# Patient Record
Sex: FEMALE | Race: WHITE | Employment: OTHER | URBAN - METROPOLITAN AREA
[De-identification: names, ages, dates, MRNs, and addresses within clinical notes are randomized per-mention and may not be internally consistent; named-entity substitution may affect disease eponyms.]

---

## 2017-01-31 ENCOUNTER — HOSPITAL ENCOUNTER (OUTPATIENT)
Dept: NON INVASIVE DIAGNOSTICS | Age: 82
Discharge: HOME | End: 2017-01-31
Attending: NURSE PRACTITIONER
Payer: MEDICARE

## 2017-01-31 DIAGNOSIS — I10 ESSENTIAL HYPERTENSION, BENIGN: ICD-10-CM

## 2017-01-31 PROCEDURE — 93306 TTE W/DOPPLER COMPLETE: CPT

## 2017-02-20 NOTE — PROGRESS NOTES
Per Fercho Spencer last note \"   Just FYI: Phong Bun done after recent ER visit for CHF.  Stress echo was done in 2009, EF at that time was 65%

## 2017-02-27 NOTE — PROGRESS NOTES
This echocardiogram looks fine and we can discuss it with her when she comes into the office on March 8, 2017 for her appointment.  HERBERTH

## 2017-03-16 ENCOUNTER — OFFICE VISIT (OUTPATIENT)
Dept: CARDIOLOGY CLINIC | Age: 82
End: 2017-03-16

## 2017-03-16 VITALS
HEART RATE: 74 BPM | SYSTOLIC BLOOD PRESSURE: 120 MMHG | DIASTOLIC BLOOD PRESSURE: 60 MMHG | HEIGHT: 56 IN | OXYGEN SATURATION: 96 % | WEIGHT: 109 LBS | BODY MASS INDEX: 24.52 KG/M2

## 2017-03-16 DIAGNOSIS — I10 ESSENTIAL HYPERTENSION: ICD-10-CM

## 2017-03-16 DIAGNOSIS — I48.0 PAROXYSMAL ATRIAL FIBRILLATION (HCC): Primary | ICD-10-CM

## 2017-03-16 DIAGNOSIS — E78.5 DYSLIPIDEMIA: ICD-10-CM

## 2017-03-16 RX ORDER — HYDRALAZINE HYDROCHLORIDE 25 MG/1
25 TABLET, FILM COATED ORAL 2 TIMES DAILY
COMMUNITY
Start: 2017-01-17 | End: 2018-02-15

## 2017-03-16 RX ORDER — NIFEDIPINE 30 MG/1
30 TABLET, EXTENDED RELEASE ORAL DAILY
COMMUNITY
Start: 2017-03-08 | End: 2018-03-07 | Stop reason: DRUGHIGH

## 2017-03-16 NOTE — PROGRESS NOTES
Review of Systems   Constitutional: Positive for malaise/fatigue. Respiratory: Negative. Cardiovascular: Negative. Gastrointestinal: Negative. Musculoskeletal: Negative. Neurological: Positive for dizziness.

## 2017-03-16 NOTE — PROGRESS NOTES
1. Have you been to the ER, urgent care clinic since your last visit? Hospitalized since your last visit? [facility] 12/3/16   2. Have you seen or consulted any other health care providers outside of the [address] [place] [address] since your last visit? Include any pap smears or colon screening.   no

## 2017-03-16 NOTE — MR AVS SNAPSHOT
Visit Information Date & Time Provider Department Dept. Phone Encounter #  
 3/16/2017 11:20 AM Adeel Bustamante DO [facility] [id number] Follow-up Instructions Return in about 6 months (around 9/16/2017), or if symptoms worsen or fail to improve. Upcoming Health Maintenance Date Due DTaP/Tdap/Td series (1 - Tdap) 6/10/1956 ZOSTER VACCINE AGE 60> 6/10/1995 GLAUCOMA SCREENING Q2Y 6/10/2000 OSTEOPOROSIS SCREENING (DEXA) 6/10/2000 MEDICARE YEARLY EXAM 6/10/2000 INFLUENZA AGE 9 TO ADULT 8/1/2016 Pneumococcal 65+ High/Highest Risk (2 of 2 - PPSV23) 10/1/2019 Allergies as of 3/16/2017  Review Complete On: 3/16/2017 By: Adeel Bustamante DO Severity Noted Reaction Type Reactions Hydrochlorothiazide  08/08/2012    Hives Penicillins  08/13/2012    Other (comments) Mouth sore, upset stomach. Sulfa (Sulfonamide Antibiotics)    Unknown (comments) Prednisone Low 08/03/2015   Systemic Other (comments) Body sweats, hot and cold, up all night Current Immunizations  Never Reviewed Name Date Influenza High Dose Vaccine PF 9/22/2015, 10/1/2014 Pneumococcal Vaccine (Unspecified Type) 10/1/2014 Not reviewed this visit You Were Diagnosed With   
  
 Codes Comments Paroxysmal atrial fibrillation (HCC)    -  Primary ICD-10-CM: I48.0 ICD-9-CM: 427.31 Essential hypertension     ICD-10-CM: I10 
ICD-9-CM: 401.9 Dyslipidemia     ICD-10-CM: E78.5 ICD-9-CM: 272.4 Vitals BP Pulse Height(growth percentile) Weight(growth percentile) SpO2 BMI  
 120/60 74 4' 8\" (1.422 m) 109 lb (49.4 kg) 96% 24.44 kg/m2 OB Status Smoking Status Hysterectomy Never Smoker Vitals History BMI and BSA Data Body Mass Index Body Surface Area  
 24.44 kg/m 2 1.4 m 2 Preferred Pharmacy Pharmacy Name Phone [facility] [address] [facility] [address] Your Updated Medication List  
  
   
This list is accurate as of: 3/16/17 12:25 PM.  Always use your most recent med list.  
  
  
  
  
 aspirin, buffered 81 mg Tab Take 1 Tab by mouth daily. beclomethasone 40 mcg/actuation [employer] [facility] [employer] Commonly known as:  QVAR Take 2 Puffs by inhalation two (2) times a day. Use with spacer device  
  
 biotin 1 mg Tab Take 1 Tab by mouth daily. CALCIUM 600 PO Take 1 Tab by mouth daily. carboxymethylcellulose sodium 1 % Dlgl Apply 1 Drop to eye nightly. cholecalciferol 1,000 unit Cap Commonly known as:  VITAMIN D3 Take 1,000 Units by mouth daily. CLARITIN 10 mg tablet Generic drug:  loratadine Take 10 mg by mouth daily as needed. digoxin 0.125 mg tablet Commonly known as:  LANOXIN Take 0.125 mg by mouth every other day. estradiol 1 mg tablet Commonly known as:  ESTRACE Take 1 mg by mouth daily. fish oil-dha-epa 1,200-144-216 mg Cap Take 1 Cap by mouth daily. furosemide 20 mg tablet Commonly known as:  LASIX Take  by mouth daily. hydrALAZINE 25 mg tablet Commonly known as:  APRESOLINE Take 25 mg by mouth two (2) times a day. levothyroxine 100 mcg tablet Commonly known as:  SYNTHROID Take 100 mcg by mouth Daily (before breakfast). LOPRESSOR 50 mg tablet Generic drug:  metoprolol tartrate Take 50 mg by mouth two (2) times a day. lovastatin 40 mg tablet Commonly known as:  MEVACOR Take 40 mg by mouth nightly. multivitamin tablet Commonly known as:  ONE A DAY Take 1 Tab by mouth daily. NIFEdipine ER 30 mg ER tablet Commonly known as:  PROCARDIA XL Take 30 mg by mouth daily. pantoprazole 40 mg tablet Commonly known as:  PROTONIX Take 40 mg by mouth daily. PROBIOTIC 4X 10-15 mg Tbec Generic drug:  B.infantis-B.ani-B.long-B.bifi Take 1 Tab by mouth daily. SINGULAIR 10 mg tablet Generic drug:  montelukast  
Take 10 mg by mouth daily. traMADol 50 mg tablet Commonly known as:  ULTRAM  
Take 25 mg by mouth two (2) times a day. traZODone 50 mg tablet Commonly known as:  Yepez Leobardo Take 50 mg by mouth nightly. We Performed the Following AMB POC EKG ROUTINE W/ 12 LEADS, INTER & REP [49971 CPT(R)] Follow-up Instructions Return in about 6 months (around 9/16/2017), or if symptoms worsen or fail to improve. Introducing [facility]! Dear [facility]: Thank you for requesting a Ondeego account. Our records indicate that you already have an active Ondeego account. You can access your account anytime at [email or web address] Did you know that you can access your hospital and ER discharge instructions at any time in Ondeego? You can also review all of your test results from your hospital stay or ER visit. Additional Information If you have questions, please visit the Frequently Asked Questions section of the Ondeego website at [email or web address] Remember, Ondeego is NOT to be used for urgent needs. For medical emergencies, dial 911. Now available from your iPhone and Android! Please provide this summary of care documentation to your next provider. Your primary care clinician is listed as Chapin Berrios. If you have any questions after today's visit, please call [phone].

## 2017-03-16 NOTE — PROGRESS NOTES
HPI: I saw Ryanlinh RosNancy Acosta in my office today in cardiovascular evaluation regarding her past problems with paroxysmal atrial fibrillation. Ms. Juan Acosta is a very pleasant 80year old  female with history of only mild coronary artery disease on a cardiac catheterization back in 2009, which was done due to fatigue issues. She has had problems with paroxysmal atrial fibrillation dating back to July of 2012, and we had empirically added Xarelto to her regimen because of potential risk for peripheral embolization from the heart, but unfortunately she had a significant problem with hemoptysis and that medication had to be discontinued and we have simply kept her off of anticoagulation long term and have simply kept her on aspirin. She had been remaining in sinus rhythm on Digoxin and Lopressor when I saw her at the time of her last visit in June 2016 and she was in sinus rhythm at the time of her last EKG on February 1, 2017, but she currently appears to be in atrial flutter fibrillation with a controlled ventricular response in the 70's. She comes in today and actually feels quite well and denies any cardiovascular symptomatology. She apparently was somewhat dig toxic when she had her jocks and level checked a number of months ago and is now taking digoxin 0.25 mg every other day. She has rather marked digitalis changes on her EKG but has a controlled rate in atrial fibrillation and this is new from her EKG back on February 1, 2017. She I suspect is going back and forth between sinus rhythm and atrial fibrillation but unfortunately due to her bleeding risk we may not be able to any anticoagulation. She is concerned about stroke and I am going to discuss her case further with Dr. Tomás Mohr.   She otherwise seems to be doing well except for some fatigue which he thinks may be related to some new medicine she got from Dr. Zuleta Goes due to some ENT issues, but the fatigue could also be related to her atrial flutter fibrillation. Encounter Diagnoses   Name Primary?  Paroxysmal atrial fibrillation (HCC) recurrent Yes    Essential hypertension     Dyslipidemia        Discussion: This patient appears to be doing reasonably well but she is back in atrial flutter fibrillation and she is concerned about the potential of stroke. I am going to discuss her case with Dr. Joyce Viera her general internist, but her hemoptysis issues I believe were significant enough that it was decided not to use anticoagulation in this lady. However, I will leave decision on attempted anticoagulation again to Dr. Angelo Pedersen. She apparently was dig toxic with a level done last July with a dig level 3.1 and have her digoxin held for couple days and then decreased to 0.25 mg every other day. However she is finding this somewhat difficult to follow and I am going to discuss this with Dr. Angelo Pedersen and I think we might want to change her to 0.125 mg either daily or 6 days a week and check a digoxin level after she has been on that regimen for a few weeks but we may wish to get a digoxin level at baseline now before we make the change and I will discuss this with Dr. Angelo Pedersen. Her latest lipid profile which was completed on July 13, 2016 showed total cholesterol 157 with triglycerides of 121, HDL of 49, and LDL of 84 on only Mevacor 40 mg daily which I think for this lady without diabetes or known obstructive coronary disease is reasonably good control. Since she otherwise seems to be doing well I will plan to see her again in several months or as needed if any new cardiovascular symptoms surface in the interim. PCP: Cayetano Allen MD       Past Medical History:   Diagnosis Date    Anemia NEC     Arthritis     Atrial fibrillation ([address]     Bursitis of left shoulder     Cardiac cath 01/27/2009    RCA mild. LM patent. pLAD 20%. Very dLAD w/significant tapering. CX mild.       Cardiac stress echo, abnormal 01/05/2009    Positive maximal stress echo by echo & EKG criteria w/o chest pain.  EF >65%. Sm, discrete apical, apical septal hypk.  Cough     Endocrine disease     Hyperlipidemia     Hypertension     Hypothyroidism     Osteopenia     Pulmonary hemorrhage     Pulmonary hypertension (HCC)     Rash and nonspecific skin eruption 9/2012    maculopapular on torso, arms, and upper legs    Reflux     Right hip pain     Right shoulder pain     Stenosing tenosynovitis of finger 04/2015    Bilateral index fingers    Trigger ring finger of right hand     Wears glasses        Past Surgical History:   Procedure Laterality Date    ABDOMEN SURGERY PROC UNLISTED      HX APPENDECTOMY      HX COLECTOMY      partial    HX HYSTERECTOMY      HX MOHS PROCEDURES Right 12/2010    Dr. Andrew Moore    HX OTHER SURGICAL      Two ribs removed    HX TONSILLECTOMY         Current Outpatient Rx   Name  Route  Sig  Dispense  Refill    levothyroxine (SYNTHROID) 100 mcg tablet    Oral    Take 100 mcg by mouth Daily (before breakfast).  hydrALAZINE (APRESOLINE) 50 mg tablet    Oral    Take 50 mg by mouth two (2) times a day.  NIFEdipine ER (ADALAT CC) 90 mg ER tablet    Oral    Take 90 mg by mouth daily.  pantoprazole (PROTONIX) 40 mg tablet    Oral    Take 40 mg by mouth daily.  CALCIUM CARBONATE (CALCIUM 600 PO)    Oral    Take 1 Tab by mouth daily.  ascorbic acid (VITAMIN C) 1,000 mg tablet    Oral    Take 1,000 mg by mouth daily.  cholecalciferol (VITAMIN D3) 1,000 unit cap    Oral    Take 1,000 Units by mouth daily.  multivitamin (ONE A DAY) tablet    Oral    Take 1 Tab by mouth daily.  carboxymethylcellulose sodium 1 % dlgl    Ophthalmic    Apply 1 Drop to eye nightly.  traMADol (ULTRAM) 50 mg tablet    Oral    Take 50 mg by mouth two (2) times a day.               beclomethasone (QVAR) 40 mcg/actuation inhaler    Inhalation    Take 2 Puffs by inhalation two (2) times a day. Use with spacer device    1 Inhaler    6      fish oil-dha-epa 1,200-144-216 mg cap    Oral    Take 1 Cap by mouth daily.  biotin 1 mg tab    Oral    Take 1 Tab by mouth daily.  OTHER    IntraMUSCular    by IntraMUSCular route every seven (7) days. Allergy shot at Dr. Ken Zepeda office              Aspirin, Buffered 81 mg tab    Oral    Take 1 Tab by mouth daily.  B.infantis-B.ani-B.long-B.bifi (PROBIOTIC 4X) 10-15 mg TbEC    Oral    Take 1 Tab by mouth daily.  montelukast (SINGULAIR) 10 mg tablet    Oral    Take 10 mg by mouth daily.  digoxin (LANOXIN) 0.125 mg tablet    Oral    Take 0.125 mg by mouth daily.  metoprolol (LOPRESSOR) 50 mg tablet    Oral    Take 50 mg by mouth two (2) times a day.  loratadine (CLARITIN) 10 mg tablet    Oral    Take 10 mg by mouth daily as needed.  lovastatin (MEVACOR) 40 mg tablet    Oral    Take 40 mg by mouth nightly.  traZODone (DESYREL) 50 mg tablet    Oral    Take 50 mg by mouth nightly.  estradiol (ESTRACE) 1 mg tablet    Oral    Take 1 mg by mouth daily. Allergies   Allergen Reactions    Hydrochlorothiazide Hives    Penicillins Other (comments)     Mouth sore, upset stomach.  Sulfa (Sulfonamide Antibiotics) Unknown (comments)    Prednisone Other (comments)     Body sweats, hot and cold, up all night       Social History:   Social History   Substance Use Topics    Smoking status: Never Smoker    Smokeless tobacco: Never Used    Alcohol use No         Family history: family history includes Heart Attack in her brother and mother; Heart Disease in her sister; Heart Surgery in her sister; Hypertension in her mother; Stroke in her brother and father. Review of Systems:   Constitutional: Positive for malaise/fatigue.    Respiratory: Negative. Cardiovascular: Negative. Gastrointestinal: Negative. Musculoskeletal: Negative. Neurological: Positive for dizziness. Physical Exam:   The patient is an alert, oriented, well developed, well nourished 80 y.o.  female who was in no acute distress at the time of my examination. Visit Vitals    /60    Pulse 74    Ht 4' 8\" (1.422 m)    Wt 49.4 kg (109 lb)    SpO2 96%    BMI 24.44 kg/m2      BP Readings from Last 3 Encounters:   03/16/17 120/60   12/29/16 150/66   06/15/16 136/80      Wt Readings from Last 3 Encounters:   03/16/17 49.4 kg (109 lb)   12/29/16 49.5 kg (109 lb 3.2 oz)   06/15/16 49 kg (108 lb)     HEENT: Conjuctiva white, mucosa moist, no pallor or cyanosis. NECK: Supple without masses, tenderness or thyromegaly. There was no jugular venous distention. Carotid are full bilaterally without bruits. CHEST: Symmetrical with good excursion. LUNGS: Clear to auscultation in all fields. HEART: Regular rate and rhythm. The apex is not displaced. There were no lifts, thrills or heaves. There is a normal S1 and S2 without appreciable murmurs, rubs, clicks, or gallops auscultated. ABDOMEN: Soft without masses, tenderness or organomegaly. EXTREMITIES: Full peripheral pulses without peripheral edema. Review of Data: See PMH and Cardiology and Imaging sections for cardiac testing      Results for orders placed or performed in visit on 03/16/17   AMB POC EKG ROUTINE W/ 12 LEADS, INTER & REP     Status: None    Narrative    Atrial flutter fibrillation with rate in the mid 70s. There are ST-T changes inferolaterally on the high lateral leads most consistent with digitalis effect. This EKG is similar to the EKG of December 29, 2016 although the ST-T wave changes are much more marked on the current tracing and at that time the patient was in sinus rhythm and now appears to be in atrial flutter fibrillation. Arvilla Lefort, D.O., F.A.C.C.   [facility]  [facility]  [address]    PLEASE NOTE:  This document has been produced using voice recognition software. Unrecognized errors in transcription may be present.

## 2017-03-17 ENCOUNTER — TELEPHONE (OUTPATIENT)
Dept: CARDIOLOGY CLINIC | Age: 82
End: 2017-03-17

## 2017-03-17 NOTE — TELEPHONE ENCOUNTER
Ms. Lenna Canavan called wanting to know if Dr. Lalita Cardozo spoke with Dr. Ирина Lovelace last night. She states that Dr. Lalita Cardozo told her he was going to call our office. Please call her with any information.

## 2017-03-20 ENCOUNTER — TELEPHONE (OUTPATIENT)
Dept: CARDIOLOGY CLINIC | Age: 82
End: 2017-03-20

## 2017-03-20 NOTE — TELEPHONE ENCOUNTER
Dr. Julio Canales returned my call and we discussed anticoagulation again  regarding Mrs. Heavenly Rodriguez since she was concerned about her risk of stroke when I saw her but is our understanding that she had a lot of bleeding when she was on Pradaxa with her hemoptysis and she really does not appear to be a candidate for any type of anticoagulation in the future. I also talked him about her digoxin level and he got one recently was down to 1.0 and she is now on digoxin 0.125 mg daily.  ES

## 2017-03-22 NOTE — TELEPHONE ENCOUNTER
I called and placed a message on the patient's answering machine on her home phone that I had called and talked to Dr. Tomás Mohr and that we are not planning on changing any medications at this time. I did actually try to call her on her cell phone but her cell phone was not accepting calls. In discussion with Dr. Tomás Mohr she had such significant bleeding from her lungs on Pradaxa that he did not feel that she was going to be a candidate to try an anticoagulation agent again and so we will simply have to follow her without any coagulation at this time. If she has specific questions or wants to discuss this further with either myself or with Dr. Tomás Mohr she can let us know and I will be glad to call or as I am sure he will be.  ES

## 2018-02-09 ENCOUNTER — APPOINTMENT (OUTPATIENT)
Dept: GENERAL RADIOLOGY | Age: 83
DRG: 866 | End: 2018-02-09
Attending: EMERGENCY MEDICINE
Payer: MEDICARE

## 2018-02-09 ENCOUNTER — HOSPITAL ENCOUNTER (INPATIENT)
Age: 83
LOS: 6 days | Discharge: NURSING, REHAB OR LONG-TERM CARE | DRG: 866 | End: 2018-02-15
Attending: EMERGENCY MEDICINE | Admitting: INTERNAL MEDICINE
Payer: MEDICARE

## 2018-02-09 DIAGNOSIS — M54.89 BACK PAIN WITHOUT SCIATICA: Primary | ICD-10-CM

## 2018-02-09 PROBLEM — J18.9 PNEUMONIA: Status: ACTIVE | Noted: 2018-02-09

## 2018-02-09 LAB
ALBUMIN SERPL-MCNC: 3.1 G/DL (ref 3.4–5)
ALBUMIN/GLOB SERPL: 0.5 {RATIO} (ref 0.8–1.7)
ALP SERPL-CCNC: 46 U/L (ref 45–117)
ALT SERPL-CCNC: 16 U/L (ref 13–56)
ANION GAP SERPL CALC-SCNC: 8 MMOL/L (ref 3–18)
APPEARANCE UR: CLEAR
AST SERPL-CCNC: 19 U/L (ref 15–37)
ATRIAL RATE: 95 BPM
BACTERIA URNS QL MICRO: ABNORMAL /HPF
BASOPHILS # BLD: 0 K/UL (ref 0–0.1)
BASOPHILS NFR BLD: 0 % (ref 0–2)
BILIRUB SERPL-MCNC: 0.6 MG/DL (ref 0.2–1)
BILIRUB UR QL: NEGATIVE
BUN SERPL-MCNC: 21 MG/DL (ref 7–18)
BUN/CREAT SERPL: 16 (ref 12–20)
CALCIUM SERPL-MCNC: 9 MG/DL (ref 8.5–10.1)
CALCULATED P AXIS, ECG09: 73 DEGREES
CALCULATED R AXIS, ECG10: 35 DEGREES
CALCULATED T AXIS, ECG11: 148 DEGREES
CHLORIDE SERPL-SCNC: 97 MMOL/L (ref 100–108)
CK MB CFR SERPL CALC: ABNORMAL % (ref 0–4)
CK MB CFR SERPL CALC: ABNORMAL % (ref 0–4)
CK MB SERPL-MCNC: <1 NG/ML (ref 5–25)
CK MB SERPL-MCNC: <1 NG/ML (ref 5–25)
CK SERPL-CCNC: 116 U/L (ref 26–192)
CK SERPL-CCNC: 93 U/L (ref 26–192)
CO2 SERPL-SCNC: 27 MMOL/L (ref 21–32)
COLOR UR: YELLOW
CREAT SERPL-MCNC: 1.31 MG/DL (ref 0.6–1.3)
DIAGNOSIS, 93000: NORMAL
DIFFERENTIAL METHOD BLD: ABNORMAL
EOSINOPHIL # BLD: 0 K/UL (ref 0–0.4)
EOSINOPHIL NFR BLD: 0 % (ref 0–5)
EPITH CASTS URNS QL MICRO: ABNORMAL /LPF (ref 0–5)
ERYTHROCYTE [DISTWIDTH] IN BLOOD BY AUTOMATED COUNT: 13 % (ref 11.6–14.5)
FLUAV AG NPH QL IA: NEGATIVE
FLUBV AG NOSE QL IA: NEGATIVE
GLOBULIN SER CALC-MCNC: 6.1 G/DL (ref 2–4)
GLUCOSE SERPL-MCNC: 122 MG/DL (ref 74–99)
GLUCOSE UR STRIP.AUTO-MCNC: NEGATIVE MG/DL
GRAN CASTS URNS QL MICRO: ABNORMAL /LPF
HCT VFR BLD AUTO: 39.4 % (ref 35–45)
HGB BLD-MCNC: 13.5 G/DL (ref 12–16)
HGB UR QL STRIP: NEGATIVE
HYALINE CASTS URNS QL MICRO: ABNORMAL /LPF (ref 0–2)
KETONES UR QL STRIP.AUTO: ABNORMAL MG/DL
LACTATE BLD-SCNC: 1.7 MMOL/L (ref 0.4–2)
LEUKOCYTE ESTERASE UR QL STRIP.AUTO: NEGATIVE
LYMPHOCYTES # BLD: 1.1 K/UL (ref 0.9–3.6)
LYMPHOCYTES NFR BLD: 7 % (ref 21–52)
MCH RBC QN AUTO: 32.3 PG (ref 24–34)
MCHC RBC AUTO-ENTMCNC: 34.3 G/DL (ref 31–37)
MCV RBC AUTO: 94.3 FL (ref 74–97)
MONOCYTES # BLD: 1.4 K/UL (ref 0.05–1.2)
MONOCYTES NFR BLD: 9 % (ref 3–10)
MUCOUS THREADS URNS QL MICRO: ABNORMAL /LPF
NEUTS SEG # BLD: 13.4 K/UL (ref 1.8–8)
NEUTS SEG NFR BLD: 84 % (ref 40–73)
NITRITE UR QL STRIP.AUTO: NEGATIVE
P-R INTERVAL, ECG05: 232 MS
PH UR STRIP: 6 [PH] (ref 5–8)
PLATELET # BLD AUTO: 311 K/UL (ref 135–420)
PMV BLD AUTO: 10.5 FL (ref 9.2–11.8)
POTASSIUM SERPL-SCNC: 3.5 MMOL/L (ref 3.5–5.5)
PROT SERPL-MCNC: 9.2 G/DL (ref 6.4–8.2)
PROT UR STRIP-MCNC: 300 MG/DL
Q-T INTERVAL, ECG07: 328 MS
QRS DURATION, ECG06: 86 MS
QTC CALCULATION (BEZET), ECG08: 412 MS
RBC # BLD AUTO: 4.18 M/UL (ref 4.2–5.3)
RBC #/AREA URNS HPF: ABNORMAL /HPF (ref 0–5)
SODIUM SERPL-SCNC: 132 MMOL/L (ref 136–145)
SP GR UR REFRACTOMETRY: 1.02 (ref 1–1.03)
TROPONIN I SERPL-MCNC: 0.07 NG/ML (ref 0–0.04)
TROPONIN I SERPL-MCNC: 0.08 NG/ML (ref 0–0.04)
UROBILINOGEN UR QL STRIP.AUTO: 1 EU/DL (ref 0.2–1)
VENTRICULAR RATE, ECG03: 95 BPM
WBC # BLD AUTO: 16 K/UL (ref 4.6–13.2)
WBC URNS QL MICRO: ABNORMAL /HPF (ref 0–4)

## 2018-02-09 PROCEDURE — 96375 TX/PRO/DX INJ NEW DRUG ADDON: CPT

## 2018-02-09 PROCEDURE — 99285 EMERGENCY DEPT VISIT HI MDM: CPT

## 2018-02-09 PROCEDURE — 87186 SC STD MICRODIL/AGAR DIL: CPT | Performed by: EMERGENCY MEDICINE

## 2018-02-09 PROCEDURE — 74011250637 HC RX REV CODE- 250/637: Performed by: EMERGENCY MEDICINE

## 2018-02-09 PROCEDURE — 87040 BLOOD CULTURE FOR BACTERIA: CPT | Performed by: EMERGENCY MEDICINE

## 2018-02-09 PROCEDURE — 74011250636 HC RX REV CODE- 250/636: Performed by: EMERGENCY MEDICINE

## 2018-02-09 PROCEDURE — 82550 ASSAY OF CK (CPK): CPT | Performed by: EMERGENCY MEDICINE

## 2018-02-09 PROCEDURE — 74011250636 HC RX REV CODE- 250/636: Performed by: INTERNAL MEDICINE

## 2018-02-09 PROCEDURE — 83605 ASSAY OF LACTIC ACID: CPT

## 2018-02-09 PROCEDURE — 87086 URINE CULTURE/COLONY COUNT: CPT | Performed by: EMERGENCY MEDICINE

## 2018-02-09 PROCEDURE — 94762 N-INVAS EAR/PLS OXIMTRY CONT: CPT

## 2018-02-09 PROCEDURE — 71045 X-RAY EXAM CHEST 1 VIEW: CPT

## 2018-02-09 PROCEDURE — 96365 THER/PROPH/DIAG IV INF INIT: CPT

## 2018-02-09 PROCEDURE — 74011250637 HC RX REV CODE- 250/637: Performed by: INTERNAL MEDICINE

## 2018-02-09 PROCEDURE — 87077 CULTURE AEROBIC IDENTIFY: CPT | Performed by: EMERGENCY MEDICINE

## 2018-02-09 PROCEDURE — 93005 ELECTROCARDIOGRAM TRACING: CPT

## 2018-02-09 PROCEDURE — 96361 HYDRATE IV INFUSION ADD-ON: CPT

## 2018-02-09 PROCEDURE — 74011000250 HC RX REV CODE- 250: Performed by: EMERGENCY MEDICINE

## 2018-02-09 PROCEDURE — 81001 URINALYSIS AUTO W/SCOPE: CPT | Performed by: EMERGENCY MEDICINE

## 2018-02-09 PROCEDURE — 85025 COMPLETE CBC W/AUTO DIFF WBC: CPT | Performed by: EMERGENCY MEDICINE

## 2018-02-09 PROCEDURE — 65270000029 HC RM PRIVATE

## 2018-02-09 PROCEDURE — 80053 COMPREHEN METABOLIC PANEL: CPT | Performed by: EMERGENCY MEDICINE

## 2018-02-09 PROCEDURE — 87804 INFLUENZA ASSAY W/OPTIC: CPT | Performed by: EMERGENCY MEDICINE

## 2018-02-09 RX ORDER — ACETAMINOPHEN 325 MG/1
650 TABLET ORAL
Status: DISCONTINUED | OUTPATIENT
Start: 2018-02-09 | End: 2018-02-15 | Stop reason: HOSPADM

## 2018-02-09 RX ORDER — SODIUM CHLORIDE 0.9 % (FLUSH) 0.9 %
5-10 SYRINGE (ML) INJECTION AS NEEDED
Status: DISCONTINUED | OUTPATIENT
Start: 2018-02-09 | End: 2018-02-15 | Stop reason: HOSPADM

## 2018-02-09 RX ORDER — METOPROLOL TARTRATE 50 MG/1
50 TABLET ORAL 2 TIMES DAILY
Status: DISCONTINUED | OUTPATIENT
Start: 2018-02-10 | End: 2018-02-15 | Stop reason: HOSPADM

## 2018-02-09 RX ORDER — TRAMADOL HYDROCHLORIDE 50 MG/1
25 TABLET ORAL 2 TIMES DAILY
Status: DISCONTINUED | OUTPATIENT
Start: 2018-02-09 | End: 2018-02-15 | Stop reason: HOSPADM

## 2018-02-09 RX ORDER — TRAMADOL HYDROCHLORIDE 50 MG/1
50 TABLET ORAL
Status: COMPLETED | OUTPATIENT
Start: 2018-02-09 | End: 2018-02-09

## 2018-02-09 RX ORDER — NIFEDIPINE 30 MG/1
30 TABLET, EXTENDED RELEASE ORAL DAILY
Status: DISCONTINUED | OUTPATIENT
Start: 2018-02-10 | End: 2018-02-15 | Stop reason: HOSPADM

## 2018-02-09 RX ORDER — LOVASTATIN 20 MG/1
40 TABLET ORAL
Status: DISCONTINUED | OUTPATIENT
Start: 2018-02-09 | End: 2018-02-15 | Stop reason: HOSPADM

## 2018-02-09 RX ORDER — LEVOTHYROXINE SODIUM 100 UG/1
100 TABLET ORAL
Status: DISCONTINUED | OUTPATIENT
Start: 2018-02-10 | End: 2018-02-15 | Stop reason: HOSPADM

## 2018-02-09 RX ORDER — HYDRALAZINE HYDROCHLORIDE 20 MG/ML
10 INJECTION INTRAMUSCULAR; INTRAVENOUS
Status: DISCONTINUED | OUTPATIENT
Start: 2018-02-09 | End: 2018-02-15 | Stop reason: HOSPADM

## 2018-02-09 RX ORDER — ALBUTEROL SULFATE 0.83 MG/ML
1.25 SOLUTION RESPIRATORY (INHALATION)
Status: DISCONTINUED | OUTPATIENT
Start: 2018-02-09 | End: 2018-02-15 | Stop reason: HOSPADM

## 2018-02-09 RX ORDER — SODIUM CHLORIDE 0.9 % (FLUSH) 0.9 %
5-10 SYRINGE (ML) INJECTION EVERY 8 HOURS
Status: DISCONTINUED | OUTPATIENT
Start: 2018-02-09 | End: 2018-02-15 | Stop reason: HOSPADM

## 2018-02-09 RX ORDER — HEPARIN SODIUM 5000 [USP'U]/ML
5000 INJECTION, SOLUTION INTRAVENOUS; SUBCUTANEOUS EVERY 8 HOURS
Status: DISCONTINUED | OUTPATIENT
Start: 2018-02-09 | End: 2018-02-11

## 2018-02-09 RX ORDER — HYDRALAZINE HYDROCHLORIDE 25 MG/1
25 TABLET, FILM COATED ORAL 2 TIMES DAILY
Status: DISCONTINUED | OUTPATIENT
Start: 2018-02-10 | End: 2018-02-14

## 2018-02-09 RX ADMIN — Medication 10 ML: at 21:55

## 2018-02-09 RX ADMIN — SODIUM CHLORIDE 1497 ML: 900 INJECTION, SOLUTION INTRAVENOUS at 15:00

## 2018-02-09 RX ADMIN — LOVASTATIN 40 MG: 20 TABLET ORAL at 21:53

## 2018-02-09 RX ADMIN — HYDRALAZINE HYDROCHLORIDE 10 MG: 20 INJECTION INTRAMUSCULAR; INTRAVENOUS at 21:53

## 2018-02-09 RX ADMIN — SODIUM CHLORIDE 500 MG: 900 INJECTION, SOLUTION INTRAVENOUS at 16:23

## 2018-02-09 RX ADMIN — ACETAMINOPHEN 650 MG: 325 TABLET ORAL at 22:04

## 2018-02-09 RX ADMIN — WATER 2 G: 1 INJECTION INTRAMUSCULAR; INTRAVENOUS; SUBCUTANEOUS at 15:00

## 2018-02-09 RX ADMIN — TRAMADOL HYDROCHLORIDE 50 MG: 50 TABLET ORAL at 10:56

## 2018-02-09 RX ADMIN — TRAMADOL HYDROCHLORIDE 25 MG: 50 TABLET, FILM COATED ORAL at 21:54

## 2018-02-09 NOTE — IP AVS SNAPSHOT
[address] Patient: Thom Mosley MRN: [id number] About your hospitalization You were admitted on:  February 9, 2018 You last received care in the:  [facility] [address] You were discharged on:  February 15, 2018 Why you were hospitalized Your primary diagnosis was:  Not on File Your diagnoses also included:  Pneumonia Follow-up Information Follow up With Details Comments Contact Info Moy Mosley MD On 2/22/2018 @ 10:30 [address] [address] 
[phone] Discharge Orders None A check jyoti indicates which time of day the medication should be taken. My Medications START taking these medications Instructions Each Dose to Equal  
 Morning Noon Evening Bedtime  
 acetaminophen 325 mg tablet Commonly known as:  TYLENOL Your last dose was: Your next dose is: Take 2 Tabs by mouth every four (4) hours as needed for Fever. 650 mg  
    
   
   
   
  
 albuterol 2.5 mg /3 mL (0.083 %) nebulizer solution Commonly known as:  PROVENTIL VENTOLIN Your last dose was: Your next dose is:    
   
   
 1.5 mL by Nebulization route every four (4) hours as needed for Wheezing. 1.25 mg  
    
   
   
   
  
 bisacodyl 5 mg EC tablet Commonly known as:  DULCOLAX Your last dose was: Your next dose is: Take 2 Tabs by mouth daily as needed for Constipation. 10 mg  
    
   
   
   
  
 docusate sodium 100 mg capsule Commonly known as:  Rafa Monica Your last dose was: Your next dose is: Take 1 Cap by mouth two (2) times a day for 90 days. 100 mg  
    
   
   
   
  
 melatonin 3 mg tablet Your last dose was: Your next dose is: Take 1 Tab by mouth nightly. 3 mg CHANGE how you take these medications Instructions Each Dose to Equal  
 Morning Noon Evening Bedtime  
 hydrALAZINE 50 mg tablet Commonly known as:  APRESOLINE What changed:   
- medication strength 
- how much to take - when to take this Your last dose was: Your next dose is: Take 1 Tab by mouth three (3) times daily. 50 mg CONTINUE taking these medications Instructions Each Dose to Equal  
 Morning Noon Evening Bedtime  
 aspirin, buffered 81 mg Tab Your last dose was: Your next dose is: Take 1 Tab by mouth daily. 1 Tab  
    
   
   
   
  
 beclomethasone 40 mcg/actuation Aero Commonly known as:  QVAR Your last dose was: Your next dose is: Take 2 Puffs by inhalation two (2) times a day. Use with spacer device 2 Puff  
    
   
   
   
  
 biotin 1 mg Tab Your last dose was: Your next dose is: Take 1 Tab by mouth daily. 1 Tab CALCIUM 600 PO Your last dose was: Your next dose is: Take 1 Tab by mouth daily. 1 Tab  
    
   
   
   
  
 carboxymethylcellulose sodium 1 % Dlgl Your last dose was: Your next dose is:    
   
   
 Apply 1 Drop to eye nightly. 1 Drop  
    
   
   
   
  
 cholecalciferol 1,000 unit Cap Commonly known as:  VITAMIN D3 Your last dose was: Your next dose is: Take 1,000 Units by mouth daily. 1000 Units CLARITIN 10 mg tablet Generic drug:  loratadine Your last dose was: Your next dose is: Take 10 mg by mouth daily as needed. 10 mg  
    
   
   
   
  
 digoxin 0.125 mg tablet Commonly known as:  LANOXIN Your last dose was: Your next dose is: Take 0.125 mg by mouth every other day.   
 0.125 mg  
    
   
   
   
  
 fish oil-dha-epa 1,200-144-216 mg Cap Your last dose was: Your next dose is: Take 1 Cap by mouth daily. 1 Cap  
    
   
   
   
  
 levothyroxine 100 mcg tablet Commonly known as:  SYNTHROID Your last dose was: Your next dose is: Take 100 mcg by mouth Daily (before breakfast). 100 mcg LOPRESSOR 50 mg tablet Generic drug:  metoprolol tartrate Your last dose was: Your next dose is: Take 50 mg by mouth two (2) times a day. 50 mg  
    
   
   
   
  
 lovastatin 40 mg tablet Commonly known as:  MEVACOR Your last dose was: Your next dose is: Take 40 mg by mouth nightly. 40 mg  
    
   
   
   
  
 multivitamin tablet Commonly known as:  ONE A DAY Your last dose was: Your next dose is: Take 1 Tab by mouth daily. 1 Tab NIFEdipine ER 30 mg ER tablet Commonly known as:  PROCARDIA XL Your last dose was: Your next dose is: Take 30 mg by mouth daily. 30 mg  
    
   
   
   
  
 pantoprazole 40 mg tablet Commonly known as:  PROTONIX Your last dose was: Your next dose is: Take 40 mg by mouth daily. 40 mg PROBIOTIC 4X 10-15 mg Tbec Generic drug:  B.infantis-B.ani-B.long-B.bifi Your last dose was: Your next dose is: Take 1 Tab by mouth daily. 1 Tab SINGULAIR 10 mg tablet Generic drug:  montelukast  
   
Your last dose was: Your next dose is: Take 10 mg by mouth daily. 10 mg  
    
   
   
   
  
 traMADol 50 mg tablet Commonly known as:  ULTRAM  
   
Your last dose was: Your next dose is: Take 0.5 Tabs by mouth two (2) times a day. Max Daily Amount: 50 mg.  
 25 mg  
    
   
   
   
  
 traZODone 50 mg tablet Commonly known as:  Desmond Blount Your last dose was: Your next dose is: Take 50 mg by mouth nightly. 50 mg  
    
   
   
   
  
  
STOP taking these medications   
 estradiol 1 mg tablet Commonly known as:  ESTRACE  
   
  
 furosemide 20 mg tablet Commonly known as:  LASIX Where to Get Your Medications Information on where to get these meds will be given to you by the nurse or doctor. ! Ask your nurse or doctor about these medications  
  acetaminophen 325 mg tablet  
 albuterol 2.5 mg /3 mL (0.083 %) nebulizer solution  
 bisacodyl 5 mg EC tablet  
 docusate sodium 100 mg capsule  
 hydrALAZINE 50 mg tablet  
 melatonin 3 mg tablet  
 traMADol 50 mg tablet Discharge Instructions Pneumonia: Care Instructions Your Care Instructions Pneumonia is an infection of the lungs. Most cases are caused by infections from bacteria or viruses. Pneumonia may be mild or very severe. If it is caused by bacteria, you will be treated with antibiotics. It may take a few weeks to a few months to recover fully from pneumonia, depending on how sick you were and whether your overall health is good. Follow-up care is a key part of your treatment and safety. Be sure to make and go to all appointments, and call your doctor if you are having problems. It's also a good idea to know your test results and keep a list of the medicines you take. How can you care for yourself at home? · Take your antibiotics exactly as directed. Do not stop taking the medicine just because you are feeling better. You need to take the full course of antibiotics. · Take your medicines exactly as prescribed. Call your doctor if you think you are having a problem with your medicine. · Get plenty of rest and sleep. You may feel weak and tired for a while, but your energy level will improve with time.  
· To prevent dehydration, drink plenty of fluids, enough so that your urine is light yellow or clear like water. Choose water and other caffeine-free clear liquids until you feel better. If you have kidney, heart, or liver disease and have to limit fluids, talk with your doctor before you increase the amount of fluids you drink. · Take care of your cough so you can rest. A cough that brings up mucus from your lungs is common with pneumonia. It is one way your body gets rid of the infection. But if coughing keeps you from resting or causes severe fatigue and chest-wall pain, talk to your doctor. He or she may suggest that you take a medicine to reduce the cough. · Use a vaporizer or humidifier to add moisture to your bedroom. Follow the directions for cleaning the machine. · Do not smoke or allow others to smoke around you. Smoke will make your cough last longer. If you need help quitting, talk to your doctor about stop-smoking programs and medicines. These can increase your chances of quitting for good. · Take an over-the-counter pain medicine, such as acetaminophen (Tylenol), ibuprofen (Advil, Motrin), or naproxen (Aleve). Read and follow all instructions on the label. · Do not take two or more pain medicines at the same time unless the doctor told you to. Many pain medicines have acetaminophen, which is Tylenol. Too much acetaminophen (Tylenol) can be harmful. · If you were given a spirometer to measure how well your lungs are working, use it as instructed. This can help your doctor tell how your recovery is going. · To prevent pneumonia in the future, talk to your doctor about getting a flu vaccine (once a year) and a pneumococcal vaccine (one time only for most people). When should you call for help? Call 911 anytime you think you may need emergency care. For example, call if: 
? · You have severe trouble breathing. ?Call your doctor now or seek immediate medical care if: 
? · You cough up dark brown or bloody mucus (sputum). ? · You have new or worse trouble breathing. ? · You are dizzy or lightheaded, or you feel like you may faint. ? Watch closely for changes in your health, and be sure to contact your doctor if: 
? · You have a new or higher fever. ? · You are coughing more deeply or more often. ? · You are not getting better after 2 days (48 hours). ? · You do not get better as expected. Where can you learn more? Go to http://molina-frida.info/. Enter [id number] in the search box to learn more about \"Pneumonia: Care Instructions. \" Current as of: May 12, 2017 Content Version: 11.4 © 7264-1839 [employer] Care instructions adapted under license by [employer] (which disclaims liability or warranty for this information). If you have questions about a medical condition or this instruction, always ask your healthcare professional. [place] [address] any warranty or liability for your use of this information. Back Pain: Care Instructions Your Care Instructions Back pain has many possible causes. It is often related to problems with muscles and ligaments of the back. It may also be related to problems with the nerves, discs, or bones of the back. Moving, lifting, standing, sitting, or sleeping in an awkward way can strain the back. Sometimes you don't notice the injury until later. Arthritis is another common cause of back pain. Although it may hurt a lot, back pain usually improves on its own within several weeks. Most people recover in 12 weeks or less. Using good home treatment and being careful not to stress your back can help you feel better sooner. Follow-up care is a key part of your treatment and safety. Be sure to make and go to all appointments, and call your doctor if you are having problems. It's also a good idea to know your test results and keep a list of the medicines you take. How can you care for yourself at home? · Sit or lie in positions that are most comfortable and reduce your pain. Try one of these positions when you lie down: ¨ Lie on your back with your knees bent and supported by large pillows. ¨ Lie on the floor with your legs on the seat of a sofa or chair. [place] on your side with your knees and hips bent and a pillow between your legs. ¨ Lie on your stomach if it does not make pain worse. · Do not sit up in bed, and avoid soft couches and twisted positions. Bed rest can help relieve pain at first, but it delays healing. Avoid bed rest after the first day of back pain. · Change positions every 30 minutes. If you must sit for long periods of time, take breaks from sitting. Get up and walk around, or lie in a comfortable position. · Try using a heating pad on a low or medium setting for 15 to 20 minutes every 2 or 3 hours. Try a warm shower in place of one session with the heating pad. · You can also try an ice pack for 10 to 15 minutes every 2 to 3 hours. Put a thin cloth between the ice pack and your skin. · Take pain medicines exactly as directed. ¨ If the doctor gave you a prescription medicine for pain, take it as prescribed. ¨ If you are not taking a prescription pain medicine, ask your doctor if you can take an over-the-counter medicine. · Take short walks several times a day. You can start with 5 to 10 minutes, 3 or 4 times a day, and work up to longer walks. Walk on level surfaces and avoid hills and stairs until your back is better. · Return to work and other activities as soon as you can. Continued rest without activity is usually not good for your back. · To prevent future back pain, do exercises to stretch and strengthen your back and stomach. Learn how to use good posture, safe lifting techniques, and proper body mechanics. When should you call for help? Call your doctor now or seek immediate medical care if: 
? · You have new or worsening numbness in your legs. ? · You have new or worsening weakness in your legs. (This could make it hard to stand up.) ? · You lose control of your bladder or bowels. ? Watch closely for changes in your health, and be sure to contact your doctor if: 
? · Your pain gets worse. ? · You are not getting better after 2 weeks. Where can you learn more? Go to http://molina-frida.info/. Enter C445 in the search box to learn more about \"Back Pain: Care Instructions. \" Current as of: March 21, 2017 Content Version: 11.4 © 5562-8736 [employer]. Care instructions adapted under license by [employer] (which disclaims liability or warranty for this information). If you have questions about a medical condition or this instruction, always ask your healthcare professional. [address] any warranty or liability for your use of this information. Patient armband removed and shredded DISCHARGE SUMMARY from Nurse PATIENT INSTRUCTIONS: 
 
 
F-face looks uneven A-arms unable to move or move unevenly S-speech slurred or non-existent T-time-call 911 as soon as signs and symptoms begin-DO NOT go Back to bed or wait to see if you get better-TIME IS BRAIN. Warning Signs of HEART ATTACK Call 911 if you have these symptoms: 
? Chest discomfort. Most heart attacks involve discomfort in the center of the chest that lasts more than a few minutes, or that goes away and comes back. It can feel like uncomfortable pressure, squeezing, fullness, or pain. ? Discomfort in other areas of the upper body.  Symptoms can include pain or discomfort in one or both arms, the back, neck, jaw, or stomach. ? Shortness of breath with or without chest discomfort. ? Other signs may include breaking out in a cold sweat, nausea, or lightheadedness. Don't wait more than five minutes to call [address]! Fast action can save your life. Calling 911 is almost always the fastest way to get lifesaving treatment. Emergency Medical Services staff can begin treatment when they arrive  up to an hour sooner than if someone gets to the hospital by car. The discharge information has been reviewed with the patient and caregiver. The patient and caregiver verbalized understanding. Discharge medications reviewed with the patient and caregiver and appropriate educational materials and side effects teaching were provided. ___________________________________________________________________________________________________________________________________ Introducing [facility]! Dear Tierney Maldonado: Thank you for requesting a [employer] account. Our records indicate that you already have an active [employer] account. You can access your account anytime at [email or web address] Did you know that you can access your hospital and ER discharge instructions at any time in [employer]? You can also review all of your test results from your hospital stay or ER visit. Additional Information If you have questions, please visit the Frequently Asked Questions section of the [employer] website at [email or web address] Remember, [employer] is NOT to be used for urgent needs. For medical emergencies, dial 911. Now available from your iPhone and Android! Unresulted Labs-Please follow up with your PCP about these lab tests Order Current Status CULTURE, BLOOD Preliminary result CULTURE, BLOOD Preliminary result Providers Seen During Your Hospitalization Provider Specialty Primary office phone Lili Coffman MD Emergency Medicine [phone] Joleen Friday, MD Internal Medicine [phone] Carin Jules MD [facility] [phone] Kvng Willson MD Family Practice [phone] Marquis Martinez MD Internal Medicine [phone] Your Primary Care Physician (PCP) Primary Care Physician Office Phone Office Fax [phone] [facility], [address] [place] [address] [phone] You are allergic to the following Allergen Reactions Hydrochlorothiazide Hives Penicillins Other (comments) Mouth sore, upset stomach. Sulfa (Sulfonamide Antibiotics) Unknown (comments) Prednisone Other (comments) Body sweats, hot and cold, up all night Recent Documentation Height Weight BMI OB Status Smoking Status 1.346 m 53.3 kg 29.43 kg/m2 Hysterectomy Never Smoker Emergency Contacts Name Discharge Info Relation Home Work Mobile [address] A CAREGIVER [3] Child [2] [phone] Patient Belongings The following personal items are in your possession at time of discharge: 
     Visual Aid: None                  Other Valuables: None Please provide this summary of care documentation to your next provider. Signatures-by signing, you are acknowledging that this After Visit Summary has been reviewed with you and you have received a copy. Patient Signature:  ____________________________________________________________ Date:  ____________________________________________________________  
  
Tanisha Lopez Provider Signature:  ____________________________________________________________ Date:  ____________________________________________________________

## 2018-02-09 NOTE — IP AVS SNAPSHOT
[address] Patient: Yohannes Gonzalez MRN: [id number] A check jyoti indicates which time of day the medication should be taken. My Medications START taking these medications Instructions Each Dose to Equal  
 Morning Noon Evening Bedtime  
 acetaminophen 325 mg tablet Commonly known as:  TYLENOL Your last dose was: Your next dose is: Take 2 Tabs by mouth every four (4) hours as needed for Fever. 650 mg  
    
   
   
   
  
 albuterol 2.5 mg /3 mL (0.083 %) nebulizer solution Commonly known as:  PROVENTIL VENTOLIN Your last dose was: Your next dose is:    
   
   
 1.5 mL by Nebulization route every four (4) hours as needed for Wheezing. 1.25 mg  
    
   
   
   
  
 bisacodyl 5 mg EC tablet Commonly known as:  DULCOLAX Your last dose was: Your next dose is: Take 2 Tabs by mouth daily as needed for Constipation. 10 mg  
    
   
   
   
  
 docusate sodium 100 mg capsule Commonly known as:  Michelle Sevillaner Your last dose was: Your next dose is: Take 1 Cap by mouth two (2) times a day for 90 days. 100 mg  
    
   
   
   
  
 melatonin 3 mg tablet Your last dose was: Your next dose is: Take 1 Tab by mouth nightly. 3 mg CHANGE how you take these medications Instructions Each Dose to Equal  
 Morning Noon Evening Bedtime  
 hydrALAZINE 50 mg tablet Commonly known as:  APRESOLINE What changed:   
- medication strength 
- how much to take - when to take this Your last dose was: Your next dose is: Take 1 Tab by mouth three (3) times daily. 50 mg CONTINUE taking these medications Instructions Each Dose to Equal  
 Morning Noon Evening Bedtime aspirin, buffered 81 mg Tab Your last dose was: Your next dose is: Take 1 Tab by mouth daily. 1 Tab  
    
   
   
   
  
 beclomethasone 40 mcg/actuation Aero Commonly known as:  QVAR Your last dose was: Your next dose is: Take 2 Puffs by inhalation two (2) times a day. Use with spacer device 2 Puff  
    
   
   
   
  
 biotin 1 mg Tab Your last dose was: Your next dose is: Take 1 Tab by mouth daily. 1 Tab CALCIUM 600 PO Your last dose was: Your next dose is: Take 1 Tab by mouth daily. 1 Tab  
    
   
   
   
  
 carboxymethylcellulose sodium 1 % Dlgl Your last dose was: Your next dose is:    
   
   
 Apply 1 Drop to eye nightly. 1 Drop  
    
   
   
   
  
 cholecalciferol 1,000 unit Cap Commonly known as:  VITAMIN D3 Your last dose was: Your next dose is: Take 1,000 Units by mouth daily. 1000 Units CLARITIN 10 mg tablet Generic drug:  loratadine Your last dose was: Your next dose is: Take 10 mg by mouth daily as needed. 10 mg  
    
   
   
   
  
 digoxin 0.125 mg tablet Commonly known as:  LANOXIN Your last dose was: Your next dose is: Take 0.125 mg by mouth every other day. 0.125 mg  
    
   
   
   
  
 fish oil-dha-epa 1,200-144-216 mg Cap Your last dose was: Your next dose is: Take 1 Cap by mouth daily. 1 Cap  
    
   
   
   
  
 levothyroxine 100 mcg tablet Commonly known as:  SYNTHROID Your last dose was: Your next dose is: Take 100 mcg by mouth Daily (before breakfast). 100 mcg LOPRESSOR 50 mg tablet Generic drug:  metoprolol tartrate Your last dose was: Your next dose is: Take 50 mg by mouth two (2) times a day. 50 mg  
    
   
   
   
  
 lovastatin 40 mg tablet Commonly known as:  MEVACOR Your last dose was: Your next dose is: Take 40 mg by mouth nightly. 40 mg  
    
   
   
   
  
 multivitamin tablet Commonly known as:  ONE A DAY Your last dose was: Your next dose is: Take 1 Tab by mouth daily. 1 Tab NIFEdipine ER 30 mg ER tablet Commonly known as:  PROCARDIA XL Your last dose was: Your next dose is: Take 30 mg by mouth daily. 30 mg  
    
   
   
   
  
 pantoprazole 40 mg tablet Commonly known as:  PROTONIX Your last dose was: Your next dose is: Take 40 mg by mouth daily. 40 mg PROBIOTIC 4X 10-15 mg Tbec Generic drug:  B.infantis-B.ani-B.long-B.bifi Your last dose was: Your next dose is: Take 1 Tab by mouth daily. 1 Tab SINGULAIR 10 mg tablet Generic drug:  montelukast  
   
Your last dose was: Your next dose is: Take 10 mg by mouth daily. 10 mg  
    
   
   
   
  
 traMADol 50 mg tablet Commonly known as:  ULTRAM  
   
Your last dose was: Your next dose is: Take 0.5 Tabs by mouth two (2) times a day. Max Daily Amount: 50 mg.  
 25 mg  
    
   
   
   
  
 traZODone 50 mg tablet Commonly known as:  Cory Gordonle Your last dose was: Your next dose is: Take 50 mg by mouth nightly. 50 mg  
    
   
   
   
  
  
STOP taking these medications   
 estradiol 1 mg tablet Commonly known as:  ESTRACE  
   
  
 furosemide 20 mg tablet Commonly known as:  LASIX Where to Get Your Medications Information on where to get these meds will be given to you by the nurse or doctor. ! Ask your nurse or doctor about these medications acetaminophen 325 mg tablet  
 albuterol 2.5 mg /3 mL (0.083 %) nebulizer solution  
 bisacodyl 5 mg EC tablet  
 docusate sodium 100 mg capsule  
 hydrALAZINE 50 mg tablet  
 melatonin 3 mg tablet  
 traMADol 50 mg tablet

## 2018-02-09 NOTE — ED PROVIDER NOTES
EMERGENCY DEPARTMENT HISTORY AND PHYSICAL EXAM    10:43 AM      Date: 2/9/2018  Patient Name: Steven Ruiz    History of Presenting Illness     Chief Complaint   Patient presents with    Back Pain         History Provided By: Patient and Patient's Son    Additional History (Context): Steven Ruiz is a 80 y.o. female with a Hx of HTN, A-fib, endocrine disease, hyperlipidemia, hypothyroidism, pulmonary hemorrhage, anemia, who presents with acute back pain onset 2 days ago. Per the pt's family, the pt has been \"couch-ridden\" for the past few months after an episode of shingles, prior to that she was active, and ambulatory. She was on Tramadol daily for a severe, persistent cough for months. The pain began in her neck 2-3 days after stopping the Tramadol, and has since moved to her back. Denies any modifying factors. Denies any edema. No further complaints at this time. PCP: Alexandria Seip, MD    Chief Complaint: back pain  Duration: 2-3 Days  Timing:  Acute  Location: back, neck  Quality: n/a  Severity: Moderate  Modifying Factors: denies any modifying factors. Associated Symptoms: coughing      Current Outpatient Prescriptions   Medication Sig Dispense Refill    NIFEdipine ER (PROCARDIA XL) 30 mg ER tablet Take 30 mg by mouth daily.  hydrALAZINE (APRESOLINE) 25 mg tablet Take 25 mg by mouth two (2) times a day.  furosemide (LASIX) 20 mg tablet Take  by mouth daily.  traMADol (ULTRAM) 50 mg tablet Take 25 mg by mouth two (2) times a day.  levothyroxine (SYNTHROID) 100 mcg tablet Take 100 mcg by mouth Daily (before breakfast).  pantoprazole (PROTONIX) 40 mg tablet Take 40 mg by mouth daily.  CALCIUM CARBONATE (CALCIUM 600 PO) Take 1 Tab by mouth daily.  cholecalciferol (VITAMIN D3) 1,000 unit cap Take 1,000 Units by mouth daily.  multivitamin (ONE A DAY) tablet Take 1 Tab by mouth daily.  carboxymethylcellulose sodium 1 % dlgl Apply 1 Drop to eye nightly.  beclomethasone (QVAR) 40 mcg/actuation inhaler Take 2 Puffs by inhalation two (2) times a day. Use with spacer device 1 Inhaler 6    fish oil-dha-epa 1,200-144-216 mg cap Take 1 Cap by mouth daily.  biotin 1 mg tab Take 1 Tab by mouth daily.  Aspirin, Buffered 81 mg tab Take 1 Tab by mouth daily.  B.infantis-B.ani-B.long-B.bifi (PROBIOTIC 4X) 10-15 mg TbEC Take 1 Tab by mouth daily.  montelukast (SINGULAIR) 10 mg tablet Take 10 mg by mouth daily.  digoxin (LANOXIN) 0.125 mg tablet Take 0.125 mg by mouth every other day.  metoprolol (LOPRESSOR) 50 mg tablet Take 50 mg by mouth two (2) times a day.  loratadine (CLARITIN) 10 mg tablet Take 10 mg by mouth daily as needed.  lovastatin (MEVACOR) 40 mg tablet Take 40 mg by mouth nightly.  traZODone (DESYREL) 50 mg tablet Take 50 mg by mouth nightly.  estradiol (ESTRACE) 1 mg tablet Take 1 mg by mouth daily. Past History     Past Medical History:  Past Medical History:   Diagnosis Date    Anemia NEC     Arthritis     Atrial fibrillation ([facility] [address]     Bursitis of left shoulder     Cardiac cath 01/27/2009    RCA mild. LM patent. pLAD 20%. Very dLAD w/significant tapering. CX mild.  Cardiac stress echo, abnormal 01/05/2009    Positive maximal stress echo by echo & EKG criteria w/o chest pain.  EF >65%. Sm, discrete apical, apical septal hypk.    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    Bilateral index fingers    Trigger ring finger of right hand     Wears glasses        Past Surgical History:  Past Surgical History:   Procedure Laterality Date    ABDOMEN SURGERY PROC UNLISTED      HX APPENDECTOMY      HX COLECTOMY      partial    HX HYSTERECTOMY      HX MOHS PROCEDURES Right 12/2010    Dr. Anton Henley      Two ribs removed    HX TONSILLECTOMY         Family History:  Family History   Problem Relation Age of Onset    Hypertension Mother     Heart Attack Mother     Stroke Father     Heart Disease Sister     Heart Surgery Sister      CABG    Stroke Brother     Heart Attack Brother        Social History:  Social History   Substance Use Topics    Smoking status: Never Smoker    Smokeless tobacco: Never Used    Alcohol use No       Allergies: Allergies   Allergen Reactions    Hydrochlorothiazide Hives    Penicillins Other (comments)     Mouth sore, upset stomach.  Sulfa (Sulfonamide Antibiotics) Unknown (comments)    Prednisone Other (comments)     Body sweats, hot and cold, up all night         Review of Systems     Review of Systems   Respiratory: Positive for cough. Cardiovascular: Negative for leg swelling. Musculoskeletal: Positive for back pain and neck pain. All other systems reviewed and are negative. Physical Exam     Visit Vitals    /74    Pulse 94    Temp 98.7 °F (37.1 °C)    Resp 30    Ht 4' 8\" (1.422 m)    Wt 49.9 kg (110 lb)    SpO2 94%    BMI 24.66 kg/m2       Physical Exam   Constitutional: She is oriented to person, place, and time. She appears well-developed. HENT:   Head: Normocephalic and atraumatic. Eyes: EOM are normal. Pupils are equal, round, and reactive to light. Neck: Normal range of motion. Neck supple. Cardiovascular: Normal rate, regular rhythm and normal heart sounds. Exam reveals no friction rub. No murmur heard. Pulmonary/Chest: Effort normal and breath sounds normal. No respiratory distress. She has no wheezes. Abdominal: Soft. She exhibits no distension. There is no tenderness. There is no rebound and no guarding. Musculoskeletal: Normal range of motion.    Neurological: She is alert and oriented to person, place, and time. Skin: Skin is warm and dry. Psychiatric: She has a normal mood and affect. Her behavior is normal. Thought content normal.         Diagnostic Study Results   CXR:  IMPRESSION:     New streaky densities in the left lower lung zone with left costophrenic angle  focal opacity. Suggestive of developing infiltrate, i.e. infectious process,  vs. atelectatic change. If the patient symptoms are not attributable to  infectious process or atelectasis, vascular event may be considered as well. EKG shows a sinus rhythm with a rate of 95 with a first-degree AV block at 232 otherwise normal intervals there is no ST elevation or depression or hypertrophy. There were T-wave inversions laterally in V5 and V6 is likely secondary to LVH that is not obvious on the EKG. Compared to EKG done   3/ 6/17 and the rate was A. Fib and now it is sinusitis however the ST abnormalities are unchanged    Medical Decision Making     cxr noted; will tx; no fever or cough but is tachypnic. 2:25 PM initially doubted sepsis; but now considering since tachycardic and tachpic with poss infiltrate. Will admit; kofi/ dr Genie Jimenes. Diagnosis     No diagnosis found. _______________________________    Attestations:  Scribe Attestation     Adeola Rousseau acting as a scribe for and in the presence of Yossi Phoenix MD      February 09, 2018 at 10:43 AM       Provider Attestation:      I personally performed the services described in the documentation, reviewed the documentation, as recorded by the scribe in my presence, and it accurately and completely records my words and actions.  February 09, 2018 at 10:43 AM - Yossi Phoenix MD    _______________________________

## 2018-02-09 NOTE — PROGRESS NOTES
Received pt from ED alert and oriented with daughter in law at bed side. Resting at present time, afebrile but c/o back pain. Red rash under both breats due to shingles according to daughter in law, we'll continue to monitor.

## 2018-02-09 NOTE — ED TRIAGE NOTES
Pt lives at home arrived via EMS c/o backpain, pt has generalized TTP along lower back, denies trauma or any injuries, no loss of sensation or movement, pt states she was coughing all night, pain increases with breathing and movement, pt put on 2 L NC due to not taking deep breaths, O2 sat on RA 88%. Family at bedside.

## 2018-02-09 NOTE — H&P
History and Physical      NAME:  Janine Marshall   :   1935   MRN:   [id number]     Date/Time:  2018     CHIEF COMPLAINT: back pain     HISTORY OF PRESENT ILLNESS:     Ms. Joy Bazzi is a 80 y.o.   female with a PMH of chronic back pain, atrial fibrillation, HTN, hyperlipidemia, hypothyroidism, pulmonary HTN, pulmonary hemorrhage who presents with c/c of back pain. She was on tramadol for several years for pain control and few days ago her PCP discontinued that and since then she is having more pain. She is also complaining of cough, no chest pain or fever. She has some shortness of breathing. Here in ED she was found to have left lower lobe infiltrates. She was started on IV antibiotics with ceftriaxone and zithromax. According to her family, they are concerned that she may be withdrawing from tramadol. Past Medical History:   Diagnosis Date    Anemia NEC     Arthritis     Atrial fibrillation ([facility])     Bursitis of left shoulder     Cardiac cath 2009    RCA mild. LM patent. pLAD 20%. Very dLAD w/significant tapering. CX mild.  Cardiac stress echo, abnormal 2009    Positive maximal stress echo by echo & EKG criteria w/o chest pain.  EF >65%. Sm, discrete apical, apical septal hypk.     Cough     Endocrine disease     Hyperlipidemia     Hypertension     Hypothyroidism     Osteopenia     Pulmonary hemorrhage     Pulmonary hypertension     Rash and nonspecific skin eruption 2012    maculopapular on torso, arms, and upper legs    Reflux     Right hip pain     Right shoulder pain     Stenosing tenosynovitis of finger 2015    Bilateral index fingers    Trigger ring finger of right hand     Wears glasses         Past Surgical History:   Procedure Laterality Date    ABDOMEN SURGERY PROC UNLISTED      HX APPENDECTOMY      HX COLECTOMY      partial    HX HYSTERECTOMY      HX MOHS PROCEDURES Right 2010    Dr. Pao Du Robert    HX OTHER SURGICAL      Two ribs removed    HX TONSILLECTOMY         Social History   Substance Use Topics    Smoking status: Never Smoker    Smokeless tobacco: Never Used    Alcohol use No        Family History   Problem Relation Age of Onset    Hypertension Mother     Heart Attack Mother     Stroke Father     Heart Disease Sister     Heart Surgery Sister      CABG    Stroke Brother     Heart Attack Brother         Allergies   Allergen Reactions    Hydrochlorothiazide Hives    Penicillins Other (comments)     Mouth sore, upset stomach.  Sulfa (Sulfonamide Antibiotics) Unknown (comments)    Prednisone Other (comments)     Body sweats, hot and cold, up all night        Prior to Admission medications    Medication Sig Start Date End Date Taking? Authorizing Provider   NIFEdipine ER (PROCARDIA XL) 30 mg ER tablet Take 30 mg by mouth daily. 3/8/17   Historical Provider   hydrALAZINE (APRESOLINE) 25 mg tablet Take 25 mg by mouth two (2) times a day. 1/17/17   Historical Provider   furosemide (LASIX) 20 mg tablet Take  by mouth daily. Historical Provider   traMADol (ULTRAM) 50 mg tablet Take 25 mg by mouth two (2) times a day. Historical Provider   levothyroxine (SYNTHROID) 100 mcg tablet Take 100 mcg by mouth Daily (before breakfast). 5/16/16   Historical Provider   pantoprazole (PROTONIX) 40 mg tablet Take 40 mg by mouth daily. 3/9/16   Historical Provider   CALCIUM CARBONATE (CALCIUM 600 PO) Take 1 Tab by mouth daily. Historical Provider   cholecalciferol (VITAMIN D3) 1,000 unit cap Take 1,000 Units by mouth daily. Historical Provider   multivitamin (ONE A DAY) tablet Take 1 Tab by mouth daily. Historical Provider   carboxymethylcellulose sodium 1 % dlgl Apply 1 Drop to eye nightly. Historical Provider   beclomethasone (QVAR) 40 mcg/actuation inhaler Take 2 Puffs by inhalation two (2) times a day.  Use with spacer device 4/9/15   Kerrie Morillo MD   fish oil-dha-epa 1,200-144-216 mg cap Take 1 Cap by mouth daily. Historical Provider   biotin 1 mg tab Take 1 Tab by mouth daily. Historical Provider   Aspirin, Buffered 81 mg tab Take 1 Tab by mouth daily. Historical Provider   B.infantis-B.ani-B.long-B.bifi (PROBIOTIC 4X) 10-15 mg TbEC Take 1 Tab by mouth daily. Historical Provider   montelukast (SINGULAIR) 10 mg tablet Take 10 mg by mouth daily. Historical Provider   digoxin (LANOXIN) 0.125 mg tablet Take 0.125 mg by mouth every other day. Historical Provider   metoprolol (LOPRESSOR) 50 mg tablet Take 50 mg by mouth two (2) times a day. Historical Provider   loratadine (CLARITIN) 10 mg tablet Take 10 mg by mouth daily as needed. Historical Provider   lovastatin (MEVACOR) 40 mg tablet Take 40 mg by mouth nightly. Historical Provider   traZODone (DESYREL) 50 mg tablet Take 50 mg by mouth nightly. Historical Provider   estradiol (ESTRACE) 1 mg tablet Take 1 mg by mouth daily.       Historical Provider       REVIEW OF SYSTEMS:     CONSTITUTIONAL: No Fever, No chills, No weight loss, No Night sweats  HEENT:  No epistaxis, No diff in swallowing  CVS: No chest pain, No palpitations, No syncope, No peripheral edema, No PND, No orthopnea  RS: No hemoptysis, No pleuritic chest pain  GI: No abd pain, No vomitting, No diarrhea, No hematemesis, No rectal bleeding, No acid reflux or heartburn  NEURO: No focal weakness, No headaches, No seizures  PSYCH: No anxiety, No depression  MUSCULOSKLETAL: No joint pain or swelling  : No hematuria or dysuria  SKIN: No rash      Physical Exam:    VITALS:    Vital signs reviewed; most recent are:    Visit Vitals    /76    Pulse 94    Temp 98.7 °F (37.1 °C)    Resp (!) 32    Ht 4' 8\" (1.422 m)    Wt 49.9 kg (110 lb)    SpO2 94%    BMI 24.66 kg/m2     SpO2 Readings from Last 6 Encounters:   02/09/18 94%   03/16/17 96%   12/29/16 95%   06/15/16 95%   10/19/15 96%   09/30/15 97%    O2 Flow Rate (L/min): 2 l/min Intake/Output Summary (Last 24 hours) at 02/09/18 1826  Last data filed at 02/09/18 1700   Gross per 24 hour   Intake              500 ml   Output                0 ml   Net              500 ml          GENERAL: Not in acute distress  HEENT: pink conjunctiva, un icteric sclera,   NECK: No lymphadenopthy or thyroid swelling, JVD not seen  LYMPH: No supraclavicular or cervical or axillary nodes on both sides  CVS: S1S2, No murmurs, No gallop or rub  RS: CTA, No wheezing or crackles  Abd: Soft, non tender, not distended, No guarding, No rigidity  NEURO:  No focal neurologic deficits   Extrm: no leg edema or swelling   Skin: No rash      Labs:  Recent Results (from the past 24 hour(s))   EKG, 12 LEAD, INITIAL    Collection Time: 02/09/18 10:58 AM   Result Value Ref Range    Ventricular Rate 95 BPM    Atrial Rate 95 BPM    P-R Interval 232 ms    QRS Duration 86 ms    Q-T Interval 328 ms    QTC Calculation (Bezet) 412 ms    Calculated P Axis 73 degrees    Calculated R Axis 35 degrees    Calculated T Axis 148 degrees    Diagnosis       Sinus rhythm with 1st degree AV block  Marked ST abnormality, possible inferolateral subendocardial injury  ST & T wave abnormality, consider inferolateral ischemia  Abnormal ECG  When compared with ECG of 08-AUG-2012 06:43,  Sinus rhythm has replaced Atrial fibrillation  ST no longer depressed in Anterior leads  T wave inversion less evident in Inferior leads  T wave inversion no longer evident in Anterior leads  Confirmed by Ziyad Segura MD, --- ([id number]) on 2/9/2018 3:55:44 PM     CBC WITH AUTOMATED DIFF    Collection Time: 02/09/18 11:02 AM   Result Value Ref Range    WBC 16.0 (H) 4.6 - 13.2 K/uL    RBC 4.18 (L) 4.20 - 5.30 M/uL    HGB 13.5 12.0 - 16.0 g/dL    HCT 39.4 35.0 - 45.0 %    MCV 94.3 74.0 - 97.0 FL    MCH 32.3 24.0 - 34.0 PG    MCHC 34.3 31.0 - 37.0 g/dL    RDW 13.0 11.6 - 14.5 %    PLATELET 664 178 - 556 K/uL    MPV 10.5 9.2 - 11.8 FL    NEUTROPHILS 84 (H) 40 - 73 % LYMPHOCYTES 7 (L) 21 - 52 %    MONOCYTES 9 3 - 10 %    EOSINOPHILS 0 0 - 5 %    BASOPHILS 0 0 - 2 %    ABS. NEUTROPHILS 13.4 (H) 1.8 - 8.0 K/UL    ABS. LYMPHOCYTES 1.1 0.9 - 3.6 K/UL    ABS. MONOCYTES 1.4 (H) 0.05 - 1.2 K/UL    ABS. EOSINOPHILS 0.0 0.0 - 0.4 K/UL    ABS. BASOPHILS 0.0 0.0 - 0.1 K/UL    DF AUTOMATED     METABOLIC PANEL, COMPREHENSIVE    Collection Time: 02/09/18 11:02 AM   Result Value Ref Range    Sodium 132 (L) 136 - 145 mmol/L    Potassium 3.5 3.5 - 5.5 mmol/L    Chloride 97 (L) 100 - 108 mmol/L    CO2 27 21 - 32 mmol/L    Anion gap 8 3.0 - 18 mmol/L    Glucose 122 (H) 74 - 99 mg/dL    BUN 21 (H) 7.0 - 18 MG/DL    Creatinine 1.31 (H) 0.6 - 1.3 MG/DL    BUN/Creatinine ratio 16 12 - 20      GFR est AA 47 (L) >60 ml/min/1.73m2    GFR est non-AA 39 (L) >60 ml/min/1.73m2    Calcium 9.0 8.5 - 10.1 MG/DL    Bilirubin, total 0.6 0.2 - 1.0 MG/DL    ALT (SGPT) 16 13 - 56 U/L    AST (SGOT) 19 15 - 37 U/L    Alk.  phosphatase 46 45 - 117 U/L    Protein, total 9.2 (H) 6.4 - 8.2 g/dL    Albumin 3.1 (L) 3.4 - 5.0 g/dL    Globulin 6.1 (H) 2.0 - 4.0 g/dL    A-G Ratio 0.5 (L) 0.8 - 1.7     CARDIAC PANEL,(CK, CKMB & TROPONIN)    Collection Time: 02/09/18 11:02 AM   Result Value Ref Range     26 - 192 U/L    CK - MB <1.0 <3.6 ng/ml    CK-MB Index  0.0 - 4.0 %     CALCULATION NOT PERFORMED WHEN RESULT IS BELOW LINEAR LIMIT    Troponin-I, Qt. 0.07 (H) 0.0 - 0.045 NG/ML   URINALYSIS W/ RFLX MICROSCOPIC    Collection Time: 02/09/18 11:09 AM   Result Value Ref Range    Color YELLOW      Appearance CLEAR      Specific gravity 1.022 1.005 - 1.030      pH (UA) 6.0 5.0 - 8.0      Protein 300 (A) NEG mg/dL    Glucose NEGATIVE  NEG mg/dL    Ketone TRACE (A) NEG mg/dL    Bilirubin NEGATIVE  NEG      Blood NEGATIVE  NEG      Urobilinogen 1.0 0.2 - 1.0 EU/dL    Nitrites NEGATIVE  NEG      Leukocyte Esterase NEGATIVE  NEG     URINE MICROSCOPIC ONLY    Collection Time: 02/09/18 11:09 AM   Result Value Ref Range    WBC 0 to 3 0 - 4 /hpf    RBC 0 to 3 0 - 5 /hpf    Epithelial cells FEW 0 - 5 /lpf    Bacteria FEW (A) NEG /hpf    Mucus FEW (A) NEG /lpf    Hyaline cast 2 to 4 0 - 2 /lpf    Granular cast 2 to 4 NEG /lpf   CARDIAC PANEL,(CK, CKMB & TROPONIN)    Collection Time: 02/09/18  1:30 PM   Result Value Ref Range    CK 93 26 - 192 U/L    CK - MB <1.0 <3.6 ng/ml    CK-MB Index  0.0 - 4.0 %     CALCULATION NOT PERFORMED WHEN RESULT IS BELOW LINEAR LIMIT    Troponin-I, Qt. 0.08 (H) 0.0 - 0.045 NG/ML   POC LACTIC ACID    Collection Time: 02/09/18  2:46 PM   Result Value Ref Range    Lactic Acid (POC) 1.7 0.4 - 2.0 mmol/L   INFLUENZA A & B AG (RAPID TEST)    Collection Time: 02/09/18  3:46 PM   Result Value Ref Range    Influenza A Antigen NEGATIVE  NEG      Influenza B Antigen NEGATIVE  NEG           Active Problems:    Pneumonia (2/9/2018)        Assessment:       1. LLL Pneumonia  2. Chronic back pain,   3. Paroxysmal atrial fibrillation,   4. HTN, controlled  5. Hyperlipidemia,  6. Hypothyroidism,   7. Pulmonary HTN,   8. H/o pulmonary hemorrhage     Plan:       · Admit to telemetry floor  · Continue IV antibiotics with ceftriaxone and zithromax  · Continue PRN albuterol  · Will start tramadol for pain control  · Consult PT, may need SNF on discharge  · Resume home medications  · Full code  · DVT prophylaxsis    Total time:  70 minutes             _______________________________________________________________________        Attending Physician:  Pastor Grigsby MD

## 2018-02-09 NOTE — ED NOTES
TRANSFER - OUT REPORT:    Verbal report given to [employer], RN. (name) on Dhruv Polk  being transferred to N (unit) for routine progression of care       Report consisted of patients Situation, Background, Assessment and   Recommendations(SBAR). Information from the following report(s) SBAR, ED Summary, MAR, Recent Results and Cardiac Rhythm NSR was reviewed with the receiving nurse. Lines:   Peripheral IV 02/09/18 Left Antecubital (Active)        Opportunity for questions and clarification was provided.       Patient transported with:   Transportation

## 2018-02-10 LAB
ANION GAP SERPL CALC-SCNC: 11 MMOL/L (ref 3–18)
BACTERIA SPEC CULT: NORMAL
BASOPHILS # BLD: 0 K/UL (ref 0–0.06)
BASOPHILS NFR BLD: 0 % (ref 0–3)
BUN SERPL-MCNC: 20 MG/DL (ref 7–18)
BUN/CREAT SERPL: 16 (ref 12–20)
CALCIUM SERPL-MCNC: 7.9 MG/DL (ref 8.5–10.1)
CHLORIDE SERPL-SCNC: 103 MMOL/L (ref 100–108)
CO2 SERPL-SCNC: 21 MMOL/L (ref 21–32)
CREAT SERPL-MCNC: 1.24 MG/DL (ref 0.6–1.3)
DIFFERENTIAL METHOD BLD: ABNORMAL
EOSINOPHIL # BLD: 0 K/UL (ref 0–0.4)
EOSINOPHIL NFR BLD: 0 % (ref 0–5)
ERYTHROCYTE [DISTWIDTH] IN BLOOD BY AUTOMATED COUNT: 13.4 % (ref 11.6–14.5)
GLUCOSE SERPL-MCNC: 116 MG/DL (ref 74–99)
HCT VFR BLD AUTO: 33.9 % (ref 35–45)
HGB BLD-MCNC: 11.1 G/DL (ref 12–16)
LYMPHOCYTES # BLD: 2 K/UL (ref 0.8–3.5)
LYMPHOCYTES NFR BLD: 8 % (ref 20–51)
MCH RBC QN AUTO: 31.7 PG (ref 24–34)
MCHC RBC AUTO-ENTMCNC: 32.7 G/DL (ref 31–37)
MCV RBC AUTO: 96.9 FL (ref 74–97)
MONOCYTES # BLD: 1.5 K/UL (ref 0–1)
MONOCYTES NFR BLD: 6 % (ref 2–9)
NEUTS SEG # BLD: 21 K/UL (ref 1.8–8)
NEUTS SEG NFR BLD: 86 % (ref 42–75)
PLATELET # BLD AUTO: 311 K/UL (ref 135–420)
PLATELET COMMENTS,PCOM: ABNORMAL
PMV BLD AUTO: 11 FL (ref 9.2–11.8)
POTASSIUM SERPL-SCNC: 3.4 MMOL/L (ref 3.5–5.5)
RBC # BLD AUTO: 3.5 M/UL (ref 4.2–5.3)
RBC MORPH BLD: ABNORMAL
SERVICE CMNT-IMP: NORMAL
SODIUM SERPL-SCNC: 135 MMOL/L (ref 136–145)
T4 FREE SERPL-MCNC: 1.5 NG/DL (ref 0.7–1.5)
TSH SERPL DL<=0.05 MIU/L-ACNC: 2.35 UIU/ML (ref 0.36–3.74)
WBC # BLD AUTO: 24.5 K/UL (ref 4.6–13.2)

## 2018-02-10 PROCEDURE — 74011000250 HC RX REV CODE- 250: Performed by: FAMILY MEDICINE

## 2018-02-10 PROCEDURE — 94760 N-INVAS EAR/PLS OXIMETRY 1: CPT

## 2018-02-10 PROCEDURE — 84439 ASSAY OF FREE THYROXINE: CPT | Performed by: FAMILY MEDICINE

## 2018-02-10 PROCEDURE — 77010033678 HC OXYGEN DAILY

## 2018-02-10 PROCEDURE — 84443 ASSAY THYROID STIM HORMONE: CPT | Performed by: FAMILY MEDICINE

## 2018-02-10 PROCEDURE — 74011250636 HC RX REV CODE- 250/636: Performed by: INTERNAL MEDICINE

## 2018-02-10 PROCEDURE — 74011250637 HC RX REV CODE- 250/637: Performed by: INTERNAL MEDICINE

## 2018-02-10 PROCEDURE — 80048 BASIC METABOLIC PNL TOTAL CA: CPT | Performed by: INTERNAL MEDICINE

## 2018-02-10 PROCEDURE — 74011250636 HC RX REV CODE- 250/636: Performed by: FAMILY MEDICINE

## 2018-02-10 PROCEDURE — 85025 COMPLETE CBC W/AUTO DIFF WBC: CPT | Performed by: INTERNAL MEDICINE

## 2018-02-10 PROCEDURE — 65270000029 HC RM PRIVATE

## 2018-02-10 PROCEDURE — 36415 COLL VENOUS BLD VENIPUNCTURE: CPT | Performed by: INTERNAL MEDICINE

## 2018-02-10 RX ORDER — DIGOXIN 125 MCG
0.12 TABLET ORAL EVERY OTHER DAY
Status: DISCONTINUED | OUTPATIENT
Start: 2018-02-10 | End: 2018-02-15 | Stop reason: HOSPADM

## 2018-02-10 RX ORDER — LORATADINE 10 MG/1
10 TABLET ORAL
Status: DISCONTINUED | OUTPATIENT
Start: 2018-02-10 | End: 2018-02-15 | Stop reason: HOSPADM

## 2018-02-10 RX ORDER — SODIUM CHLORIDE 9 MG/ML
100 INJECTION, SOLUTION INTRAVENOUS CONTINUOUS
Status: DISCONTINUED | OUTPATIENT
Start: 2018-02-10 | End: 2018-02-10 | Stop reason: SDUPTHER

## 2018-02-10 RX ORDER — MORPHINE SULFATE 2 MG/ML
1 INJECTION, SOLUTION INTRAMUSCULAR; INTRAVENOUS
Status: DISCONTINUED | OUTPATIENT
Start: 2018-02-10 | End: 2018-02-15 | Stop reason: HOSPADM

## 2018-02-10 RX ORDER — SODIUM CHLORIDE 9 MG/ML
50 INJECTION, SOLUTION INTRAVENOUS CONTINUOUS
Status: DISCONTINUED | OUTPATIENT
Start: 2018-02-10 | End: 2018-02-13

## 2018-02-10 RX ORDER — MONTELUKAST SODIUM 10 MG/1
10 TABLET ORAL DAILY
Status: DISCONTINUED | OUTPATIENT
Start: 2018-02-10 | End: 2018-02-15 | Stop reason: HOSPADM

## 2018-02-10 RX ORDER — PANTOPRAZOLE SODIUM 40 MG/1
40 GRANULE, DELAYED RELEASE ORAL
Status: DISCONTINUED | OUTPATIENT
Start: 2018-02-10 | End: 2018-02-15 | Stop reason: HOSPADM

## 2018-02-10 RX ADMIN — TRAMADOL HYDROCHLORIDE 25 MG: 50 TABLET, FILM COATED ORAL at 21:09

## 2018-02-10 RX ADMIN — CEFEPIME 1 G: 1 INJECTION, POWDER, FOR SOLUTION INTRAMUSCULAR; INTRAVENOUS at 11:39

## 2018-02-10 RX ADMIN — NIFEDIPINE 30 MG: 30 TABLET, FILM COATED, EXTENDED RELEASE ORAL at 11:13

## 2018-02-10 RX ADMIN — METOPROLOL TARTRATE 50 MG: 50 TABLET ORAL at 11:12

## 2018-02-10 RX ADMIN — Medication 10 ML: at 14:00

## 2018-02-10 RX ADMIN — LEVOTHYROXINE SODIUM 100 MCG: 100 TABLET ORAL at 11:09

## 2018-02-10 RX ADMIN — ACETAMINOPHEN 650 MG: 325 TABLET ORAL at 07:22

## 2018-02-10 RX ADMIN — LOVASTATIN 40 MG: 20 TABLET ORAL at 23:52

## 2018-02-10 RX ADMIN — DIGOXIN 0.12 MG: 0.12 TABLET ORAL at 11:13

## 2018-02-10 RX ADMIN — METOPROLOL TARTRATE 50 MG: 50 TABLET ORAL at 17:05

## 2018-02-10 RX ADMIN — PANTOPRAZOLE SODIUM 40 MG: 40 GRANULE, DELAYED RELEASE ORAL at 11:13

## 2018-02-10 RX ADMIN — MONTELUKAST SODIUM 10 MG: 10 TABLET, COATED ORAL at 11:12

## 2018-02-10 RX ADMIN — HYDRALAZINE HYDROCHLORIDE 25 MG: 25 TABLET, FILM COATED ORAL at 11:12

## 2018-02-10 RX ADMIN — SODIUM CHLORIDE 1000 MG: 900 INJECTION, SOLUTION INTRAVENOUS at 12:18

## 2018-02-10 RX ADMIN — HYDRALAZINE HYDROCHLORIDE 25 MG: 25 TABLET, FILM COATED ORAL at 17:05

## 2018-02-10 RX ADMIN — SODIUM CHLORIDE 100 ML/HR: 900 INJECTION, SOLUTION INTRAVENOUS at 07:19

## 2018-02-10 RX ADMIN — TRAMADOL HYDROCHLORIDE 25 MG: 50 TABLET, FILM COATED ORAL at 11:10

## 2018-02-10 RX ADMIN — Medication 10 ML: at 23:53

## 2018-02-10 NOTE — PROGRESS NOTES
NUTRITION    Nursing Referral: [facility]     RECOMMENDATIONS / PLAN:     - Start po diet. - Continue RD inpatient monitoring and evaluation. NUTRITION INTERVENTIONS & DIAGNOSIS:     [x] Meals/snacks: modify composition  [x] Collaboration and referral of nutrition care: discussed diet order with Dr. Cristina Hernandez. Nutrition Diagnosis: No nutrition diagnosis at this time. ASSESSMENT:     [facility] received in error, pt denies changes in appetite or weight PTA. No diet order noted. Pt denies being hungry at this time, but son asking about diet. Discussed food preferences. Obtained telephone order from Dr. Cristina Hernandez to start po diet. Average po intake adequate to meet patients estimated nutritional needs:   [] Yes     [x] No   [] Unable to determine at this time    Diet:        Food Allergies: NKFA  Current Appetite:   [] Good     [] Fair     [x] Poor     [] Other:  Appetite/meal intake prior to admission:   [] Good     [x] Fair: baseline     [] Poor     [] Other:  Feeding Limitations:  [] Swallowing difficulty    [] Chewing difficulty    [] Other:  Current Meal Intake: No data found. BM:  2/9  Skin Integrity: WDL, fragile  Edema: none  Pertinent Medications: Reviewed: NS at 75 mL/hr    Recent Labs      02/10/18   0400  02/09/18   1102   NA  135*  132*   K  3.4*  3.5   CL  103  97*   CO2  21  27   GLU  116*  122*   BUN  20*  21*   CREA  1.24  1.31*   CA  7.9*  9.0   ALB   --   3.1*   SGOT   --   19   ALT   --   16       Intake/Output Summary (Last 24 hours) at 02/10/18 1426  Last data filed at 02/09/18 1700   Gross per 24 hour   Intake              500 ml   Output                0 ml   Net              500 ml       Anthropometrics:  Ht Readings from Last 1 Encounters:   02/09/18 4' 8\" (1.422 m)     Last 3 Recorded Weights in this Encounter    02/09/18 0946   Weight: 49.9 kg (110 lb)     Body mass index is 24.66 kg/(m^2).           Weight History: Denies weight change PTA    Weight Metrics 2/9/2018 3/16/2017 12/29/2016 6/15/2016 2/23/2016 2/16/2016 2/9/2016   Weight 110 lb 109 lb 109 lb 3.2 oz 108 lb 112 lb 112 lb 112 lb   BMI 24.66 kg/m2 24.44 kg/m2 24.48 kg/m2 24.23 kg/m2 25.12 kg/m2 25.12 kg/m2 25.12 kg/m2        Admitting Diagnosis: Pneumonia  Pertinent PMHx: hyperlipidemia, HTN, osteopenia    Education Needs:        [x] None identified  [] Identified - Not appropriate at this time  []  Identified and addressed - refer to education log  Learning Limitations:   [x] None identified  [] Identified    Cultural, Sikh & ethnic food preferences:  [x] None identified    [] Identified and addressed     ESTIMATED NUTRITION NEEDS:     Calories: 7427-9252 kcal (MSJx1.2-1.3) based on  [x] Actual BW  77 kg    [] IBW   Protein: 62-77 gm (0.8-1 gm/kg) based on  [x] Actual BW      [] IBW   Fluid: 1 mL/kcal     MONITORING & EVALUATION:     Nutrition Goal(s):   1. Po intake of meals will meet >75% of patient estimated nutritional needs within the next 7 days.   Outcome:  [] Met/Ongoing    []  Not Met    [x] New/Initial Goal     Monitoring:   [x] Food and beverage intake   [x] Diet order   [x] Nutrition-focused physical findings   [x] Treatment/therapy   [] Weight   [] Enteral nutrition intake        Previous Recommendations (for follow-up assessments only):     []   Implemented       []   Not Implemented (RD to address)      [] No Longer Appropriate     [] No Recommendation Made     Discharge Planning: cardiac diet  [x] Participated in care planning, discharge planning, & interdisciplinary rounds as appropriate      Krunal Kee RD, [address]   Pager: [phone]

## 2018-02-10 NOTE — PROGRESS NOTES
[facility] Hospitalist Group  Progress Note    Patient: Percy Starks Age: 80 y.o. : 1935 MR#: [id number] [id number]: [id number]  Date/Time: 2/10/2018 9:14 AM    Subjective/24-hour events:     Afebrile overnight. No SOB or new respiratory issues. Family present at bedside. Assessment:   Pneumonia, community acquired  Sepsis, POA  Atrial fibrillation  HTN  HLD   Hypothryoidism  Chronic back pain  Advanced age    Plan:  Broaden antibiotic therapy given worsening leukocytosis relative to admission. Continue to follow cultures. Thyroid studies. Analgesia as necessary. Continue ultram but will add PRN morphine at low dose. PT/OT. Disposition TBD but suspect that patient may benefit from SNF placement. Functional status has been declining for the past several weeks/months per family. Case discussed with:  []Patient  [x]Family  []Nursing  []Case Management  DVT Prophylaxis:  []Lovenox  [x]Hep SQ  []SCDs  []Coumadin   []On Heparin gtt    Objective:   VS:   Visit Vitals    BP (!) 179/98 (BP 1 Location: Right arm, BP Patient Position: At rest)    Pulse (!) 105    Temp 97.4 °F (36.3 °C)    Resp (!) 35    Ht 4' 8\" (1.422 m)    Wt 49.9 kg (110 lb)    SpO2 91%    BMI 24.66 kg/m2      Tmax/24hrs: Temp (24hrs), Av.2 °F (36.8 °C), Min:97.2 °F (36.2 °C), Max:100.3 °F (37.9 °C)    Intake/Output Summary (Last 24 hours) at 02/10/18 0914  Last data filed at 18 1700   Gross per 24 hour   Intake              500 ml   Output                0 ml   Net              500 ml       General:  Appears uncomfortable but nontoxic-appearing. Cardiovascular:  S1, S2.  Mildly tachy. Pulmonary:  No wheezes. Effort nonlabored. GI:  Abdomen soft, NTTP. Extremities:  Warm, no ischemia or edema.     Labs:    Recent Results (from the past 24 hour(s))   EKG, 12 LEAD, INITIAL    Collection Time: 18 10:58 AM   Result Value Ref Range    Ventricular Rate 95 BPM    Atrial Rate 95 BPM    P-R Interval 232 ms    QRS Duration 86 ms    Q-T Interval 328 ms    QTC Calculation (Bezet) 412 ms    Calculated P Axis 73 degrees    Calculated R Axis 35 degrees    Calculated T Axis 148 degrees    Diagnosis       Sinus rhythm with 1st degree AV block  Marked ST abnormality, possible inferolateral subendocardial injury  ST & T wave abnormality, consider inferolateral ischemia  Abnormal ECG  When compared with ECG of 08-AUG-2012 06:43,  Sinus rhythm has replaced Atrial fibrillation  ST no longer depressed in Anterior leads  T wave inversion less evident in Inferior leads  T wave inversion no longer evident in Anterior leads  Confirmed by Remington Rico MD, --- ([id number]) on 2/9/2018 3:55:44 PM     CBC WITH AUTOMATED DIFF    Collection Time: 02/09/18 11:02 AM   Result Value Ref Range    WBC 16.0 (H) 4.6 - 13.2 K/uL    RBC 4.18 (L) 4.20 - 5.30 M/uL    HGB 13.5 12.0 - 16.0 g/dL    HCT 39.4 35.0 - 45.0 %    MCV 94.3 74.0 - 97.0 FL    MCH 32.3 24.0 - 34.0 PG    MCHC 34.3 31.0 - 37.0 g/dL    RDW 13.0 11.6 - 14.5 %    PLATELET 376 432 - 106 K/uL    MPV 10.5 9.2 - 11.8 FL    NEUTROPHILS 84 (H) 40 - 73 %    LYMPHOCYTES 7 (L) 21 - 52 %    MONOCYTES 9 3 - 10 %    EOSINOPHILS 0 0 - 5 %    BASOPHILS 0 0 - 2 %    ABS. NEUTROPHILS 13.4 (H) 1.8 - 8.0 K/UL    ABS. LYMPHOCYTES 1.1 0.9 - 3.6 K/UL    ABS. MONOCYTES 1.4 (H) 0.05 - 1.2 K/UL    ABS. EOSINOPHILS 0.0 0.0 - 0.4 K/UL    ABS.  BASOPHILS 0.0 0.0 - 0.1 K/UL    DF AUTOMATED     METABOLIC PANEL, COMPREHENSIVE    Collection Time: 02/09/18 11:02 AM   Result Value Ref Range    Sodium 132 (L) 136 - 145 mmol/L    Potassium 3.5 3.5 - 5.5 mmol/L    Chloride 97 (L) 100 - 108 mmol/L    CO2 27 21 - 32 mmol/L    Anion gap 8 3.0 - 18 mmol/L    Glucose 122 (H) 74 - 99 mg/dL    BUN 21 (H) 7.0 - 18 MG/DL    Creatinine 1.31 (H) 0.6 - 1.3 MG/DL    BUN/Creatinine ratio 16 12 - 20      GFR est AA 47 (L) >60 ml/min/1.73m2    GFR est non-AA 39 (L) >60 ml/min/1.73m2    Calcium 9.0 8.5 - 10.1 MG/DL    Bilirubin, total 0.6 0.2 - 1.0 MG/DL    ALT (SGPT) 16 13 - 56 U/L    AST (SGOT) 19 15 - 37 U/L    Alk.  phosphatase 46 45 - 117 U/L    Protein, total 9.2 (H) 6.4 - 8.2 g/dL    Albumin 3.1 (L) 3.4 - 5.0 g/dL    Globulin 6.1 (H) 2.0 - 4.0 g/dL    A-G Ratio 0.5 (L) 0.8 - 1.7     CARDIAC PANEL,(CK, CKMB & TROPONIN)    Collection Time: 02/09/18 11:02 AM   Result Value Ref Range     26 - 192 U/L    CK - MB <1.0 <3.6 ng/ml    CK-MB Index  0.0 - 4.0 %     CALCULATION NOT PERFORMED WHEN RESULT IS BELOW LINEAR LIMIT    Troponin-I, Qt. 0.07 (H) 0.0 - 0.045 NG/ML   URINALYSIS W/ RFLX MICROSCOPIC    Collection Time: 02/09/18 11:09 AM   Result Value Ref Range    Color YELLOW      Appearance CLEAR      Specific gravity 1.022 1.005 - 1.030      pH (UA) 6.0 5.0 - 8.0      Protein 300 (A) NEG mg/dL    Glucose NEGATIVE  NEG mg/dL    Ketone TRACE (A) NEG mg/dL    Bilirubin NEGATIVE  NEG      Blood NEGATIVE  NEG      Urobilinogen 1.0 0.2 - 1.0 EU/dL    Nitrites NEGATIVE  NEG      Leukocyte Esterase NEGATIVE  NEG     URINE MICROSCOPIC ONLY    Collection Time: 02/09/18 11:09 AM   Result Value Ref Range    WBC 0 to 3 0 - 4 /hpf    RBC 0 to 3 0 - 5 /hpf    Epithelial cells FEW 0 - 5 /lpf    Bacteria FEW (A) NEG /hpf    Mucus FEW (A) NEG /lpf    Hyaline cast 2 to 4 0 - 2 /lpf    Granular cast 2 to 4 NEG /lpf   CARDIAC PANEL,(CK, CKMB & TROPONIN)    Collection Time: 02/09/18  1:30 PM   Result Value Ref Range    CK 93 26 - 192 U/L    CK - MB <1.0 <3.6 ng/ml    CK-MB Index  0.0 - 4.0 %     CALCULATION NOT PERFORMED WHEN RESULT IS BELOW LINEAR LIMIT    Troponin-I, Qt. 0.08 (H) 0.0 - 0.045 NG/ML   POC LACTIC ACID    Collection Time: 02/09/18  2:46 PM   Result Value Ref Range    Lactic Acid (POC) 1.7 0.4 - 2.0 mmol/L   CULTURE, BLOOD    Collection Time: 02/09/18  3:00 PM   Result Value Ref Range    Special Requests: NO SPECIAL REQUESTS      Culture result: NO GROWTH AFTER 13 HOURS     CULTURE, BLOOD    Collection Time: 02/09/18  3:15 PM   Result Value Ref Range    Special Requests: NO SPECIAL REQUESTS      Culture result: NO GROWTH AFTER 13 HOURS     INFLUENZA A & B AG (RAPID TEST)    Collection Time: 02/09/18  3:46 PM   Result Value Ref Range    Influenza A Antigen NEGATIVE  NEG      Influenza B Antigen NEGATIVE  NEG     METABOLIC PANEL, BASIC    Collection Time: 02/10/18  4:00 AM   Result Value Ref Range    Sodium 135 (L) 136 - 145 mmol/L    Potassium 3.4 (L) 3.5 - 5.5 mmol/L    Chloride 103 100 - 108 mmol/L    CO2 21 21 - 32 mmol/L    Anion gap 11 3.0 - 18 mmol/L    Glucose 116 (H) 74 - 99 mg/dL    BUN 20 (H) 7.0 - 18 MG/DL    Creatinine 1.24 0.6 - 1.3 MG/DL    BUN/Creatinine ratio 16 12 - 20      GFR est AA 50 (L) >60 ml/min/1.73m2    GFR est non-AA 41 (L) >60 ml/min/1.73m2    Calcium 7.9 (L) 8.5 - 10.1 MG/DL   CBC WITH AUTOMATED DIFF    Collection Time: 02/10/18  4:00 AM   Result Value Ref Range    WBC 24.5 (H) 4.6 - 13.2 K/uL    RBC 3.50 (L) 4.20 - 5.30 M/uL    HGB 11.1 (L) 12.0 - 16.0 g/dL    HCT 33.9 (L) 35.0 - 45.0 %    MCV 96.9 74.0 - 97.0 FL    MCH 31.7 24.0 - 34.0 PG    MCHC 32.7 31.0 - 37.0 g/dL    RDW 13.4 11.6 - 14.5 %    PLATELET 775 893 - 905 K/uL    MPV 11.0 9.2 - 11.8 FL    NEUTROPHILS 86 (H) 42 - 75 %    LYMPHOCYTES 8 (L) 20 - 51 %    MONOCYTES 6 2 - 9 %    EOSINOPHILS 0 0 - 5 %    BASOPHILS 0 0 - 3 %    ABS. NEUTROPHILS 21.0 (H) 1.8 - 8.0 K/UL    ABS. LYMPHOCYTES 2.0 0.8 - 3.5 K/UL    ABS. MONOCYTES 1.5 (H) 0 - 1.0 K/UL    ABS. EOSINOPHILS 0.0 0.0 - 0.4 K/UL    ABS.  BASOPHILS 0.0 0.0 - 0.06 K/UL    DF MANUAL      PLATELET COMMENTS ADEQUATE PLATELETS      RBC COMMENTS NORMOCYTIC, NORMOCHROMIC         Signed By: Marija Gil MD     February 10, 2018 9:14 AM

## 2018-02-10 NOTE — ROUTINE PROCESS
Bedside and Verbal shift change report given to GUSTAVO King (oncoming nurse) by [employer] RN   (offgoing nurse). Report included the following information SBAR and Kardex.

## 2018-02-10 NOTE — PROGRESS NOTES
Called regarding medications    Patient takes digoxin every other day, high interaction with azithro. Will give digoxin as he takes it at home and check digoxin level in AM.     Did not reorder lasix as patient comes with PNA and has just started getting fluids , reduced fluids. Please discuss medications with patients family.

## 2018-02-11 LAB
ANION GAP SERPL CALC-SCNC: 11 MMOL/L (ref 3–18)
BASOPHILS # BLD: 0 K/UL (ref 0–0.06)
BASOPHILS NFR BLD: 0 % (ref 0–3)
BUN SERPL-MCNC: 25 MG/DL (ref 7–18)
BUN/CREAT SERPL: 23 (ref 12–20)
CALCIUM SERPL-MCNC: 8.7 MG/DL (ref 8.5–10.1)
CHLORIDE SERPL-SCNC: 100 MMOL/L (ref 100–108)
CO2 SERPL-SCNC: 22 MMOL/L (ref 21–32)
CREAT SERPL-MCNC: 1.1 MG/DL (ref 0.6–1.3)
DIFFERENTIAL METHOD BLD: ABNORMAL
DIGOXIN SERPL-MCNC: 1.1 NG/ML (ref 0.9–2)
EOSINOPHIL # BLD: 0 K/UL (ref 0–0.4)
EOSINOPHIL NFR BLD: 0 % (ref 0–5)
ERYTHROCYTE [DISTWIDTH] IN BLOOD BY AUTOMATED COUNT: 13.8 % (ref 11.6–14.5)
GLUCOSE SERPL-MCNC: 109 MG/DL (ref 74–99)
HCT VFR BLD AUTO: 35.5 % (ref 35–45)
HGB BLD-MCNC: 11.8 G/DL (ref 12–16)
LYMPHOCYTES # BLD: 2.2 K/UL (ref 0.8–3.5)
LYMPHOCYTES NFR BLD: 9 % (ref 20–51)
MCH RBC QN AUTO: 31.9 PG (ref 24–34)
MCHC RBC AUTO-ENTMCNC: 33.2 G/DL (ref 31–37)
MCV RBC AUTO: 95.9 FL (ref 74–97)
MONOCYTES # BLD: 1.7 K/UL (ref 0–1)
MONOCYTES NFR BLD: 7 % (ref 2–9)
NEUTS BAND NFR BLD MANUAL: 2 % (ref 0–5)
NEUTS SEG # BLD: 20.5 K/UL (ref 1.8–8)
NEUTS SEG NFR BLD: 82 % (ref 42–75)
PLATELET # BLD AUTO: 382 K/UL (ref 135–420)
PLATELET COMMENTS,PCOM: ABNORMAL
PMV BLD AUTO: 11.1 FL (ref 9.2–11.8)
POTASSIUM SERPL-SCNC: 3.5 MMOL/L (ref 3.5–5.5)
RBC # BLD AUTO: 3.7 M/UL (ref 4.2–5.3)
RBC MORPH BLD: ABNORMAL
SODIUM SERPL-SCNC: 133 MMOL/L (ref 136–145)
WBC # BLD AUTO: 24.4 K/UL (ref 4.6–13.2)

## 2018-02-11 PROCEDURE — 74011250637 HC RX REV CODE- 250/637: Performed by: FAMILY MEDICINE

## 2018-02-11 PROCEDURE — 97161 PT EVAL LOW COMPLEX 20 MIN: CPT

## 2018-02-11 PROCEDURE — 74011250636 HC RX REV CODE- 250/636: Performed by: FAMILY MEDICINE

## 2018-02-11 PROCEDURE — 85025 COMPLETE CBC W/AUTO DIFF WBC: CPT | Performed by: FAMILY MEDICINE

## 2018-02-11 PROCEDURE — 97530 THERAPEUTIC ACTIVITIES: CPT

## 2018-02-11 PROCEDURE — 74011000258 HC RX REV CODE- 258: Performed by: FAMILY MEDICINE

## 2018-02-11 PROCEDURE — 36415 COLL VENOUS BLD VENIPUNCTURE: CPT | Performed by: INTERNAL MEDICINE

## 2018-02-11 PROCEDURE — 74011250637 HC RX REV CODE- 250/637: Performed by: INTERNAL MEDICINE

## 2018-02-11 PROCEDURE — 65270000029 HC RM PRIVATE

## 2018-02-11 PROCEDURE — 80162 ASSAY OF DIGOXIN TOTAL: CPT | Performed by: INTERNAL MEDICINE

## 2018-02-11 PROCEDURE — 74011000250 HC RX REV CODE- 250: Performed by: FAMILY MEDICINE

## 2018-02-11 PROCEDURE — 80048 BASIC METABOLIC PNL TOTAL CA: CPT | Performed by: FAMILY MEDICINE

## 2018-02-11 RX ORDER — TRAZODONE HYDROCHLORIDE 50 MG/1
50 TABLET ORAL
Status: DISCONTINUED | OUTPATIENT
Start: 2018-02-11 | End: 2018-02-11

## 2018-02-11 RX ORDER — TRAZODONE HYDROCHLORIDE 50 MG/1
50 TABLET ORAL
Status: DISCONTINUED | OUTPATIENT
Start: 2018-02-11 | End: 2018-02-15 | Stop reason: HOSPADM

## 2018-02-11 RX ORDER — BISACODYL 5 MG
10 TABLET, DELAYED RELEASE (ENTERIC COATED) ORAL DAILY PRN
Status: DISCONTINUED | OUTPATIENT
Start: 2018-02-11 | End: 2018-02-15 | Stop reason: HOSPADM

## 2018-02-11 RX ORDER — LANOLIN ALCOHOL/MO/W.PET/CERES
3 CREAM (GRAM) TOPICAL
Status: DISCONTINUED | OUTPATIENT
Start: 2018-02-11 | End: 2018-02-15 | Stop reason: HOSPADM

## 2018-02-11 RX ORDER — DOCUSATE SODIUM 100 MG/1
100 CAPSULE, LIQUID FILLED ORAL 2 TIMES DAILY
Status: DISCONTINUED | OUTPATIENT
Start: 2018-02-11 | End: 2018-02-15 | Stop reason: HOSPADM

## 2018-02-11 RX ADMIN — METOPROLOL TARTRATE 50 MG: 50 TABLET ORAL at 18:06

## 2018-02-11 RX ADMIN — NIFEDIPINE 30 MG: 30 TABLET, FILM COATED, EXTENDED RELEASE ORAL at 09:48

## 2018-02-11 RX ADMIN — MELATONIN TAB 3 MG 3 MG: 3 TAB at 22:00

## 2018-02-11 RX ADMIN — DOCUSATE SODIUM 100 MG: 100 CAPSULE, LIQUID FILLED ORAL at 11:00

## 2018-02-11 RX ADMIN — TRAMADOL HYDROCHLORIDE 25 MG: 50 TABLET, FILM COATED ORAL at 20:35

## 2018-02-11 RX ADMIN — Medication 10 ML: at 14:00

## 2018-02-11 RX ADMIN — CEFEPIME 1 G: 1 INJECTION, POWDER, FOR SOLUTION INTRAMUSCULAR; INTRAVENOUS at 09:47

## 2018-02-11 RX ADMIN — PANTOPRAZOLE SODIUM 40 MG: 40 GRANULE, DELAYED RELEASE ORAL at 07:30

## 2018-02-11 RX ADMIN — VANCOMYCIN HYDROCHLORIDE 500 MG: 500 INJECTION, POWDER, LYOPHILIZED, FOR SOLUTION INTRAVENOUS at 18:03

## 2018-02-11 RX ADMIN — METOPROLOL TARTRATE 50 MG: 50 TABLET ORAL at 09:48

## 2018-02-11 RX ADMIN — MONTELUKAST SODIUM 10 MG: 10 TABLET, COATED ORAL at 09:48

## 2018-02-11 RX ADMIN — Medication 10 ML: at 22:01

## 2018-02-11 RX ADMIN — LOVASTATIN 40 MG: 20 TABLET ORAL at 22:00

## 2018-02-11 RX ADMIN — TRAMADOL HYDROCHLORIDE: 50 TABLET, FILM COATED ORAL at 09:48

## 2018-02-11 RX ADMIN — Medication 10 ML: at 07:37

## 2018-02-11 RX ADMIN — DOCUSATE SODIUM 100 MG: 100 CAPSULE, LIQUID FILLED ORAL at 18:00

## 2018-02-11 RX ADMIN — LEVOTHYROXINE SODIUM 100 MCG: 100 TABLET ORAL at 09:48

## 2018-02-11 RX ADMIN — HYDRALAZINE HYDROCHLORIDE 25 MG: 25 TABLET, FILM COATED ORAL at 18:06

## 2018-02-11 RX ADMIN — TRAZODONE HYDROCHLORIDE 50 MG: 50 TABLET ORAL at 20:35

## 2018-02-11 RX ADMIN — Medication 1 MG: at 01:42

## 2018-02-11 RX ADMIN — HYDRALAZINE HYDROCHLORIDE 25 MG: 25 TABLET, FILM COATED ORAL at 09:48

## 2018-02-11 NOTE — PROGRESS NOTES
Ms. Susan Kerr has pulled out her IV. She is currently refusing I place another one. She is also refusing BP's. I will try later when her son is here.

## 2018-02-11 NOTE — PROGRESS NOTES
Problem: Mobility Impaired (Adult and Pediatric)  Goal: *Acute Goals and Plan of Care (Insert Text)  Physical Therapy Goals  Initiated 2/11/2018 and to be accomplished within 7 day(s)  1. Patient will move from supine to sit and sit to supine  in bed with supervision/set-up. 2.  Patient will transfer from bed to chair and chair to bed with contact guard assist using the least restrictive device. 3.  Patient will perform sit to stand with supervision/set-up to RW. 4.  Patient will ambulate with minimal assistance/contact guard assist for 50 feet with the least restrictive device. 5.  Patient will ascend/descend 2 stairs with unilateral handrail(s) with minimal assistance/contact guard assist.  physical Therapy EVALUATION    Patient: Domingo Baldwin (60 y.o. female)  Date: 2/11/2018  Primary Diagnosis: Pneumonia        Precautions: Fall       ASSESSMENT :  Based on the objective data described below, the patient presents with decreased functional indpendence, poor balance, decreased strength. Pt cleared with RN to participate, pt received supine in bed with 2 sons present. Upon entering pt began to complain of pain and refusing PT session. This was second attempt. Discussed with patient importance of sitting up in chair for lungs and breathing. Pt with NC @4L O2, taking short quick breaths through mouth, SPO2 @88, discussed breathing through nose and SPO2 increased to 90. Pt then asking to use bedside commode. Pt needing verbal cues for proper hand placement to roll using bedrails. Pt and sons reporting aids at home will just pick her up. Discussed proper ways to transfer safely for both patient and aids. SPT with [facility] to [facility]. Unable to void much urine. SPT to bedside chair. Discussing sitting up at least 30 minutes. Pt very anxious through and refusing to participate though with max coaxing would complete tasks. Pt left with sons, all needs met and call bell in reach.      Patient will benefit from skilled intervention to address the above impairments. Patients rehabilitation potential is considered to be Good  Factors which may influence rehabilitation potential include:   []         None noted  [x]         Mental ability/status  []         Medical condition  [x]         Home/family situation and support systems  [x]         Safety awareness  []         Pain tolerance/management  []         Other:      PLAN :  Recommendations and Planned Interventions:  [x]           Bed Mobility Training             []    Neuromuscular Re-Education  [x]           Transfer Training                   []    Orthotic/Prosthetic Training  [x]           Gait Training                          []    Modalities  [x]           Therapeutic Exercises          []    Edema Management/Control  [x]           Therapeutic Activities            [x]    Patient and Family Training/Education  []           Other (comment):    Frequency/Duration: Patient will be followed by physical therapy 1-2 times per day/4-7 days per week to address goals. Discharge Recommendations: Skilled Nursing Facility  Further Equipment Recommendations for Discharge: RW}     SUBJECTIVE:   Patient stated I don't want to do it.     OBJECTIVE DATA SUMMARY:     Past Medical History:   Diagnosis Date    Anemia NEC     Arthritis     Atrial fibrillation ([address]     Bursitis of left shoulder     Cardiac cath 01/27/2009    RCA mild. LM patent. pLAD 20%. Very dLAD w/significant tapering. CX mild.  Cardiac stress echo, abnormal 01/05/2009    Positive maximal stress echo by echo & EKG criteria w/o chest pain.  EF >65%. Sm, discrete apical, apical septal hypk.    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    Bilateral index fingers    Trigger ring finger of right hand     Wears glasses      Past Surgical History:   Procedure Laterality Date    ABDOMEN SURGERY PROC UNLISTED      HX APPENDECTOMY      HX COLECTOMY      partial    HX HYSTERECTOMY      HX MOHS PROCEDURES Right 12/2010    Dr. Katie Castellanos    HX OTHER SURGICAL      Two ribs removed    HX TONSILLECTOMY       Barriers to Learning/Limitations: yes;  emotional  Compensate with: Verbal Cues  Prior Level of Function/Home Situation: Pt lives alone, has aids come in 4 hours a day 7 days a week. Uses RW recently for locomotion. 1 story house with 2 steps to enter. Home Situation  Home Environment: Private residence  One/Two Story Residence: One story  Living Alone: Yes  Support Systems: Child(priyank)  Patient Expects to be Discharged to[de-identified] Private residence  Current DME Used/Available at Home: [facility] Behavior:        Strength:    Strength: Generally decreased, functional  Tone & Sensation:   Tone: Normal  Sensation: Intact   Range Of Motion:  AROM: Within functional limits  Functional Mobility:  Bed Mobility:  Rolling: Minimum assistance (verbal cues to use bedrails)  Supine to Sit: Minimum assistance (with use of bedrails)     Scooting: Minimum assistance  Transfers:  Sit to Stand: Minimum assistance (pt grabbing for PT)  Stand to Sit: Contact guard assistance (very poor eccentric control)  Balance:   Sitting: Impaired  Sitting - Static: Fair (occasional)  Sitting - Dynamic: Fair (occasional)  Standing: Impaired  Standing - Static: Occassional  Standing - Dynamic : Fair  Ambulation/Gait Training:                          Stairs:             Pain:  Pain Scale 1: Numeric (0 - 10)  Pain Intensity 1: 2  Pain Location 1: Neck;Rib cage     Pain Description 1: Aching  Pain Intervention(s) 1: Medication (see MAR)  Activity Tolerance:   Very poor activity tolerance.  Pt unwilling to walk with PT today, needing consistent and max encouragement to even participate to sit in chair. Please refer to the flowsheet for vital signs taken during this treatment. After treatment:   [x] Patient left in no apparent distress sitting up in chair  [] Patient left sitting on EOB  [] Patient left in no apparent distress in bed  [] Patient declined to be OOB at this time due to   [x] Call bell left within reach  [x] Nursing notified  [] Caregiver present  [] Bed alarm activated      COMMUNICATION/EDUCATION:   [x]         Fall prevention education was provided and the patient/caregiver indicated understanding. [x]         Patient/family have participated as able in goal setting and plan of care. [x]         Patient/family agree to work toward stated goals and plan of care. []         Patient understands intent and goals of therapy, but is neutral about his/her participation. []         Patient is unable to participate in goal setting and plan of care. Thank you for this referral.  Bette Yo, PT   Time Calculation: 24 mins    Eval Complexity: History: MEDIUM  Complexity : 1-2 comorbidities / personal factors will impact the outcome/ POC Exam:MEDIUM Complexity : 3 Standardized tests and measures addressing body structure, function, activity limitation and / or participation in recreation  Presentation: LOW Complexity : Stable, uncomplicated   Overall Complexity:LOW      Mobility  Current  CL= 60-79%   Goal  CI= 1-19%. The severity rating is based on the Level of Assistance required for Functional Mobility and ADLs.

## 2018-02-11 NOTE — ROUTINE PROCESS
Bedside and Verbal shift change report given to Bhakti Bright RN (oncoming nurse) by Esmer Barnett RN (offgoing nurse). Report included the following information SBAR, Kardex, MAR and Recent Results. SITUATION:    Code Status: Full Code   Reason for Admission: Pneumonia    [facility] day: 2   Problem List:       Hospital Problems  Date Reviewed: 3/16/2017          Codes Class Noted POA    Pneumonia ICD-10-CM: J18.9  ICD-9-CM: 178  2/9/2018 Unknown              BACKGROUND:    Past Medical History:   Past Medical History:   Diagnosis Date    Anemia NEC     Arthritis     Atrial [address] [address]     Bursitis of left shoulder     Cardiac cath 01/27/2009    RCA mild. LM patent. pLAD 20%. Very dLAD w/significant tapering. CX mild.  Cardiac stress echo, abnormal 01/05/2009    Positive maximal stress echo by echo & EKG criteria w/o chest pain.  EF >65%. Sm, discrete apical, apical septal hypk.    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    Bilateral index fingers    Trigger ring finger of right hand     Wears glasses          Patient taking anticoagulants no, refuses heparin    ASSESSMENT:    Changes in Assessment Throughout Shift: none     Patient has Central Line: no Reasons if yes: n/a   Patient has Kendall Cath: no Reasons if yes: n/a      Last Vitals:     Vitals:    02/10/18 2023 02/10/18 2103 02/11/18 0025 02/11/18 0400   BP: 156/88 147/71 166/83 144/87   Pulse: 92 82 90 88   Resp: (!) 38 28 29 25   Temp: (!) 92.8 °F (33.8 °C) 97 °F (36.1 °C) 95.9 °F (35.5 °C) 97.3 °F (36.3 °C)   SpO2: (!) 85% 90% 91% 91%   Weight:       Height:            IV and DRAINS (will only show if present)   Peripheral IV 02/09/18 Left Antecubital-Site Assessment: Clean, dry, & intact     WOUND (if present)   Wound Type:  none   Dressing present Dressing Present : No   Wound Concerns/Notes:  none     PAIN    Pain Assessment    Pain Intensity 1: 2 (02/11/18 0200)    Pain Location 1: Neck, Rib cage    Pain Intervention(s) 1: Medication (see MAR)    Patient Stated Pain Goal: 0  o Interventions for Pain:  Tramadol, morphine  o Intervention effective: yes  o Time of last intervention: 0142  o Reassessment Completed: yes      Last 3 Weights:  Last 3 Recorded Weights in this Encounter    02/09/18 0946   Weight: 49.9 kg (110 lb)     Weight change:      INTAKE/OUPUT    Current Shift: 02/11 0701 - 02/11 1900  In: -   Out: 25 [Urine:25]    Last three shifts: 02/09 1901 - 02/11 0700  In: -   Out: 50 [Urine:50]     LAB RESULTS     Recent Labs      02/11/18   0355  02/10/18   0400  02/09/18   1102   WBC  24.4*  24.5*  16.0*   HGB  11.8*  11.1*  13.5   HCT  35.5  33.9*  39.4   PLT  382  311  311        Recent Labs      02/11/18   0355  02/10/18   0400  02/09/18   1102   NA  133*  135*  132*   K  3.5  3.4*  3.5   GLU  109*  116*  122*   BUN  25*  20*  21*   CREA  1.10  1.24  1.31*   CA  8.7  7.9*  9.0       RECOMMENDATIONS AND DISCHARGE PLANNING     1. Pending tests/procedures/ Plan of Care or Other Needs: O2 therapy, antibiotics, pain control    2. Discharge plan for patient and Needs/Barriers: SNF    3. Estimated Discharge Date: tbd Posted on Whiteboard in [facility]: yes      4. The patient's care plan was reviewed with the oncoming nurse. \"HEALS\" SAFETY CHECK      Fall Risk    Total Score: 2    Safety Measures: Safety Measures: Bed/Chair-Wheels locked, Bed in low position, Call light within reach    A safety check occurred in the patient's room between off going nurse and oncoming nurse listed above.     The safety check included the below items  Area Items   H  High Alert Medications - Verify all high alert medication drips (heparin, PCA, etc.)   E  Equipment - Suction is set up for ALL patients (with erma)  - Red plugs utilized for all equipment (IV pumps, etc.)  - WOWs wiped down at end of shift.  - Room stocked with oxygen, suction, and other unit-specific supplies   A  Alarms - Bed alarm is set for fall risk patients  - Ensure chair alarm is in place and activated if patient is up in a chair   L  Lines - Check IV for any infiltration  - Kendall bag is empty if patient has a Kendall   - Tubing and IV bags are labeled   S  Safety   - Room is clean, patient is clean, and equipment is clean. - Hallways are clear from equipment besides carts. - Fall bracelet on for fall risk patients  - Ensure room is clear and free of clutter  - Suction is set up for ALL patients (with erma)  - Hallways are clear from equipment besides carts.    - Isolation precautions followed, supplies available outside room, sign posted     Lj Max RN

## 2018-02-11 NOTE — PROGRESS NOTES
Care Management Interventions  PCP Verified by CM: Yes (DR. MOLINA)  Palliative Care Criteria Met (RRAT>21 & CHF Dx)?: No  Mode of Transport at Discharge: BLS  Transition of Care Consult (CM Consult): Discharge Planning  Physical Therapy Consult: Yes  Occupational Therapy Consult: Yes  Current Support Network: Own Home, Family Lives Nearby, Has Personal Caregivers  Confirm Follow Up Transport: Other (see comment) (CAREGIVER)  Plan discussed with Pt/Family/Caregiver: Yes (PT LIVES ALONE AND WAS INDEPENDENT PRIOR TO THIS ADMISSION, BUT IN THE LAST THREE MONTHS PT HAS EXPERIENCED EXTREME [address]. PT NOW HAS PERSONAL CARETAKERS IN THE HOME TO ASSIST HER NEEDS AND SAFETY. )  Freedom of Choice Offered: Yes  Discharge Location  Discharge Placement: Skilled nursing facility  Pt sons at bedside, Farooq Fret [phone] and Alida Leisure [phone], who are along with pt requesting rehab when ready for discharge, prior to this pt returning home. FOC provided family members present along with pt chose [address], [facility] and [facility] for placement considerations for acceptance. This pt will return home with caregivers in place to assist as needed. Pt according to family members was independent with daily tasks and driving, prior to pt's recent illness. Pt's sons would like to be contacted regarding medical concerns and acceptance. Pt agreed to this plan of notifying sons to assist as needed. Pt and family request placed in [facility] for [facility] and [facility]. [employer] placed in [facility].

## 2018-02-11 NOTE — PROGRESS NOTES
Pt had been alert and responsive,c/o shortness of breath but saturation always above 91%. Pt was medicated as per order, afebrile continued ABT.

## 2018-02-11 NOTE — PROGRESS NOTES
[facility] Hospitalist Group  Progress Note    Patient: Janine Marshall Age: 80 y.o. : 1935 MR#: [id number] SSN: [id number]  Date/Time: 2018 9:14 AM    Subjective/24-hour events:     Slept poorly last night and has been having continued pain issues. Much more awake and alert than yesterday. Afebrile overnight. Sons present at bedside. Assessment:   Pneumonia, community acquired  Sepsis, POA  GPC bacteremia  Atrial fibrillation  HTN  HLD   Hypothryoidism  Chronic back pain  Advanced age    Plan:  Blood cultures growing GPC. Await ID and sensitivities and continue broad-spectrum therapy as ordered for now. Thyroid studies normal - no further w/u. D/C SQ heparin, order SCDs. Scheduled tramadol per home dose. Additional PRN analgesic ordered. Add melatonin and trazodone per previous home dosing. PT/OT. Anticipate SNF disposition but will place order for ARU screening. Notified [facility] of potential facilities if she is not an inpatient rehab candidate. Case discussed with:  [x]Patient  [x]Family  []Nursing  [x]Case Management  DVT Prophylaxis:  []Lovenox  []Hep SQ  [x]SCDs  []Coumadin   []On Heparin gtt    Objective:   VS:   Visit Vitals    /87 (BP 1 Location: Right arm, BP Patient Position: At rest)    Pulse 88    Temp 97.3 °F (36.3 °C)    Resp 25    Ht 4' 5\" (1.346 m)    Wt 49.9 kg (110 lb)    SpO2 91%    BMI 24.66 kg/m2      Tmax/24hrs: Temp (24hrs), Av.5 °F (35.8 °C), Min:92.8 °F (33.8 °C), Max:98.9 °F (37.2 °C)      Intake/Output Summary (Last 24 hours) at 18 0851  Last data filed at 18 0701   Gross per 24 hour   Intake                0 ml   Output               75 ml   Net              -75 ml       General:  Appears uncomfortable but nontoxic-appearing. Cardiovascular:  S1, S2.  Mildly tachy. Pulmonary:  No wheezes. Effort nonlabored. GI:  Abdomen soft, NTTP. Extremities:  Warm, no ischemia or edema.     Labs:    Recent Results (from the past 24 hour(s))   TSH 3RD GENERATION    Collection Time: 02/10/18 11:44 AM   Result Value Ref Range    TSH 2.35 0.36 - 3.74 uIU/mL   T4, FREE    Collection Time: 02/10/18 11:44 AM   Result Value Ref Range    T4, Free 1.5 0.7 - 1.5 NG/DL   DIGOXIN    Collection Time: 02/11/18  3:55 AM   Result Value Ref Range    Digoxin level 1.1 0.9 - 2.0 NG/ML   CBC WITH AUTOMATED DIFF    Collection Time: 02/11/18  3:55 AM   Result Value Ref Range    WBC 24.4 (H) 4.6 - 13.2 K/uL    RBC 3.70 (L) 4.20 - 5.30 M/uL    HGB 11.8 (L) 12.0 - 16.0 g/dL    HCT 35.5 35.0 - 45.0 %    MCV 95.9 74.0 - 97.0 FL    MCH 31.9 24.0 - 34.0 PG    MCHC 33.2 31.0 - 37.0 g/dL    RDW 13.8 11.6 - 14.5 %    PLATELET 143 695 - 383 K/uL    MPV 11.1 9.2 - 11.8 FL    NEUTROPHILS 82 (H) 42 - 75 %    BAND NEUTROPHILS 2 0 - 5 %    LYMPHOCYTES 9 (L) 20 - 51 %    MONOCYTES 7 2 - 9 %    EOSINOPHILS 0 0 - 5 %    BASOPHILS 0 0 - 3 %    ABS. NEUTROPHILS 20.5 (H) 1.8 - 8.0 K/UL    ABS. LYMPHOCYTES 2.2 0.8 - 3.5 K/UL    ABS. MONOCYTES 1.7 (H) 0 - 1.0 K/UL    ABS. EOSINOPHILS 0.0 0.0 - 0.4 K/UL    ABS.  BASOPHILS 0.0 0.0 - 0.06 K/UL    DF MANUAL      PLATELET COMMENTS ADEQUATE PLATELETS      RBC COMMENTS NORMOCYTIC, NORMOCHROMIC     METABOLIC PANEL, BASIC    Collection Time: 02/11/18  3:55 AM   Result Value Ref Range    Sodium 133 (L) 136 - 145 mmol/L    Potassium 3.5 3.5 - 5.5 mmol/L    Chloride 100 100 - 108 mmol/L    CO2 22 21 - 32 mmol/L    Anion gap 11 3.0 - 18 mmol/L    Glucose 109 (H) 74 - 99 mg/dL    BUN 25 (H) 7.0 - 18 MG/DL    Creatinine 1.10 0.6 - 1.3 MG/DL    BUN/Creatinine ratio 23 (H) 12 - 20      GFR est AA 58 (L) >60 ml/min/1.73m2    GFR est non-AA 48 (L) >60 ml/min/1.73m2    Calcium 8.7 8.5 - 10.1 MG/DL       Signed By: Pat Manzo MD     February 11, 2018 9:14 AM

## 2018-02-12 LAB
ANION GAP SERPL CALC-SCNC: 9 MMOL/L (ref 3–18)
BACTERIA SPEC CULT: ABNORMAL
BASOPHILS # BLD: 0 K/UL (ref 0–0.06)
BASOPHILS NFR BLD: 0 % (ref 0–2)
BUN SERPL-MCNC: 33 MG/DL (ref 7–18)
BUN/CREAT SERPL: 33 (ref 12–20)
CALCIUM SERPL-MCNC: 8.9 MG/DL (ref 8.5–10.1)
CHLORIDE SERPL-SCNC: 103 MMOL/L (ref 100–108)
CO2 SERPL-SCNC: 23 MMOL/L (ref 21–32)
CREAT SERPL-MCNC: 1.01 MG/DL (ref 0.6–1.3)
DIFFERENTIAL METHOD BLD: ABNORMAL
EOSINOPHIL # BLD: 0 K/UL (ref 0–0.4)
EOSINOPHIL NFR BLD: 0 % (ref 0–5)
ERYTHROCYTE [DISTWIDTH] IN BLOOD BY AUTOMATED COUNT: 13.5 % (ref 11.6–14.5)
GLUCOSE SERPL-MCNC: 96 MG/DL (ref 74–99)
GRAM STN SPEC: ABNORMAL
HCT VFR BLD AUTO: 31.1 % (ref 35–45)
HGB BLD-MCNC: 10.8 G/DL (ref 12–16)
LYMPHOCYTES # BLD: 1.7 K/UL (ref 0.9–3.6)
LYMPHOCYTES NFR BLD: 11 % (ref 21–52)
MCH RBC QN AUTO: 32 PG (ref 24–34)
MCHC RBC AUTO-ENTMCNC: 34.7 G/DL (ref 31–37)
MCV RBC AUTO: 92.3 FL (ref 74–97)
MONOCYTES # BLD: 1.4 K/UL (ref 0.05–1.2)
MONOCYTES NFR BLD: 9 % (ref 3–10)
NEUTS SEG # BLD: 12.9 K/UL (ref 1.8–8)
NEUTS SEG NFR BLD: 80 % (ref 40–73)
PLATELET # BLD AUTO: 357 K/UL (ref 135–420)
PMV BLD AUTO: 10.5 FL (ref 9.2–11.8)
POTASSIUM SERPL-SCNC: 3.4 MMOL/L (ref 3.5–5.5)
RBC # BLD AUTO: 3.37 M/UL (ref 4.2–5.3)
SERVICE CMNT-IMP: ABNORMAL
SERVICE CMNT-IMP: ABNORMAL
SODIUM SERPL-SCNC: 135 MMOL/L (ref 136–145)
WBC # BLD AUTO: 16 K/UL (ref 4.6–13.2)

## 2018-02-12 PROCEDURE — 36415 COLL VENOUS BLD VENIPUNCTURE: CPT | Performed by: FAMILY MEDICINE

## 2018-02-12 PROCEDURE — 74011250636 HC RX REV CODE- 250/636: Performed by: FAMILY MEDICINE

## 2018-02-12 PROCEDURE — 85025 COMPLETE CBC W/AUTO DIFF WBC: CPT | Performed by: FAMILY MEDICINE

## 2018-02-12 PROCEDURE — 97530 THERAPEUTIC ACTIVITIES: CPT

## 2018-02-12 PROCEDURE — 65270000029 HC RM PRIVATE

## 2018-02-12 PROCEDURE — 74011250637 HC RX REV CODE- 250/637: Performed by: FAMILY MEDICINE

## 2018-02-12 PROCEDURE — 74011250637 HC RX REV CODE- 250/637: Performed by: INTERNAL MEDICINE

## 2018-02-12 PROCEDURE — 80048 BASIC METABOLIC PNL TOTAL CA: CPT | Performed by: FAMILY MEDICINE

## 2018-02-12 RX ORDER — LEVOFLOXACIN 750 MG/1
750 TABLET ORAL
Status: DISCONTINUED | OUTPATIENT
Start: 2018-02-12 | End: 2018-02-13

## 2018-02-12 RX ORDER — POTASSIUM CHLORIDE 20MEQ/15ML
40 LIQUID (ML) ORAL EVERY 4 HOURS
Status: DISCONTINUED | OUTPATIENT
Start: 2018-02-12 | End: 2018-02-12 | Stop reason: CLARIF

## 2018-02-12 RX ORDER — POTASSIUM CHLORIDE 1.5 G/1.77G
40 POWDER, FOR SOLUTION ORAL EVERY 4 HOURS
Status: COMPLETED | OUTPATIENT
Start: 2018-02-12 | End: 2018-02-12

## 2018-02-12 RX ADMIN — Medication 10 ML: at 21:07

## 2018-02-12 RX ADMIN — Medication 10 ML: at 14:00

## 2018-02-12 RX ADMIN — METOPROLOL TARTRATE 50 MG: 50 TABLET ORAL at 10:20

## 2018-02-12 RX ADMIN — PANTOPRAZOLE SODIUM 40 MG: 40 GRANULE, DELAYED RELEASE ORAL at 10:21

## 2018-02-12 RX ADMIN — DOCUSATE SODIUM 100 MG: 100 CAPSULE, LIQUID FILLED ORAL at 17:28

## 2018-02-12 RX ADMIN — MONTELUKAST SODIUM 10 MG: 10 TABLET, COATED ORAL at 10:20

## 2018-02-12 RX ADMIN — DIGOXIN 0.12 MG: 0.12 TABLET ORAL at 10:20

## 2018-02-12 RX ADMIN — LOVASTATIN 40 MG: 20 TABLET ORAL at 21:06

## 2018-02-12 RX ADMIN — Medication 10 ML: at 05:36

## 2018-02-12 RX ADMIN — MELATONIN TAB 3 MG 3 MG: 3 TAB at 21:06

## 2018-02-12 RX ADMIN — NIFEDIPINE 30 MG: 30 TABLET, FILM COATED, EXTENDED RELEASE ORAL at 10:20

## 2018-02-12 RX ADMIN — POTASSIUM CHLORIDE 40 MEQ: 1.5 POWDER, FOR SOLUTION ORAL at 14:47

## 2018-02-12 RX ADMIN — HYDRALAZINE HYDROCHLORIDE 25 MG: 25 TABLET, FILM COATED ORAL at 10:19

## 2018-02-12 RX ADMIN — POTASSIUM CHLORIDE 40 MEQ: 1.5 POWDER, FOR SOLUTION ORAL at 17:27

## 2018-02-12 RX ADMIN — TRAMADOL HYDROCHLORIDE 25 MG: 50 TABLET, FILM COATED ORAL at 21:06

## 2018-02-12 RX ADMIN — METOPROLOL TARTRATE 50 MG: 50 TABLET ORAL at 17:27

## 2018-02-12 RX ADMIN — LEVOTHYROXINE SODIUM 100 MCG: 100 TABLET ORAL at 10:19

## 2018-02-12 RX ADMIN — DOCUSATE SODIUM 100 MG: 100 CAPSULE, LIQUID FILLED ORAL at 10:20

## 2018-02-12 RX ADMIN — HYDRALAZINE HYDROCHLORIDE 25 MG: 25 TABLET, FILM COATED ORAL at 17:27

## 2018-02-12 RX ADMIN — TRAMADOL HYDROCHLORIDE: 50 TABLET, FILM COATED ORAL at 10:20

## 2018-02-12 RX ADMIN — SODIUM CHLORIDE 50 ML/HR: 900 INJECTION, SOLUTION INTRAVENOUS at 05:36

## 2018-02-12 RX ADMIN — LEVOFLOXACIN 750 MG: 750 TABLET, FILM COATED ORAL at 20:04

## 2018-02-12 RX ADMIN — TRAZODONE HYDROCHLORIDE 50 MG: 50 TABLET ORAL at 21:06

## 2018-02-12 NOTE — PROGRESS NOTES
I have assumed care of Ms. Sindi Hutton. She was assessed after bedside report. She is currently sleeping.

## 2018-02-12 NOTE — PROGRESS NOTES
OT orders received and chart reviewed. Pt not seen for skilled OT due to: RN consulted, and per her report pt did not sleep last evening and finally sleeping at this time. RN requested pt be able to cont to sleep at this time. Will f/u later as patients' schedule allows. Thank you.   Kevin Ambriz OTR/L

## 2018-02-12 NOTE — PROGRESS NOTES
ARU/IPR REFERRAL CONTACT NOTE  [address] for Physical Rehabilitation    Re: Harper Ace    IP Consult for IP Rehab Screen received today. Upon review of case, Ms Som Greer will need to be seen by Occupational Therapy (consulted 2/10). Team will follow and advise as appropriate. Thank you for the consult.     MARY JO Myers

## 2018-02-12 NOTE — PROGRESS NOTES
ARU/IPR REFERRAL CONTACT NOTE  [address] [place] [address] Physical Rehabilitation    RE: Ana Labs    Referral received to review this patient's case for admission to [address] [place] [address] Physical Rehabilitation. Current status reviewed.  When appropriate, will need OT evaluation/treatment on this patient to complete the pre-admission evaluation.  Will continue to follow. Thank you for this referral.  Should you have any questions please do not hesitate to call. Sincerely,  Laurie Olivares.  [address] [place] [address] Physical Rehabilitation  [phone]

## 2018-02-12 NOTE — PROGRESS NOTES
Problem: Falls - Risk of  Goal: *Absence of Falls  Document Ronnie Fall Risk and appropriate interventions in the flowsheet.    Outcome: Progressing Towards Goal  Fall Risk Interventions:  Mobility Interventions: Patient to call before getting OOB         Medication Interventions: Patient to call before getting OOB    Elimination Interventions: Call light in reach    History of Falls Interventions: Door open when patient unattended

## 2018-02-12 NOTE — CDMP QUERY
Please clarify if this patient is being treated/managed for:    => Hyponatremia  => Other Explanation of clinical findings  => Unable to Determine (no explanation of clinical findings)    The medical record reflects the following:  Risk:  -- admitted with sepsis, pna    Clinical Indicators:  -- 2/9/2018 11:02: Sodium: 132 (L)  -- 2/10/2018 04:00: Sodium: 135 (L)  -- 2/11/2018 03:55: Sodium: 133 (L)  -- 2/12/2018 05:23: Sodium: 135 (L)    Treatment:   -- receiving serial BMP  -- receiving Normal Saline IVF    Please clarify and document your clinical opinion in the progress notes and discharge summary including the definitive and/or presumptive diagnosis, (suspected or probable), related to the above clinical findings. Please include clinical findings supporting your diagnosis. If you DECLINE this query or would like to communicate with [facility], please utilize the \"[employer] message box\" at the TOP of the Progress Note on the right.       Thank you,  Lauralee Jeans [facility] [address]

## 2018-02-12 NOTE — PROGRESS NOTES
[facility] Hospitalist Group  Progress Note    Patient: Janine Marshall Age: 80 y.o. : 1935 MR#: [id number] SSN: [id number]  Date/Time: 2018 10:32 AM    Subjective/24-hour events:     Continues to feel better clinically. Slept better last night with home meds being resumed. Family present at bedside. Assessment:   Pneumonia, community acquired  Sepsis, POA  S. Epidermidis bacteremia  Atrial fibrillation  HTN  HLD   Hypothryoidism  Chronic back pain  Advanced age    Plan:  F/U CBC today to trend WBCs. Cultures growing S. Epi in 4/4 bottles. Anticipate narrowing antibiotic therapy today if WBCs down. Scheduled tramadol per home dose. Additional PRN analgesic ordered. Continue melatonin and trazodone per home dosing. Awaiting PT/OT evals, mobilize as tolerated. Anticipate SNF disposition but ARU aware of patient - recs will depend on therapy recs. Discussed plan with son, questions answered. Case discussed with:  [x]Patient  [x]Family  [x]Nursing  []Case Management  DVT Prophylaxis:  []Lovenox  []Hep SQ  [x]SCDs  []Coumadin   []On Heparin gtt    Objective:   VS:   Visit Vitals    /70 (BP 1 Location: Left arm, BP Patient Position: At rest)    Pulse 89    Temp 96.9 °F (36.1 °C)    Resp 18    Ht 4' 5\" (1.346 m)    Wt 49.8 kg (109 lb 12.6 oz)    SpO2 91%    BMI 27.48 kg/m2      Tmax/24hrs: Temp (24hrs), Av °F (36.1 °C), Min:96.4 °F (35.8 °C), Max:97.6 °F (36.4 °C)    No intake or output data in the 24 hours ending 18 1032    General:  In NAD. Nontoxic-appearing. Cardiovascular:  RRR. Pulmonary:  No wheezes. Effort nonlabored. GI:  Abdomen soft, NTTP. Extremities:  Warm, no ischemia or edema.     Labs:    Recent Results (from the past 24 hour(s))   METABOLIC PANEL, BASIC    Collection Time: 18  5:23 AM   Result Value Ref Range    Sodium 135 (L) 136 - 145 mmol/L    Potassium 3.4 (L) 3.5 - 5.5 mmol/L    Chloride 103 100 - 108 mmol/L CO2 23 21 - 32 mmol/L    Anion gap 9 3.0 - 18 mmol/L    Glucose 96 74 - 99 mg/dL    BUN 33 (H) 7.0 - 18 MG/DL    Creatinine 1.01 0.6 - 1.3 MG/DL    BUN/Creatinine ratio 33 (H) 12 - 20      GFR est AA >60 >60 ml/min/1.73m2    GFR est non-AA 52 (L) >60 ml/min/1.73m2    Calcium 8.9 8.5 - 10.1 MG/DL       Signed By: Kristin Barrios MD     February 12, 2018 10:32 AM

## 2018-02-12 NOTE — CDMP QUERY
Please clarify if this patient is being treated/managed for:    => Hypokalemia  => Other Explanation of clinical findings  => Unable to Determine (no explanation of clinical findings)    The medical record reflects the following:  Risk:  -- admitted with sepsis, pna    Clinical Indicators:  -- 2/9/2018 11:02: Potassium: 3.5  -- 2/10/2018 04:00: Potassium: 3.4 (L)  -- 2/11/2018 03:55: Potassium: 3.5  -- 2/12/2018 05:23: Potassium: 3.4 (L)    Treatment:   -- receiving serial BMP  -- receiving Klor Con    Please clarify and document your clinical opinion in the progress notes and discharge summary including the definitive and/or presumptive diagnosis, (suspected or probable), related to the above clinical findings. Please include clinical findings supporting your diagnosis. If you DECLINE this query or would like to communicate with [facility], please utilize the \"[employer] message box\" at the TOP of the Progress Note on the right.       Thank you,  Ana Louie [facility] [address]

## 2018-02-12 NOTE — PROGRESS NOTES
Problem: Mobility Impaired (Adult and Pediatric)  Goal: *Acute Goals and Plan of Care (Insert Text)  Physical Therapy Goals  Initiated 2/11/2018 and to be accomplished within 7 day(s)  1. Patient will move from supine to sit and sit to supine  in bed with supervision/set-up. 2.  Patient will transfer from bed to chair and chair to bed with contact guard assist using the least restrictive device. 3.  Patient will perform sit to stand with supervision/set-up to RW. 4.  Patient will ambulate with minimal assistance/contact guard assist for 50 feet with the least restrictive device. 5.  Patient will ascend/descend 2 stairs with unilateral handrail(s) with minimal assistance/contact guard assist.   Outcome: Progressing Towards Goal  physical Therapy TREATMENT    Patient: Kimmy Srivastava (22 y.o. female)  Date: 2/12/2018  Diagnosis: Pneumonia <principal problem not specified>  Precautions:     Chart, physical therapy assessment, plan of care and goals were reviewed. ASSESSMENT:  Patient presents today supine in bed with caregiver Dereck Carrillo) in room, initially refusing PT services however agreeable with max encouragement from both PT and caregiver. Patient transferred to sitting EOB with CGA, stood with CGA for safety. Patient stood ~20 seconds, then immediately returned to sitting stating she was fatigued. Following seated rest break and additional encouragement, she returned to standing with RW and CGA, performed ~5 marches at EOB, then returned to sitting again. Attempted seated therex training, however patient then began transferring back to bed despite encouragement for continued participation. PT again attempted therex training in supine, however patient becoming increasingly agitated. Patient left with caregiver in room and needs met.   Progression toward goals:  []      Improving appropriately and progressing toward goals  [x]      Improving slowly and progressing toward goals  []      Not making progress toward goals and plan of care will be adjusted     PLAN:  Patient continues to benefit from skilled intervention to address the above impairments. Continue treatment per established plan of care. Discharge Recommendations:  Aron Rendon  Further Equipment Recommendations for Discharge:  rolling walker     SUBJECTIVE:   Patient stated Shut up and go away.     OBJECTIVE DATA SUMMARY:   Functional Mobility Training:  Bed Mobility:  Supine to Sit: Contact guard assistance  Sit to Supine: Contact guard assistance  Transfers:  Sit to Stand: Contact guard assistance  Stand to Sit: Contact guard assistance  Balance:  Sitting: Impaired  Sitting - Static: Good (unsupported)  Sitting - Dynamic: Fair (occasional)  Standing: Impaired; With support (RW)  Standing - Static: Fair  Standing - Dynamic : Fair  Ambulation/Gait Training:   N/A patient refused  Therapeutic Exercises:   Seated LAQ X 5 reps  Sidelying clamshells X 5 reps AAROM  Pain:  Pain Scale 1: Visual  Pain Intensity 1: 0  Activity Tolerance:   Fair  Please refer to the flowsheet for vital signs taken during this treatment. After treatment:   [] Patient left in no apparent distress sitting up in chair  [x] Patient left in no apparent distress in bed  [x] Call bell left within reach  [x] Nursing notified Asha Dickerson)  [x] Caregiver present  [] Bed alarm activated      Adrianne Ice   Time Calculation: 9 mins    Mobility [id number] Current  CJ= 20-39%   Goal  CJ= 20-39%. The severity rating is based on the Level of Assistance required for Functional Mobility and ADLs.

## 2018-02-12 NOTE — PROGRESS NOTES
Ms. Jeanette Rizzo has pulled out 2 IV's today. 3 nurses have attempted to place a new IV with no success. MD is informed.

## 2018-02-12 NOTE — PROGRESS NOTES
ARU/IPR REFERRAL CONTACT NOTE  [address] [place] [address] for Physical Rehabilitation    RE: Deny Ny     Thank you for the opportunity to review this patient's case for admission to [address] [place] [address] for Physical Rehabilitation. Based on our pre-admission screening:     [x ] This patient does not meet criteria for admission to [facility] for Physical Rehabilitation due to:    [x ] Too low level, per documentation, patient has not demonstrated tolerance for acute rehabilitation level of intensity. [x ] We recommend the following:  [ ] Re-evaluation of this patient's status when appropriate  [ ] Home with Home Care Services  [ ] Outpatient Therapy Services  [x ] Skilled Nursing Facility/Sub Acute Services with extended stay option  [ ] Assisted Living/ Adult Home    Again, Thank you for this referral. Should you have any questions please do not hesitate to call. Sincerely,  Loreta Seek. Hayder Jimenez, [address]   Hayder Jimenez, RN  Admissions [facility] for Physical Rehabilitation  [phone]

## 2018-02-13 ENCOUNTER — APPOINTMENT (OUTPATIENT)
Dept: GENERAL RADIOLOGY | Age: 83
DRG: 866 | End: 2018-02-13
Attending: INTERNAL MEDICINE
Payer: MEDICARE

## 2018-02-13 LAB
ANION GAP SERPL CALC-SCNC: 8 MMOL/L (ref 3–18)
BUN SERPL-MCNC: 39 MG/DL (ref 7–18)
BUN/CREAT SERPL: 35 (ref 12–20)
CALCIUM SERPL-MCNC: 8.9 MG/DL (ref 8.5–10.1)
CHLORIDE SERPL-SCNC: 107 MMOL/L (ref 100–108)
CO2 SERPL-SCNC: 22 MMOL/L (ref 21–32)
CREAT SERPL-MCNC: 1.11 MG/DL (ref 0.6–1.3)
GLUCOSE SERPL-MCNC: 96 MG/DL (ref 74–99)
POTASSIUM SERPL-SCNC: 4.2 MMOL/L (ref 3.5–5.5)
SODIUM SERPL-SCNC: 137 MMOL/L (ref 136–145)

## 2018-02-13 PROCEDURE — 97530 THERAPEUTIC ACTIVITIES: CPT

## 2018-02-13 PROCEDURE — 36415 COLL VENOUS BLD VENIPUNCTURE: CPT | Performed by: FAMILY MEDICINE

## 2018-02-13 PROCEDURE — 74011250637 HC RX REV CODE- 250/637: Performed by: INTERNAL MEDICINE

## 2018-02-13 PROCEDURE — 97166 OT EVAL MOD COMPLEX 45 MIN: CPT

## 2018-02-13 PROCEDURE — 87040 BLOOD CULTURE FOR BACTERIA: CPT | Performed by: HOSPITALIST

## 2018-02-13 PROCEDURE — 97116 GAIT TRAINING THERAPY: CPT

## 2018-02-13 PROCEDURE — 80048 BASIC METABOLIC PNL TOTAL CA: CPT | Performed by: FAMILY MEDICINE

## 2018-02-13 PROCEDURE — 65270000029 HC RM PRIVATE

## 2018-02-13 PROCEDURE — 71045 X-RAY EXAM CHEST 1 VIEW: CPT

## 2018-02-13 PROCEDURE — 97535 SELF CARE MNGMENT TRAINING: CPT

## 2018-02-13 PROCEDURE — 74011250637 HC RX REV CODE- 250/637: Performed by: FAMILY MEDICINE

## 2018-02-13 PROCEDURE — 77010033678 HC OXYGEN DAILY

## 2018-02-13 RX ADMIN — LOVASTATIN 40 MG: 20 TABLET ORAL at 22:02

## 2018-02-13 RX ADMIN — METOPROLOL TARTRATE 50 MG: 50 TABLET ORAL at 17:26

## 2018-02-13 RX ADMIN — Medication 10 ML: at 14:00

## 2018-02-13 RX ADMIN — TRAMADOL HYDROCHLORIDE 25 MG: 50 TABLET, FILM COATED ORAL at 22:02

## 2018-02-13 RX ADMIN — DOCUSATE SODIUM 100 MG: 100 CAPSULE, LIQUID FILLED ORAL at 17:26

## 2018-02-13 RX ADMIN — NIFEDIPINE 30 MG: 30 TABLET, FILM COATED, EXTENDED RELEASE ORAL at 10:58

## 2018-02-13 RX ADMIN — DOCUSATE SODIUM 100 MG: 100 CAPSULE, LIQUID FILLED ORAL at 10:58

## 2018-02-13 RX ADMIN — Medication 10 ML: at 22:06

## 2018-02-13 RX ADMIN — LEVOTHYROXINE SODIUM 100 MCG: 100 TABLET ORAL at 10:57

## 2018-02-13 RX ADMIN — TRAMADOL HYDROCHLORIDE 25 MG: 50 TABLET, FILM COATED ORAL at 10:58

## 2018-02-13 RX ADMIN — Medication 10 ML: at 07:02

## 2018-02-13 RX ADMIN — HYDRALAZINE HYDROCHLORIDE 25 MG: 25 TABLET, FILM COATED ORAL at 17:26

## 2018-02-13 RX ADMIN — MELATONIN TAB 3 MG 3 MG: 3 TAB at 22:02

## 2018-02-13 RX ADMIN — TRAZODONE HYDROCHLORIDE 50 MG: 50 TABLET ORAL at 22:02

## 2018-02-13 RX ADMIN — MONTELUKAST SODIUM 10 MG: 10 TABLET, COATED ORAL at 10:58

## 2018-02-13 RX ADMIN — METOPROLOL TARTRATE 50 MG: 50 TABLET ORAL at 10:57

## 2018-02-13 RX ADMIN — PANTOPRAZOLE SODIUM 40 MG: 40 GRANULE, DELAYED RELEASE ORAL at 10:59

## 2018-02-13 RX ADMIN — HYDRALAZINE HYDROCHLORIDE 25 MG: 25 TABLET, FILM COATED ORAL at 10:58

## 2018-02-13 NOTE — PROGRESS NOTES
Problem: Mobility Impaired (Adult and Pediatric)  Goal: *Acute Goals and Plan of Care (Insert Text)  Physical Therapy Goals  Initiated 2/11/2018 and to be accomplished within 7 day(s)  1. Patient will move from supine to sit and sit to supine  in bed with supervision/set-up. 2.  Patient will transfer from bed to chair and chair to bed with contact guard assist using the least restrictive device. 3.  Patient will perform sit to stand with supervision/set-up to RW. 4.  Patient will ambulate with minimal assistance/contact guard assist for 50 feet with the least restrictive device. 5.  Patient will ascend/descend 2 stairs with unilateral handrail(s) with minimal assistance/contact guard assist.   physical Therapy TREATMENT    Patient: Robin Pires (65 y.o. female)  Date: 2/13/2018  Diagnosis: Pneumonia <principal problem not specified>       Precautions: Fall   Chart, physical therapy assessment, plan of care and goals were reviewed. ASSESSMENT:  Pt found sitting in chair, resting. Pt easily roused and initially denied mobility but agreed to amb to bathroom. Pt slow to transfer to seated EOB but pt suddenly performed sit to stand to RW and quickly amb to bathroom with no LOB. Pt transferred to commode with CGA and required additional time to perform bladder movement. Pt then amb back to bed and transfered  Progression toward goals:  [x]      Improving appropriately and progressing toward goals  []      Improving slowly and progressing toward goals  []      Not making progress toward goals and plan of care will be adjusted     PLAN:  Patient continues to benefit from skilled intervention to address the above impairments. Continue treatment per established plan of care. Discharge Recommendations:  Aron Rendon  Further Equipment Recommendations for Discharge:  rolling walker     SUBJECTIVE:   Patient stated I'm tired, but I do need to get.     OBJECTIVE DATA SUMMARY:   Critical Behavior:  Neurologic State: Drowsy  Orientation Level: Oriented to person, Oriented to place  Cognition: Follows commands  Safety/Judgement: Fall prevention  Functional Mobility Training:  Bed Mobility:  Supine to Sit:  (Pt up in chair upon arrival.)  Transfers:  Sit to Stand: Contact guard assistance  Stand to Sit: Contact guard assistance  Balance:  Sitting: Intact  Sitting - Static: Good (unsupported)  Sitting - Dynamic: Fair (occasional)  Standing: Intact; With support  Standing - Static: Fair  Ambulation/Gait Training:  Distance (ft): 30 Feet (ft)  Assistive Device: Walker, rolling  Ambulation - Level of Assistance: Contact guard assistance  Gait Description (WDL): Exceptions to WDL  Gait Abnormalities: Decreased step clearance  Base of Support: Narrowed  Stance: Left increased;Right increased  Speed/Lilliam: Accelerated  Step Length: Left shortened;Right shortened  Pain:  Pain Scale 1: Numeric (0 - 10)  Pain Intensity 1: 0   Activity Tolerance:   Fair+  Please refer to the flowsheet for vital signs taken during this treatment. After treatment:   [] Patient left in no apparent distress sitting up in chair  [x] Patient left in no apparent distress in bed  [x] Call bell left within reach  [x] Nursing notified  [] Caregiver present  [] Bed alarm activated      Shawandaliss Clanton   Time Calculation: 23 mins    Mobility  Current  CI= 1-19%   Goal  CI= 1-19%  D/C  CI= 1-19%. The severity rating is based on the Level of Assistance required for Functional Mobility and ADLs.

## 2018-02-13 NOTE — PROGRESS NOTES
[facility] Hospitalist Group  Progress Note    Patient: Rafa Chavarria Age: 80 y.o. : 1935 MR#: [id number] SSN: [id number]  Date/Time: 2018     Subjective/24-hour events:     Pt AAOx3, feels ok, care giver at bedside. Assessment:   ? Pneumonia, community acquired vs viral syndrome   Sepsis, POA  S. Epidermidis bacteremia ? Contamination   Atrial fibrillation  HTN  HLD   Hypothryoidism  Chronic back pain  Advanced age    Plan:  CXR noted, mostly atelectasis. Start IS  Will wean off O2    F/U CBC to trend WBCs. Fu repeat blood Cx  Scheduled tramadol per home dose. Additional PRN analgesic ordered. Continue melatonin and trazodone per home dosing. Awaiting PT/OT evals, mobilize as tolerated. Anticipate SNF disposition  Discussed plan with son Brad Brought [phone]. D/w ID    Case discussed with:  [x]Patient  []Family  [x]Nursing  []Case Management  DVT Prophylaxis:  []Lovenox  []Hep SQ  [x]SCDs  []Coumadin   []On Heparin gtt    Objective:   VS:   Visit Vitals    /70 (BP 1 Location: Left arm, BP Patient Position: Sitting)    Pulse 66    Temp 96.7 °F (35.9 °C)    Resp 18    Ht 4' 5\" (1.346 m)    Wt 49.8 kg (109 lb 12.6 oz)    SpO2 97%    BMI 27.48 kg/m2      Tmax/24hrs: Temp (24hrs), Av.1 °F (36.2 °C), Min:96.7 °F (35.9 °C), Max:97.7 °F (36.5 °C)      Intake/Output Summary (Last 24 hours) at 18 1635  Last data filed at 18 1023   Gross per 24 hour   Intake              220 ml   Output              230 ml   Net              -10 ml       General:  In NAD. Nontoxic-appearing. Cardiovascular:  RRR. Pulmonary:  No wheezes. Effort nonlabored. GI:  Abdomen soft, NTTP. Extremities:  Warm, no ischemia or edema.   AAOx3, moves all ext    Labs:    Recent Results (from the past 24 hour(s))   METABOLIC PANEL, BASIC    Collection Time: 18  4:25 AM   Result Value Ref Range    Sodium 137 136 - 145 mmol/L    Potassium 4.2 3.5 - 5.5 mmol/L    Chloride 107 100 - 108 mmol/L    CO2 22 21 - 32 mmol/L    Anion gap 8 3.0 - 18 mmol/L    Glucose 96 74 - 99 mg/dL    BUN 39 (H) 7.0 - 18 MG/DL    Creatinine 1.11 0.6 - 1.3 MG/DL    BUN/Creatinine ratio 35 (H) 12 - 20      GFR est AA 57 (L) >60 ml/min/1.73m2    GFR est non-AA 47 (L) >60 ml/min/1.73m2    Calcium 8.9 8.5 - 10.1 MG/DL       Signed By: Simón James MD     February 13, 2018

## 2018-02-13 NOTE — CDMP QUERY
Please clarify any significance to renal lab results.    =>   =>Other Explanation of clinical findings  =>Unable to Determine (no explanation of clinical findings)    The medical record reflects the following:  Risk:  -- admitted with sepsis, pna  -- PMH HTN, afib    Clinical Indicators:  -- 2/10/2018 04:00  BUN: 20 (H)  Creatinine: 1.24  BUN/Creatinine ratio: 16  GFR est non-AA: 41 (L)  GFR est AA: 50 (L)    -- 2/11/2018 03:55  BUN: 25 (H)  Creatinine: 1.10  BUN/Creatinine ratio: 23 (H)  GFR est non-AA: 48 (L)  GFR est AA: 58 (L)    -- 2/12/2018 05:23  BUN: 33 (H)  Creatinine: 1.01  BUN/Creatinine ratio: 33 (H)  GFR est non-AA: 52 (L)  GFR est AA: >60    -- 2/13/2018 04:25  BUN: 39 (H)  Creatinine: 1.11  BUN/Creatinine ratio: 35 (H)  GFR est non-AA: 47 (L)  GFR est AA: 57 (L)    Treatment:   -- receiving serial BMP  -- receiving Normal Saline IVF    Please clarify and document your clinical opinion in the progress notes and discharge summary including the definitive and/or presumptive diagnosis, (suspected or probable), related to the above clinical findings. Please include clinical findings supporting your diagnosis. If you DECLINE this query or would like to communicate with [facility], please utilize the \"[employer] message box\" at the TOP of the Progress Note on the right.       Thank you,  Parmjit Anderson [facility]0 [address]

## 2018-02-13 NOTE — PROGRESS NOTES
Assumed care for this patient. Pt A&Ox3, bed low & locked, call bell in reach. No needs at this time. Private caregiver at bedside, Mariela Ca.

## 2018-02-13 NOTE — ROUTINE PROCESS
Bedside and Verbal shift change report given to [employer], RN (oncoming nurse) by Cyndee Woods RN (offgoing nurse). Report included the following information SBAR, Kardex, MAR and Recent Results. SITUATION:    Code Status: Full Code   Reason for Admission: Pneumonia    [facility] day: 4   Problem List:       Hospital Problems  Date Reviewed: 3/16/2017          Codes Class Noted POA    Pneumonia ICD-10-CM: J18.9  ICD-9-CM: 951  2/9/2018 Unknown              BACKGROUND:    Past Medical History:   Past Medical History:   Diagnosis Date    Anemia NEC     Arthritis     Atrial fibrillation ([facility] [address]     Bursitis of left shoulder     Cardiac cath 01/27/2009    RCA mild. LM patent. pLAD 20%. Very dLAD w/significant tapering. CX mild.  Cardiac stress echo, abnormal 01/05/2009    Positive maximal stress echo by echo & EKG criteria w/o chest pain.  EF >65%. Sm, discrete apical, apical septal hypk.    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    Bilateral index fingers    Trigger ring finger of right hand     Wears glasses          Patient taking anticoagulants no, refuses heparin    ASSESSMENT:    Changes in Assessment Throughout Shift: none     Patient has Central Line: no Reasons if yes: n/a   Patient has Kendall Cath: no Reasons if yes: n/a      Last Vitals:     Vitals:    02/12/18 2000 02/13/18 0000 02/13/18 0400 02/13/18 0708   BP: 151/85 130/79 139/82 148/81   Pulse: 81 80 90 89   Resp: 18 18 18 18   Temp: 97 °F (36.1 °C) 97.7 °F (36.5 °C) 97 °F (36.1 °C) 96.9 °F (36.1 °C)   SpO2: 94%  94% 94%   Weight:       Height:            IV and DRAINS (will only show if present)   Peripheral IV 02/09/18 Left Antecubital-Site Assessment: Clean, dry, & intact     WOUND (if present)   Wound Type:  none   Dressing present Dressing Present : No   Wound Concerns/Notes:  none     PAIN    Pain Assessment    Pain Intensity 1: 0 (02/13/18 0600)    Pain Location 1: Rib cage    Pain Intervention(s) 1: Medication (see MAR)    Patient Stated Pain Goal: 0  o Interventions for Pain:  Tramadol  o Intervention effective: yes  o Time of last intervention: 2106  o Reassessment Completed: yes      Last 3 Weights:  Last 3 Recorded Weights in this Encounter    02/09/18 0946 02/12/18 0825   Weight: 49.9 kg (110 lb) 49.8 kg (109 lb 12.6 oz)     Weight change:      INTAKE/OUPUT    Current Shift: 02/13 0701 - 02/13 1900  In: 220 [P.O.:220]  Out: 200 [Urine:200]    Last three shifts: 02/11 1901 - 02/13 0700  In: -   Out: 30 [Urine:30]     LAB RESULTS     Recent Labs      02/12/18   1256  02/11/18   0355   WBC  16.0*  24.4*   HGB  10.8*  11.8*   HCT  31.1*  35.5   PLT  357  382        Recent Labs      02/13/18   0425  02/12/18   0523  02/11/18   0355   NA  137  135*  133*   K  4.2  3.4*  3.5   GLU  96  96  109*   BUN  39*  33*  25*   CREA  1.11  1.01  1.10   CA  8.9  8.9  8.7       RECOMMENDATIONS AND DISCHARGE PLANNING     1. Pending tests/procedures/ Plan of Care or Other Needs: O2 therapy, antibiotics, pain control    2. Discharge plan for patient and Needs/Barriers: SNF    3. Estimated Discharge Date: 2/15/18 Posted on Whiteboard in [facility]: yes      4. The patient's care plan was reviewed with the oncoming nurse. \"HEALS\" SAFETY CHECK      Fall Risk    Total Score: 2    Safety Measures: Safety Measures: Bed/Chair-Wheels locked, Bed in low position, Call light within reach    A safety check occurred in the patient's room between off going nurse and oncoming nurse listed above.     The safety check included the below items  Area Items   H  High Alert Medications - Verify all high alert medication drips (heparin, PCA, etc.)   E  Equipment - Suction is set up for ALL patients (with erma)  - Red plugs utilized for all equipment (IV pumps, etc.)  - WOWs wiped down at end of shift.  - Room stocked with oxygen, suction, and other unit-specific supplies   A  Alarms - Bed alarm is set for fall risk patients  - Ensure chair alarm is in place and activated if patient is up in a chair   L  Lines - Check IV for any infiltration  - Kendall bag is empty if patient has a Kendall   - Tubing and IV bags are labeled   S  Safety   - Room is clean, patient is clean, and equipment is clean. - Hallways are clear from equipment besides carts. - Fall bracelet on for fall risk patients  - Ensure room is clear and free of clutter  - Suction is set up for ALL patients (with erma)  - Hallways are clear from equipment besides carts.    - Isolation precautions followed, supplies available outside room, sign posted     Paula Henderson RN

## 2018-02-13 NOTE — INTERDISCIPLINARY ROUNDS
Interdisciplinary Round Note   Patient Information:   Lara Aceves   [id number]/[birth year]   Reason for Admission: Pneumonia   Attending Provider:   Brayan Gomez  Primary Care Physician:       Mary Wilson MD       [phone]   Estimated discharge date:  2/14/18   Hospital day: 4  [unfilled]  4d 21h  RRAT Score: Medium Risk            14       Total Score        3 Has Seen PCP in Last 6 Months (Yes=3, No=0)    5 Pt. Coverage (Medicare=5 , Medicaid, or Self-Pay=4)    6 Charlson Comorbidity Score (Age + Comorbid Conditions)        Criteria that do not apply:    . Living with Significant Other. Assisted Living. LTAC. SNF. or   Rehab    Patient Length of Stay (>5 days = 3)    IP Visits Last 12 Months (1-3=4, 4=9, >4=11)       AFIB     No  N/A  N/A     Not needed      Lines, Drains, & Airways  None       IV Antibiotics:    Current Antimicrobial Therapy     None        GI Prophylaxis: GI Prophylaxis: no   Type:       Recent Glucose Results:   Lab Results   Component Value Date/Time    GLU 96 02/13/2018 04:25 AM      Activity Level: Activity Level:  Up with Assistance    Needs assistance with ADLs: no       Goals for Today: placement for snf   Recommendations:   Discharge Disposition: SNF  P.T, O.T. and CM  tbd    Needs for Discharge: discharge pending IDR Team: una Ozuna Montgomery case management  Recommendations from IDR team: placement for snf    Other Notes:

## 2018-02-13 NOTE — PROGRESS NOTES
Problem: Self Care Deficits Care Plan (Adult)  Goal: *Acute Goals and Plan of Care (Insert Text)  Occupational Therapy Goals  Initiated 2/13/2018 within 7 day(s). 1.  Patient will perform upper body dressing with supervision/set-up. 2.  Patient will perform lower body dressing with minimal assistance/contact guard assist.  3.  Patient will perform functional task in standing for 3 minutes with supervision for balance. 4.  Patient will perform toilet transfers with supervision/set-up. 5.  Patient will perform all aspects of toileting with supervision/set-up. 6.  Patient will participate in upper extremity therapeutic exercise/activities with supervision/set-up for 8 minutes to increase strength/endurance for ADLs. Outcome: Progressing Towards Goal  Occupational Therapy EVALUATION    Patient: Chip Mabry (16 y.o. female)  Date: 2/13/2018  Primary Diagnosis: Pneumonia        Precautions:   Fall    ASSESSMENT :  Based on the objective data described below, the patient presents with decreased ADLs, decreased functional mobility and muscle weakness, complicated by lethargy. Patient requires encouragement for all activities and to maintain alert state. She states she is weak from pneumonia and wants to rest.  Patient educated on importance of OOB activities to build endurance/strength for ADLs. She verbalized understanding. Recommend continued therapy at SNF to increase her functional independence for safe return to home. Patient will benefit from skilled intervention to address the above impairments.   Patients rehabilitation potential is considered to be Fair  Factors which may influence rehabilitation potential include:   []             None noted  []             Mental ability/status  [x]             Medical condition  []             Home/family situation and support systems  []             Safety awareness  []             Pain tolerance/management  []             Other:      PLAN :  Recommendations and Planned Interventions:  [x]               Self Care Training                  [x]        Therapeutic Activities  [x]               Functional Mobility Training    []        Cognitive Retraining  [x]               Therapeutic Exercises           [x]        Endurance Activities  [x]               Balance Training                   []        Neuromuscular Re-Education  []               Visual/Perceptual Training     [x]   Home Safety Training  [x]               Patient Education                 [x]        Family Training/Education  []               Other (comment):    Frequency/Duration: Patient will be followed by occupational therapy 1-2 times per day/4-7 days per week to address goals. Discharge Recommendations: [facility]  Further Equipment Recommendations for Discharge: TBD at next level of care     Barriers to Learning/Limitations: yes;  altered mental status (i.e.Sedation, Confusion)  Compensate with: visual, verbal, tactile, kinesthetic cues/model     PATIENT COMPLEXITY      Eval Complexity: History: MEDIUM Complexity : Expanded review of history including physical, cognitive and psychosocial  history ; Examination: MEDIUM Complexity : 3-5 performance deficits relating to physical, cognitive , or psychosocial skils that result in activity limitations and / or participation restrictions; Decision Making:MEDIUM Complexity : Patient may present with comorbidities that affect occupational performnce. Miniml to moderate modification of tasks or assistance (eg, physical or verbal ) with assesment(s) is necessary to enable patient to complete evaluation  Assessment: Moderate Complexity     G-CODES:     Self Care  Current  CK= 40-59%   Goal  CJ= 20-39%. The severity rating is based on the Level of Assistance required for Functional Mobility and ADLs. SUBJECTIVE:   Patient stated I need ice for my peaches.     OBJECTIVE DATA SUMMARY:     Past Medical History: Diagnosis Date    Anemia NEC     Arthritis     Atrial fibrillation ([facility])     Bursitis of left shoulder     Cardiac cath 01/27/2009    RCA mild. LM patent. pLAD 20%. Very dLAD w/significant tapering. CX mild.  Cardiac stress echo, abnormal 01/05/2009    Positive maximal stress echo by echo & EKG criteria w/o chest pain.  EF >65%. Sm, discrete apical, apical septal hypk. 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    Bilateral index fingers    Trigger ring finger of right hand     Wears glasses      Past Surgical History:   Procedure Laterality Date    ABDOMEN SURGERY PROC UNLISTED      HX APPENDECTOMY      HX COLECTOMY      partial    HX HYSTERECTOMY      HX MOHS PROCEDURES Right 12/2010    Dr. Buddy Oliva    HX OTHER SURGICAL      Two ribs removed    HX TONSILLECTOMY       Prior Level of Function/Home Situation: Pt required min assist with basic self care tasks and used a RW for functional mobility PTA.   Home Situation  Home Environment: Private residence  One/Two Story Residence: One story  Living Alone: Yes  Support Systems: Child(priyank)  Patient Expects to be Discharged to[de-identified] Private residence  Current DME Used/Available at Home: [employer] or Shower Type: Tub/Shower combination  [x]  Right hand dominant   []  Left hand dominant  Cognitive/Behavioral Status:  Neurologic State: Drowsy  Orientation Level: Oriented to person;Oriented to place  Cognition: Follows commands  Safety/Judgement: Fall prevention    Skin: Intact on UEs    Edema: None noted in UEs    Vision/Perceptual:    Acuity: Able to read clock/calendar on wall without difficulty    Corrective Lenses: Glasses    Coordination:  Fine Motor Skills-Upper: Left Intact; Right Intact    Gross Motor Skills-Upper: Left Intact; Right Intact    Balance:  Sitting: Intact  Standing: Impaired    Strength:  Strength: Generally decreased, functional (UEs; 4/5 throughout)    Tone & Sensation:  Tone: Normal (UEs)  Sensation: Intact (UEs)    Range of Motion:  AROM: Within functional limits (UEs)  PROM: Within functional limits (UEs)    Functional Mobility and Transfers for ADLs:  Bed Mobility:  Supine to Sit:  (Pt up in chair upon arrival.)  Transfers:  Sit to Stand: Minimum assistance   Toilet Transfer : Minimum assistance    ADL Assessment:  Feeding: Setup;Supervision    Oral Facial Hygiene/Grooming: Setup;Supervision    Bathing: Moderate assistance    Upper Body Dressing: Minimum assistance    Lower Body Dressing: Moderate assistance    Toileting: Minimum assistance    ADL Intervention:  Patient attempted to reach her feet for LB dressing but max encouragement needed for participation and min assist given. At start of session, she stated she could not feed herself but bowl of peaches handed to her and she was able to self feed. Cognitive Retraining  Safety/Judgement: Fall prevention    Pain:  Pt reports 0/10 pain or discomfort prior to treatment.    Pt reports 0/10 pain or discomfort post treatment. Activity Tolerance:   Fair    Please refer to the flowsheet for vital signs taken during this treatment. After treatment:   [x] Patient left in no apparent distress sitting up in chair  [] Patient left in no apparent distress in bed  [x] Call bell left within reach  [x] Nursing notified  [] Caregiver present  [] Bed alarm activated    COMMUNICATION/EDUCATION:   [x] Home safety education was provided and the patient/caregiver indicated understanding. [x] Patient/family have participated as able in goal setting and plan of care. [x] Patient/family agree to work toward stated goals and plan of care.   [] Patient understands intent and goals of therapy, but is neutral about his/her participation. [] Patient is unable to participate in goal setting and plan of care.     Thank you for this referral.  Anastasia Ghosh MS OTR/L  Time Calculation: 25 mins

## 2018-02-13 NOTE — CONSULTS
Infectious Disease Consultation Note    Requested by:dr. Abdulaziz Crandall    Reason: staph epidermidis bacteremia    Current abx Prior abx   Levofloxacin since 2/12 Ceftriaxone 2/9  Vancomycin 2/11-2/12  Azithromycin 2/9  Cefepime 2/10-2/12       Lines:       Assessment :    80 y.o. female with a PMH of chronic back pain, atrial fibrillation, HTN, hyperlipidemia, hypothyroidism, pulmonary HTN, pulmonary hemorrhage who presented to ed on 2/9/18 with c/c of back pain. Now with staph epidermidis bacteremia, cough, leukocytosis. Patient presents with highly complex clinical picture. Difficult to determine significance of staph epidermidis in blood culture since no repeat blood cultures obtained within 48 hours of positivity. Patient didn't have fevers on admission. But she did have leukocytosis. Patient's cough on admission was likely due to viral URTI (had sorethroat, nasal congestion). LL abnormality noted on cxr is likely atelectasis - low clinical suspicion of pneumonia. Under these circumstances, leukocytosis on admission was probably leukemoid reaction to back pain, hemoconcentration. Will need to f/u repeat blood cultures and monitor off abx to verify. Currently there is no definitive clinical evidence of back infection but clinical dx difficult due to h/o chronic back pain. Hence, will monitor off abx. Recommendations:    1. D/c levofloxacin  2. Repeat blood cultures  3. Monitor cbc, temp off abx to determine further plan of care    Advance Care planning: full code: discussed  with patient/surrogate decision maker:Raghav Garcia: [phone]    Thank you for consultation request. Above plan was discussed in details with patient, and dr Abdulaziz Crandall. Please call me if any further questions or concerns. Will continue to participate in the care of this patient.     HPI:    80 y.o. female with a PMH of chronic back pain, atrial fibrillation, HTN, hyperlipidemia, hypothyroidism, pulmonary HTN, pulmonary hemorrhage who presented to ed on 2/9/18 with c/c of back pain. She was on tramadol for several years for pain control and few days ago her PCP discontinued that and since then she is having more pain. She is also complaining of cough, no chest pain or fever. She has some shortness of breathing. Here in ED she was found to have probable left lower lobe infiltrateShe was started on IV antibiotics with ceftriaxone and zithromax which was later switched to cefepime followed by levofloxacin. Her blood cultures on admission revealed staph epidermidis. I have been consulted for further recommendations. Patient states that she still has back pain. She recollects having runny nose, sore throat for few days prior to admission. She denies any subjective sensation of fever/chills. Patient denies headaches, visual disturbances, sore throat, runny nose, earaches, cp,  chills,  abdominal pain, diarrhea, burning micturition, pain  in extremities. she denies recent sick contacts. No h/o recent travel. No known h/o MRSA colonization or infection in the past.      Past Medical History:   Diagnosis Date    Anemia NEC     Arthritis     Atrial fibrillation (HCC)     Bursitis of left shoulder     Cardiac cath 01/27/2009    RCA mild. LM patent. pLAD 20%. Very dLAD w/significant tapering. CX mild.  Cardiac stress echo, abnormal 01/05/2009    Positive maximal stress echo by echo & EKG criteria w/o chest pain.  EF >65%. Sm, discrete apical, apical septal hypk.    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    Bilateral index fingers    Trigger ring finger of right hand     Wears glasses        Past Surgical History: Procedure Laterality Date    ABDOMEN SURGERY PROC UNLISTED      HX APPENDECTOMY      HX COLECTOMY      partial    HX HYSTERECTOMY      HX MOHS PROCEDURES Right 12/2010    Dr. Vannessa Lainez    HX OTHER SURGICAL      Two ribs removed    HX TONSILLECTOMY         home Medication List    Details   NIFEdipine ER (PROCARDIA XL) 30 mg ER tablet Take 30 mg by mouth daily. hydrALAZINE (APRESOLINE) 25 mg tablet Take 25 mg by mouth two (2) times a day. furosemide (LASIX) 20 mg tablet Take  by mouth daily. levothyroxine (SYNTHROID) 100 mcg tablet Take 100 mcg by mouth Daily (before breakfast). pantoprazole (PROTONIX) 40 mg tablet Take 40 mg by mouth daily. cholecalciferol (VITAMIN D3) 1,000 unit cap Take 1,000 Units by mouth daily. multivitamin (ONE A DAY) tablet Take 1 Tab by mouth daily. carboxymethylcellulose sodium 1 % dlgl Apply 1 Drop to eye nightly. fish oil-dha-epa 1,200-144-216 mg cap Take 1 Cap by mouth daily. biotin 1 mg tab Take 1 Tab by mouth daily. Aspirin, Buffered 81 mg tab Take 1 Tab by mouth daily. B.infantis-B.ani-B.long-B.bifi (PROBIOTIC 4X) 10-15 mg TbEC Take 1 Tab by mouth daily. digoxin (LANOXIN) 0.125 mg tablet Take 0.125 mg by mouth every other day. metoprolol (LOPRESSOR) 50 mg tablet Take 50 mg by mouth two (2) times a day. loratadine (CLARITIN) 10 mg tablet Take 10 mg by mouth daily as needed. lovastatin (MEVACOR) 40 mg tablet Take 40 mg by mouth nightly. traZODone (DESYREL) 50 mg tablet Take 50 mg by mouth nightly. estradiol (ESTRACE) 1 mg tablet Take 1 mg by mouth daily. traMADol (ULTRAM) 50 mg tablet Take 25 mg by mouth two (2) times a day. CALCIUM CARBONATE (CALCIUM 600 PO) Take 1 Tab by mouth daily. beclomethasone (QVAR) 40 mcg/actuation inhaler Take 2 Puffs by inhalation two (2) times a day.  Use with spacer device  Qty: 1 Inhaler, Refills: 6      montelukast (SINGULAIR) 10 mg tablet Take 10 mg by mouth daily.              Current Facility-Administered Medications   Medication Dose Route Frequency    levoFLOXacin (LEVAQUIN) tablet 750 mg  750 mg Oral Q48H    melatonin tablet 3 mg  3 mg Oral QHS    docusate sodium (COLACE) capsule 100 mg  100 mg Oral BID    bisacodyl (DULCOLAX) tablet 10 mg  10 mg Oral DAILY PRN    traZODone (DESYREL) tablet 50 mg  50 mg Oral QHS    0.9% sodium chloride infusion  50 mL/hr IntraVENous CONTINUOUS    digoxin (LANOXIN) tablet 0.125 mg  0.125 mg Oral EVERY OTHER DAY    loratadine (CLARITIN) tablet 10 mg  10 mg Oral DAILY PRN    montelukast (SINGULAIR) tablet 10 mg  10 mg Oral DAILY    pantoprazole (PROTONIX) granules for oral suspension 40 mg  40 mg Oral ACB    morphine injection 1 mg  1 mg IntraVENous Q4H PRN    sodium chloride (NS) flush 5-10 mL  5-10 mL IntraVENous PRN    sodium chloride (NS) flush 5-10 mL  5-10 mL IntraVENous Q8H    sodium chloride (NS) flush 5-10 mL  5-10 mL IntraVENous PRN    albuterol (PROVENTIL VENTOLIN) nebulizer solution 1.25 mg  1.25 mg Nebulization Q4H PRN    hydrALAZINE (APRESOLINE) 20 mg/mL injection 10 mg  10 mg IntraVENous Q6H PRN    hydrALAZINE (APRESOLINE) tablet 25 mg  25 mg Oral BID    NIFEdipine ER (PROCARDIA XL) tablet 30 mg  30 mg Oral DAILY    traMADol (ULTRAM) tablet 25 mg  25 mg Oral BID    levothyroxine (SYNTHROID) tablet 100 mcg  100 mcg Oral ACB    metoprolol tartrate (LOPRESSOR) tablet 50 mg  50 mg Oral BID    lovastatin (MEVACOR) tablet 40 mg  40 mg Oral QHS    acetaminophen (TYLENOL) tablet 650 mg  650 mg Oral Q4H PRN       Allergies: Hydrochlorothiazide; Penicillins; Sulfa (sulfonamide antibiotics); and Prednisone    Family History   Problem Relation Age of Onset    Hypertension Mother     Heart Attack Mother     Stroke Father     Heart Disease Sister     Heart Surgery Sister      CABG    Stroke Brother     Heart Attack Brother      Social History     Social History    Marital status:      Spouse name: N/A    Number of children: N/A    Years of education: N/A     Occupational History    Not on file. Social History Main Topics    Smoking status: Never Smoker    Smokeless tobacco: Never Used    Alcohol use No    Drug use: No    Sexual activity: Not on file     Other Topics Concern    Not on file     Social History Narrative     History   Smoking Status    Never Smoker   Smokeless Tobacco    Never Used        Temp (24hrs), Av.1 °F (36.2 °C), Min:96.9 °F (36.1 °C), Max:97.7 °F (36.5 °C)    Visit Vitals    /88 (BP 1 Location: Left arm, BP Patient Position: Sitting)    Pulse 89    Temp 97.1 °F (36.2 °C)    Resp 18    Ht 4' 5\" (1.346 m)    Wt 49.8 kg (109 lb 12.6 oz)    SpO2 96%    BMI 27.48 kg/m2       ROS: 12 point ROS obtained in details. Pertinent positives as mentioned in HPI,   otherwise negative    Physical Exam:    General: Well developed, well nourished female sitting on the  Chair, sleepy, oriented x 3    HEENT:  Normocephalic, atraumatic, PERRL, EOMI, no scleral icterus or pallor; no conjunctival hemmohage;  nasal and oral mucous are moist and without evidence of lesions. No thrush Neck supple, no bruits. Lymph Nodes:   no cervical, axillary or inguinal adenopathy   Lungs:   non-labored, bilaterally clear to auscultation- no crackles wheezes rales or rhonchi   Heart:  RRR, s1 and s2; no  rubs or gallops, no edema, + pedal pulses   Abdomen:  soft, non-distended, active bowel sounds, no hepatomegaly, no splenomegaly. Non-tender   Genitourinary:  deferred   Extremities:   no clubbing, cyanosis; no joint effusions or swelling;  muscle mass appropriate for age   Neurologic:  No gross focal sensory abnormalities; 5/5 muscle strength to upper and lower extremities. Speech appropriate.  Cranial nerves intact                        Skin:  No rash or ulcers noted   Back:  no spinal or paraspinal muscle tenderness or rigidity, no CVA tenderness Psychiatric:  No suicidal or homicidal ideations, appropriate mood and affect         Labs: Results:   Chemistry Recent Labs      02/13/18   0425  02/12/18   0523  02/11/18   0355   GLU  96  96  109*   NA  137  135*  133*   K  4.2  3.4*  3.5   CL  107  103  100   CO2  22  23  22   BUN  39*  33*  25*   CREA  1.11  1.01  1.10   CA  8.9  8.9  8.7   AGAP  8  9  11   BUCR  35*  33*  23*      CBC w/Diff Recent Labs      02/12/18   1256  02/11/18   0355   WBC  16.0*  24.4*   RBC  3.37*  3.70*   HGB  10.8*  11.8*   HCT  31.1*  35.5   PLT  357  382   GRANS  80*  82*   LYMPH  11*  9*   EOS  0  0      Microbiology No results for input(s): CULT in the last 72 hours.        RADIOLOGY:    All available imaging studies/reports in [facility] for this admission were reviewed    Dr. Daren Jones, Infectious Disease Specialist  [phone]  February 13, 2018  1:25 PM

## 2018-02-13 NOTE — ROUTINE PROCESS
Bedside and Verbal shift change report given to wilbur Gotti (oncoming nurse) by Krissy Kong (offgoing nurse). Report included the following information SBAR, Kardex, Intake/Output and MAR.

## 2018-02-14 LAB
ANION GAP SERPL CALC-SCNC: 7 MMOL/L (ref 3–18)
BASOPHILS # BLD: 0.1 K/UL (ref 0–0.1)
BASOPHILS NFR BLD: 1 % (ref 0–2)
BUN SERPL-MCNC: 35 MG/DL (ref 7–18)
BUN/CREAT SERPL: 31 (ref 12–20)
CALCIUM SERPL-MCNC: 8.2 MG/DL (ref 8.5–10.1)
CHLORIDE SERPL-SCNC: 108 MMOL/L (ref 100–108)
CO2 SERPL-SCNC: 23 MMOL/L (ref 21–32)
CREAT SERPL-MCNC: 1.12 MG/DL (ref 0.6–1.3)
DIFFERENTIAL METHOD BLD: ABNORMAL
EOSINOPHIL # BLD: 0.1 K/UL (ref 0–0.4)
EOSINOPHIL NFR BLD: 1 % (ref 0–5)
ERYTHROCYTE [DISTWIDTH] IN BLOOD BY AUTOMATED COUNT: 13.7 % (ref 11.6–14.5)
GLUCOSE SERPL-MCNC: 94 MG/DL (ref 74–99)
HCT VFR BLD AUTO: 31.3 % (ref 35–45)
HGB BLD-MCNC: 10.2 G/DL (ref 12–16)
LYMPHOCYTES # BLD: 2.2 K/UL (ref 0.9–3.6)
LYMPHOCYTES NFR BLD: 25 % (ref 21–52)
MCH RBC QN AUTO: 30.8 PG (ref 24–34)
MCHC RBC AUTO-ENTMCNC: 32.6 G/DL (ref 31–37)
MCV RBC AUTO: 94.6 FL (ref 74–97)
MONOCYTES # BLD: 1.5 K/UL (ref 0.05–1.2)
MONOCYTES NFR BLD: 17 % (ref 3–10)
NEUTS SEG # BLD: 5 K/UL (ref 1.8–8)
NEUTS SEG NFR BLD: 56 % (ref 40–73)
PLATELET # BLD AUTO: 402 K/UL (ref 135–420)
PMV BLD AUTO: 10.2 FL (ref 9.2–11.8)
POTASSIUM SERPL-SCNC: 3.8 MMOL/L (ref 3.5–5.5)
RBC # BLD AUTO: 3.31 M/UL (ref 4.2–5.3)
SODIUM SERPL-SCNC: 138 MMOL/L (ref 136–145)
WBC # BLD AUTO: 8.8 K/UL (ref 4.6–13.2)

## 2018-02-14 PROCEDURE — 74011250637 HC RX REV CODE- 250/637: Performed by: INTERNAL MEDICINE

## 2018-02-14 PROCEDURE — 74011250637 HC RX REV CODE- 250/637: Performed by: HOSPITALIST

## 2018-02-14 PROCEDURE — 80048 BASIC METABOLIC PNL TOTAL CA: CPT | Performed by: INTERNAL MEDICINE

## 2018-02-14 PROCEDURE — 77030027138 HC INCENT SPIROMETER -A

## 2018-02-14 PROCEDURE — 36415 COLL VENOUS BLD VENIPUNCTURE: CPT | Performed by: INTERNAL MEDICINE

## 2018-02-14 PROCEDURE — 97535 SELF CARE MNGMENT TRAINING: CPT

## 2018-02-14 PROCEDURE — 74011250637 HC RX REV CODE- 250/637: Performed by: FAMILY MEDICINE

## 2018-02-14 PROCEDURE — 85025 COMPLETE CBC W/AUTO DIFF WBC: CPT | Performed by: INTERNAL MEDICINE

## 2018-02-14 PROCEDURE — 65270000029 HC RM PRIVATE

## 2018-02-14 PROCEDURE — 97530 THERAPEUTIC ACTIVITIES: CPT

## 2018-02-14 RX ORDER — HYDRALAZINE HYDROCHLORIDE 50 MG/1
50 TABLET, FILM COATED ORAL 3 TIMES DAILY
Status: DISCONTINUED | OUTPATIENT
Start: 2018-02-14 | End: 2018-02-15 | Stop reason: HOSPADM

## 2018-02-14 RX ADMIN — HYDRALAZINE HYDROCHLORIDE 50 MG: 50 TABLET, FILM COATED ORAL at 22:24

## 2018-02-14 RX ADMIN — TRAMADOL HYDROCHLORIDE 25 MG: 50 TABLET, FILM COATED ORAL at 10:24

## 2018-02-14 RX ADMIN — MONTELUKAST SODIUM 10 MG: 10 TABLET, COATED ORAL at 10:24

## 2018-02-14 RX ADMIN — TRAZODONE HYDROCHLORIDE 50 MG: 50 TABLET ORAL at 22:24

## 2018-02-14 RX ADMIN — LOVASTATIN 40 MG: 20 TABLET ORAL at 22:25

## 2018-02-14 RX ADMIN — LEVOTHYROXINE SODIUM 100 MCG: 100 TABLET ORAL at 10:24

## 2018-02-14 RX ADMIN — TRAMADOL HYDROCHLORIDE 25 MG: 50 TABLET, FILM COATED ORAL at 22:24

## 2018-02-14 RX ADMIN — NIFEDIPINE 30 MG: 30 TABLET, FILM COATED, EXTENDED RELEASE ORAL at 10:24

## 2018-02-14 RX ADMIN — METOPROLOL TARTRATE 50 MG: 50 TABLET ORAL at 10:24

## 2018-02-14 RX ADMIN — DOCUSATE SODIUM 100 MG: 100 CAPSULE, LIQUID FILLED ORAL at 18:13

## 2018-02-14 RX ADMIN — Medication 10 ML: at 06:15

## 2018-02-14 RX ADMIN — DIGOXIN 0.12 MG: 0.12 TABLET ORAL at 10:24

## 2018-02-14 RX ADMIN — PANTOPRAZOLE SODIUM 40 MG: 40 GRANULE, DELAYED RELEASE ORAL at 10:24

## 2018-02-14 RX ADMIN — HYDRALAZINE HYDROCHLORIDE 25 MG: 25 TABLET, FILM COATED ORAL at 10:24

## 2018-02-14 RX ADMIN — Medication 10 ML: at 22:30

## 2018-02-14 RX ADMIN — DOCUSATE SODIUM 100 MG: 100 CAPSULE, LIQUID FILLED ORAL at 10:24

## 2018-02-14 RX ADMIN — METOPROLOL TARTRATE 50 MG: 50 TABLET ORAL at 18:13

## 2018-02-14 RX ADMIN — MELATONIN TAB 3 MG 3 MG: 3 TAB at 22:24

## 2018-02-14 RX ADMIN — Medication 10 ML: at 14:00

## 2018-02-14 NOTE — PROGRESS NOTES
Bedside shift change report given to Do RN (oncoming nurse) by Davy Rodriguez RN (offgoing nurse). Report included the following information SBAR.

## 2018-02-14 NOTE — PROGRESS NOTES
Problem: Falls - Risk of  Goal: *Absence of Falls  Document Ronnie Fall Risk and appropriate interventions in the flowsheet.    Outcome: Progressing Towards Goal  Fall Risk Interventions:  Mobility Interventions: Bed/chair exit alarm         Medication Interventions: Teach patient to arise slowly    Elimination Interventions: Patient to call for help with toileting needs    History of Falls Interventions: Consult care management for discharge planning

## 2018-02-14 NOTE — ROUTINE PROCESS
Bedside and Verbal shift change report given to Xander (oncoming nurse) by Blayne Darden RN (offgoing nurse). Report included the following information SBAR, Kardex, MAR and Recent Results. SITUATION:    Code Status: Full Code   Reason for Admission: Pneumonia    [facility] day: 5   Problem List:       Hospital Problems  Date Reviewed: 3/16/2017          Codes Class Noted POA    Pneumonia ICD-10-CM: J18.9  ICD-9-CM: 603  2/9/2018 Unknown              BACKGROUND:    Past Medical History:   Past Medical History:   Diagnosis Date    Anemia NEC     Arthritis     Atrial fibrillation ([address]     Bursitis of left shoulder     Cardiac cath 01/27/2009    RCA mild. LM patent. pLAD 20%. Very dLAD w/significant tapering. CX mild.  Cardiac stress echo, abnormal 01/05/2009    Positive maximal stress echo by echo & EKG criteria w/o chest pain.  EF >65%. Sm, discrete apical, apical septal hypk.    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    Bilateral index fingers    Trigger ring finger of right hand     Wears glasses          Patient taking anticoagulants no, refuses heparin    ASSESSMENT:    Changes in Assessment Throughout Shift: none     Patient has Central Line: no Reasons if yes: n/a   Patient has Kendall Cath: no Reasons if yes: n/a      Last Vitals:     Vitals:    02/13/18 1549 02/13/18 2000 02/14/18 0000 02/14/18 0400   BP:  151/79 146/82 (!) 154/97   Pulse:  80 79 91   Resp:  20 18 20   Temp:  97 °F (36.1 °C) 98.5 °F (36.9 °C) 97.9 °F (36.6 °C)   SpO2:  94% 93% 95%   Weight: 49.8 kg (109 lb 12.6 oz)      Height:            IV and DRAINS (will only show if present)   [REMOVED] Peripheral IV 02/09/18 Left Antecubital-Site Assessment: Clean, dry, & intact  Peripheral IV 02/14/18 Left Forearm-Site Assessment: Clean, dry, & intact     WOUND (if present)   Wound Type:  none   Dressing present Dressing Present : No   Wound Concerns/Notes:  none     PAIN    Pain Assessment    Pain Intensity 1: 0 (02/14/18 0810)    Pain Location 1: Generalized    Pain Intervention(s) 1: Medication (see MAR)    Patient Stated Pain Goal: 0  o Interventions for Pain:  Tramadol  o Intervention effective: yes  o Time of last intervention: 2106  o Reassessment Completed: yes      Last 3 Weights:  Last 3 Recorded Weights in this Encounter    02/09/18 0946 02/12/18 0825 02/13/18 1549   Weight: 49.9 kg (110 lb) 49.8 kg (109 lb 12.6 oz) 49.8 kg (109 lb 12.6 oz)     Weight change: 0 kg (0 lb)     INTAKE/OUPUT    Current Shift:      Last three shifts: 02/12 1901 - 02/14 0700  In: 220 [P.O.:220]  Out: 230 [Urine:230]     LAB RESULTS     Recent Labs      02/14/18   0513  02/12/18   1256   WBC  8.8  16.0*   HGB  10.2*  10.8*   HCT  31.3*  31.1*   PLT  402  357        Recent Labs      02/14/18   0513  02/13/18   0425  02/12/18   0523   NA  138  137  135*   K  3.8  4.2  3.4*   GLU  94  96  96   BUN  35*  39*  33*   CREA  1.12  1.11  1.01   CA  8.2*  8.9  8.9       RECOMMENDATIONS AND DISCHARGE PLANNING     1. Pending tests/procedures/ Plan of Care or Other Needs: O2 therapy, antibiotics, pain control    2. Discharge plan for patient and Needs/Barriers: SNF    3. Estimated Discharge Date: 2/15/18 Posted on Whiteboard in [facility]: yes      4. The patient's care plan was reviewed with the oncoming nurse. \"HEALS\" SAFETY CHECK      Fall Risk    Total Score: 3    Safety Measures: Safety Measures: Bed/Chair alarm on, Bed/Chair-Wheels locked, Bed in low position, Call light within reach    A safety check occurred in the patient's room between off going nurse and oncoming nurse listed above.     The safety check included the below items  Area Items   H  High Alert Medications - Verify all high alert medication drips (heparin, PCA, etc.)   E  Equipment - Suction is set up for ALL patients (with erma)  - Red plugs utilized for all equipment (IV pumps, etc.)  - WOWs wiped down at end of shift.  - Room stocked with oxygen, suction, and other unit-specific supplies   A  Alarms - Bed alarm is set for fall risk patients  - Ensure chair alarm is in place and activated if patient is up in a chair   L  Lines - Check IV for any infiltration  - Kendall bag is empty if patient has a Kendall   - Tubing and IV bags are labeled   S  Safety   - Room is clean, patient is clean, and equipment is clean. - Hallways are clear from equipment besides carts. - Fall bracelet on for fall risk patients  - Ensure room is clear and free of clutter  - Suction is set up for ALL patients (with erma)  - Hallways are clear from equipment besides carts.    - Isolation precautions followed, supplies available outside room, sign posted     Vibha Holm RN

## 2018-02-14 NOTE — PROGRESS NOTES
conducted an initial consultation and Spiritual Assessment for Maryelizabeth Carrel, who is a 80 y.o.,female. Patients Primary Language is: [place]. According to the patients EMR Latter day Affiliation is: Druze. The reason the Patient came to the hospital is:   Patient Active Problem List    Diagnosis Date Noted    Pneumonia 02/09/2018    Cough 09/30/2015    Malaise and fatigue 03/23/2014    Essential hypertension 03/23/2014    Chronic kidney disease, stage III (moderate) 03/23/2014    Pulmonary hypertension     Rash and nonspecific skin eruption 09/01/2012    Atrial fibrillation [address] [address] 08/13/2012    Essential hypertension, benign 08/13/2012    Dyslipidemia 08/13/2012    Chronic anemia 08/13/2012    Hypothyroidism 08/13/2012        The  provided the following Interventions:  Initiated a relationship of care and support. Explored issues of ruperto, belief, spirituality and Catholic/ritual needs while hospitalized. Listened empathically. Patient did not feel like talking much. Her personal aid share some information about the patient with the . Patient did desire  to pray. Provided information about Spiritual Care Services. Offered prayer and assurance of continued prayers on patient's behalf. The following outcomes where achieved:  Patient shared limited information about both their medical narrative and spiritual journey/beliefs.  confirmed Patient's Latter day Affiliation. Patient processed feeling about current hospitalization. Patient expressed gratitude for 's visit. Assessment:  Patient does not have any Catholic/cultural needs that will affect patients preferences in health care. There are no spiritual or Catholic issues which require intervention at this time. Plan:  Chaplains will continue to follow and will provide pastoral care on an as needed/requested basis.    recommends bedside caregivers page  on duty if patient shows signs of acute spiritual or emotional distress.       Riddhi Flores, [address] [place] [address]  Spiritual Care  [phone]

## 2018-02-14 NOTE — PROGRESS NOTES
Problem: Self Care Deficits Care Plan (Adult)  Goal: *Acute Goals and Plan of Care (Insert Text)  Occupational Therapy Goals  Initiated 2/13/2018 within 7 day(s). 1.  Patient will perform upper body dressing with supervision/set-up. 2.  Patient will perform lower body dressing with minimal assistance/contact guard assist.  3.  Patient will perform functional task in standing for 3 minutes with supervision for balance. 4.  Patient will perform toilet transfers with supervision/set-up. 5.  Patient will perform all aspects of toileting with supervision/set-up. 6.  Patient will participate in upper extremity therapeutic exercise/activities with supervision/set-up for 8 minutes to increase strength/endurance for ADLs. Outcome: Progressing Towards Goal  Occupational Therapy TREATMENT    Patient: Wen Shipman (2 y.o. female)  Date: 2/14/2018  Diagnosis: Pneumonia <principal problem not specified>       Precautions: Fall  Chart, occupational therapy assessment, plan of care, and goals were reviewed. ASSESSMENT:  Pt is asleep upon entry, however, easily arouses to voice, and requesting to get up to the [facility]. Pt required Mod A to don socks, and Min A to simulate donning of underwear. Pt required CGA w/HHA to maneuver to the [facility] and SBA for toileting hygiene. Pt reports general fatigue and constipation, however, is not able to have BM. Pt was educated on BUE TherEx in mult planes including scap stabilization TherEx to perform to increase strength and overall activity tolerance for carryover w/ADLs. Progression toward goals:  []          Improving appropriately and progressing toward goals  [x]          Improving slowly and progressing toward goals  []          Not making progress toward goals and plan of care will be adjusted     PLAN:  Patient continues to benefit from skilled intervention to address the above impairments. Continue treatment per established plan of care.   Discharge Recommendations: Skilled Nursing Facility  Further Equipment Recommendations for Discharge:  bedside commode      G-CODES:     Self Care  Current  CK= 40-59%   Goal  CI= 1-19%. The severity rating is based on the Level of Assistance required for Functional Mobility and ADLs. SUBJECTIVE:   Patient stated I am just tired all the time.     OBJECTIVE DATA SUMMARY:   Cognitive/Behavioral Status:  Neurologic State: Alert  Orientation Level: Oriented to person, Oriented to place  Cognition: Follows commands  Safety/Judgement: Fall prevention    Functional Mobility and Transfers for ADLs:   Bed Mobility:     Supine to Sit: Supervision  Sit to Supine: Supervision      Transfers:  Sit to Stand: Contact guard assistance    Toilet Transfer : Contact guard assistance (A to [facility])    Balance:  Sitting: Intact  Standing: Impaired; With support  Standing - Static: Fair  Standing - Dynamic : Fair    ADL Intervention:   Upper Body [address]: Supervision/ set-up    Lower Body Dressing Assistance  Socks: Moderate assistance  Pain:  Pt reports 0/10 pain or discomfort prior to treatment.    Pt reports 0/10 pain or discomfort post treatment. Activity Tolerance:    Fair    Please refer to the flowsheet for vital signs taken during this treatment.   After treatment:   []  Patient left in no apparent distress sitting up in chair  [x]  Patient left in no apparent distress in bed  [x]  Call bell left within reach  [x]  Nursing notified  [x]  Nurse present at the end of the session  []  Bed alarm activated  CHELE Bowen  Time Calculation: 25 mins

## 2018-02-14 NOTE — PROGRESS NOTES
[facility] Hospitalist Group  Progress Note    Patient: Ronak Nix Age: 80 y.o. : 1935 MR#: [id number] SSN: [id number]  Date/Time: 2018     Subjective/24-hour events:     Pt AAOx3, feels ok, niece at bedside. Assessment:   ? Pneumonia, community acquired vs viral syndrome   Sepsis, POA due to # 1  S. Epidermidis bacteremia ? Contamination   Atrial fibrillation  HTN  HLD   Hypothryoidism  Chronic back pain  Advanced age    Plan:  Contt IS  Will wean off O2    Repeat blood Cx neg   Scheduled tramadol per home dose. Additional PRN analgesic ordered. Continue melatonin and trazodone per home dosing. Cont PT/OT evals, mobilize as tolerated. Anticipate SNF disposition  Discussed plan with niece at bedside in detail  maximino Quinonez [phone]. D/w ID  Plan for SNF in am     Case discussed with:  [x]Patient  [x]Family  [x]Nursing  []Case Management  DVT Prophylaxis:  []Lovenox  []Hep SQ  [x]SCDs  []Coumadin   []On Heparin gtt    Objective:   VS:   Visit Vitals    /85 (BP 1 Location: Left arm, BP Patient Position: Head of bed elevated (Comment degrees))    Pulse 72    Temp 96.9 °F (36.1 °C)    Resp 17    Ht 4' 5\" (1.346 m)    Wt 49.8 kg (109 lb 12.6 oz)    SpO2 96%    BMI 27.48 kg/m2      Tmax/24hrs: Temp (24hrs), Av.5 °F (36.4 °C), Min:96.9 °F (36.1 °C), Max:98.5 °F (36.9 °C)      Intake/Output Summary (Last 24 hours) at 18 1534  Last data filed at 18 0315   Gross per 24 hour   Intake              200 ml   Output              300 ml   Net             -100 ml       General:  In NAD. Nontoxic-appearing. Cardiovascular:  RRR. Pulmonary:  No wheezes. Effort nonlabored. GI:  Abdomen soft, NTTP. Extremities:  Warm, no ischemia or edema.   AAOx3, moves all ext    Labs:    Recent Results (from the past 24 hour(s))   CBC WITH AUTOMATED DIFF    Collection Time: 18  5:13 AM   Result Value Ref Range    WBC 8.8 4.6 - 13.2 K/uL    RBC 3.31 (L) 4.20 - 5.30 M/uL    HGB 10.2 (L) 12.0 - 16.0 g/dL    HCT 31.3 (L) 35.0 - 45.0 %    MCV 94.6 74.0 - 97.0 FL    MCH 30.8 24.0 - 34.0 PG    MCHC 32.6 31.0 - 37.0 g/dL    RDW 13.7 11.6 - 14.5 %    PLATELET 101 447 - 389 K/uL    MPV 10.2 9.2 - 11.8 FL    NEUTROPHILS 56 40 - 73 %    LYMPHOCYTES 25 21 - 52 %    MONOCYTES 17 (H) 3 - 10 %    EOSINOPHILS 1 0 - 5 %    BASOPHILS 1 0 - 2 %    ABS. NEUTROPHILS 5.0 1.8 - 8.0 K/UL    ABS. LYMPHOCYTES 2.2 0.9 - 3.6 K/UL    ABS. MONOCYTES 1.5 (H) 0.05 - 1.2 K/UL    ABS. EOSINOPHILS 0.1 0.0 - 0.4 K/UL    ABS.  BASOPHILS 0.1 0.0 - 0.1 K/UL    DF AUTOMATED     METABOLIC PANEL, BASIC    Collection Time: 02/14/18  5:13 AM   Result Value Ref Range    Sodium 138 136 - 145 mmol/L    Potassium 3.8 3.5 - 5.5 mmol/L    Chloride 108 100 - 108 mmol/L    CO2 23 21 - 32 mmol/L    Anion gap 7 3.0 - 18 mmol/L    Glucose 94 74 - 99 mg/dL    BUN 35 (H) 7.0 - 18 MG/DL    Creatinine 1.12 0.6 - 1.3 MG/DL    BUN/Creatinine ratio 31 (H) 12 - 20      GFR est AA 56 (L) >60 ml/min/1.73m2    GFR est non-AA 47 (L) >60 ml/min/1.73m2    Calcium 8.2 (L) 8.5 - 10.1 MG/DL       Signed By: Pancho Cummings MD     February 14, 2018

## 2018-02-14 NOTE — PROGRESS NOTES
Infectious Disease Progress Note    Requested by:dr. Tom Kuo    Reason: staph epidermidis bacteremia    Current abx Prior abx    Ceftriaxone 2/9  Vancomycin 2/11-2/12  Azithromycin 2/9  Cefepime 2/10-2/12  Levofloxacin 2/12-2/13       Lines:       Assessment :    80 y.o. female with a PMH of chronic back pain, atrial fibrillation, HTN, hyperlipidemia, hypothyroidism, pulmonary HTN, pulmonary hemorrhage who presented to ed on 2/9/18 with c/c of back pain. Now with staph epidermidis bacteremia, cough, leukocytosis. Patient presents with highly complex clinical picture. Difficult to determine significance of staph epidermidis in blood culture since no repeat blood cultures obtained within 48 hours of positivity. Patient didn't have fevers on admission. But she did have leukocytosis. Patient's cough on admission was likely due to viral URTI (had sorethroat, nasal congestion). LL abnormality noted on cxr is likely atelectasis - low clinical suspicion of pneumonia. Under these circumstances, leukocytosis on admission was probably leukemoid reaction to back pain, hemoconcentration. Will need to f/u repeat blood cultures and monitor off abx to verify. Currently there is no definitive clinical evidence of back infection but clinical dx difficult due to h/o chronic back pain. Hence, will monitor off abx. Recommendations:    1. Hold abx  2. F/u Repeat blood cultures  3. Monitor cbc, temp off abx to determine further plan of care  4.hopefully d/c in am if repeat blood cultures remain negative and patient is clinically stable. Advance Care planning: full code: discussed  with patient/surrogate decision maker:JoseRaghav: [phone]     Above plan was discussed in details with patient, and dr Tom Kuo. Please call me if any further questions or concerns. Will continue to participate in the care of this patient. subjective:    Patient states that she still has back pain. Feels better.  She denies any subjective sensation of fever/chills. Patient denies headaches, visual disturbances, sore throat, runny nose, earaches, cp,  chills,  abdominal pain, diarrhea, burning micturition, pain  in extremities. home Medication List    Details   NIFEdipine ER (PROCARDIA XL) 30 mg ER tablet Take 30 mg by mouth daily. hydrALAZINE (APRESOLINE) 25 mg tablet Take 25 mg by mouth two (2) times a day. furosemide (LASIX) 20 mg tablet Take  by mouth daily. levothyroxine (SYNTHROID) 100 mcg tablet Take 100 mcg by mouth Daily (before breakfast). pantoprazole (PROTONIX) 40 mg tablet Take 40 mg by mouth daily. cholecalciferol (VITAMIN D3) 1,000 unit cap Take 1,000 Units by mouth daily. multivitamin (ONE A DAY) tablet Take 1 Tab by mouth daily. carboxymethylcellulose sodium 1 % dlgl Apply 1 Drop to eye nightly. fish oil-dha-epa 1,200-144-216 mg cap Take 1 Cap by mouth daily. biotin 1 mg tab Take 1 Tab by mouth daily. Aspirin, Buffered 81 mg tab Take 1 Tab by mouth daily. B.infantis-B.ani-B.long-B.bifi (PROBIOTIC 4X) 10-15 mg TbEC Take 1 Tab by mouth daily. digoxin (LANOXIN) 0.125 mg tablet Take 0.125 mg by mouth every other day. metoprolol (LOPRESSOR) 50 mg tablet Take 50 mg by mouth two (2) times a day. loratadine (CLARITIN) 10 mg tablet Take 10 mg by mouth daily as needed. lovastatin (MEVACOR) 40 mg tablet Take 40 mg by mouth nightly. traZODone (DESYREL) 50 mg tablet Take 50 mg by mouth nightly. estradiol (ESTRACE) 1 mg tablet Take 1 mg by mouth daily. traMADol (ULTRAM) 50 mg tablet Take 25 mg by mouth two (2) times a day. CALCIUM CARBONATE (CALCIUM 600 PO) Take 1 Tab by mouth daily. beclomethasone (QVAR) 40 mcg/actuation inhaler Take 2 Puffs by inhalation two (2) times a day. Use with spacer device  Qty: 1 Inhaler, Refills: 6      montelukast (SINGULAIR) 10 mg tablet Take 10 mg by mouth daily.              Current Facility-Administered Medications   Medication Dose Route Frequency    melatonin tablet 3 mg  3 mg Oral QHS    docusate sodium (COLACE) capsule 100 mg  100 mg Oral BID    bisacodyl (DULCOLAX) tablet 10 mg  10 mg Oral DAILY PRN    traZODone (DESYREL) tablet 50 mg  50 mg Oral QHS    digoxin (LANOXIN) tablet 0.125 mg  0.125 mg Oral EVERY OTHER DAY    loratadine (CLARITIN) tablet 10 mg  10 mg Oral DAILY PRN    montelukast (SINGULAIR) tablet 10 mg  10 mg Oral DAILY    pantoprazole (PROTONIX) granules for oral suspension 40 mg  40 mg Oral ACB    morphine injection 1 mg  1 mg IntraVENous Q4H PRN    sodium chloride (NS) flush 5-10 mL  5-10 mL IntraVENous PRN    sodium chloride (NS) flush 5-10 mL  5-10 mL IntraVENous Q8H    sodium chloride (NS) flush 5-10 mL  5-10 mL IntraVENous PRN    albuterol (PROVENTIL VENTOLIN) nebulizer solution 1.25 mg  1.25 mg Nebulization Q4H PRN    hydrALAZINE (APRESOLINE) 20 mg/mL injection 10 mg  10 mg IntraVENous Q6H PRN    hydrALAZINE (APRESOLINE) tablet 25 mg  25 mg Oral BID    NIFEdipine ER (PROCARDIA XL) tablet 30 mg  30 mg Oral DAILY    traMADol (ULTRAM) tablet 25 mg  25 mg Oral BID    levothyroxine (SYNTHROID) tablet 100 mcg  100 mcg Oral ACB    metoprolol tartrate (LOPRESSOR) tablet 50 mg  50 mg Oral BID    lovastatin (MEVACOR) tablet 40 mg  40 mg Oral QHS    acetaminophen (TYLENOL) tablet 650 mg  650 mg Oral Q4H PRN       Allergies: Hydrochlorothiazide; Penicillins; Sulfa (sulfonamide antibiotics); and Prednisone    Temp (24hrs), Av.4 °F (36.3 °C), Min:96.7 °F (35.9 °C), Max:98.5 °F (36.9 °C)    Visit Vitals    /83 (BP 1 Location: Left arm, BP Patient Position: Head of bed elevated (Comment degrees))    Pulse 72    Temp 97 °F (36.1 °C)    Resp 17    Ht 4' 5\" (1.346 m)    Wt 49.8 kg (109 lb 12.6 oz)    SpO2 96%    BMI 27.48 kg/m2       ROS: 12 point ROS obtained in details.  Pertinent positives as mentioned in HPI,   otherwise negative    Physical Exam:    General: Well developed, well nourished female laying on the bed, sleepy, oriented x 3    HEENT:  Normocephalic, atraumatic, PERRL, EOMI, no scleral icterus or pallor; no conjunctival hemmohage;  nasal and oral mucous are moist and without evidence of lesions. No thrush Neck supple, no bruits. Lymph Nodes:   no cervical, axillary or inguinal adenopathy   Lungs:   non-labored, bilaterally clear to auscultation- no crackles wheezes rales or rhonchi   Heart:  RRR, s1 and s2; no  rubs or gallops, no edema, + pedal pulses   Abdomen:  soft, non-distended, active bowel sounds, no hepatomegaly, no splenomegaly. Non-tender   Genitourinary:  deferred   Extremities:   no clubbing, cyanosis; no joint effusions or swelling;  muscle mass appropriate for age   Neurologic:  No gross focal sensory abnormalities; 5/5 muscle strength to upper and lower extremities. Speech appropriate.  Cranial nerves intact                        Skin:  No rash or ulcers noted   Back:  no spinal or paraspinal muscle tenderness or rigidity, no CVA tenderness     Psychiatric:  No suicidal or homicidal ideations, appropriate mood and affect         Labs: Results:   Chemistry Recent Labs      02/14/18   0513  02/13/18   0425  02/12/18   0523   GLU  94  96  96   NA  138  137  135*   K  3.8  4.2  3.4*   CL  108  107  103   CO2  23  22  23   BUN  35*  39*  33*   CREA  1.12  1.11  1.01   CA  8.2*  8.9  8.9   AGAP  7  8  9   BUCR  31*  35*  33*      CBC w/Diff Recent Labs      02/14/18   0513  02/12/18   1256   WBC  8.8  16.0*   RBC  3.31*  3.37*   HGB  10.2*  10.8*   HCT  31.3*  31.1*   PLT  402  357   GRANS  56  80*   LYMPH  25  11*   EOS  1  0      Microbiology Recent Labs      02/13/18   0953  02/13/18   0940   CULT  NO GROWTH AFTER 20 HOURS  NO GROWTH AFTER 20 HOURS          RADIOLOGY:    All available imaging studies/reports in [facility] for this admission were reviewed    Dr. Wesley Mahoney, Infectious Disease Specialist  [phone]  February 14, 2018  1:25 PM

## 2018-02-14 NOTE — PROGRESS NOTES
This writer called pt's son regarding acceptance at the [facility] and the discharge will not be able to occur until 2/15/18. Called Riana'ubaldo Dong who would like for transport to occur by 11:00 a.m., Admissions provided nurse to nurse phone numbers to call for report.  Nursing to call report to [phone] and [phone]

## 2018-02-14 NOTE — PROGRESS NOTES
[facility]. Amy Lazcano informed that she can go to [facility] requested. Informed her we are working on a transport time.

## 2018-02-15 VITALS
BODY MASS INDEX: 27.21 KG/M2 | TEMPERATURE: 96 F | SYSTOLIC BLOOD PRESSURE: 149 MMHG | HEART RATE: 70 BPM | RESPIRATION RATE: 22 BRPM | HEIGHT: 55 IN | DIASTOLIC BLOOD PRESSURE: 78 MMHG | WEIGHT: 117.6 LBS | OXYGEN SATURATION: 95 %

## 2018-02-15 PROCEDURE — 74011250637 HC RX REV CODE- 250/637: Performed by: FAMILY MEDICINE

## 2018-02-15 PROCEDURE — 74011250637 HC RX REV CODE- 250/637: Performed by: INTERNAL MEDICINE

## 2018-02-15 PROCEDURE — 74011250637 HC RX REV CODE- 250/637: Performed by: HOSPITALIST

## 2018-02-15 RX ORDER — LANOLIN ALCOHOL/MO/W.PET/CERES
3 CREAM (GRAM) TOPICAL
Qty: 30 TAB | Refills: 0 | Status: SHIPPED
Start: 2018-02-15 | End: 2019-07-08

## 2018-02-15 RX ORDER — BISACODYL 5 MG
10 TABLET, DELAYED RELEASE (ENTERIC COATED) ORAL
Qty: 30 TAB | Refills: 0 | Status: SHIPPED
Start: 2018-02-15 | End: 2018-10-08

## 2018-02-15 RX ORDER — ALBUTEROL SULFATE 0.83 MG/ML
1.25 SOLUTION RESPIRATORY (INHALATION)
Qty: 30 EACH | Refills: 0 | Status: SHIPPED
Start: 2018-02-15 | End: 2018-10-08

## 2018-02-15 RX ORDER — DOCUSATE SODIUM 100 MG/1
100 CAPSULE, LIQUID FILLED ORAL 2 TIMES DAILY
Qty: 60 CAP | Refills: 0 | Status: SHIPPED
Start: 2018-02-15 | End: 2018-05-16

## 2018-02-15 RX ORDER — ACETAMINOPHEN 325 MG/1
650 TABLET ORAL
Qty: 30 TAB | Refills: 0 | Status: SHIPPED
Start: 2018-02-15 | End: 2019-06-20

## 2018-02-15 RX ORDER — HYDRALAZINE HYDROCHLORIDE 50 MG/1
50 TABLET, FILM COATED ORAL 3 TIMES DAILY
Qty: 90 TAB | Refills: 0 | Status: SHIPPED
Start: 2018-02-15 | End: 2018-06-15

## 2018-02-15 RX ORDER — TRAMADOL HYDROCHLORIDE 50 MG/1
25 TABLET ORAL 2 TIMES DAILY
Qty: 20 TAB | Refills: 0 | Status: ON HOLD | OUTPATIENT
Start: 2018-02-15 | End: 2018-06-15

## 2018-02-15 RX ADMIN — LEVOTHYROXINE SODIUM 100 MCG: 100 TABLET ORAL at 10:00

## 2018-02-15 RX ADMIN — MONTELUKAST SODIUM 10 MG: 10 TABLET, COATED ORAL at 10:01

## 2018-02-15 RX ADMIN — PANTOPRAZOLE SODIUM 40 MG: 40 GRANULE, DELAYED RELEASE ORAL at 09:59

## 2018-02-15 RX ADMIN — HYDRALAZINE HYDROCHLORIDE 50 MG: 50 TABLET, FILM COATED ORAL at 10:01

## 2018-02-15 RX ADMIN — Medication 10 ML: at 07:29

## 2018-02-15 RX ADMIN — TRAMADOL HYDROCHLORIDE 25 MG: 50 TABLET, FILM COATED ORAL at 10:01

## 2018-02-15 RX ADMIN — METOPROLOL TARTRATE 50 MG: 50 TABLET ORAL at 10:01

## 2018-02-15 RX ADMIN — NIFEDIPINE 30 MG: 30 TABLET, FILM COATED, EXTENDED RELEASE ORAL at 10:00

## 2018-02-15 RX ADMIN — DOCUSATE SODIUM 100 MG: 100 CAPSULE, LIQUID FILLED ORAL at 10:00

## 2018-02-15 NOTE — PROGRESS NOTES
Problem: Mobility Impaired (Adult and Pediatric)  Goal: *Acute Goals and Plan of Care (Insert Text)  Physical Therapy Goals  Initiated 2/11/2018 and to be accomplished within 7 day(s)  1. Patient will move from supine to sit and sit to supine  in bed with supervision/set-up. 2.  Patient will transfer from bed to chair and chair to bed with contact guard assist using the least restrictive device. 3.  Patient will perform sit to stand with supervision/set-up to RW. 4.  Patient will ambulate with minimal assistance/contact guard assist for 50 feet with the least restrictive device. 5.  Patient will ascend/descend 2 stairs with unilateral handrail(s) with minimal assistance/contact guard assist.     1009 - Pt found having just transferred to bedside commode. Pt request PTA return at a later time.     ANTONINO Martinez

## 2018-02-15 NOTE — PROGRESS NOTES
Infectious Disease Progress Note    Requested by:dr. Erven Klinefelter    Reason: staph epidermidis bacteremia    Current abx Prior abx    Ceftriaxone 2/9  Vancomycin 2/11-2/12  Azithromycin 2/9  Cefepime 2/10-2/12  Levofloxacin 2/12-2/13       Lines:       Assessment :    80 y.o. female with a PMH of chronic back pain, atrial fibrillation, HTN, hyperlipidemia, hypothyroidism, pulmonary HTN, pulmonary hemorrhage who presented to ed on 2/9/18 with c/c of back pain. Now with staph epidermidis bacteremia, cough, leukocytosis. Patient presents with highly complex clinical picture. Difficult to determine significance of staph epidermidis in blood culture since no repeat blood cultures obtained within 48 hours of positivity. Patient didn't have fevers on admission. But she did have leukocytosis. Patient's cough on admission was likely due to viral URTI (had sorethroat, nasal congestion). LL abnormality noted on cxr is likely atelectasis - low clinical suspicion of pneumonia. Under these circumstances, leukocytosis on admission was probably leukemoid reaction to back pain, hemoconcentration. Will need to f/u repeat blood cultures and monitor off abx to verify. Currently there is no definitive clinical evidence of back infection but clinical dx difficult due to h/o chronic back pain. Hence, will monitor off abx. Recommendations:    1. Hold abx  2. Ok to d/c patient from [address] [place] [address]: full code: discussed  with patient/surrogate decision maker:JoseRaghav: [phone]     Above plan was discussed in details with patient, and dr Erven Klinefelter. Please call me if any further questions or concerns. Will continue to participate in the care of this patient. subjective:    Doesn't want to eat. Improved cough/malaise/sore throat. She denies any subjective sensation of fever/chills.  Patient denies headaches, visual disturbances, sore throat, runny nose, earaches, cp,  chills,  abdominal pain, diarrhea, burning micturition, pain  in extremities. home Medication List    Details   NIFEdipine ER (PROCARDIA XL) 30 mg ER tablet Take 30 mg by mouth daily. hydrALAZINE (APRESOLINE) 25 mg tablet Take 25 mg by mouth two (2) times a day. furosemide (LASIX) 20 mg tablet Take  by mouth daily. levothyroxine (SYNTHROID) 100 mcg tablet Take 100 mcg by mouth Daily (before breakfast). pantoprazole (PROTONIX) 40 mg tablet Take 40 mg by mouth daily. cholecalciferol (VITAMIN D3) 1,000 unit cap Take 1,000 Units by mouth daily. multivitamin (ONE A DAY) tablet Take 1 Tab by mouth daily. carboxymethylcellulose sodium 1 % dlgl Apply 1 Drop to eye nightly. fish oil-dha-epa 1,200-144-216 mg cap Take 1 Cap by mouth daily. biotin 1 mg tab Take 1 Tab by mouth daily. Aspirin, Buffered 81 mg tab Take 1 Tab by mouth daily. B.infantis-B.ani-B.long-B.bifi (PROBIOTIC 4X) 10-15 mg TbEC Take 1 Tab by mouth daily. digoxin (LANOXIN) 0.125 mg tablet Take 0.125 mg by mouth every other day. metoprolol (LOPRESSOR) 50 mg tablet Take 50 mg by mouth two (2) times a day. loratadine (CLARITIN) 10 mg tablet Take 10 mg by mouth daily as needed. lovastatin (MEVACOR) 40 mg tablet Take 40 mg by mouth nightly. traZODone (DESYREL) 50 mg tablet Take 50 mg by mouth nightly. estradiol (ESTRACE) 1 mg tablet Take 1 mg by mouth daily. traMADol (ULTRAM) 50 mg tablet Take 25 mg by mouth two (2) times a day. CALCIUM CARBONATE (CALCIUM 600 PO) Take 1 Tab by mouth daily. beclomethasone (QVAR) 40 mcg/actuation inhaler Take 2 Puffs by inhalation two (2) times a day. Use with spacer device  Qty: 1 Inhaler, Refills: 6      montelukast (SINGULAIR) 10 mg tablet Take 10 mg by mouth daily.              Current Facility-Administered Medications   Medication Dose Route Frequency    hydrALAZINE (APRESOLINE) tablet 50 mg  50 mg Oral TID    melatonin tablet 3 mg  3 mg Oral QHS    docusate sodium (COLACE) capsule 100 mg  100 mg Oral BID    bisacodyl (DULCOLAX) tablet 10 mg  10 mg Oral DAILY PRN    traZODone (DESYREL) tablet 50 mg  50 mg Oral QHS    digoxin (LANOXIN) tablet 0.125 mg  0.125 mg Oral EVERY OTHER DAY    loratadine (CLARITIN) tablet 10 mg  10 mg Oral DAILY PRN    montelukast (SINGULAIR) tablet 10 mg  10 mg Oral DAILY    pantoprazole (PROTONIX) granules for oral suspension 40 mg  40 mg Oral ACB    morphine injection 1 mg  1 mg IntraVENous Q4H PRN    sodium chloride (NS) flush 5-10 mL  5-10 mL IntraVENous PRN    sodium chloride (NS) flush 5-10 mL  5-10 mL IntraVENous Q8H    sodium chloride (NS) flush 5-10 mL  5-10 mL IntraVENous PRN    albuterol (PROVENTIL VENTOLIN) nebulizer solution 1.25 mg  1.25 mg Nebulization Q4H PRN    hydrALAZINE (APRESOLINE) 20 mg/mL injection 10 mg  10 mg IntraVENous Q6H PRN    NIFEdipine ER (PROCARDIA XL) tablet 30 mg  30 mg Oral DAILY    traMADol (ULTRAM) tablet 25 mg  25 mg Oral BID    levothyroxine (SYNTHROID) tablet 100 mcg  100 mcg Oral ACB    metoprolol tartrate (LOPRESSOR) tablet 50 mg  50 mg Oral BID    lovastatin (MEVACOR) tablet 40 mg  40 mg Oral QHS    acetaminophen (TYLENOL) tablet 650 mg  650 mg Oral Q4H PRN       Allergies: Hydrochlorothiazide; Penicillins; Sulfa (sulfonamide antibiotics); and Prednisone    Temp (24hrs), Av.6 °F (36.4 °C), Min:96.9 °F (36.1 °C), Max:98.6 °F (37 °C)    Visit Vitals    /75 (BP 1 Location: Left arm, BP Patient Position: At rest)    Pulse 65    Temp 97.1 °F (36.2 °C)    Resp 18    Ht 4' 5\" (1.346 m)    Wt 53.3 kg (117 lb 9.6 oz)    SpO2 95%    BMI 29.43 kg/m2       ROS: 12 point ROS obtained in details.  Pertinent positives as mentioned in HPI,   otherwise negative    Physical Exam:    General: Well developed, well nourished female sitting on chair,  oriented x 3    HEENT:  Normocephalic, atraumatic, PERRL, EOMI, no scleral icterus or pallor; no conjunctival hemmohage;  nasal and oral mucous are moist and without evidence of lesions. No thrush Neck supple, no bruits. Lymph Nodes:   no cervical, axillary or inguinal adenopathy   Lungs:   non-labored, bilaterally clear to auscultation- no crackles wheezes rales or rhonchi   Heart:  RRR, s1 and s2; no  rubs or gallops, no edema, + pedal pulses   Abdomen:  soft, non-distended, active bowel sounds, no hepatomegaly, no splenomegaly. Non-tender   Genitourinary:  deferred   Extremities:   no clubbing, cyanosis; no joint effusions or swelling;  muscle mass appropriate for age   Neurologic:  No gross focal sensory abnormalities; 5/5 muscle strength to upper and lower extremities. Speech appropriate.  Cranial nerves intact                        Skin:  No rash or ulcers noted   Back:  no spinal or paraspinal muscle tenderness or rigidity, no CVA tenderness     Psychiatric:  No suicidal or homicidal ideations, appropriate mood and affect         Labs: Results:   Chemistry Recent Labs      02/14/18   0513  02/13/18   0425   GLU  94  96   NA  138  137   K  3.8  4.2   CL  108  107   CO2  23  22   BUN  35*  39*   CREA  1.12  1.11   CA  8.2*  8.9   AGAP  7  8   BUCR  31*  35*      CBC w/Diff Recent Labs      02/14/18   0513  02/12/18   1256   WBC  8.8  16.0*   RBC  3.31*  3.37*   HGB  10.2*  10.8*   HCT  31.3*  31.1*   PLT  402  357   GRANS  56  80*   LYMPH  25  11*   EOS  1  0      Microbiology Recent Labs      02/13/18   0953  02/13/18   0940   CULT  NO GROWTH 2 DAYS  NO GROWTH 2 DAYS          RADIOLOGY:    All available imaging studies/reports in [facility] for this admission were reviewed    Dr. Elaina Salazar, Infectious Disease Specialist  [phone]  February 15, 2018  1:25 PM

## 2018-02-15 NOTE — PROGRESS NOTES
Problem: Falls - Risk of  Goal: *Absence of Falls  Document Ronnie Fall Risk and appropriate interventions in the flowsheet.    Outcome: Progressing Towards Goal  Fall Risk Interventions:  Mobility Interventions: Bed/chair exit alarm, Patient to call before getting OOB, Utilize walker, cane, or other assitive device         Medication Interventions: Patient to call before getting OOB, Teach patient to arise slowly    Elimination Interventions: Patient to call for help with toileting needs    History of Falls Interventions: Bed/chair exit alarm, Door open when patient unattended, Room close to nurse's station

## 2018-02-15 NOTE — PROGRESS NOTES
Patient has two visitors at the bedside. One visitor identifies herself as the patient's \"sister in [place].\" Patient requests to use [facility], ask patient if she would like privacy. Patient states that her visitors are ok to stay. Patient's visitor insists several time that she is not hungry and she does not want something to eat. The visitor interrupts patient, this nurse and MD several times to insist that she should be eating something. Several times visitor insists that patient speak to her when she has stated several times that she does not feel well. Patient having a hard time being able to finish using restroom so visitors are sent out of the room. Gave patient medications while in room.

## 2018-02-15 NOTE — DISCHARGE INSTRUCTIONS
Discharge Instructions    Patient: Arron Schuler MRN: [id number]  CSN: [id number]    YOB: 1935  Age: 80 y.o. Sex: female    DOA: 2/9/2018 LOS:  LOS: 6 days   Discharge Date:      DIET:  Cardiac Diet, of note pateint likes yogurt (cherry or vanilla)     ACTIVITY: Activity as tolerated, with assistance  Skilled nursing for continued PT/OT    ADDITIONAL INFORMATION: If you experience any of the following symptoms but not limited to Fever, chills, nausea, vomiting, diarrhea, change in mentation, falling, bleeding, shortness of breath, chest pain, please call your primary care physician or return to the emergency room if you cannot get hold of your doctor:     FOLLOW UP CARE:  Dr. Jackie Haynes MD in 7 from discharge from [place] [address], NP  2/15/2018 10:48 AM           Pneumonia: Care Instructions  Your Care Instructions    Pneumonia is an infection of the lungs. Most cases are caused by infections from bacteria or viruses. Pneumonia may be mild or very severe. If it is caused by bacteria, you will be treated with antibiotics. It may take a few weeks to a few months to recover fully from pneumonia, depending on how sick you were and whether your overall health is good. Follow-up care is a key part of your treatment and safety. Be sure to make and go to all appointments, and call your doctor if you are having problems. It's also a good idea to know your test results and keep a list of the medicines you take. How can you care for yourself at home? · Take your antibiotics exactly as directed. Do not stop taking the medicine just because you are feeling better. You need to take the full course of antibiotics. · Take your medicines exactly as prescribed. Call your doctor if you think you are having a problem with your medicine. · Get plenty of rest and sleep. You may feel weak and tired for a while, but your energy level will improve with time.   · To prevent dehydration, drink plenty of fluids, enough so that your urine is light yellow or clear like water. Choose water and other caffeine-free clear liquids until you feel better. If you have kidney, heart, or liver disease and have to limit fluids, talk with your doctor before you increase the amount of fluids you drink. · Take care of your cough so you can rest. A cough that brings up mucus from your lungs is common with pneumonia. It is one way your body gets rid of the infection. But if coughing keeps you from resting or causes severe fatigue and chest-wall pain, talk to your doctor. He or she may suggest that you take a medicine to reduce the cough. · Use a vaporizer or humidifier to add moisture to your bedroom. Follow the directions for cleaning the machine. · Do not smoke or allow others to smoke around you. Smoke will make your cough last longer. If you need help quitting, talk to your doctor about stop-smoking programs and medicines. These can increase your chances of quitting for good. · Take an over-the-counter pain medicine, such as acetaminophen (Tylenol), ibuprofen (Advil, Motrin), or naproxen (Aleve). Read and follow all instructions on the label. · Do not take two or more pain medicines at the same time unless the doctor told you to. Many pain medicines have acetaminophen, which is Tylenol. Too much acetaminophen (Tylenol) can be harmful. · If you were given a spirometer to measure how well your lungs are working, use it as instructed. This can help your doctor tell how your recovery is going. · To prevent pneumonia in the future, talk to your doctor about getting a flu vaccine (once a year) and a pneumococcal vaccine (one time only for most people). When should you call for help? Call 911 anytime you think you may need emergency care. For example, call if:  ? · You have severe trouble breathing.    ?Call your doctor now or seek immediate medical care if:  ? · You cough up dark brown or bloody mucus (sputum). ? · You have new or worse trouble breathing. ? · You are dizzy or lightheaded, or you feel like you may faint. ? Watch closely for changes in your health, and be sure to contact your doctor if:  ? · You have a new or higher fever. ? · You are coughing more deeply or more often. ? · You are not getting better after 2 days (48 hours). ? · You do not get better as expected. Where can you learn more? Go to http://molina-frida.info/. Enter [id number] in the search box to learn more about \"Pneumonia: Care Instructions. \"  Current as of: May 12, 2017  Content Version: 11.4  © 2000-9553 [employer]. Care instructions adapted under license by [employer] (which disclaims liability or warranty for this information). If you have questions about a medical condition or this instruction, always ask your healthcare professional. [place] [address] any warranty or liability for your use of this information. Back Pain: Care Instructions  Your Care Instructions    Back pain has many possible causes. It is often related to problems with muscles and ligaments of the back. It may also be related to problems with the nerves, discs, or bones of the back. Moving, lifting, standing, sitting, or sleeping in an awkward way can strain the back. Sometimes you don't notice the injury until later. Arthritis is another common cause of back pain. Although it may hurt a lot, back pain usually improves on its own within several weeks. Most people recover in 12 weeks or less. Using good home treatment and being careful not to stress your back can help you feel better sooner. Follow-up care is a key part of your treatment and safety. Be sure to make and go to all appointments, and call your doctor if you are having problems. It's also a good idea to know your test results and keep a list of the medicines you take. How can you care for yourself at home?   · Sit or lie in positions that are most comfortable and reduce your pain. Try one of these positions when you lie down:  ¨ Lie on your back with your knees bent and supported by large pillows. ¨ Lie on the floor with your legs on the seat of a sofa or chair. Chery Corti on your side with your knees and hips bent and a pillow between your legs. ¨ Lie on your stomach if it does not make pain worse. · Do not sit up in bed, and avoid soft couches and twisted positions. Bed rest can help relieve pain at first, but it delays healing. Avoid bed rest after the first day of back pain. · Change positions every 30 minutes. If you must sit for long periods of time, take breaks from sitting. Get up and walk around, or lie in a comfortable position. · Try using a heating pad on a low or medium setting for 15 to 20 minutes every 2 or 3 hours. Try a warm shower in place of one session with the heating pad. · You can also try an ice pack for 10 to 15 minutes every 2 to 3 hours. Put a thin cloth between the ice pack and your skin. · Take pain medicines exactly as directed. ¨ If the doctor gave you a prescription medicine for pain, take it as prescribed. ¨ If you are not taking a prescription pain medicine, ask your doctor if you can take an over-the-counter medicine. · Take short walks several times a day. You can start with 5 to 10 minutes, 3 or 4 times a day, and work up to longer walks. Walk on level surfaces and avoid hills and stairs until your back is better. · Return to work and other activities as soon as you can. Continued rest without activity is usually not good for your back. · To prevent future back pain, do exercises to stretch and strengthen your back and stomach. Learn how to use good posture, safe lifting techniques, and proper body mechanics. When should you call for help? Call your doctor now or seek immediate medical care if:  ? · You have new or worsening numbness in your legs.    ? · You have new or worsening weakness in your legs. (This could make it hard to stand up.)   ? · You lose control of your bladder or bowels. ? Watch closely for changes in your health, and be sure to contact your doctor if:  ? · Your pain gets worse. ? · You are not getting better after 2 weeks. Where can you learn more? Go to http://molina-frida.info/. Enter A865 in the search box to learn more about \"Back Pain: Care Instructions. \"  Current as of: March 21, 2017  Content Version: 11.4  © 3965-7731 [employer]. Care instructions adapted under license by [employer] (which disclaims liability or warranty for this information). If you have questions about a medical condition or this instruction, always ask your healthcare professional. [address] any warranty or liability for your use of this information. Patient armband removed and shredded      DISCHARGE SUMMARY from Nurse    PATIENT INSTRUCTIONS:    After general anesthesia or intravenous sedation, for 24 hours or while taking prescription Narcotics:  · Limit your activities  · Do not drive and operate hazardous machinery  · Do not make important personal or business decisions  · Do  not drink alcoholic beverages  · If you have not urinated within 8 hours after discharge, please contact your surgeon on call.     Report the following to your surgeon:  · Excessive pain, swelling, redness or odor of or around the surgical area  · Temperature over 100.5  · Nausea and vomiting lasting longer than 4 hours or if unable to take medications  · Any signs of decreased circulation or nerve impairment to extremity: change in color, persistent  numbness, tingling, coldness or increase pain  · Any questions    What to do at Home:  Recommended activity: Activity as tolerated    If you experience any of the following symptoms Nausea, vomiting, diarrhea, fever greater than 100.5, dizziness, severe headache, shortness of breath, chest pain, increased pain, please follow up with PCP. *  Please give a list of your current medications to your Primary Care Provider. *  Please update this list whenever your medications are discontinued, doses are      changed, or new medications (including over-the-counter products) are added. *  Please carry medication information at all times in case of emergency situations. These are general instructions for a healthy lifestyle:    No smoking/ No tobacco products/ Avoid exposure to second hand smoke  Surgeon General's Warning:  Quitting smoking now greatly reduces serious risk to your health. Obesity, smoking, and sedentary lifestyle greatly increases your risk for illness    A healthy diet, regular physical exercise & weight monitoring are important for maintaining a healthy lifestyle    You may be retaining fluid if you have a history of heart failure or if you experience any of the following symptoms:  Weight gain of 3 pounds or more overnight or 5 pounds in a week, increased swelling in our hands or feet or shortness of breath while lying flat in bed. Please call your doctor as soon as you notice any of these symptoms; do not wait until your next office visit. Recognize signs and symptoms of STROKE:    F-face looks uneven    A-arms unable to move or move unevenly    S-speech slurred or non-existent    T-time-call 911 as soon as signs and symptoms begin-DO NOT go       Back to bed or wait to see if you get better-TIME IS BRAIN. Warning Signs of HEART ATTACK     Call 911 if you have these symptoms:   Chest discomfort. Most heart attacks involve discomfort in the center of the chest that lasts more than a few minutes, or that goes away and comes back. It can feel like uncomfortable pressure, squeezing, fullness, or pain.  Discomfort in other areas of the upper body. Symptoms can include pain or discomfort in one or both arms, the back, neck, jaw, or stomach.    Shortness of breath with or without chest discomfort.  Other signs may include breaking out in a cold sweat, nausea, or lightheadedness. Don't wait more than five minutes to call 911 - MINUTES MATTER! Fast action can save your life. Calling 911 is almost always the fastest way to get lifesaving treatment. Emergency Medical Services staff can begin treatment when they arrive -- up to an hour sooner than if someone gets to the hospital by car. The discharge information has been reviewed with the patient and caregiver. The patient and caregiver verbalized understanding. Discharge medications reviewed with the patient and caregiver and appropriate educational materials and side effects teaching were provided.   ___________________________________________________________________________________________________________________________________

## 2018-02-15 NOTE — ROUTINE PROCESS
Bedside and Verbal shift change report given to GUSTAVO Ivy (oncoming nurse) by Josefina Wilkerson RN (offgoing nurse). Report included the following information SBAR, Kardex, MAR and Recent Results. SITUATION:    Code Status: Full Code   Reason for Admission: Pneumonia    [facility] day: 6   Problem List:       Hospital Problems  Date Reviewed: 3/16/2017          Codes Class Noted POA    Pneumonia ICD-10-CM: J18.9  ICD-9-CM: 498  2/9/2018 Unknown              BACKGROUND:    Past Medical History:   Past Medical History:   Diagnosis Date    Anemia NEC     Arthritis     Atrial fibrillation ([address]     Bursitis of left shoulder     Cardiac cath 01/27/2009    RCA mild. LM patent. pLAD 20%. Very dLAD w/significant tapering. CX mild.  Cardiac stress echo, abnormal 01/05/2009    Positive maximal stress echo by echo & EKG criteria w/o chest pain.  EF >65%. Sm, discrete apical, apical septal hypk.    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    Bilateral index fingers    Trigger ring finger of right hand     Wears glasses          Patient taking anticoagulants no, refuses heparin    ASSESSMENT:    Changes in Assessment Throughout Shift: none     Patient has Central Line: no Reasons if yes: n/a   Patient has Kendall Cath: no Reasons if yes: n/a      Last Vitals:     Vitals:    02/14/18 2100 02/14/18 2222 02/15/18 0110 02/15/18 0435   BP: 157/86  149/81 146/79   Pulse: 79  70 74   Resp: 22  22 20   Temp: 97.9 °F (36.6 °C)  98.6 °F (37 °C) 97.6 °F (36.4 °C)   SpO2: (!) 86% 93% 95% 96%   Weight:       Height:            IV and DRAINS (will only show if present)   [REMOVED] Peripheral IV 02/09/18 Left Antecubital-Site Assessment: Clean, dry, & intact  Peripheral IV 02/14/18 Left Forearm-Site Assessment: Clean, dry, & intact     WOUND (if present)   Wound Type:  none   Dressing present Dressing Present : No   Wound Concerns/Notes:  none     PAIN    Pain Assessment    Pain Intensity 1: 0 (02/15/18 0402)    Pain Location 1: Generalized    Pain Intervention(s) 1: Medication (see MAR)    Patient Stated Pain Goal: 0  o Interventions for Pain:  Tramadol  o Intervention effective: yes  o Time of last intervention: Denied pain  o Reassessment Completed: yes      Last 3 Weights:  Last 3 Recorded Weights in this Encounter    02/12/18 0825 02/13/18 1549 02/14/18 1812   Weight: 49.8 kg (109 lb 12.6 oz) 49.8 kg (109 lb 12.6 oz) 53.3 kg (117 lb 9.6 oz)     Weight change: 3.543 kg (7 lb 13 oz)     INTAKE/OUPUT    Current Shift: 02/14 1901 - 02/15 0700  In: 240 [P.O.:240]  Out: -     Last three shifts: 02/13 0701 - 02/14 1900  In: 5 [P.O.:420]  Out: 500 [Urine:500]     LAB RESULTS     Recent Labs      02/14/18   0513  02/12/18   1256   WBC  8.8  16.0*   HGB  10.2*  10.8*   HCT  31.3*  31.1*   PLT  402  357        Recent Labs      02/14/18   0513  02/13/18   0425  02/12/18   0523   NA  138  137  135*   K  3.8  4.2  3.4*   GLU  94  96  96   BUN  35*  39*  33*   CREA  1.12  1.11  1.01   CA  8.2*  8.9  8.9       RECOMMENDATIONS AND DISCHARGE PLANNING     1. Pending tests/procedures/ Plan of Care or Other Needs: O2 therapy, antibiotics, pain control    2. Discharge plan for patient and Needs/Barriers: SNF    3. Estimated Discharge Date: 2/15/18 Posted on Whiteboard in \[facility]\"": yes      4. The patient's care plan was reviewed with the oncoming nurse.        \"HEALS\" SAFETY CHECK      Fall Risk    Total Score: 3    Safety Measures: Safety Measures: Bed/Chair alarm on, Bed/Chair-Wheels locked, Bed in low position, Call light within reach, Fall prevention (comment), Gripper socks, Side rails X 3    A safety check occurred in the patient's room between off going nurse and oncoming nurse listed above. The safety check included the below items  Area Items   H  High Alert Medications - Verify all high alert medication drips (heparin, PCA, etc.)   E  Equipment - Suction is set up for ALL patients (with erma)  - Red plugs utilized for all equipment (IV pumps, etc.)  - WOWs wiped down at end of shift.  - Room stocked with oxygen, suction, and other unit-specific supplies   A  Alarms - Bed alarm is set for fall risk patients  - Ensure chair alarm is in place and activated if patient is up in a chair   L  Lines - Check IV for any infiltration  - Kendall bag is empty if patient has a Kendall   - Tubing and IV bags are labeled   S  Safety   - Room is clean, patient is clean, and equipment is clean. - Hallways are clear from equipment besides carts. - Fall bracelet on for fall risk patients  - Ensure room is clear and free of clutter  - Suction is set up for ALL patients (with erma)  - Hallways are clear from equipment besides carts.    - Isolation precautions followed, supplies available outside room, sign posted     Yong Reyes RN

## 2018-02-15 NOTE — PROGRESS NOTES
Patient's O2 sats at 2100 = 86 % at room air. Administered O2 at 1 LPM/NC, O2 sats I,proved to 93%. No acute resp distress noted.

## 2018-02-15 NOTE — ROUTINE PROCESS
[facility] at [phone] to give report. Was notified that receiving nurse, Heriberto Atkins, was with a patient and that she would return my call when she was finished. Received call from receiving nurse, Heriberto Atkins at 10:53, report given. Informed nurse transport for patient transfer for 11:30.

## 2018-02-15 NOTE — DISCHARGE SUMMARY
[facility]  Discharge Summary       Patient: Dhruv Polk Age: 80 y.o. : 1935 MR#: [id number] SSN: [id number]  PCP on record: Elbert Chase MD  Admit date: 2018  Discharge date: 2/15/2018    Disposition:    []Home   []Home with Home Health   [x]SNF/NH   []Rehab   []Home with family   []Alternate Facility:____________________    Discharge Diagnoses:                             1.  Leukocytosis on admission likely leukemoid reaction to back pain and hemoconcentration   2. SIRS due to viral syndrome, resolved   3.  S. Epidermidis bacteremia ? Contamination   4. Paroxysmal Atrial fibrillation  5. Hypertension   6. Hyperlipidemia  7. Hypothryoidism  8. Chronic back pain     Instructions:   Diet: Cardiac Diet, of note pateint likes yogurt (cherry or vanilla)     ACTIVITY: Activity as tolerated, with assistance  Skilled nursing for continued PT/OT  Discharge Medications:     Current Discharge Medication List      START taking these medications    Details   melatonin 3 mg tablet Take 1 Tab by mouth nightly. Qty: 30 Tab, Refills: 0      bisacodyl (DULCOLAX) 5 mg EC tablet Take 2 Tabs by mouth daily as needed for Constipation. Qty: 30 Tab, Refills: 0      docusate sodium (COLACE) 100 mg capsule Take 1 Cap by mouth two (2) times a day for 90 days. Qty: 60 Cap, Refills: 0      albuterol (PROVENTIL VENTOLIN) 2.5 mg /3 mL (0.083 %) nebulizer solution 1.5 mL by Nebulization route every four (4) hours as needed for Wheezing. Qty: 30 Each, Refills: 0      acetaminophen (TYLENOL) 325 mg tablet Take 2 Tabs by mouth every four (4) hours as needed for Fever/pain. Qty: 30 Tab, Refills: 0         CONTINUE these medications which have CHANGED    Details   hydrALAZINE (APRESOLINE) 50 mg tablet Take 1 Tab by mouth three (3) times daily. Qty: 90 Tab, Refills: 0      traMADol (ULTRAM) 50 mg tablet Take 0.5 Tabs by mouth two (2) times a day.  Max Daily Amount: 50 mg.  Qty: 20 Tab, Refills: 0    Associated Diagnoses: Back pain without sciatica         CONTINUE these medications which have NOT CHANGED    Details   NIFEdipine ER (PROCARDIA XL) 30 mg ER tablet Take 30 mg by mouth daily. levothyroxine (SYNTHROID) 100 mcg tablet Take 100 mcg by mouth Daily (before breakfast). pantoprazole (PROTONIX) 40 mg tablet Take 40 mg by mouth daily. cholecalciferol (VITAMIN D3) 1,000 unit cap Take 1,000 Units by mouth daily. multivitamin (ONE A DAY) tablet Take 1 Tab by mouth daily. carboxymethylcellulose sodium 1 % dlgl Apply 1 Drop to eye nightly. fish oil-dha-epa 1,200-144-216 mg cap Take 1 Cap by mouth daily. biotin 1 mg tab Take 1 Tab by mouth daily. Aspirin, Buffered 81 mg tab Take 1 Tab by mouth daily. B.infantis-B.ani-B.long-B.bifi (PROBIOTIC 4X) 10-15 mg TbEC Take 1 Tab by mouth daily. digoxin (LANOXIN) 0.125 mg tablet Take 0.125 mg by mouth every other day. metoprolol (LOPRESSOR) 50 mg tablet Take 50 mg by mouth two (2) times a day. loratadine (CLARITIN) 10 mg tablet Take 10 mg by mouth daily as needed. lovastatin (MEVACOR) 40 mg tablet Take 40 mg by mouth nightly. traZODone (DESYREL) 50 mg tablet Take 50 mg by mouth nightly. CALCIUM CARBONATE (CALCIUM 600 PO) Take 1 Tab by mouth daily. beclomethasone (QVAR) 40 mcg/actuation inhaler Take 2 Puffs by inhalation two (2) times a day. Use with spacer device  Qty: 1 Inhaler, Refills: 6      montelukast (SINGULAIR) 10 mg tablet Take 10 mg by mouth daily.          STOP taking these medications       furosemide (LASIX) 20 mg tablet Comments:   Reason for Stopping:         estradiol (ESTRACE) 1 mg tablet Comments:   Reason for Stopping:             Consults:    - Infectious Disease    Procedures:  -   None    Significant Diagnostic Studies:   -  XR CHEST PORT on 02/09/2018  IMPRESSION:     New streaky densities in the left lower lung zone with left costophrenic angle  focal opacity. Suggestive of developing infiltrate, i.e. infectious process,  vs. atelectatic change. If the patient symptoms are not attributable to  infectious process or atelectasis, vascular event may be considered as well. XR CHEST PORT on 02/13/2018  Impression:  1. New left basilar/midlung zone opacity which is favored to represent  atelectasis over airspace disease. Hospital Course by Problem        1. Leukocytosis on admission likely leukemoid reaction to back pain and hemoconcentration - at time admission patient found to have WBC in excess of 24 and afebrile for which patient was initially treated with antibiotics for suspected pneumonia. Infectious disease was consulted, antibiotics were stopped and leukocytosis has since resolved and WBC count is  8.8. Patient was felt to have a potential upper respiratory infection only as had sore throat nasal congestion. Symptoms have resolved. 2.  SIRS due to viral syndrome, resolved - initially admitted with leukocytosis with concern for sepsis. Vitals signs are now stable at time of diagnosis and leukocytosis has resolved. 3.  S. Epidermidis bacteremia ? Contamination - at time of admission with + blood cultures for which infectious disease was consulted and felt to be likely contaminant. Repeat blood cultures have since remained negative as of this writing. 4.  Paroxysmal Atrial fibrillation - Rate has been controlled during admission for which patient to continue on home regimen of digoxin and lopressor. Patient was previously on xarelto which was stopped due to hemoptysis. Follow up with primary cardiologist Dr. Anthony Betancur in 1 month. 5.  Hypertension - Blood pressure mildly elevated during admission for which hydralazine dose was increased. Continue other medications as prior to admission. 6.  Hyperlipidemia - no acute issues, continue home regimen.     7.  Hypothryoidism - no acute issues, continue home dosing of synthroid  8. Chronic back pain - continue tramadol scheduled as prior to admission with tylenol as needed. Today's examination of the patient revealed:     Subjective:   No complaints states she is feeling fine. Denies shortness of breath, chest pain, n/v/consitpation. Objective:   VS:   Visit Vitals    /75 (BP 1 Location: Left arm, BP Patient Position: At rest)    Pulse 65    Temp 97.1 °F (36.2 °C)    Resp 18    Ht 4' 5\" (1.346 m)    Wt 53.3 kg (117 lb 9.6 oz)    SpO2 95%    BMI 29.43 kg/m2      Tmax/24hrs: Temp (24hrs), Av.6 °F (36.4 °C), Min:96.9 °F (36.1 °C), Max:98.6 °F (37 °C)     Input/Output:   Intake/Output Summary (Last 24 hours) at 02/15/18 1052  Last data filed at 02/15/18 1016   Gross per 24 hour   Intake              460 ml   Output              400 ml   Net               60 ml     General:  Alert, NAD  Cardiovascular:  RRR  Pulmonary:  LSC throughout  GI:  +BS in all four quadrants, soft, non-tender  Extremities:  No edema; 2+ dorsalis pedis pulses bilaterally  Neuro: oriented x 3    Labs:    Results for Rafael Duran (MRN [id number]) as of 2/15/2018 10:53   Ref. Range 2018 05:13   WBC Latest Ref Range: 4.6 - 13.2 K/uL 8.8   RBC Latest Ref Range: 4.20 - 5.30 M/uL 3.31 (L)   HGB Latest Ref Range: 12.0 - 16.0 g/dL 10.2 (L)   HCT Latest Ref Range: 35.0 - 45.0 % 31.3 (L)   MCV Latest Ref Range: 74.0 - 97.0 FL 94.6   MCH Latest Ref Range: 24.0 - 34.0 PG 30.8   MCHC Latest Ref Range: 31.0 - 37.0 g/dL 32.6   RDW Latest Ref Range: 11.6 - 14.5 % 13.7   PLATELET Latest Ref Range: 135 - 420 K/uL 402   MPV Latest Ref Range: 9.2 - 11.8 FL 10.2   NEUTROPHILS Latest Ref Range: 40 - 73 % 56   LYMPHOCYTES Latest Ref Range: 21 - 52 % 25   MONOCYTES Latest Ref Range: 3 - 10 % 17 (H)   EOSINOPHILS Latest Ref Range: 0 - 5 % 1   BASOPHILS Latest Ref Range: 0 - 2 % 1   DF Latest Units:   AUTOMATED   ABS. NEUTROPHILS Latest Ref Range: 1.8 - 8.0 K/UL 5.0   ABS.  LYMPHOCYTES Latest Ref Range: 0.9 - 3.6 K/UL 2.2   ABS. MONOCYTES Latest Ref Range: 0.05 - 1.2 K/UL 1.5 (H)   ABS. EOSINOPHILS Latest Ref Range: 0.0 - 0.4 K/UL 0.1   ABS. BASOPHILS Latest Ref Range: 0.0 - 0.1 K/UL 0.1     Results for Jose J Lou (MRN [id number]) as of 2/15/2018 10:53   Ref. Range 2/14/2018 05:13   Sodium Latest Ref Range: 136 - 145 mmol/L 138   Potassium Latest Ref Range: 3.5 - 5.5 mmol/L 3.8   Chloride Latest Ref Range: 100 - 108 mmol/L 108   CO2 Latest Ref Range: 21 - 32 mmol/L 23   Anion gap Latest Ref Range: 3.0 - 18 mmol/L 7   Glucose Latest Ref Range: 74 - 99 mg/dL 94   BUN Latest Ref Range: 7.0 - 18 MG/DL 35 (H)   Creatinine Latest Ref Range: 0.6 - 1.3 MG/DL 1.12   BUN/Creatinine ratio Latest Ref Range: 12 - 20   31 (H)   Calcium Latest Ref Range: 8.5 - 10.1 MG/DL 8.2 (L)   GFR est non-AA Latest Ref Range: >60 ml/min/1.73m2 47 (L)   GFR est AA Latest Ref Range: >60 ml/min/1.73m2 56 (L)     Blood culture x 2 on 02/13/2018   NO SPECIAL REQUESTS     Preliminary     Culture result: NO GROWTH 2 DAYS        Additional Data Reviewed: Follow-up Appointments:   1. Your PCP: Genie Johnson MD, within 7 days upon discharge from SNF.   2.  Cardiology Dr. Rachel Ma in 1 month    >30 minutes spent coordinating this discharge (review instructions/follow-up, prescriptions, preparing report for sign off)    Signed:  Little lAtamirano NP  2/15/2018  10:52 AM

## 2018-02-15 NOTE — ROUTINE PROCESS
Bedside shift change report given to Sj Ramos (oncoming nurse) by Skylar Shah (offgoing nurse). Report included the following information SBAR, Recent Results and Alarm Parameters . Patient resting in bed and awake.

## 2018-02-19 LAB
BACTERIA SPEC CULT: NORMAL
BACTERIA SPEC CULT: NORMAL
SERVICE CMNT-IMP: NORMAL
SERVICE CMNT-IMP: NORMAL

## 2018-02-21 ENCOUNTER — PATIENT OUTREACH (OUTPATIENT)
Dept: CASE MANAGEMENT | Age: 83
End: 2018-02-21

## 2018-02-28 ENCOUNTER — PATIENT OUTREACH (OUTPATIENT)
Dept: CASE MANAGEMENT | Age: 83
End: 2018-02-28

## 2018-03-01 NOTE — PROGRESS NOTES
Community Care Team Documentation for Patient in [facility]     Patient discharged [address] 2/9/2018 - 2/15/2018 to [facility] , on 2/15/2018. Hospital Discharge diagnosis: SIRS due to viral syndrome. SNF Attending Provider:      Anticipated discharge date from SNF:  [facility]      PCP : Chrissy Fowler MD    Nurse Navigator: CHAPARRO    [facility] Team rounds competed. Updates provided by facility. very impulsive, +dementia. Poor safety awareness. Needs safety cues. Appetite increased. Head ache. Family involved in care, lives alone. VSS. No dc date yet. Low Risk            9       Total Score        3 Has Seen PCP in Last 6 Months (Yes=3, No=0)    6 Charlson Comorbidity Score (Age + Comorbid Conditions)        Criteria that do not apply:    . Living with Significant Other. Assisted Living. LTAC. SNF. or   Rehab    Patient Length of Stay (>5 days = 3)    IP Visits Last 12 Months (1-3=4, 4=9, >4=11)    Pt.  Coverage (Medicare=5 , Medicaid, or Self-Pay=4)      Active Ambulatory Problems     Diagnosis Date Noted    Atrial fibrillation ([facility] [address] 08/13/2012    Essential hypertension, benign 08/13/2012    Dyslipidemia 08/13/2012    Chronic anemia 08/13/2012    Hypothyroidism 08/13/2012    Rash and nonspecific skin eruption 09/01/2012    Pulmonary hypertension     Malaise and fatigue 03/23/2014    Essential hypertension 03/23/2014    Chronic kidney disease, stage III (moderate) 03/23/2014    Cough 09/30/2015    Pneumonia 02/09/2018     Resolved Ambulatory Problems     Diagnosis Date Noted    No Resolved Ambulatory Problems     Past Medical History:   Diagnosis Date    Anemia NEC     Arthritis     Atrial fibrillation (HCC)     Bursitis of left shoulder     Cardiac cath 01/27/2009    Cardiac stress echo, abnormal 01/05/2009    Cough     Endocrine disease     Hyperlipidemia     Hypertension     Hypothyroidism     Osteopenia     Pulmonary hemorrhage     Pulmonary hypertension     Rash and nonspecific skin eruption 9/2012    Reflux     Right hip pain     Right shoulder pain     Stenosing tenosynovitis of finger 04/2015    Trigger ring finger of right hand     Wears glasses

## 2018-03-01 NOTE — PROGRESS NOTES
Community Care Team Documentation for Patient in [facility]     Patient discharged [address] 2/9/2018 [facility] Hospital Discharge diagnosis: SIRS due to viral syndrome. SNF Attending Provider:      Anticipated discharge date from SNF:  [facility]      PCP : Andi Bullard MD    Nurse Navigator: ELLIOT    [facility] Team rounds competed. Updates provided by facility. Progressing with PT/OT  Anticipate dc to home  3/5/2018    Low Risk            9       Total Score        3 Has Seen PCP in Last 6 Months (Yes=3, No=0)    6 Charlson Comorbidity Score (Age + Comorbid Conditions)        Criteria that do not apply:    . Living with Significant Other. Assisted Living. LTAC. SNF. or   Rehab    Patient Length of Stay (>5 days = 3)    IP Visits Last 12 Months (1-3=4, 4=9, >4=11)    Pt.  Coverage (Medicare=5 , Medicaid, or Self-Pay=4)      Active Ambulatory Problems     Diagnosis Date Noted    Atrial fibrillation ([facility] [address] 08/13/2012    Essential hypertension, benign 08/13/2012    Dyslipidemia 08/13/2012    Chronic anemia 08/13/2012    Hypothyroidism 08/13/2012    Rash and nonspecific skin eruption 09/01/2012    Pulmonary hypertension     Malaise and fatigue 03/23/2014    Essential hypertension 03/23/2014    Chronic kidney disease, stage III (moderate) 03/23/2014    Cough 09/30/2015    Pneumonia 02/09/2018     Resolved Ambulatory Problems     Diagnosis Date Noted    No Resolved Ambulatory Problems     Past Medical History:   Diagnosis Date    Anemia NEC     Arthritis     Atrial fibrillation (HCC)     Bursitis of left shoulder     Cardiac cath 01/27/2009    Cardiac stress echo, abnormal 01/05/2009    Cough     Endocrine disease     Hyperlipidemia     Hypertension     Hypothyroidism     Osteopenia     Pulmonary hemorrhage     Pulmonary hypertension     Rash and nonspecific skin eruption 9/2012    Reflux     Right hip pain     Right shoulder pain     Stenosing tenosynovitis of finger 04/2015    Trigger ring finger of right hand     Wears glasses

## 2018-03-02 ENCOUNTER — HOME HEALTH ADMISSION (OUTPATIENT)
Dept: HOME HEALTH SERVICES | Facility: HOME HEALTH | Age: 83
End: 2018-03-02
Payer: MEDICARE

## 2018-03-07 ENCOUNTER — HOME CARE VISIT (OUTPATIENT)
Dept: SCHEDULING | Facility: HOME HEALTH | Age: 83
End: 2018-03-07
Payer: MEDICARE

## 2018-03-07 VITALS
DIASTOLIC BLOOD PRESSURE: 71 MMHG | HEART RATE: 70 BPM | TEMPERATURE: 98.2 F | OXYGEN SATURATION: 96 % | SYSTOLIC BLOOD PRESSURE: 127 MMHG

## 2018-03-07 PROCEDURE — 3331090001 HH PPS REVENUE CREDIT

## 2018-03-07 PROCEDURE — G0151 HHCP-SERV OF PT,EA 15 MIN: HCPCS

## 2018-03-07 PROCEDURE — 400013 HH SOC

## 2018-03-07 PROCEDURE — 3331090002 HH PPS REVENUE DEBIT

## 2018-03-08 ENCOUNTER — HOME CARE VISIT (OUTPATIENT)
Dept: HOME HEALTH SERVICES | Facility: HOME HEALTH | Age: 83
End: 2018-03-08
Payer: MEDICARE

## 2018-03-08 VITALS
OXYGEN SATURATION: 97 % | DIASTOLIC BLOOD PRESSURE: 88 MMHG | HEART RATE: 86 BPM | SYSTOLIC BLOOD PRESSURE: 156 MMHG | TEMPERATURE: 98.6 F

## 2018-03-08 PROCEDURE — 3331090002 HH PPS REVENUE DEBIT

## 2018-03-08 PROCEDURE — G0157 HHC PT ASSISTANT EA 15: HCPCS

## 2018-03-08 PROCEDURE — 3331090001 HH PPS REVENUE CREDIT

## 2018-03-09 ENCOUNTER — HOME CARE VISIT (OUTPATIENT)
Dept: HOME HEALTH SERVICES | Facility: HOME HEALTH | Age: 83
End: 2018-03-09
Payer: MEDICARE

## 2018-03-09 PROCEDURE — 3331090002 HH PPS REVENUE DEBIT

## 2018-03-09 PROCEDURE — 3331090001 HH PPS REVENUE CREDIT

## 2018-03-10 PROCEDURE — 3331090002 HH PPS REVENUE DEBIT

## 2018-03-10 PROCEDURE — 3331090001 HH PPS REVENUE CREDIT

## 2018-03-11 PROCEDURE — 3331090001 HH PPS REVENUE CREDIT

## 2018-03-11 PROCEDURE — 3331090002 HH PPS REVENUE DEBIT

## 2018-03-12 PROCEDURE — 3331090002 HH PPS REVENUE DEBIT

## 2018-03-12 PROCEDURE — 3331090001 HH PPS REVENUE CREDIT

## 2018-03-13 ENCOUNTER — HOME CARE VISIT (OUTPATIENT)
Dept: SCHEDULING | Facility: HOME HEALTH | Age: 83
End: 2018-03-13
Payer: MEDICARE

## 2018-03-13 VITALS
DIASTOLIC BLOOD PRESSURE: 62 MMHG | HEART RATE: 80 BPM | OXYGEN SATURATION: 97 % | TEMPERATURE: 99.2 F | SYSTOLIC BLOOD PRESSURE: 136 MMHG

## 2018-03-13 PROCEDURE — G0157 HHC PT ASSISTANT EA 15: HCPCS

## 2018-03-13 PROCEDURE — 3331090001 HH PPS REVENUE CREDIT

## 2018-03-13 PROCEDURE — 3331090002 HH PPS REVENUE DEBIT

## 2018-03-14 PROCEDURE — 3331090001 HH PPS REVENUE CREDIT

## 2018-03-14 PROCEDURE — 3331090002 HH PPS REVENUE DEBIT

## 2018-03-15 ENCOUNTER — HOME CARE VISIT (OUTPATIENT)
Dept: HOME HEALTH SERVICES | Facility: HOME HEALTH | Age: 83
End: 2018-03-15
Payer: MEDICARE

## 2018-03-15 PROCEDURE — 3331090002 HH PPS REVENUE DEBIT

## 2018-03-15 PROCEDURE — 3331090001 HH PPS REVENUE CREDIT

## 2018-03-16 ENCOUNTER — HOME CARE VISIT (OUTPATIENT)
Dept: HOME HEALTH SERVICES | Facility: HOME HEALTH | Age: 83
End: 2018-03-16
Payer: MEDICARE

## 2018-03-16 VITALS — OXYGEN SATURATION: 98 % | HEART RATE: 82 BPM | TEMPERATURE: 98.6 F

## 2018-03-16 PROCEDURE — 3331090002 HH PPS REVENUE DEBIT

## 2018-03-16 PROCEDURE — 3331090001 HH PPS REVENUE CREDIT

## 2018-03-16 PROCEDURE — G0157 HHC PT ASSISTANT EA 15: HCPCS

## 2018-03-17 PROCEDURE — 3331090002 HH PPS REVENUE DEBIT

## 2018-03-17 PROCEDURE — 3331090001 HH PPS REVENUE CREDIT

## 2018-03-18 PROCEDURE — 3331090002 HH PPS REVENUE DEBIT

## 2018-03-18 PROCEDURE — 3331090001 HH PPS REVENUE CREDIT

## 2018-03-19 ENCOUNTER — HOME CARE VISIT (OUTPATIENT)
Dept: SCHEDULING | Facility: HOME HEALTH | Age: 83
End: 2018-03-19
Payer: MEDICARE

## 2018-03-19 VITALS
OXYGEN SATURATION: 93 % | DIASTOLIC BLOOD PRESSURE: 74 MMHG | HEART RATE: 65 BPM | SYSTOLIC BLOOD PRESSURE: 132 MMHG | TEMPERATURE: 99.7 F

## 2018-03-19 PROCEDURE — 3331090002 HH PPS REVENUE DEBIT

## 2018-03-19 PROCEDURE — 3331090001 HH PPS REVENUE CREDIT

## 2018-03-19 PROCEDURE — G0157 HHC PT ASSISTANT EA 15: HCPCS

## 2018-03-20 ENCOUNTER — HOME CARE VISIT (OUTPATIENT)
Dept: HOME HEALTH SERVICES | Facility: HOME HEALTH | Age: 83
End: 2018-03-20
Payer: MEDICARE

## 2018-03-20 PROCEDURE — 3331090002 HH PPS REVENUE DEBIT

## 2018-03-20 PROCEDURE — 3331090001 HH PPS REVENUE CREDIT

## 2018-03-21 ENCOUNTER — HOME CARE VISIT (OUTPATIENT)
Dept: SCHEDULING | Facility: HOME HEALTH | Age: 83
End: 2018-03-21
Payer: MEDICARE

## 2018-03-21 VITALS
HEART RATE: 83 BPM | TEMPERATURE: 98.5 F | DIASTOLIC BLOOD PRESSURE: 72 MMHG | SYSTOLIC BLOOD PRESSURE: 120 MMHG | OXYGEN SATURATION: 94 %

## 2018-03-21 PROCEDURE — 3331090001 HH PPS REVENUE CREDIT

## 2018-03-21 PROCEDURE — G0157 HHC PT ASSISTANT EA 15: HCPCS

## 2018-03-21 PROCEDURE — 3331090002 HH PPS REVENUE DEBIT

## 2018-03-22 PROCEDURE — 3331090002 HH PPS REVENUE DEBIT

## 2018-03-22 PROCEDURE — 3331090001 HH PPS REVENUE CREDIT

## 2018-03-23 ENCOUNTER — HOME CARE VISIT (OUTPATIENT)
Dept: SCHEDULING | Facility: HOME HEALTH | Age: 83
End: 2018-03-23
Payer: MEDICARE

## 2018-03-23 PROCEDURE — G0152 HHCP-SERV OF OT,EA 15 MIN: HCPCS

## 2018-03-23 PROCEDURE — 3331090002 HH PPS REVENUE DEBIT

## 2018-03-23 PROCEDURE — 3331090001 HH PPS REVENUE CREDIT

## 2018-03-24 PROCEDURE — 3331090001 HH PPS REVENUE CREDIT

## 2018-03-24 PROCEDURE — 3331090002 HH PPS REVENUE DEBIT

## 2018-03-25 VITALS
HEART RATE: 69 BPM | TEMPERATURE: 97.8 F | SYSTOLIC BLOOD PRESSURE: 130 MMHG | OXYGEN SATURATION: 94 % | DIASTOLIC BLOOD PRESSURE: 40 MMHG

## 2018-03-25 PROCEDURE — 3331090001 HH PPS REVENUE CREDIT

## 2018-03-25 PROCEDURE — 3331090002 HH PPS REVENUE DEBIT

## 2018-03-26 PROCEDURE — 3331090001 HH PPS REVENUE CREDIT

## 2018-03-26 PROCEDURE — 3331090002 HH PPS REVENUE DEBIT

## 2018-03-27 ENCOUNTER — HOME CARE VISIT (OUTPATIENT)
Dept: SCHEDULING | Facility: HOME HEALTH | Age: 83
End: 2018-03-27
Payer: MEDICARE

## 2018-03-27 VITALS
DIASTOLIC BLOOD PRESSURE: 70 MMHG | TEMPERATURE: 99.2 F | SYSTOLIC BLOOD PRESSURE: 150 MMHG | OXYGEN SATURATION: 92 % | HEART RATE: 68 BPM

## 2018-03-27 PROCEDURE — 3331090002 HH PPS REVENUE DEBIT

## 2018-03-27 PROCEDURE — 3331090001 HH PPS REVENUE CREDIT

## 2018-03-27 PROCEDURE — G0151 HHCP-SERV OF PT,EA 15 MIN: HCPCS

## 2018-03-28 PROCEDURE — 3331090002 HH PPS REVENUE DEBIT

## 2018-03-28 PROCEDURE — 3331090001 HH PPS REVENUE CREDIT

## 2018-04-10 ENCOUNTER — HOSPITAL ENCOUNTER (OUTPATIENT)
Dept: LAB | Age: 83
Discharge: HOME | End: 2018-04-10
Payer: MEDICARE

## 2018-04-10 LAB
APPEARANCE UR: CLEAR
BACTERIA URNS QL MICRO: ABNORMAL /HPF
BILIRUB UR QL: NEGATIVE
COLOR UR: YELLOW
EPITH CASTS URNS QL MICRO: ABNORMAL /LPF (ref 0–5)
GLUCOSE UR STRIP.AUTO-MCNC: NEGATIVE MG/DL
HGB UR QL STRIP: NEGATIVE
KETONES UR QL STRIP.AUTO: NEGATIVE MG/DL
LEUKOCYTE ESTERASE UR QL STRIP.AUTO: NEGATIVE
NITRITE UR QL STRIP.AUTO: NEGATIVE
PH UR STRIP: 6.5 [PH] (ref 5–8)
PROT UR STRIP-MCNC: ABNORMAL MG/DL
RBC #/AREA URNS HPF: ABNORMAL /HPF (ref 0–5)
SP GR UR REFRACTOMETRY: 1.01 (ref 1–1.03)
UROBILINOGEN UR QL STRIP.AUTO: 0.2 EU/DL (ref 0.2–1)
WBC URNS QL MICRO: ABNORMAL /HPF (ref 0–4)

## 2018-04-10 PROCEDURE — 81001 URINALYSIS AUTO W/SCOPE: CPT | Performed by: INTERNAL MEDICINE

## 2018-04-10 PROCEDURE — 87086 URINE CULTURE/COLONY COUNT: CPT | Performed by: INTERNAL MEDICINE

## 2018-04-10 PROCEDURE — 87077 CULTURE AEROBIC IDENTIFY: CPT | Performed by: INTERNAL MEDICINE

## 2018-04-10 PROCEDURE — 87186 SC STD MICRODIL/AGAR DIL: CPT | Performed by: INTERNAL MEDICINE

## 2018-04-14 LAB
BACTERIA SPEC CULT: ABNORMAL
SERVICE CMNT-IMP: ABNORMAL

## 2018-04-26 ENCOUNTER — HOSPITAL ENCOUNTER (OUTPATIENT)
Dept: LAB | Age: 83
Discharge: HOME | End: 2018-04-26
Payer: MEDICARE

## 2018-04-26 LAB
APPEARANCE UR: CLEAR
BACTERIA URNS QL MICRO: NEGATIVE /HPF
BILIRUB UR QL: NEGATIVE
COLOR UR: YELLOW
EPITH CASTS URNS QL MICRO: NORMAL /LPF (ref 0–5)
GLUCOSE UR STRIP.AUTO-MCNC: NEGATIVE MG/DL
HGB UR QL STRIP: NEGATIVE
KETONES UR QL STRIP.AUTO: NEGATIVE MG/DL
LEUKOCYTE ESTERASE UR QL STRIP.AUTO: NEGATIVE
NITRITE UR QL STRIP.AUTO: NEGATIVE
PH UR STRIP: 6.5 [PH] (ref 5–8)
PROT UR STRIP-MCNC: ABNORMAL MG/DL
RBC #/AREA URNS HPF: NEGATIVE /HPF (ref 0–5)
SP GR UR REFRACTOMETRY: 1.01 (ref 1–1.03)
UROBILINOGEN UR QL STRIP.AUTO: 0.2 EU/DL (ref 0.2–1)
WBC URNS QL MICRO: NEGATIVE /HPF (ref 0–4)

## 2018-04-26 PROCEDURE — 81001 URINALYSIS AUTO W/SCOPE: CPT | Performed by: INTERNAL MEDICINE

## 2018-04-26 PROCEDURE — 87086 URINE CULTURE/COLONY COUNT: CPT | Performed by: INTERNAL MEDICINE

## 2018-04-28 LAB
BACTERIA SPEC CULT: NORMAL
SERVICE CMNT-IMP: NORMAL

## 2018-06-10 ENCOUNTER — APPOINTMENT (OUTPATIENT)
Dept: CT IMAGING | Age: 83
DRG: 202 | End: 2018-06-10
Attending: EMERGENCY MEDICINE
Payer: MEDICARE

## 2018-06-10 ENCOUNTER — HOSPITAL ENCOUNTER (INPATIENT)
Age: 83
LOS: 4 days | Discharge: NURSING, REHAB OR LONG-TERM CARE | DRG: 202 | End: 2018-06-15
Attending: EMERGENCY MEDICINE | Admitting: INTERNAL MEDICINE
Payer: MEDICARE

## 2018-06-10 ENCOUNTER — APPOINTMENT (OUTPATIENT)
Dept: GENERAL RADIOLOGY | Age: 83
DRG: 202 | End: 2018-06-10
Attending: NURSE PRACTITIONER
Payer: MEDICARE

## 2018-06-10 DIAGNOSIS — M54.89 BACK PAIN WITHOUT SCIATICA: ICD-10-CM

## 2018-06-10 DIAGNOSIS — J18.9 PNEUMONIA OF RIGHT MIDDLE LOBE DUE TO INFECTIOUS ORGANISM: Primary | ICD-10-CM

## 2018-06-10 DIAGNOSIS — J40 BRONCHITIS: ICD-10-CM

## 2018-06-10 DIAGNOSIS — R53.1 WEAKNESS: ICD-10-CM

## 2018-06-10 DIAGNOSIS — E87.6 HYPOKALEMIA: ICD-10-CM

## 2018-06-10 DIAGNOSIS — I50.9 ACUTE CONGESTIVE HEART FAILURE, UNSPECIFIED HEART FAILURE TYPE (HCC): ICD-10-CM

## 2018-06-10 DIAGNOSIS — E87.1 HYPONATREMIA: ICD-10-CM

## 2018-06-10 DIAGNOSIS — S13.4XXA INJURY TO LIGAMENT OF CERVICAL SPINE, INITIAL ENCOUNTER: ICD-10-CM

## 2018-06-10 DIAGNOSIS — I10 ESSENTIAL HYPERTENSION, BENIGN: ICD-10-CM

## 2018-06-10 LAB — LACTATE BLD-SCNC: 0.9 MMOL/L (ref 0.4–2)

## 2018-06-10 PROCEDURE — 83735 ASSAY OF MAGNESIUM: CPT | Performed by: NURSE PRACTITIONER

## 2018-06-10 PROCEDURE — 96375 TX/PRO/DX INJ NEW DRUG ADDON: CPT

## 2018-06-10 PROCEDURE — 77030029684 HC NEB SM VOL KT MONA -A

## 2018-06-10 PROCEDURE — 96361 HYDRATE IV INFUSION ADD-ON: CPT

## 2018-06-10 PROCEDURE — 93005 ELECTROCARDIOGRAM TRACING: CPT

## 2018-06-10 PROCEDURE — 96365 THER/PROPH/DIAG IV INF INIT: CPT

## 2018-06-10 PROCEDURE — 70450 CT HEAD/BRAIN W/O DYE: CPT

## 2018-06-10 PROCEDURE — 72125 CT NECK SPINE W/O DYE: CPT

## 2018-06-10 PROCEDURE — 83605 ASSAY OF LACTIC ACID: CPT

## 2018-06-10 PROCEDURE — 94640 AIRWAY INHALATION TREATMENT: CPT

## 2018-06-10 PROCEDURE — 87040 BLOOD CULTURE FOR BACTERIA: CPT | Performed by: NURSE PRACTITIONER

## 2018-06-10 PROCEDURE — 99284 EMERGENCY DEPT VISIT MOD MDM: CPT

## 2018-06-10 PROCEDURE — 71045 X-RAY EXAM CHEST 1 VIEW: CPT

## 2018-06-10 PROCEDURE — 83880 ASSAY OF NATRIURETIC PEPTIDE: CPT | Performed by: NURSE PRACTITIONER

## 2018-06-10 PROCEDURE — 85610 PROTHROMBIN TIME: CPT | Performed by: NURSE PRACTITIONER

## 2018-06-10 PROCEDURE — 85025 COMPLETE CBC W/AUTO DIFF WBC: CPT | Performed by: NURSE PRACTITIONER

## 2018-06-10 PROCEDURE — 80053 COMPREHEN METABOLIC PANEL: CPT | Performed by: NURSE PRACTITIONER

## 2018-06-10 RX ORDER — BENZONATATE 100 MG/1
200 CAPSULE ORAL
Status: DISCONTINUED | OUTPATIENT
Start: 2018-06-10 | End: 2018-06-15 | Stop reason: HOSPADM

## 2018-06-10 RX ORDER — IPRATROPIUM BROMIDE AND ALBUTEROL SULFATE 2.5; .5 MG/3ML; MG/3ML
3 SOLUTION RESPIRATORY (INHALATION) ONCE
Status: COMPLETED | OUTPATIENT
Start: 2018-06-10 | End: 2018-06-11

## 2018-06-10 RX ORDER — SODIUM CHLORIDE 9 MG/ML
100 INJECTION, SOLUTION INTRAVENOUS ONCE
Status: COMPLETED | OUTPATIENT
Start: 2018-06-10 | End: 2018-06-11

## 2018-06-10 NOTE — Clinical Note
Status[de-identified] Inpatient [101] Type of Bed: Telemetry [19] Inpatient Hospitalization Certified Necessary for the Following Reasons: 3. Patient receiving treatment that can only be provided in an inpatient setting (further clarification in H&P documentation) Admitting Diagnosis: Pneumonia [512289] Admitting Diagnosis: Weakness [200459] Admitting Diagnosis: Injury to ligament of cervical spine [[id number]] Admitting Physician: Prema Willard [[id number]] Attending Physician: Prema Willard [[id number]] Estimated Length of Stay: 2 Midnights Discharge Plan[de-identified] Extended Care Facility (e.g. Adult Home, Nursing Home, etc.)

## 2018-06-10 NOTE — IP AVS SNAPSHOT
[address] Patient: Zechariah Bach MRN: [id number] HBR:[birth year] A check jyoti indicates which time of day the medication should be taken. My Medications START taking these medications Instructions Each Dose to Equal  
 Morning Noon Evening Bedtime  
 azithromycin 250 mg tablet Commonly known as:  Thana Los Your last dose was: Your next dose is: Take 2 Tabs by mouth daily for 1 day. 500 mg  
    
   
   
   
  
 benzonatate 200 mg capsule Commonly known as:  TESSALON Your last dose was: Your next dose is: Take 1 Cap by mouth three (3) times daily as needed for Cough for up to 7 days. 200 mg  
    
   
   
   
  
 sodium chloride 1 gram tablet Your last dose was: Your next dose is: Take 1 Tab by mouth three (3) times daily (with meals). 1 g CHANGE how you take these medications Instructions Each Dose to Equal  
 Morning Noon Evening Bedtime  
 hydrALAZINE 25 mg tablet Commonly known as:  APRESOLINE What changed:   
- medication strength 
- how much to take Your last dose was: Your next dose is: Take 1 Tab by mouth three (3) times daily. 25 mg  
    
   
   
   
  
 traMADol 50 mg tablet Commonly known as:  ULTRAM  
What changed:   
- how much to take 
- how to take this - when to take this 
- additional instructions Your last dose was: Your next dose is: Take 0.5 tabs (25mg) PO Q AM and 1 tab (50mg) PO Q PM.  
     
   
   
   
  
  
CONTINUE taking these medications Instructions Each Dose to Equal  
 Morning Noon Evening Bedtime  
 acetaminophen 325 mg tablet Commonly known as:  TYLENOL Your last dose was: Your next dose is: Take 2 Tabs by mouth every four (4) hours as needed for Fever. 650 mg  
    
   
   
   
  
 albuterol 2.5 mg /3 mL (0.083 %) nebulizer solution Commonly known as:  PROVENTIL VENTOLIN Your last dose was: Your next dose is:    
   
   
 1.5 mL by Nebulization route every four (4) hours as needed for Wheezing. 1.25 mg  
    
   
   
   
  
 aspirin, buffered 81 mg Tab Your last dose was: Your next dose is: Take 1 Tab by mouth daily. 1 Tab  
    
   
   
   
  
 beclomethasone 40 mcg/actuation Aero Commonly known as:  QVAR Your last dose was: Your next dose is: Take 2 Puffs by inhalation two (2) times a day. Use with spacer device 2 Puff  
    
   
   
   
  
 biotin 1 mg Tab Your last dose was: Your next dose is: Take 1 Tab by mouth daily. 1 Tab  
    
   
   
   
  
 bisacodyl 5 mg EC tablet Commonly known as:  DULCOLAX Your last dose was: Your next dose is: Take 2 Tabs by mouth daily as needed for Constipation. 10 mg CALCIUM 600 PO Your last dose was: Your next dose is: Take 1 Tab by mouth daily. 1 Tab  
    
   
   
   
  
 carboxymethylcellulose sodium 1 % Dlgl Your last dose was: Your next dose is:    
   
   
 Apply 1 Drop to eye nightly. To both eyes 1 Drop  
    
   
   
   
  
 cholecalciferol 1,000 unit Cap Commonly known as:  VITAMIN D3 Your last dose was: Your next dose is: Take 1,000 Units by mouth daily. 1000 Units CLARITIN 10 mg tablet Generic drug:  loratadine Your last dose was: Your next dose is: Take 10 mg by mouth daily as needed. 10 mg  
    
   
   
   
  
 digoxin 0.125 mg tablet Commonly known as:  LANOXIN Your last dose was: Your next dose is: Take 0.125 mg by mouth every other day. 0.125 mg  
    
   
   
   
  
 fish oil-dha-epa 1,200-144-216 mg Cap Your last dose was: Your next dose is: Take 1 Cap by mouth daily. 1 Cap  
    
   
   
   
  
 levothyroxine 100 mcg tablet Commonly known as:  SYNTHROID Your last dose was: Your next dose is: Take 100 mcg by mouth Daily (before breakfast). 100 mcg LOPRESSOR 50 mg tablet Generic drug:  metoprolol tartrate Your last dose was: Your next dose is: Take 50 mg by mouth two (2) times a day. 50 mg  
    
   
   
   
  
 lovastatin 40 mg tablet Commonly known as:  MEVACOR Your last dose was: Your next dose is: Take 40 mg by mouth nightly. 40 mg  
    
   
   
   
  
 melatonin 3 mg tablet Your last dose was: Your next dose is: Take 1 Tab by mouth nightly. 3 mg  
    
   
   
   
  
 multivitamin tablet Commonly known as:  ONE A DAY Your last dose was: Your next dose is: Take 1 Tab by mouth daily. 1 Tab NIFEdipine ER 30 mg ER tablet Commonly known as:  PROCARDIA XL Your last dose was: Your next dose is: Take 60 mg by mouth daily. 60 mg  
    
   
   
   
  
 pantoprazole 40 mg tablet Commonly known as:  PROTONIX Your last dose was: Your next dose is: Take 40 mg by mouth daily. 40 mg PROBIOTIC 4X 10-15 mg Tbec Generic drug:  B.infantis-B.ani-B.long-B.bifi Your last dose was: Your next dose is: Take 1 Tab by mouth daily. 1 Tab SINGULAIR 10 mg tablet Generic drug:  montelukast  
   
Your last dose was: Your next dose is: Take 10 mg by mouth daily. 10 mg  
    
   
   
   
  
 traZODone 50 mg tablet Commonly known as:   Morrison Your last dose was: Your next dose is: Take 25 mg by mouth nightly as needed for Sleep. 25 mg Where to Get Your Medications Information on where to get these meds will be given to you by the nurse or doctor. ! Ask your nurse or doctor about these medications  
  azithromycin 250 mg tablet  
 benzonatate 200 mg capsule  
 hydrALAZINE 25 mg tablet  
 sodium chloride 1 gram tablet  
 traMADol 50 mg tablet

## 2018-06-10 NOTE — IP AVS SNAPSHOT
[address] Patient: Lindsay Sanchez MRN: [id number] About your hospitalization You were admitted on:  June 11, 2018 You last received care in the:  [facility] [address] [facility] You were discharged on:  Mayelin 15, 2018 Why you were hospitalized Your primary diagnosis was:  Pneumonia Your diagnoses also included:  Weakness, Injury To Ligament Of Cervical Spine, Hyponatremia, Fall, Afib (Hcc) Follow-up Information Follow up With Details Comments Contact Info Patient will be going to [facility] upon discharge. Abbey Nayak MD   [address] [place] [address] 
[phone] Discharge Orders Procedure Order Date Status Priority Quantity Spec Type Associated Dx METABOLIC PANEL, BASIC 38/52/04 1137 Future Routine 1 Serum Schedule Instructions:  Please fax results to PCP. Dx: hyponatremia. A check jyoti indicates which time of day the medication should be taken. My Medications START taking these medications Instructions Each Dose to Equal  
 Morning Noon Evening Bedtime  
 azithromycin 250 mg tablet Commonly known as:  Amy Ates Your last dose was: Your next dose is: Take 2 Tabs by mouth daily for 1 day. 500 mg  
    
   
   
   
  
 benzonatate 200 mg capsule Commonly known as:  TESSALON Your last dose was: Your next dose is: Take 1 Cap by mouth three (3) times daily as needed for Cough for up to 7 days. 200 mg  
    
   
   
   
  
 sodium chloride 1 gram tablet Your last dose was: Your next dose is: Take 1 Tab by mouth three (3) times daily (with meals). 1 g CHANGE how you take these medications Instructions Each Dose to Equal  
 Morning Noon Evening Bedtime  
 hydrALAZINE 25 mg tablet Commonly known as:  APRESOLINE What changed:   
- medication strength 
- how much to take Your last dose was: Your next dose is: Take 1 Tab by mouth three (3) times daily. 25 mg  
    
   
   
   
  
 traMADol 50 mg tablet Commonly known as:  ULTRAM  
What changed:   
- how much to take 
- how to take this - when to take this 
- additional instructions Your last dose was: Your next dose is: Take 0.5 tabs (25mg) PO Q AM and 1 tab (50mg) PO Q PM.  
     
   
   
   
  
  
CONTINUE taking these medications Instructions Each Dose to Equal  
 Morning Noon Evening Bedtime  
 acetaminophen 325 mg tablet Commonly known as:  TYLENOL Your last dose was: Your next dose is: Take 2 Tabs by mouth every four (4) hours as needed for Fever. 650 mg  
    
   
   
   
  
 albuterol 2.5 mg /3 mL (0.083 %) nebulizer solution Commonly known as:  PROVENTIL VENTOLIN Your last dose was: Your next dose is:    
   
   
 1.5 mL by Nebulization route every four (4) hours as needed for Wheezing. 1.25 mg  
    
   
   
   
  
 aspirin, buffered 81 mg Tab Your last dose was: Your next dose is: Take 1 Tab by mouth daily. 1 Tab  
    
   
   
   
  
 beclomethasone 40 mcg/actuation Aero Commonly known as:  QVAR Your last dose was: Your next dose is: Take 2 Puffs by inhalation two (2) times a day. Use with spacer device 2 Puff  
    
   
   
   
  
 biotin 1 mg Tab Your last dose was: Your next dose is: Take 1 Tab by mouth daily. 1 Tab  
    
   
   
   
  
 bisacodyl 5 mg EC tablet Commonly known as:  DULCOLAX Your last dose was: Your next dose is: Take 2 Tabs by mouth daily as needed for Constipation. 10 mg CALCIUM 600 PO Your last dose was: Your next dose is: Take 1 Tab by mouth daily. 1 Tab  
    
   
   
   
  
 carboxymethylcellulose sodium 1 % Dlgl Your last dose was: Your next dose is:    
   
   
 Apply 1 Drop to eye nightly. To both eyes 1 Drop  
    
   
   
   
  
 cholecalciferol 1,000 unit Cap Commonly known as:  VITAMIN D3 Your last dose was: Your next dose is: Take 1,000 Units by mouth daily. 1000 Units CLARITIN 10 mg tablet Generic drug:  loratadine Your last dose was: Your next dose is: Take 10 mg by mouth daily as needed. 10 mg  
    
   
   
   
  
 digoxin 0.125 mg tablet Commonly known as:  LANOXIN Your last dose was: Your next dose is: Take 0.125 mg by mouth every other day. 0.125 mg  
    
   
   
   
  
 fish oil-dha-epa 1,200-144-216 mg Cap Your last dose was: Your next dose is: Take 1 Cap by mouth daily. 1 Cap  
    
   
   
   
  
 levothyroxine 100 mcg tablet Commonly known as:  SYNTHROID Your last dose was: Your next dose is: Take 100 mcg by mouth Daily (before breakfast). 100 mcg LOPRESSOR 50 mg tablet Generic drug:  metoprolol tartrate Your last dose was: Your next dose is: Take 50 mg by mouth two (2) times a day. 50 mg  
    
   
   
   
  
 lovastatin 40 mg tablet Commonly known as:  MEVACOR Your last dose was: Your next dose is: Take 40 mg by mouth nightly. 40 mg  
    
   
   
   
  
 melatonin 3 mg tablet Your last dose was: Your next dose is: Take 1 Tab by mouth nightly. 3 mg  
    
   
   
   
  
 multivitamin tablet Commonly known as:  ONE A DAY Your last dose was: Your next dose is: Take 1 Tab by mouth daily. 1 Tab NIFEdipine ER 30 mg ER tablet Commonly known as:  PROCARDIA XL Your last dose was: Your next dose is: Take 60 mg by mouth daily. 60 mg  
    
   
   
   
  
 pantoprazole 40 mg tablet Commonly known as:  PROTONIX Your last dose was: Your next dose is: Take 40 mg by mouth daily. 40 mg PROBIOTIC 4X 10-15 mg Tbec Generic drug:  B.infantis-B.ani-B.long-B.bifi Your last dose was: Your next dose is: Take 1 Tab by mouth daily. 1 Tab SINGULAIR 10 mg tablet Generic drug:  montelukast  
   
Your last dose was: Your next dose is: Take 10 mg by mouth daily. 10 mg  
    
   
   
   
  
 traZODone 50 mg tablet Commonly known as:  Ana Paula  Your last dose was: Your next dose is: Take 25 mg by mouth nightly as needed for Sleep. 25 mg Where to Get Your Medications Information on where to get these meds will be given to you by the nurse or doctor. ! Ask your nurse or doctor about these medications  
  azithromycin 250 mg tablet  
 benzonatate 200 mg capsule  
 hydrALAZINE 25 mg tablet  
 sodium chloride 1 gram tablet  
 traMADol 50 mg tablet Opioid Education Prescription Opioids: What You Need to Know: 
 
Prescription opioids can be used to help relieve moderate-to-severe pain and are often prescribed following a surgery or injury, or for certain health conditions. These medications can be an important part of treatment but also come with serious risks. Opioids are strong pain medicines. Examples include hydrocodone, oxycodone, fentanyl, and morphine. Heroin is an example of an illegal opioid. It is important to work with your health care provider to make sure you are getting the safest, most effective care. WHAT ARE THE RISKS AND SIDE EFFECTS OF OPIOID USE? Prescription opioids carry serious risks of addiction and overdose, especially with prolonged use. An opioid overdose, often marked by slow breathing, can cause sudden death. The use of prescription opioids can have a number of side effects as well, even when taken as directed. · Tolerance-meaning you might need to take more of a medication for the same pain relief · Physical dependence-meaning you have symptoms of withdrawal when the medication is stopped. Withdrawal symptoms can include nausea, sweating, chills, diarrhea, stomach cramps, and muscle aches. Withdrawal can last up to several weeks, depending on which drug you took and how long you took it. · Increased sensitivity to pain · Constipation · Nausea, vomiting, and dry mouth · Sleepiness and dizziness · Confusion · Depression · Low levels of testosterone that can result in lower sex drive, energy, and strength · Itching and sweating RISKS ARE GREATER WITH:      
· History of drug misuse, substance use disorder, or overdose · Mental health conditions (such as depression or anxiety) · Sleep apnea · Older age (72 years or older) · Pregnancy Avoid alcohol while taking prescription opioids. Also, unless specifically advised by your health care provider, medications to avoid include: · Benzodiazepines (such as Xanax or Valium) · Muscle relaxants (such as Soma or Flexeril) · Hypnotics (such as Ambien or Lunesta) · Other prescription opioids KNOW YOUR OPTIONS Talk to your health care provider about ways to manage your pain that don't involve prescription opioids. Some of these options may actually work better and have fewer risks and side effects. Options may include: 
· Pain relievers such as acetaminophen, ibuprofen, and naproxen · Some medications that are also used for depression or seizures · Physical therapy and exercise · Counseling to help patients learn how to cope better with triggers of pain and stress. · Application of heat or cold compress · Massage therapy · Relaxation techniques Be Informed Make sure you know the name of your medication, how much and how often to take it, and its potential risks & side effects. IF YOU ARE PRESCRIBED OPIOIDS FOR PAIN: 
· Never take opioids in greater amounts or more often than prescribed. Remember the goal is not to be pain-free but to manage your pain at a tolerable level. · Follow up with your primary care provider to: · Work together to create a plan on how to manage your pain. · Talk about ways to help manage your pain that don't involve prescription opioids. · Talk about any and all concerns and side effects. · Help prevent misuse and abuse. · Never sell or share prescription opioids · Help prevent misuse and abuse. · Store prescription opioids in a secure place and out of reach of others (this may include visitors, children, friends, and family). · Safely dispose of unused/unwanted prescription opioids: Find your community drug take-back program or your pharmacy mail-back program, or flush them down the toilet, following guidance from the Food and Drug Administration (www.fda.gov/Drugs/ResourcesForYou). · Visit www.cdc.gov/drugoverdose to learn about the risks of opioid abuse and overdose. · If you believe you may be struggling with addiction, tell your health care provider and ask for guidance or call [address] [place] [address] at 1-547-518-HUXZ. Discharge Instructions Pneumonia: Care Instructions Your Care Instructions Pneumonia is an infection of the lungs. Most cases are caused by infections from bacteria or viruses. Pneumonia may be mild or very severe. If it is caused by bacteria, you will be treated with antibiotics.  It may take a few weeks to a few months to recover fully from pneumonia, depending on how sick you were and whether your overall health is good. Follow-up care is a key part of your treatment and safety. Be sure to make and go to all appointments, and call your doctor if you are having problems. It's also a good idea to know your test results and keep a list of the medicines you take. How can you care for yourself at home? · Take your antibiotics exactly as directed. Do not stop taking the medicine just because you are feeling better. You need to take the full course of antibiotics. · Take your medicines exactly as prescribed. Call your doctor if you think you are having a problem with your medicine. · Get plenty of rest and sleep. You may feel weak and tired for a while, but your energy level will improve with time. · To prevent dehydration, drink plenty of fluids, enough so that your urine is light yellow or clear like water. Choose water and other caffeine-free clear liquids until you feel better. If you have kidney, heart, or liver disease and have to limit fluids, talk with your doctor before you increase the amount of fluids you drink. · Take care of your cough so you can rest. A cough that brings up mucus from your lungs is common with pneumonia. It is one way your body gets rid of the infection. But if coughing keeps you from resting or causes severe fatigue and chest-wall pain, talk to your doctor. He or she may suggest that you take a medicine to reduce the cough. · Use a vaporizer or humidifier to add moisture to your bedroom. Follow the directions for cleaning the machine. · Do not smoke or allow others to smoke around you. Smoke will make your cough last longer. If you need help quitting, talk to your doctor about stop-smoking programs and medicines. These can increase your chances of quitting for good. · Take an over-the-counter pain medicine, such as acetaminophen (Tylenol), ibuprofen (Advil, Motrin), or naproxen (Aleve). Read and follow all instructions on the label. · Do not take two or more pain medicines at the same time unless the doctor told you to. Many pain medicines have acetaminophen, which is Tylenol. Too much acetaminophen (Tylenol) can be harmful. · If you were given a spirometer to measure how well your lungs are working, use it as instructed. This can help your doctor tell how your recovery is going. · To prevent pneumonia in the future, talk to your doctor about getting a flu vaccine (once a year) and a pneumococcal vaccine (one time only for most people). When should you call for help? Call 911 anytime you think you may need emergency care. For example, call if: 
? · You have severe trouble breathing. ?Call your doctor now or seek immediate medical care if: 
? · You cough up dark brown or bloody mucus (sputum). ? · You have new or worse trouble breathing. ? · You are dizzy or lightheaded, or you feel like you may faint. ? Watch closely for changes in your health, and be sure to contact your doctor if: 
? · You have a new or higher fever. ? · You are coughing more deeply or more often. ? · You are not getting better after 2 days (48 hours). ? · You do not get better as expected. Where can you learn more? Go to http://molina-frida.info/. Enter [id number] in the search box to learn more about \"Pneumonia: Care Instructions. \" Current as of: May 12, 2017 Content Version: 11.4 © 1901-7403 [employer]. Care instructions adapted under license by [employer] (which disclaims liability or warranty for this information). If you have questions about a medical condition or this instruction, always ask your healthcare professional. [place] [address] any warranty or liability for your use of this information. 1. Take all your medications as prescribed. 2. Follow up with your PCP in 5-7 days 3. Follow up with Cardiology in 1 week. 4. Follow up with nephrology in 1 week. 5. Follow fluid restriction as discussed by the Nephrologist.  
6. Repeat BMP in 1 week. Bronchitis: Care Instructions Your Care Instructions Bronchitis is inflammation of the bronchial tubes, which carry air to the lungs. The tubes swell and produce mucus, or phlegm. The mucus and inflamed bronchial tubes make you cough. You may have trouble breathing. Most cases of bronchitis are caused by viruses like those that cause colds. Antibiotics usually do not help and they may be harmful. Bronchitis usually develops rapidly and lasts about 2 to 3 weeks in otherwise healthy people. Follow-up care is a key part of your treatment and safety. Be sure to make and go to all appointments, and call your doctor if you are having problems. It's also a good idea to know your test results and keep a list of the medicines you take. How can you care for yourself at home? · Take all medicines exactly as prescribed. Call your doctor if you think you are having a problem with your medicine. · Get some extra rest. 
· Take an over-the-counter pain medicine, such as acetaminophen (Tylenol), ibuprofen (Advil, Motrin), or naproxen (Aleve) to reduce fever and relieve body aches. Read and follow all instructions on the label. · Do not take two or more pain medicines at the same time unless the doctor told you to. Many pain medicines have acetaminophen, which is Tylenol. Too much acetaminophen (Tylenol) can be harmful. · Take an over-the-counter cough medicine that contains dextromethorphan to help quiet a dry, hacking cough so that you can sleep. Avoid cough medicines that have more than one active ingredient. Read and follow all instructions on the label. · Breathe moist air from a humidifier, hot shower, or sink filled with hot water. The heat and moisture will thin mucus so you can cough it out. · Do not smoke. Smoking can make bronchitis worse.  If you need help quitting, talk to your doctor about stop-smoking programs and medicines. These can increase your chances of quitting for good. When should you call for help? Call 911 anytime you think you may need emergency care. For example, call if: 
? · You have severe trouble breathing. ?Call your doctor now or seek immediate medical care if: 
? · You have new or worse trouble breathing. ? · You cough up dark brown or bloody mucus (sputum). ? · You have a new or higher fever. ? · You have a new rash. ? Watch closely for changes in your health, and be sure to contact your doctor if: 
? · You cough more deeply or more often, especially if you notice more mucus or a change in the color of your mucus. ? · You are not getting better as expected. Where can you learn more? Go to http://molina-frida.info/. Enter H333 in the search box to learn more about \"Bronchitis: Care Instructions. \" Current as of: May 12, 2017 Content Version: 11.4 © 6235-9009 [employer]. Care instructions adapted under license by [employer] (which disclaims liability or warranty for this information). If you have questions about a medical condition or this instruction, always ask your healthcare professional. [place] [address] any warranty or liability for your use of this information. Electrolyte Imbalance: Care Instructions Your Care Instructions Electrolytes are minerals in your blood. They include sodium, potassium, calcium, and magnesium. When they are not at the right levels, you can feel very ill. You may not know what is causing it, but you know something is wrong. You may feel weak or numb, have muscle spasms, or twitch. Your heart may beat fast. Symptoms are different with each mineral. Too much is as bad as too little. Minerals help keep your body working as it should.  Vomiting, diarrhea, and fever can cause you to lose minerals. A problem with your kidneys can tip a mineral out of balance. So can taking certain medicines. Your doctor may do more tests. He or she may change your medicine and diet. If you are low in one or more minerals, they may be given through a tube into your vein (IV). Your doctor may have you take or drink special fluids or pills. If your kidneys are failing, your blood may be filtered. This is called dialysis. It can put the minerals back in balance. Follow-up care is a key part of your treatment and safety. Be sure to make and go to all appointments, and call your doctor if you are having problems. It's also a good idea to know your test results and keep a list of the medicines you take. How can you care for yourself at home? · Take your medicines exactly as prescribed. Call your doctor if you have any problems with your medicines. You will get more details on the specific medicines your doctor prescribes. · Do not take any medicine without talking to your doctor first. This includes prescription, over-the-counter, and herbal medicines. · If you have kidney, heart, or liver disease and have to limit fluids, talk with your doctor before you increase the amount of fluids you drink. · Your doctor or dietitian may give you a diet plan to help balance your minerals. Follow the diet carefully. When should you call for help? Call 911 anytime you think you may need emergency care. For example, call if: 
? · You passed out (lost consciousness). ? · Your heartbeat seems to be irregular. It might be speeding up and then slowing down or skipping beats. ?Call your doctor now or seek immediate medical care if: 
? · You have muscle aches. ? · You feel very weak. ? · You are confused or cannot think clearly. ? Watch closely for changes in your health, and be sure to contact your doctor if: 
? · You do not get better as expected. Where can you learn more? Go to http://molina-frida.info/. Enter C624 in the search box to learn more about \"Electrolyte Imbalance: Care Instructions. \" Current as of: May 12, 2017 Content Version: 11.4 © 8651-3865 [employer]. Care instructions adapted under license by [employer] (which disclaims liability or warranty for this information). If you have questions about a medical condition or this instruction, always ask your healthcare professional. [address] any warranty or liability for your use of this information. Heart Failure: Care Instructions Your Care Instructions Heart failure occurs when your heart does not pump as much blood as the body needs. Failure does not mean that the heart has stopped pumping but rather that it is not pumping as well as it should. Over time, this causes fluid buildup in your lungs and other parts of your body. Fluid buildup can cause shortness of breath, fatigue, swollen ankles, and other problems. By taking medicines regularly, reducing sodium (salt) in your diet, checking your weight every day, and making lifestyle changes, you can feel better and live longer. Follow-up care is a key part of your treatment and safety. Be sure to make and go to all appointments, and call your doctor if you are having problems. It's also a good idea to know your test results and keep a list of the medicines you take. How can you care for yourself at home? Medicines ? · Be safe with medicines. Take your medicines exactly as prescribed. Call your doctor if you think you are having a problem with your medicine. ? · Do not take any vitamins, over-the-counter medicine, or herbal products without talking to your doctor first. Leopoldo Freeze not take ibuprofen (Advil or Motrin) and naproxen (Aleve) without talking to your doctor first. They could make your heart failure worse. ? · You may be taking some of the following medicine. ¨ Beta-blockers can slow heart rate, decrease blood pressure, and improve your condition. Taking a beta-blocker may lower your chance of needing to be hospitalized. ¨ Angiotensin-converting enzyme inhibitors (ACEIs) reduce the heart's workload, lower blood pressure, and reduce swelling. Taking an ACEI may lower your chance of needing to be hospitalized again. ¨ Angiotensin II receptor blockers (ARBs) work like ACEIs. Your doctor may prescribe them instead of ACEIs. ¨ Diuretics, also called water pills, reduce swelling. ¨ Potassium supplements replace this important mineral, which is sometimes lost with diuretics. ¨ Aspirin and other blood thinners prevent blood clots, which can cause a stroke or heart attack. ? You will get more details on the specific medicines your doctor prescribes. Diet ? · Your doctor may suggest that you limit sodium to 2,000 milligrams (mg) a day or less. That is less than 1 teaspoon of salt a day, including all the salt you eat in cooking or in packaged foods. People get most of their sodium from processed foods. Fast food and restaurant meals also tend to be very high in sodium. ? · Ask your doctor how much liquid you can drink each day. You may have to limit liquids. ?Weight ? · Weigh yourself without clothing at the same time each day. Record your weight. Call your doctor if you have a sudden weight gain, such as more than 2 to 3 pounds in a day or 5 pounds in a week. (Your doctor may suggest a different range of weight gain.) A sudden weight gain may mean that your heart failure is getting worse. ? Activity level ? · Start light exercise (if your doctor says it is okay). Even if you can only do a small amount, exercise will help you get stronger, have more energy, and manage your weight and your stress. Walking is an easy way to get exercise. Start out by walking a little more than you did before. Bit by bit, increase the amount you walk. ? · When you exercise, watch for signs that your heart is working too hard. You are pushing yourself too hard if you cannot talk while you are exercising. If you become short of breath or dizzy or have chest pain, stop, sit down, and rest.  
? · If you feel \"wiped out\" the day after you exercise, walk slower or for a shorter distance until you can work up to a better pace. ? · Get enough rest at night. Sleeping with 1 or 2 pillows under your upper body and head may help you breathe easier. ? Lifestyle changes ? · Do not smoke. Smoking can make a heart condition worse. If you need help quitting, talk to your doctor about stop-smoking programs and medicines. These can increase your chances of quitting for good. Quitting smoking may be the most important step you can take to protect your heart. ? · Limit alcohol to 2 drinks a day for men and 1 drink a day for women. Too much alcohol can cause health problems. ? · Avoid getting sick from colds and the flu. Get a pneumococcal vaccine shot. If you have had one before, ask your doctor whether you need another dose. Get a flu shot each year. If you must be around people with colds or the flu, wash your hands often. When should you call for help? Call 911 if you have symptoms of sudden heart failure such as: 
? · You have severe trouble breathing. ? · You cough up pink, foamy mucus. ? · You have a new irregular or rapid heartbeat. ?Call your doctor now or seek immediate medical care if: 
? · You have new or increased shortness of breath. ? · You are dizzy or lightheaded, or you feel like you may faint. ? · You have sudden weight gain, such as more than 2 to 3 pounds in a day or 5 pounds in a week. (Your doctor may suggest a different range of weight gain.) ? · You have increased swelling in your legs, ankles, or feet. ? · You are suddenly so tired or weak that you cannot do your usual activities. ?Watch closely for changes in your health, and be sure to contact your doctor if you develop new symptoms. Where can you learn more? Go to http://molina-frida.info/. Enter I557 in the search box to learn more about \"Heart Failure: Care Instructions. \" Current as of: September 21, 2016 Content Version: 11.4 © 8318-4175 [employer]. Care instructions adapted under license by [employer] (which disclaims liability or warranty for this information). If you have questions about a medical condition or this instruction, always ask your healthcare professional. [address] any warranty or liability for your use of this information. Hypokalemia: Care Instructions Your Care Instructions Hypokalemia (say \"nw-et-vne-FREDI-david-uh\") is a low level of potassium. The heart, muscles, kidneys, and nervous system all need potassium to work well. This problem has many different causes. Kidney problems, diet, and medicines like diuretics and laxatives can cause it. So can vomiting or diarrhea. In some cases, cancer is the cause. Your doctor may do tests to find the cause of your low potassium levels. You may need medicines to bring your potassium levels back to normal. You may also need regular blood tests to check your potassium. If you have very low potassium, you may need intravenous (IV) medicines. You also may need tests to check the electrical activity of your heart. Heart problems caused by low potassium levels can be very serious. Follow-up care is a key part of your treatment and safety. Be sure to make and go to all appointments, and call your doctor if you are having problems. It's also a good idea to know your test results and keep a list of the medicines you take. How can you care for yourself at home? · If your doctor recommends it, eat foods that have a lot of potassium. These include fresh fruits, juices, and vegetables. They also include nuts, beans, and milk. · Be safe with medicines. If your doctor prescribes medicines or potassium supplements, take them exactly as directed. Call your doctor if you have any problems with your medicines. · Get your potassium levels tested as often as your doctor tells you. When should you call for help? Call 911 anytime you think you may need emergency care. For example, call if: 
? · You feel like your heart is missing beats. Heart problems caused by low potassium can cause death. ? · You passed out (lost consciousness). ? · You have a seizure. ?Call your doctor now or seek immediate medical care if: 
? · You feel weak or unusually tired. ? · You have severe arm or leg cramps. ? · You have tingling or numbness. ? · You feel sick to your stomach, or you vomit. ? · You have belly cramps. ? · You feel bloated or constipated. ? · You have to urinate a lot. ? · You feel very thirsty most of the time. ? · You are dizzy or lightheaded, or you feel like you may faint. ? · You feel depressed, or you lose touch with reality. ? Watch closely for changes in your health, and be sure to contact your doctor if: 
? · You do not get better as expected. Where can you learn more? Go to http://molina-frida.info/. Enter G358 in the search box to learn more about \"Hypokalemia: Care Instructions. \" Current as of: May 12, 2017 Content Version: 11.4 © 5393-7381 [employer]. Care instructions adapted under license by [employer] (which disclaims liability or warranty for this information). If you have questions about a medical condition or this instruction, always ask your healthcare professional. [address] any warranty or liability for your use of this information. Hyponatremia: Care Instructions Your Care Instructions Hyponatremia (say \"lw-lt-jve-TREE-david-uh\") means that you don't have enough sodium in your blood. It can cause nausea, vomiting, and headaches. Or you may not feel hungry. In serious cases, it can cause seizures, a coma, or even death. Hyponatremia is not a disease. It is a problem caused by something else, such as medicines or exercising for a long time in hot weather. You can get hyponatremia if you lose a lot of fluids and then you drink a lot of water or other liquids that don't have much sodium. You can also get it if you have kidney, liver, heart, or other health problems. Treatment is focused on getting your sodium levels back to normal. 
Follow-up care is a key part of your treatment and safety. Be sure to make and go to all appointments, and call your doctor if you are having problems. It's also a good idea to know your test results and keep a list of the medicines you take. How can you care for yourself at home? · If your doctor recommends it, drink fluids that have sodium. Sports drinks are a good choice. Or you can eat salty foods. · If your doctor recommends it, limit the amount of water you drink. And limit fluids that are mostly water. These include tea, coffee, and juice. · Take your medicines exactly as prescribed. Call your doctor if you have any problems with your medicine. · Get your sodium levels tested when your doctor tells you to. When should you call for help? Call 911 anytime you think you may need emergency care. For example, call if: 
? · You have a seizure. ? · You passed out (lost consciousness). ?Call your doctor now or seek immediate medical care if: 
? · You are confused or it is hard to focus. ? · You have little or no appetite. ? · You feel sick to your stomach or you vomit. ? · You have a headache. ? · You have mood changes. ? · You feel more tired than usual. ? Watch closely for changes in your health, and be sure to contact your doctor if: ? · You do not get better as expected. Where can you learn more? Go to http://molina-frida.info/. Enter K012 in the search box to learn more about \"Hyponatremia: Care Instructions. \" Current as of: 2016 Content Version: 11.4 © 6122-2878 [employer]. Care instructions adapted under license by [employer] (which disclaims liability or warranty for this information). If you have questions about a medical condition or this instruction, always ask your healthcare professional. [address] any warranty or liability for your use of this information. [employer] Activation Thank you for requesting access to Plugaround. Please follow the instructions below to securely access and download your online medical record. Plugaround allows you to send messages to your doctor, view your test results, renew your prescriptions, schedule appointments, and more. How Do I Sign Up? 1. In your internet browser, go to [email or web address] 
2. Click on the First Time User? Click Here link in the Sign In box. You will be redirect to the New Member Sign Up page. 3. Enter your Plugaround Access Code exactly as it appears below. You will not need to use this code after youve completed the sign-up process. If you do not sign up before the expiration date, you must request a new code. Plugaround Access Code: Activation code not generated Current Plugaround Status: Active (This is the date your Plugaround access code will ) 4. Enter the last four digits of your Social Security Number (xxxx) and Date of Birth (mm/dd/yyyy) as indicated and click Submit. You will be taken to the next sign-up page. 5. Create a Plugaround ID. This will be your Plugaround login ID and cannot be changed, so think of one that is secure and easy to remember. 6. Create a Plugaround password. You can change your password at any time. 7. Enter your Password Reset Question and Answer. This can be used at a later time if you forget your password. 8. Enter your e-mail address. You will receive e-mail notification when new information is available in [address] 9. Click Sign Up. You can now view and download portions of your medical record. 10. Click the Download Summary menu link to download a portable copy of your medical information. Additional Information If you have questions, please visit the Frequently Asked Questions section of the Wantr website at [email or web address] Remember, Wantr is NOT to be used for urgent needs. For medical emergencies, dial 911. Patient armband removed and shredded. DISCHARGE SUMMARY from Nurse PATIENT INSTRUCTIONS: 
 
After general anesthesia or intravenous sedation, for 24 hours or while taking prescription Narcotics: · Limit your activities · Do not drive and operate hazardous machinery · Do not make important personal or business decisions · Do  not drink alcoholic beverages · If you have not urinated within 8 hours after discharge, please contact your surgeon on call. Report the following to your surgeon: 
· Excessive pain, swelling, redness or odor of or around the surgical area · Temperature over 100.5 · Nausea and vomiting lasting longer than 4 hours or if unable to take medications · Any signs of decreased circulation or nerve impairment to extremity: change in color, persistent  numbness, tingling, coldness or increase pain · Any questions What to do at Home: 
Recommended activity: Activity as tolerated, If you experience any of the following symptoms severe back pain, shortness of breath, wheezing, chest pain, elevated temperature, please follow up with primary care doctor. . 
 
*  Please give a list of your current medications to your Primary Care Provider.  
 
*  Please update this list whenever your medications are discontinued, doses are 
 changed, or new medications (including over-the-counter products) are added. *  Please carry medication information at all times in case of emergency situations. These are general instructions for a healthy lifestyle: No smoking/ No tobacco products/ Avoid exposure to second hand smoke Surgeon General's Warning:  Quitting smoking now greatly reduces serious risk to your health. Obesity, smoking, and sedentary lifestyle greatly increases your risk for illness A healthy diet, regular physical exercise & weight monitoring are important for maintaining a healthy lifestyle You may be retaining fluid if you have a history of heart failure or if you experience any of the following symptoms:  Weight gain of 3 pounds or more overnight or 5 pounds in a week, increased swelling in our hands or feet or shortness of breath while lying flat in bed. Please call your doctor as soon as you notice any of these symptoms; do not wait until your next office visit. Recognize signs and symptoms of STROKE: 
 
F-face looks uneven A-arms unable to move or move unevenly S-speech slurred or non-existent T-time-call 911 as soon as signs and symptoms begin-DO NOT go Back to bed or wait to see if you get better-TIME IS BRAIN. Warning Signs of HEART ATTACK Call 911 if you have these symptoms: 
? Chest discomfort. Most heart attacks involve discomfort in the center of the chest that lasts more than a few minutes, or that goes away and comes back. It can feel like uncomfortable pressure, squeezing, fullness, or pain. ? Discomfort in other areas of the upper body. Symptoms can include pain or discomfort in one or both arms, the back, neck, jaw, or stomach. ? Shortness of breath with or without chest discomfort. ? Other signs may include breaking out in a cold sweat, nausea, or lightheadedness. Don't wait more than five minutes to call [address]!  Fast action can save your life. Calling 911 is almost always the fastest way to get lifesaving treatment. Emergency Medical Services staff can begin treatment when they arrive  up to an hour sooner than if someone gets to the hospital by car. The discharge information has been reviewed with the patient. The patient verbalized understanding. Discharge medications reviewed with the patient and appropriate educational materials and side effects teaching were provided. ___________________________________________________________________________________________________________________________________ [employer]hart Announcement We are excited to announce that we are making your provider's discharge notes available to you in [email or web address]. You will see these notes when they are completed and signed by the physician that discharged you from your recent hospital stay. If you have any questions or concerns about any information you see in [email or web address], please call the Health Information Department where you were seen or reach out to your Primary Care Provider for more information about your plan of care. Introducing [address]! Dear Ronal Crump: Thank you for requesting a [email or web address] account. Our records indicate that you already have an active [email or web address] account. You can access your account anytime at [email or web address] Did you know that you can access your hospital and ER discharge instructions at any time in [email or web address]? You can also review all of your test results from your hospital stay or ER visit. Additional Information If you have questions, please visit the Frequently Asked Questions section of the [email or web address] website at [email or web address] Remember, [email or web address] is NOT to be used for urgent needs. For medical emergencies, dial 911. Now available from your iPhone and Android! Introducing Domingo Prince As a [employer] patient, I wanted to make you aware of our electronic visit tool called Domingo MeraBearch. [employer] 24/7 allows you to connect within minutes with a medical provider 24 hours a day, seven days a week via a mobile device or tablet or logging into a secure website from your computer. You can access Domingo Merafin from anywhere in the [place]. A virtual visit might be right for you when you have a simple condition and feel like you just dont want to get out of bed, or cant get away from work for an appointment, when your regular [employer] provider is not available (evenings, weekends or holidays), or when youre out of town and need minor care. Electronic visits cost only $49 and if the [employer] 24/7 provider determines a prescription is needed to treat your condition, one can be electronically transmitted to a nearby pharmacy*. Please take a moment to enroll today if you have not already done so. The enrollment process is free and takes just a few minutes. To enroll, please download the [employer] 24/7 destiny to your tablet or phone, or visit [email or web address] to enroll on your computer. And, as an [address] [place] [address] patient with a [employer] account, the results of your visits will be scanned into your electronic medical record and your primary care provider will be able to view the scanned results. We urge you to continue to see your regular [employer] provider for your ongoing medical care. And while your primary care provider may not be the one available when you seek a Domingo Heartisabelfin virtual visit, the peace of mind you get from getting a real diagnosis real time can be priceless. For more information on Domingo Heartisabelfin, view our Frequently Asked Questions (FAQs) at [email or web address]. Sincerely, 
 
Robert Machuca MD 
Chief Medical Officer Kay8 Magaly Chapman *:  certain medications cannot be prescribed via Domingo Prince Unresulted tests-please follow up with your PCP on these results Procedure/Test Authorizing Provider  2D ECHO COMPLETE ADULT (TTE) W OR WO Miko Lynch [address] [place], [place]  
 CBC WITH AUTOMATED DIFF Alayna Brawn, NP  
 CBC WITH AUTOMATED DIFF Chisola Njapau, NP  
 CORTISOL, AM Nancy John MD  
 CT HEAD WO CONT Delmer Henderson MD  
 CT SPINE CERV WO CONT Delmer Henderson MD  
 CULTURE, BLOOD Alayna Brawn, NP  
 CULTURE, BLOOD Alayna Brawn, NP  
 CULTURE, URINE Alayna Brawn, NP  
 DIGOXIN Lupe Earl MD  
 EKG, 12 LEAD, INITIAL Alayna Brawn, NP  
 EKG, 12 LEAD, SUBSEQUENT Chisola Njapau, NP  
 EKG, 12 LEAD, SUBSEQUENT Travis Morales MD  
 METABOLIC PANEL, BASIC Nancy John MD  
 METABOLIC PANEL, BASIC Chisola Njapau, NP  
 METABOLIC PANEL, COMPREHENSIVE Delmer Henderson MD  
 MRI Abdiazizn Patient, MD  
 NT-PRO BNP Delmer Henderson MD  
 OSMOLALITY, SERUM/PLASMA Chelo Hughes MD  
 OSMOLALITY, SERUM/PLASMA Nancy John MD  
 OSMOLALITY, UR Chelo Hughes MD  
 OSMOLALITY, UR MD Ruth Liao MD  
 POTASSIUM, UR, RANDOM Nancy John MD  
 PROTHROMBIN TIME + INR Alayna Zambrano, CHRISTOPH  
 SODIUM, UR, Kimberly Geiger MD  
 SODIUM, UR, Daniel Carpio MD  
 SODIUM, UR, RANDOM Nancy John MD  
 TROPONIN I Delmer Henderson MD  
 TSH 3RD GENERATION Nancy John MD  
 URINALYSIS W/ RFLX MICROSCOPIC Alayna Zambrano, NP  
 URINE MICROSCOPIC ONLY Yanique Stagers, NP  
 XR CHEST PORT Yanique Stagers, NP Providers Seen During Your Hospitalization Provider Specialty Primary office phone Jerry Cerrato MD Emergency Medicine [phone] Ranjeet Sidhu MD Family Practice [phone] Marianne Weldon MD Infectious Diseases [phone] Your Primary Care Physician (PCP) Primary Care Physician Office Phone Office Fax [address] [facility], [address] [place] [address] [phone] You are allergic to the following Allergen Reactions Hydrochlorothiazide Hives Penicillins Other (comments) Mouth sore, upset stomach. Sulfa (Sulfonamide Antibiotics) Unknown (comments) Prednisone Other (comments) Body sweats, hot and cold, up all night Recent Documentation Weight Breastfeeding? BMI OB Status Smoking Status 54 kg No 29.81 kg/m2 Hysterectomy Never Smoker Emergency Contacts Name Discharge Info Relation Home Work Mobile [address] A CAREGIVER [3] Son [22] [phone] Patient Belongings The following personal items are in your possession at time of discharge: 
  Dental Appliances: None  Visual Aid: Glasses, With patient, At bedside      Home Medications: None   Jewelry: None  Clothing: Pants, At bedside, Shirt    Other Valuables: None Discharge Instructions Attachments/References AZITHROMYCIN (BY MOUTH) (ENGLISH) BENZONATATE (BY MOUTH) (ENGLISH) HYDRALAZINE (BY MOUTH) (ENGLISH) SODIUM CHLORIDE (BY MOUTH) (ENGLISH) TRAMADOL (BY MOUTH) (ENGLISH) Patient Handouts Azithromycin (By mouth) Azithromycin (al-vpcq-soj-MYE-sin) Treats infections. This medicine is a macrolide antibiotic. Brand Name(s): Zithromax, Zithromax Tri-Zev, Zithromax Z-Zev, Zmax There may be other brand names for this medicine. When This Medicine Should Not Be Used: This medicine is not right for everyone. Do not use it if you had an allergic reaction to azithromycin, erythromycin, or similar medicines, or you have a history of liver problems caused by azithromycin. How to Use This Medicine:  
Capsule, Liquid, Packet, Powder, Tablet · Your doctor will tell you how much medicine to use. Do not use more than directed. · Take all of the medicine in your prescription to clear up your infection, even if you feel better after the first few doses. · Read and follow the patient instructions that come with this medicine. Talk to your doctor or pharmacist if you have any questions. · Multiple dose (Zithromax® oral liquid or tablets): ¨ You may take this medicine with or without food. ¨ Shake the bottle well before you measure the medicine. Measure the oral liquid medicine with a marked measuring spoon, oral syringe, or medicine cup. · Single dose (Zmax® extended-release oral liquid or powder):  
¨ Liquid: § Take this medicine on an empty stomach at least 1 hour before you eat, or 2 hours after you eat. § Call your doctor right away if you vomit within 1 hour after you take the medicine. § You must take the liquid within 12 hours after the pharmacist gives it to you. § Shake the bottle well before you measure the medicine. Measure your dose with a marked measuring spoon, cup, or syringe. ¨ Powder:  
§ Open 1 packet and pour all of the medicine into a glass with about 2 ounces (¼ cup) of water. Mix well and drink the medicine right away. Pour another 2 ounces of water into the same glass, and drink the remaining medicine. · Missed dose: If you are taking multiple doses, take the dose as soon as you remember. If it is almost time for your next dose, wait until then to take a regular dose. Do not use extra medicine to make up for a missed dose. · Store the medicine in a closed container at room temperature, away from heat, moisture, and direct light. · Extended-release oral liquid: Do not refrigerate or freeze. · Oral liquid for 1 dose only: Store at room temperature. Do not store in the refrigerator or allow the medicine to freeze. · Oral liquid for multiple doses: Store at room temperature or in the refrigerator. Use it within 10 days of filling the prescription. Drugs and Foods to Avoid: Ask your doctor or pharmacist before using any other medicine, including over-the-counter medicines, vitamins, and herbal products. · Some medicines can affect how azithromycin works. Tell your doctor if you are also using any of the following: ¨ Cyclosporine, digoxin, nelfinavir, or phenytoin ¨ Blood thinner ¨ Ergot medicine ¨ Medicine for a heart rhythm problem (including amiodarone, dofetilide, procainamide, quinidine, sotalol) · Zithromax® for multiple doses: Do not take an antacid that contains magnesium or aluminum at the same time you take Zithromax®. An antacid will affect how the medicine works. Antacids will not affect Zmax® for single dose. Warnings While Using This Medicine: · Tell your doctor if you are pregnant or breastfeeding, or if you have kidney disease, liver disease, heart disease, heart rhythm problems, heart failure, or myasthenia gravis. Tell your doctor if anyone in your family has heart rhythm problems. · This medicine may cause the following problems:  
¨ Serious skin reactions ¨ Liver problems ¨ Infantile hypertrophic pyloric stenosis ¨ Heart rhythm problems · This medicine can cause diarrhea. Call your doctor if the diarrhea becomes severe, does not stop, or is bloody. Do not take any medicine to stop diarrhea until you have talked to your doctor. Diarrhea can occur 2 months or more after you stop taking this medicine. It may occur 2 months or more after you stop using this medicine. · Call your doctor if your symptoms do not improve or if they get worse. · Keep all medicine out of the reach of children.  Never share your medicine with anyone. Possible Side Effects While Using This Medicine:  
Call your doctor right away if you notice any of these side effects: · Allergic reaction: Itching or hives, swelling in your face or hands, swelling or tingling in your mouth or throat, chest tightness, trouble breathing · Blistering, peeling, red skin rash · Dark urine, pale stools, nausea, vomiting, loss of appetite, stomach pain, yellow skin or eyes · Double vision, tiredness or weakness · Fainting, dizziness, lightheadedness · Fast, pounding, or uneven heartbeat, chest pain · Feeling irritable or vomits after feeding (in babies) · Severe diarrhea that may contain blood, stomach cramps, fever If you notice these less serious side effects, talk with your doctor: · Mild diarrhea, nausea, vomiting, stomach pain If you notice other side effects that you think are caused by this medicine, tell your doctor. Call your doctor for medical advice about side effects. You may report side effects to FDA at 5-611-FDA-8609 © 2017 [address] [facility] Information is for End User's use only and may not be sold, redistributed or otherwise used for commercial purposes. The above information is an  only. It is not intended as medical advice for individual conditions or treatments. Talk to your doctor, nurse or pharmacist before following any medical regimen to see if it is safe and effective for you. Benzonatate (By mouth) Benzonatate (vwp-ZUD-br-shen) Treats cough. Brand Name(s): Kvng Joseph There may be other brand names for this medicine. When This Medicine Should Not Be Used: You should not use this medicine if you have had an allergic reaction to benzonatate in the past.  
How to Use This Medicine:  
Capsule, Liquid Filled Capsule · Your doctor will tell you how much medicine to take and how often. · Swallow the capsules whole; do not crush or chew.  Chewing the capsule may numb your mouth and throat and you could choke. If a dose is missed: · Take the missed dose as soon as possible. · If it is almost time for the next dose, wait until then to take your medicine and skip the missed dose. · You should not use two doses at the same time. How to Store and Dispose of This Medicine:  
· Keep this medication in the original tightly closed, light-resistant container. · Store at room temperature, away from heat, moisture, and light. · Ask your pharmacist, doctor, or health caregiver about the best way to dispose of any outdated medicine or medicine no longer needed. · Keep all medicine away from children. Drugs and Foods to Avoid: Ask your doctor or pharmacist before using any other medicine, including over-the-counter medicines, vitamins, and herbal products. · Avoid drinking alcohol while taking this medicine. Warnings While Using This Medicine: · If you are pregnant or breastfeeding, talk to your doctor before taking this medicine. · Benzonatate is not recommended for children younger than 8years old. Possible Side Effects While Using This Medicine:  
Call your doctor right away if you notice any of these side effects: · Trouble breathing · Tightness in the chest or throat · Tremors, shakiness, seizures · Skin rash, itching If you notice these less serious side effects, talk with your doctor: · Nausea, upset stomach 
· Headache · Mild dizziness · Drowsiness · Constipation · Stuffy nose If you notice other side effects that you think are caused by this medicine, tell your doctor. Call your doctor for medical advice about side effects. You may report side effects to FDA at 0-355-FDA-4475 © 2017 [address] Information is for End User's use only and may not be sold, redistributed or otherwise used for commercial purposes. The above information is an  only.  It is not intended as medical advice for individual conditions or treatments. Talk to your doctor, nurse or pharmacist before following any medical regimen to see if it is safe and effective for you. Hydralazine (By mouth) Hydralazine Hydrochloride (slf-UBFB-c-manoloen jayne-droe-KLOR-olga lidia) Treats high blood pressure. Brand Name(s):  
There may be other brand names for this medicine. When This Medicine Should Not Be Used: This medicine is not right for everyone. Do not use it if you had an allergic reaction to hydralazine, or if you have coronary artery disease or rheumatic heart disease. How to Use This Medicine:  
Tablet · Take your medicine as directed. Your dose may need to be changed several times to find what works best for you. · Missed dose: Take a dose as soon as you remember. If it is almost time for your next dose, wait until then and take a regular dose. Do not take extra medicine to make up for a missed dose. · Store the medicine in a closed container at room temperature, away from heat, moisture, and direct light. Drugs and Foods to Avoid: Ask your doctor or pharmacist before using any other medicine, including over-the-counter medicines, vitamins, and herbal products. · Some foods and medicines can affect how hydralazine works. Tell your doctor if you are using diazoxide or an MAO inhibitor. Warnings While Using This Medicine: · Tell your doctor if you are pregnant or breastfeeding, or if you have kidney disease, heart or blood vessel disease, heart rhythm problems, lupus, or if you had a heart attack or stroke. · This medicine may cause the following problems: 
¨ Lupus-like syndrome ¨ Changes in heart rhythm ¨ Nerve problems · This medicine may lower your blood pressure too much and cause you to feel dizzy. Do not drive or do anything else that could be dangerous until you know how this medicine affects you.  
· Your doctor will do lab tests at regular visits to check on the effects of this medicine. Keep all appointments. · Keep all medicine out of the reach of children. Never share your medicine with anyone. Possible Side Effects While Using This Medicine:  
Call your doctor right away if you notice any of these side effects: · Allergic reaction: Itching or hives, swelling in your face or hands, swelling or tingling in your mouth or throat, chest tightness, trouble breathing · Change in how much or how often you urinate · Chest pain that may spread to your arms, jaw, back, or neck, trouble breathing, unusual sweating, faintness · Fast, pounding, or uneven heartbeat · Fever, chills, cough, sore throat, and body aches · Lightheadedness, dizziness, or fainting · Numbness, tingling, or burning pain in your hands, arms, legs, or feet · Unusual bleeding, bruising, or weakness If you notice these less serious side effects, talk with your doctor: · Diarrhea, nausea, vomiting, loss of appetite · Headache · Stuffy nose or watery eyes If you notice other side effects that you think are caused by this medicine, tell your doctor. Call your doctor for medical advice about side effects. You may report side effects to FDA at 5-762-FDA-8349 © 2017 [address] Information is for End User's use only and may not be sold, redistributed or otherwise used for commercial purposes. The above information is an  only. It is not intended as medical advice for individual conditions or treatments. Talk to your doctor, nurse or pharmacist before following any medical regimen to see if it is safe and effective for you. Sodium Chloride (By mouth) Sodium Chloride (SONY-madhu-um KLOR-olga lidia) Helps prevent heat cramps caused by heavy sweating. Also used to prepare sodium chloride solution. Brand Name(s): Natrum Mur There may be other brand names for this medicine. When This Medicine Should Not Be Used: This medicine is generally considered safe for most people. Talk to your doctor if you have concerns. How to Use This Medicine:  
Tablet · Your doctor will tell you how much medicine to use. Do not use more than directed. · Follow the instructions on the medicine label if you are using this medicine without a prescription. · To prepare sodium chloride solution: ¨ Dissolve 1 tablet in 120 mL (4 ounces) of distilled water. ¨ Use as directed by your doctor. · Store the medicine in a closed container at room temperature, away from heat, moisture, and direct light. Drugs and Foods to Avoid: Ask your doctor or pharmacist before using any other medicine, including over-the-counter medicines, vitamins, and herbal products. Warnings While Using This Medicine: · Tell your doctor if you are pregnant or breastfeeding, or if you are on a low-sodium diet. · Call your doctor right away if you have heat cramps for more than 24 hours. · Keep all medicine out of the reach of children. Never share your medicine with anyone. Possible Side Effects While Using This Medicine:  
Call your doctor right away if you notice any of these side effects: · Allergic reaction: Itching or hives, swelling in your face or hands, swelling or tingling in your mouth or throat, chest tightness, trouble breathing If you notice other side effects that you think are caused by this medicine, tell your doctor. Call your doctor for medical advice about side effects. You may report side effects to FDA at 4-899-FDA-0932 © 2017 [address] Information is for End User's use only and may not be sold, redistributed or otherwise used for commercial purposes. The above information is an  only. It is not intended as medical advice for individual conditions or treatments. Talk to your doctor, nurse or pharmacist before following any medical regimen to see if it is safe and effective for you. Tramadol (By mouth) Tramadol (TRAM-a-dol) Treats moderate to severe pain. This medicine is a narcotic pain reliever. Brand Name(s): ConZip, FusePaq Synapryn, Ultram, Ultram ER, traMADol HCl There may be other brand names for this medicine. When This Medicine Should Not Be Used: This medicine is not right for everyone. Do not use if you had an allergic reaction to tramadol or other narcotic medicine, or if you have stomach or bowel blockage (including paralytic ileus). How to Use This Medicine:  
Long Acting Capsule, Liquid, Tablet, Dissolving Tablet, Long Acting Tablet · Take your medicine as directed. Your dose may need to be changed several times to find what works best for you. · Make sure your hands are dry before you handle the disintegrating tablet. Peel back the foil from the blister pack, then remove the tablet. Do not push the tablet through the foil. Place the tablet in your mouth. After it has melted, swallow or take a drink of water. · Swallow the extended-release tablet whole. Do not crush, break, or chew it. · Drink plenty of liquids to help avoid constipation. · This medicine should come with a Medication Guide. Ask your pharmacist for a copy if you do not have one. · Missed dose: Take a dose as soon as you remember. If it is almost time for your next dose, wait until then and take a regular dose. Do not take extra medicine to make up for a missed dose. · Store the medicine in a closed container at room temperature, away from heat, moisture, and direct light. Drugs and Foods to Avoid: Ask your doctor or pharmacist before using any other medicine, including over-the-counter medicines, vitamins, and herbal products. · Do not use this medicine if you are using or have used an MAO inhibitor within the past 14 days. · Some medicines can affect how tramadol works. Tell your doctor if you are using any of the following: ¨ Carbamazepine, cyclobenzaprine, digoxin, erythromycin, ketoconazole, lithium, mirtazapine, phenytoin, promethazine, rifampin, ritonavir, quinidine, or trazodone ¨ Blood thinner (including warfarin) ¨ Diuretic (water pill) ¨ Medicine to treat depression (including bupropion, fluoxetine, paroxetine, quinidine) ¨ Phenothiazine medicine ¨ Triptan medicine for migraine headaches · Tell your doctor if you use anything else that makes you sleepy. Some examples are allergy medicine, narcotic pain medicine, and alcohol. Tell your doctor if you are using a muscle relaxer. · Do not drink alcohol while you are using this medicine. Warnings While Using This Medicine: · Tell your doctor if you are pregnant or breastfeeding, or if you have kidney disease, liver disease (including cirrhosis), gallstones, lung or breathing problems, pancreas problems, or a history of head injury, seizures, drug addiction, or depression or similar emotional problems. Tell your doctor if you have phenylketonuria. · This medicine may cause the following problems: 
¨ High risk of overdose, which can lead to death ¨ Respiratory depression (serious breathing problem that can be life-threatening) ¨ Serotonin syndrome (when used with certain medicines) ¨ Unusual change in mood or behavior · This medicine may make you dizzy, drowsy, or lightheaded. Do not drive or doing anything else that could be dangerous until you know how this medicine affects you. · This medicine can be habit-forming. Do not use more than your prescribed dose. Call your doctor if you think your medicine is not working. · Do not stop using this medicine suddenly. Your doctor will need to slowly decrease your dose before you stop it completely. · Tell any doctor or dentist who treats you that you are using this medicine. · This medicine may cause constipation, especially with long-term use.  Ask your doctor if you should use a laxative to prevent and treat constipation. · This medicine could cause infertility. Talk with your doctor before using this medicine if you plan to have children. · Keep all medicine out of the reach of children. Never share your medicine with anyone. Possible Side Effects While Using This Medicine:  
Call your doctor right away if you notice any of these side effects: · Allergic reaction: Itching or hives, swelling in your face or hands, swelling or tingling in your mouth or throat, chest tightness, trouble breathing · Anxiety, restlessness, fast heartbeat, fever, sweating, muscle spasms, nausea, vomiting, diarrhea, seeing or hearing things that are not there · Blistering, peeling, red skin rash · Blue lips, fingernails, or skin · Extreme dizziness, drowsiness, or weakness, shallow breathing, slow heartbeat, seizures, and cold, clammy skin · Lightheadedness, dizziness, fainting · Seizures · Unusual mood or behavior, thoughts of killing yourself or others · Trouble breathing If you notice these less serious side effects, talk with your doctor: · Constipation, loss of appetite, stomach upset · Dry mouth 
· Headache If you notice other side effects that you think are caused by this medicine, tell your doctor. Call your doctor for medical advice about side effects. You may report side effects to FDA at 3-416-FDA-8502 © 2017 [address] Information is for End User's use only and may not be sold, redistributed or otherwise used for commercial purposes. The above information is an  only. It is not intended as medical advice for individual conditions or treatments. Talk to your doctor, nurse or pharmacist before following any medical regimen to see if it is safe and effective for you. Please provide this summary of care documentation to your next provider.  
  
  
 
  
Signatures-by signing, you are acknowledging that this After Visit Summary has been reviewed with you and you have received a copy. Patient Signature:  ____________________________________________________________ Date:  ____________________________________________________________  
  
Iesha Domingo Provider Signature:  ____________________________________________________________ Date:  ____________________________________________________________

## 2018-06-11 ENCOUNTER — APPOINTMENT (OUTPATIENT)
Dept: MRI IMAGING | Age: 83
DRG: 202 | End: 2018-06-11
Attending: INTERNAL MEDICINE
Payer: MEDICARE
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
## 2018-06-11 PROBLEM — R53.1 WEAKNESS: Status: ACTIVE | Noted: 2018-06-11

## 2018-06-11 PROBLEM — I48.91 AFIB (HCC): Status: ACTIVE | Noted: 2018-06-11

## 2018-06-11 PROBLEM — E87.1 HYPONATREMIA: Status: ACTIVE | Noted: 2018-06-11

## 2018-06-11 PROBLEM — S13.4XXA INJURY TO LIGAMENT OF CERVICAL SPINE: Status: ACTIVE | Noted: 2018-06-11

## 2018-06-11 LAB
ALBUMIN SERPL-MCNC: 3 G/DL (ref 3.4–5)
ALBUMIN/GLOB SERPL: 0.6 {RATIO} (ref 0.8–1.7)
ALP SERPL-CCNC: 99 U/L (ref 45–117)
ALT SERPL-CCNC: 120 U/L (ref 13–56)
ANION GAP SERPL CALC-SCNC: 10 MMOL/L (ref 3–18)
ANION GAP SERPL CALC-SCNC: 11 MMOL/L (ref 3–18)
ANION GAP SERPL CALC-SCNC: 12 MMOL/L (ref 3–18)
APPEARANCE UR: CLEAR
AST SERPL-CCNC: 100 U/L (ref 15–37)
ATRIAL RATE: 312 BPM
ATRIAL RATE: 326 BPM
BASOPHILS # BLD: 0 K/UL (ref 0–0.1)
BASOPHILS NFR BLD: 0 % (ref 0–2)
BILIRUB SERPL-MCNC: 0.5 MG/DL (ref 0.2–1)
BILIRUB UR QL: NEGATIVE
BNP SERPL-MCNC: 8486 PG/ML (ref 0–1800)
BUN SERPL-MCNC: 22 MG/DL (ref 7–18)
BUN SERPL-MCNC: 25 MG/DL (ref 7–18)
BUN SERPL-MCNC: 25 MG/DL (ref 7–18)
BUN/CREAT SERPL: 19 (ref 12–20)
BUN/CREAT SERPL: 21 (ref 12–20)
BUN/CREAT SERPL: 22 (ref 12–20)
CALCIUM SERPL-MCNC: 7.5 MG/DL (ref 8.5–10.1)
CALCIUM SERPL-MCNC: 7.7 MG/DL (ref 8.5–10.1)
CALCIUM SERPL-MCNC: 8.1 MG/DL (ref 8.5–10.1)
CALCULATED R AXIS, ECG10: 37 DEGREES
CALCULATED R AXIS, ECG10: 50 DEGREES
CALCULATED T AXIS, ECG11: -142 DEGREES
CALCULATED T AXIS, ECG11: -154 DEGREES
CHLORIDE SERPL-SCNC: 89 MMOL/L (ref 100–108)
CHLORIDE SERPL-SCNC: 94 MMOL/L (ref 100–108)
CHLORIDE SERPL-SCNC: 94 MMOL/L (ref 100–108)
CO2 SERPL-SCNC: 22 MMOL/L (ref 21–32)
CO2 SERPL-SCNC: 23 MMOL/L (ref 21–32)
CO2 SERPL-SCNC: 26 MMOL/L (ref 21–32)
COLOR UR: ABNORMAL
CREAT SERPL-MCNC: 1.13 MG/DL (ref 0.6–1.3)
CREAT SERPL-MCNC: 1.16 MG/DL (ref 0.6–1.3)
CREAT SERPL-MCNC: 1.17 MG/DL (ref 0.6–1.3)
DIAGNOSIS, 93000: NORMAL
DIAGNOSIS, 93000: NORMAL
DIFFERENTIAL METHOD BLD: ABNORMAL
DIGOXIN SERPL-MCNC: 0.5 NG/ML (ref 0.9–2)
EOSINOPHIL # BLD: 0.1 K/UL (ref 0–0.4)
EOSINOPHIL NFR BLD: 0 % (ref 0–5)
EPITH CASTS URNS QL MICRO: NORMAL /LPF (ref 0–5)
ERYTHROCYTE [DISTWIDTH] IN BLOOD BY AUTOMATED COUNT: 17.3 % (ref 11.6–14.5)
GLOBULIN SER CALC-MCNC: 5.1 G/DL (ref 2–4)
GLUCOSE SERPL-MCNC: 101 MG/DL (ref 74–99)
GLUCOSE SERPL-MCNC: 85 MG/DL (ref 74–99)
GLUCOSE SERPL-MCNC: 90 MG/DL (ref 74–99)
GLUCOSE UR STRIP.AUTO-MCNC: NEGATIVE MG/DL
HCT VFR BLD AUTO: 32.3 % (ref 35–45)
HGB BLD-MCNC: 11.2 G/DL (ref 12–16)
HGB UR QL STRIP: NEGATIVE
INR PPP: 1.2 (ref 0.8–1.2)
KETONES UR QL STRIP.AUTO: NEGATIVE MG/DL
LEUKOCYTE ESTERASE UR QL STRIP.AUTO: ABNORMAL
LYMPHOCYTES # BLD: 1.7 K/UL (ref 0.9–3.6)
LYMPHOCYTES NFR BLD: 15 % (ref 21–52)
MAGNESIUM SERPL-MCNC: 1.9 MG/DL (ref 1.6–2.6)
MCH RBC QN AUTO: 28.4 PG (ref 24–34)
MCHC RBC AUTO-ENTMCNC: 34.7 G/DL (ref 31–37)
MCV RBC AUTO: 82 FL (ref 74–97)
MONOCYTES # BLD: 1.7 K/UL (ref 0.05–1.2)
MONOCYTES NFR BLD: 14 % (ref 3–10)
NEUTS SEG # BLD: 8.5 K/UL (ref 1.8–8)
NEUTS SEG NFR BLD: 71 % (ref 40–73)
NITRITE UR QL STRIP.AUTO: NEGATIVE
PH UR STRIP: 6 [PH] (ref 5–8)
PLATELET # BLD AUTO: 243 K/UL (ref 135–420)
PMV BLD AUTO: 10.8 FL (ref 9.2–11.8)
POTASSIUM SERPL-SCNC: 3.2 MMOL/L (ref 3.5–5.5)
POTASSIUM SERPL-SCNC: 3.6 MMOL/L (ref 3.5–5.5)
POTASSIUM SERPL-SCNC: 3.7 MMOL/L (ref 3.5–5.5)
PROT SERPL-MCNC: 8.1 G/DL (ref 6.4–8.2)
PROT UR STRIP-MCNC: 100 MG/DL
PROTHROMBIN TIME: 14.7 SEC (ref 11.5–15.2)
Q-T INTERVAL, ECG07: 374 MS
Q-T INTERVAL, ECG07: 400 MS
QRS DURATION, ECG06: 88 MS
QRS DURATION, ECG06: 92 MS
QTC CALCULATION (BEZET), ECG08: 439 MS
QTC CALCULATION (BEZET), ECG08: 467 MS
RBC # BLD AUTO: 3.94 M/UL (ref 4.2–5.3)
RBC #/AREA URNS HPF: NORMAL /HPF (ref 0–5)
SODIUM SERPL-SCNC: 125 MMOL/L (ref 136–145)
SODIUM SERPL-SCNC: 127 MMOL/L (ref 136–145)
SODIUM SERPL-SCNC: 129 MMOL/L (ref 136–145)
SP GR UR REFRACTOMETRY: 1.02 (ref 1–1.03)
TROPONIN I SERPL-MCNC: 0.02 NG/ML (ref 0–0.04)
UROBILINOGEN UR QL STRIP.AUTO: 1 EU/DL (ref 0.2–1)
VENTRICULAR RATE, ECG03: 82 BPM
VENTRICULAR RATE, ECG03: 83 BPM
WBC # BLD AUTO: 12 K/UL (ref 4.6–13.2)
WBC URNS QL MICRO: NORMAL /HPF (ref 0–4)

## 2018-06-11 PROCEDURE — 74011250636 HC RX REV CODE- 250/636: Performed by: INTERNAL MEDICINE

## 2018-06-11 PROCEDURE — 74011000250 HC RX REV CODE- 250: Performed by: INTERNAL MEDICINE

## 2018-06-11 PROCEDURE — 83735 ASSAY OF MAGNESIUM: CPT | Performed by: EMERGENCY MEDICINE

## 2018-06-11 PROCEDURE — 87040 BLOOD CULTURE FOR BACTERIA: CPT | Performed by: NURSE PRACTITIONER

## 2018-06-11 PROCEDURE — 80048 BASIC METABOLIC PNL TOTAL CA: CPT | Performed by: NURSE PRACTITIONER

## 2018-06-11 PROCEDURE — 93005 ELECTROCARDIOGRAM TRACING: CPT

## 2018-06-11 PROCEDURE — 77030038269 HC DRN EXT URIN PURWCK BARD -A

## 2018-06-11 PROCEDURE — 36415 COLL VENOUS BLD VENIPUNCTURE: CPT | Performed by: INTERNAL MEDICINE

## 2018-06-11 PROCEDURE — 87086 URINE CULTURE/COLONY COUNT: CPT | Performed by: NURSE PRACTITIONER

## 2018-06-11 PROCEDURE — 74011250637 HC RX REV CODE- 250/637: Performed by: NURSE PRACTITIONER

## 2018-06-11 PROCEDURE — 80053 COMPREHEN METABOLIC PANEL: CPT | Performed by: EMERGENCY MEDICINE

## 2018-06-11 PROCEDURE — 74011250636 HC RX REV CODE- 250/636: Performed by: NURSE PRACTITIONER

## 2018-06-11 PROCEDURE — 65270000029 HC RM PRIVATE

## 2018-06-11 PROCEDURE — 83880 ASSAY OF NATRIURETIC PEPTIDE: CPT | Performed by: EMERGENCY MEDICINE

## 2018-06-11 PROCEDURE — 81001 URINALYSIS AUTO W/SCOPE: CPT | Performed by: NURSE PRACTITIONER

## 2018-06-11 PROCEDURE — 72141 MRI NECK SPINE W/O DYE: CPT

## 2018-06-11 PROCEDURE — 83930 ASSAY OF BLOOD OSMOLALITY: CPT | Performed by: INTERNAL MEDICINE

## 2018-06-11 PROCEDURE — 84484 ASSAY OF TROPONIN QUANT: CPT | Performed by: EMERGENCY MEDICINE

## 2018-06-11 PROCEDURE — 74011000250 HC RX REV CODE- 250: Performed by: NURSE PRACTITIONER

## 2018-06-11 PROCEDURE — 84300 ASSAY OF URINE SODIUM: CPT | Performed by: INTERNAL MEDICINE

## 2018-06-11 PROCEDURE — 80162 ASSAY OF DIGOXIN TOTAL: CPT | Performed by: INTERNAL MEDICINE

## 2018-06-11 PROCEDURE — 83935 ASSAY OF URINE OSMOLALITY: CPT | Performed by: INTERNAL MEDICINE

## 2018-06-11 PROCEDURE — 74011250637 HC RX REV CODE- 250/637: Performed by: INTERNAL MEDICINE

## 2018-06-11 RX ORDER — DIGOXIN 125 MCG
0.12 TABLET ORAL EVERY OTHER DAY
Status: DISCONTINUED | OUTPATIENT
Start: 2018-06-11 | End: 2018-06-15 | Stop reason: HOSPADM

## 2018-06-11 RX ORDER — NIFEDIPINE 30 MG/1
60 TABLET, EXTENDED RELEASE ORAL DAILY
Status: DISCONTINUED | OUTPATIENT
Start: 2018-06-12 | End: 2018-06-15 | Stop reason: HOSPADM

## 2018-06-11 RX ORDER — UREA 10 %
2 LOTION (ML) TOPICAL 3 TIMES DAILY
Status: DISCONTINUED | OUTPATIENT
Start: 2018-06-11 | End: 2018-06-15 | Stop reason: HOSPADM

## 2018-06-11 RX ORDER — LEVOTHYROXINE SODIUM 100 UG/1
100 TABLET ORAL
Status: DISCONTINUED | OUTPATIENT
Start: 2018-06-12 | End: 2018-06-15 | Stop reason: HOSPADM

## 2018-06-11 RX ORDER — MONTELUKAST SODIUM 10 MG/1
10 TABLET ORAL DAILY
Status: DISCONTINUED | OUTPATIENT
Start: 2018-06-12 | End: 2018-06-12

## 2018-06-11 RX ORDER — TRAZODONE HYDROCHLORIDE 50 MG/1
25 TABLET ORAL
Status: DISCONTINUED | OUTPATIENT
Start: 2018-06-11 | End: 2018-06-15 | Stop reason: HOSPADM

## 2018-06-11 RX ORDER — TRAMADOL HYDROCHLORIDE 50 MG/1
25 TABLET ORAL DAILY
Status: DISCONTINUED | OUTPATIENT
Start: 2018-06-12 | End: 2018-06-15 | Stop reason: HOSPADM

## 2018-06-11 RX ORDER — METOPROLOL TARTRATE 50 MG/1
50 TABLET ORAL 2 TIMES DAILY
Status: DISCONTINUED | OUTPATIENT
Start: 2018-06-11 | End: 2018-06-11

## 2018-06-11 RX ORDER — NALOXONE HYDROCHLORIDE 0.4 MG/ML
0.4 INJECTION, SOLUTION INTRAMUSCULAR; INTRAVENOUS; SUBCUTANEOUS AS NEEDED
Status: DISCONTINUED | OUTPATIENT
Start: 2018-06-11 | End: 2018-06-15 | Stop reason: HOSPADM

## 2018-06-11 RX ORDER — HYDRALAZINE HYDROCHLORIDE 50 MG/1
50 TABLET, FILM COATED ORAL 3 TIMES DAILY
Status: DISCONTINUED | OUTPATIENT
Start: 2018-06-11 | End: 2018-06-13

## 2018-06-11 RX ORDER — ALBUTEROL SULFATE 0.83 MG/ML
2.5 SOLUTION RESPIRATORY (INHALATION)
Status: DISCONTINUED | OUTPATIENT
Start: 2018-06-11 | End: 2018-06-12

## 2018-06-11 RX ORDER — THERA TABS 400 MCG
1 TAB ORAL DAILY
Status: DISCONTINUED | OUTPATIENT
Start: 2018-06-12 | End: 2018-06-15 | Stop reason: HOSPADM

## 2018-06-11 RX ORDER — ALBUTEROL SULFATE 90 UG/1
1 AEROSOL, METERED RESPIRATORY (INHALATION)
Status: DISCONTINUED | OUTPATIENT
Start: 2018-06-11 | End: 2018-06-11 | Stop reason: CLARIF

## 2018-06-11 RX ORDER — PANTOPRAZOLE SODIUM 40 MG/1
40 TABLET, DELAYED RELEASE ORAL
Status: DISCONTINUED | OUTPATIENT
Start: 2018-06-12 | End: 2018-06-15 | Stop reason: HOSPADM

## 2018-06-11 RX ORDER — LANOLIN ALCOHOL/MO/W.PET/CERES
3 CREAM (GRAM) TOPICAL
Status: DISCONTINUED | OUTPATIENT
Start: 2018-06-11 | End: 2018-06-15 | Stop reason: HOSPADM

## 2018-06-11 RX ORDER — LOVASTATIN 20 MG/1
40 TABLET ORAL
Status: DISCONTINUED | OUTPATIENT
Start: 2018-06-11 | End: 2018-06-11 | Stop reason: CLARIF

## 2018-06-11 RX ORDER — MELATONIN
1000 DAILY
Status: DISCONTINUED | OUTPATIENT
Start: 2018-06-12 | End: 2018-06-15 | Stop reason: HOSPADM

## 2018-06-11 RX ORDER — BISACODYL 5 MG
10 TABLET, DELAYED RELEASE (ENTERIC COATED) ORAL
Status: DISCONTINUED | OUTPATIENT
Start: 2018-06-11 | End: 2018-06-15 | Stop reason: HOSPADM

## 2018-06-11 RX ORDER — METOPROLOL TARTRATE 50 MG/1
50 TABLET ORAL EVERY 12 HOURS
Status: DISCONTINUED | OUTPATIENT
Start: 2018-06-11 | End: 2018-06-15 | Stop reason: HOSPADM

## 2018-06-11 RX ORDER — ACETAMINOPHEN 325 MG/1
650 TABLET ORAL
Status: DISCONTINUED | OUTPATIENT
Start: 2018-06-11 | End: 2018-06-15 | Stop reason: HOSPADM

## 2018-06-11 RX ORDER — POLYVINYL ALCOHOL 14 MG/ML
1 SOLUTION/ DROPS OPHTHALMIC
Status: DISCONTINUED | OUTPATIENT
Start: 2018-06-11 | End: 2018-06-15 | Stop reason: HOSPADM

## 2018-06-11 RX ORDER — OXYCODONE AND ACETAMINOPHEN 5; 325 MG/1; MG/1
1 TABLET ORAL
Status: DISCONTINUED | OUTPATIENT
Start: 2018-06-11 | End: 2018-06-15 | Stop reason: HOSPADM

## 2018-06-11 RX ORDER — ALBUTEROL SULFATE 0.83 MG/ML
1.25 SOLUTION RESPIRATORY (INHALATION)
Status: DISCONTINUED | OUTPATIENT
Start: 2018-06-11 | End: 2018-06-11

## 2018-06-11 RX ORDER — SODIUM CHLORIDE 0.9 % (FLUSH) 0.9 %
5-10 SYRINGE (ML) INJECTION EVERY 8 HOURS
Status: DISCONTINUED | OUTPATIENT
Start: 2018-06-11 | End: 2018-06-15 | Stop reason: HOSPADM

## 2018-06-11 RX ORDER — SIMVASTATIN 20 MG/1
20 TABLET, FILM COATED ORAL
Status: DISCONTINUED | OUTPATIENT
Start: 2018-06-11 | End: 2018-06-15 | Stop reason: HOSPADM

## 2018-06-11 RX ORDER — SODIUM CHLORIDE 9 MG/ML
50 INJECTION, SOLUTION INTRAVENOUS ONCE
Status: COMPLETED | OUTPATIENT
Start: 2018-06-11 | End: 2018-06-11

## 2018-06-11 RX ORDER — GUAIFENESIN 100 MG/5ML
81 LIQUID (ML) ORAL DAILY
Status: DISCONTINUED | OUTPATIENT
Start: 2018-06-12 | End: 2018-06-15 | Stop reason: HOSPADM

## 2018-06-11 RX ORDER — LORATADINE 10 MG/1
10 TABLET ORAL
Status: DISCONTINUED | OUTPATIENT
Start: 2018-06-11 | End: 2018-06-13 | Stop reason: SDUPTHER

## 2018-06-11 RX ORDER — SODIUM CHLORIDE 0.9 % (FLUSH) 0.9 %
5-10 SYRINGE (ML) INJECTION AS NEEDED
Status: DISCONTINUED | OUTPATIENT
Start: 2018-06-11 | End: 2018-06-15 | Stop reason: HOSPADM

## 2018-06-11 RX ADMIN — BENZONATATE 200 MG: 100 CAPSULE ORAL at 21:20

## 2018-06-11 RX ADMIN — SODIUM CHLORIDE 100 ML/HR: 900 INJECTION, SOLUTION INTRAVENOUS at 01:03

## 2018-06-11 RX ADMIN — METOPROLOL TARTRATE 50 MG: 50 TABLET ORAL at 21:15

## 2018-06-11 RX ADMIN — SODIUM CHLORIDE 50 ML/HR: 900 INJECTION, SOLUTION INTRAVENOUS at 13:33

## 2018-06-11 RX ADMIN — Medication 10 ML: at 21:21

## 2018-06-11 RX ADMIN — Medication 5 ML: at 07:48

## 2018-06-11 RX ADMIN — SIMVASTATIN 20 MG: 20 TABLET, FILM COATED ORAL at 21:15

## 2018-06-11 RX ADMIN — WATER 1 G: 1 INJECTION INTRAMUSCULAR; INTRAVENOUS; SUBCUTANEOUS at 05:34

## 2018-06-11 RX ADMIN — ALBUTEROL SULFATE 2.5 MG: 2.5 SOLUTION RESPIRATORY (INHALATION) at 16:26

## 2018-06-11 RX ADMIN — WATER 1 G: 1 INJECTION INTRAMUSCULAR; INTRAVENOUS; SUBCUTANEOUS at 01:03

## 2018-06-11 RX ADMIN — BENZONATATE 200 MG: 100 CAPSULE ORAL at 01:01

## 2018-06-11 RX ADMIN — ALBUTEROL SULFATE 2.5 MG: 2.5 SOLUTION RESPIRATORY (INHALATION) at 08:38

## 2018-06-11 RX ADMIN — IPRATROPIUM BROMIDE AND ALBUTEROL SULFATE 3 ML: .5; 3 SOLUTION RESPIRATORY (INHALATION) at 01:02

## 2018-06-11 RX ADMIN — SODIUM CHLORIDE 500 MG: 900 INJECTION, SOLUTION INTRAVENOUS at 05:36

## 2018-06-11 RX ADMIN — HYDRALAZINE HYDROCHLORIDE 50 MG: 50 TABLET, FILM COATED ORAL at 21:15

## 2018-06-11 RX ADMIN — HYDRALAZINE HYDROCHLORIDE 50 MG: 50 TABLET, FILM COATED ORAL at 16:26

## 2018-06-11 RX ADMIN — LACTOBACILLUS TAB 2 TABLET: TAB at 16:26

## 2018-06-11 RX ADMIN — POLYVINYL ALCOHOL 1 DROP: 14 SOLUTION/ DROPS OPHTHALMIC at 21:20

## 2018-06-11 RX ADMIN — DIGOXIN 0.12 MG: 0.25 TABLET ORAL at 13:28

## 2018-06-11 RX ADMIN — LACTOBACILLUS TAB 2 TABLET: TAB at 21:15

## 2018-06-11 RX ADMIN — SODIUM CHLORIDE 1000 MG: 900 INJECTION, SOLUTION INTRAVENOUS at 01:03

## 2018-06-11 RX ADMIN — MELATONIN TAB 3 MG 3 MG: 3 TAB at 21:15

## 2018-06-11 RX ADMIN — Medication 5 ML: at 14:00

## 2018-06-11 NOTE — CONSULTS
[facility] - Consult Note    Date of  Admission: 6/10/2018  8:26 PM   Primary Care Physician:  Robert Norton MD     Assessment:     Patient Active Problem List   Diagnosis Code    Atrial fibrillation [facility]) I48.91    Essential hypertension, benign I10    Dyslipidemia E78.5    Chronic anemia D64.9    Hypothyroidism E03.9    Rash and nonspecific skin eruption R21    Pulmonary hypertension (HCC) I27.20    Malaise and fatigue R53.81, R53.83    Essential hypertension I10    Chronic kidney disease, stage III (moderate) N18.3    Cough R05    Pneumonia J18.9    Weakness R53.1    Injury to ligament of cervical spine S13. 4XXA    Hyponatremia E87.1    Fall W19. XXXA    Afib ([facility] [address] I48.91     -Generalized weakness with fall, no acute injuries, CT head negative  -URI, bronchitis picture, no fevers or white count  -SOB, multifactorial with bronchitis and possible diastolic HF exacerbation, BNP 8k, echo Jan 2017 with normal LV function and G2DD, elevated PA pressures at 51mmHg, mild to mod mitral regurge  -Hyponatremia, on IVF  -Hx of afib, rates controlled, on BB and low dose digoxin as outpatient. NOT on [address] d/t history of significant upper GI bleed. (see Maury's notes)  -Confusion, 2/2 to hyponatremia? Unclear of patient's baseline mentation given no family is present  -HTN hx, controlled  -HLD, on statin     Primary cardiologist Dr Cathleen Canseco:     -Concern for diastolic heart failure with elevated pro-BNP and mild congestion by CXR, however, does not appear grossly fluid overloaded. No diuretics for now, check echo. Concern for pneumonia vs bronchitis and getting antbx. Renal to see as well.  -Continue home rate controlling agents, BB and digoxin. No OAC given bleeding history.   -Will repeat echocardiogram tomorrow. I saw, examined, and evaluated the patient.   I personally reviewed the patient's labs, tests, vitals, orders, medications, updated history, and other providers assessments. I personally agree with the findings as stated and the plan as documented. History of Present Illness: This is a 80 y.o. female admitted for Pneumonia  Weakness  Injury to ligament of cervical spine  Hyponatremia  Pneumonia  Fall  Afib [facility]). Patient is being admitted after a fall and being found with hyponatremia. She also has symptoms of an URI vs CHF exacerbation with elevated BNP of 8k (no baseline for comparison). She is not on diuretics as an outpatient. PMHx includes HTN, HLD, and afib for which she is not on anticoagulation d/t significant upper GI bleed a few years back. She is noted to have some diastolic dysfunction on echo in 2017 with elevated PA pressures in the 50mmHg range. She has not had a history of overt heart failure symptoms and has not been on any diuretic regimen. Patient seems to be confused today during my evaluation and her baseline mentation is unknown. There is no family at bedside to corroborate. She reports having shortness of breath but is unable to clarify if its been for a few days or a few weeks or a few months. She does have a dry cough during my evaluation and states she has been coughing up sputum but again is not clear on details and I question her reliability as she seems to not have consistent answers. Per ER provider's note, pt lives in a nursing facility and had a witnessed fall while transfering from her chair to her bed without the assistance of her walker. She did not hit her head or lose consciousness. Pt was diagnosed with a URI a few days ago and was started on PO antibiotics.        Cardiac risk factors:   HTN  HLD    Review of Symptoms:  Except as stated above include:  Constitutional:  Negative for fevers or chills  Respiratory:  Positive for sob and cough and congestion  Cardiovascular:  Negative for chest pain  Gastrointestinal: negative for abd pain, nausea, and vomiting  Genitourinary:  Negative for hematuria or dysuria or blood in stool  Musculoskeletal:  Positive for fall  Neurological:  Negative for dizziness or syncope  Dermatological:  Negative for rashes or open wounds  Endocrinological: Negative for heat or cold intolerance  Psychological:  Negative for anxiety or depression     Past Medical History:     Past Medical History:   Diagnosis Date    Anemia NEC     Arthritis     Atrial fibrillation (HCC)     Bursitis of left shoulder     Cardiac cath 01/27/2009    RCA mild. LM patent. pLAD 20%. Very dLAD w/significant tapering. CX mild.  Cardiac stress echo, abnormal 01/05/2009    Positive maximal stress echo by echo & EKG criteria w/o chest pain.  EF >65%. Sm, discrete apical, apical septal hypk.  Cough     Endocrine disease     Hyperlipidemia     Hypertension     Hypothyroidism     Osteopenia     Pulmonary hemorrhage     Pulmonary hypertension (HCC)     Rash and nonspecific skin eruption 9/2012    maculopapular on torso, arms, and upper legs    Reflux     Right hip pain     Right shoulder pain     Stenosing tenosynovitis of finger 04/2015    Bilateral index fingers    Trigger ring finger of right hand     Wears glasses          Social History:     Social History     Social History    Marital status:      Spouse name: N/A    Number of children: N/A    Years of education: N/A     Social History Main Topics    Smoking status: Never Smoker    Smokeless tobacco: Never Used    Alcohol use No    Drug use: No    Sexual activity: Not Asked     Other Topics Concern    None     Social History Narrative        Family History:     Family History   Problem Relation Age of Onset    Hypertension Mother     Heart Attack Mother     Stroke Father     Heart Disease Sister     Heart Surgery Sister      CABG    Stroke Brother     Heart Attack Brother         Medications: Allergies   Allergen Reactions    Hydrochlorothiazide Hives    Penicillins Other (comments)     Mouth sore, upset stomach.     Sulfa (Sulfonamide Antibiotics) Unknown (comments)    Prednisone Other (comments)     Body sweats, hot and cold, up all night        Current Facility-Administered Medications   Medication Dose Route Frequency    acetaminophen (TYLENOL) tablet 650 mg  650 mg Oral Q4H PRN    digoxin (LANOXIN) tablet 0.125 mg  0.125 mg Oral EVERY OTHER DAY    loratadine (CLARITIN) tablet 10 mg  10 mg Oral DAILY PRN    [START ON 6/12/2018] montelukast (SINGULAIR) tablet 10 mg  10 mg Oral DAILY    [START ON 6/12/2018] levothyroxine (SYNTHROID) tablet 100 mcg  100 mcg Oral ACB    [START ON 6/12/2018] pantoprazole (PROTONIX) tablet 40 mg  40 mg Oral ACB    [START ON 6/12/2018] cholecalciferol (VITAMIN D3) tablet 1,000 Units  1,000 Units Oral DAILY    [START ON 6/12/2018] therapeutic multivitamin (THERAGRAN) tablet 1 Tab  1 Tab Oral DAILY    melatonin tablet 3 mg  3 mg Oral QHS    bisacodyl (DULCOLAX) tablet 10 mg  10 mg Oral DAILY PRN    hydrALAZINE (APRESOLINE) tablet 50 mg  50 mg Oral TID    [START ON 6/12/2018] traMADol (ULTRAM) tablet 25 mg  25 mg Oral DAILY    traZODone (DESYREL) tablet 25 mg  25 mg Oral QHS PRN    [START ON 6/12/2018] NIFEdipine ER (PROCARDIA XL) tablet 60 mg  60 mg Oral DAILY    [START ON 6/12/2018] aspirin chewable tablet 81 mg  81 mg Oral DAILY    Lactobacillus Acidoph & Bulgar (FLORANEX) tablet 2 Tab  2 Tab Oral TID    [START ON 6/12/2018] fluticasone furoate (ARNUITY ELLIPTA) 100 mcg/puff  1 Puff Inhalation DAILY    polyvinyl alcohol (LIQUIFILM TEARS) 1.4 % ophthalmic solution 1 Drop  1 Drop Both Eyes QHS    [START ON 6/12/2018] fish oil-omega-3 fatty acids 340-1,000 mg capsule 1 Cap  1 Cap Oral DAILY    sodium chloride (NS) flush 5-10 mL  5-10 mL IntraVENous Q8H    sodium chloride (NS) flush 5-10 mL  5-10 mL IntraVENous PRN    oxyCODONE-acetaminophen (PERCOCET) 5-325 mg per tablet 1 Tab  1 Tab Oral Q4H PRN    naloxone (NARCAN) injection 0.4 mg  0.4 mg IntraVENous PRN    cefTRIAXone (ROCEPHIN) 1 g in sterile water (preservative free) 10 mL IV syringe  1 g IntraVENous Q24H    azithromycin (ZITHROMAX) 500 mg in 0.9% sodium chloride (MBP/ADV) 250 mL adv  500 mg IntraVENous Q24H    albuterol (PROVENTIL VENTOLIN) nebulizer solution 2.5 mg  2.5 mg Nebulization QID RT    metoprolol tartrate (LOPRESSOR) tablet 50 mg  50 mg Oral Q12H    simvastatin (ZOCOR) tablet 20 mg  20 mg Oral QHS    benzonatate (TESSALON) capsule 200 mg  200 mg Oral TID PRN     Current Outpatient Prescriptions   Medication Sig    traZODone (DESYREL) 50 mg tablet Take 25 mg by mouth nightly as needed for Sleep.  NIFEdipine ER (PROCARDIA XL) 30 mg ER tablet Take 60 mg by mouth daily.  carboxymethylcellulose sodium 1 % dlgl Apply 1 Drop to eye nightly. To both eyes    melatonin 3 mg tablet Take 1 Tab by mouth nightly.  bisacodyl (DULCOLAX) 5 mg EC tablet Take 2 Tabs by mouth daily as needed for Constipation.  hydrALAZINE (APRESOLINE) 50 mg tablet Take 1 Tab by mouth three (3) times daily.  albuterol (PROVENTIL VENTOLIN) 2.5 mg /3 mL (0.083 %) nebulizer solution 1.5 mL by Nebulization route every four (4) hours as needed for Wheezing.  acetaminophen (TYLENOL) 325 mg tablet Take 2 Tabs by mouth every four (4) hours as needed for Fever.  traMADol (ULTRAM) 50 mg tablet Take 0.5 Tabs by mouth two (2) times a day. Max Daily Amount: 50 mg. (Patient taking differently: Take 25 mg by mouth daily.)    levothyroxine (SYNTHROID) 100 mcg tablet Take 100 mcg by mouth Daily (before breakfast).  pantoprazole (PROTONIX) 40 mg tablet Take 40 mg by mouth daily.  CALCIUM CARBONATE (CALCIUM 600 PO) Take 1 Tab by mouth daily.  cholecalciferol (VITAMIN D3) 1,000 unit cap Take 1,000 Units by mouth daily.  multivitamin (ONE A DAY) tablet Take 1 Tab by mouth daily.  beclomethasone (QVAR) 40 mcg/actuation inhaler Take 2 Puffs by inhalation two (2) times a day.  Use with spacer device    fish oil-dha-epa 1,200-144-216 mg cap Take 1 Cap by mouth daily.  biotin 1 mg tab Take 1 Tab by mouth daily.  Aspirin, Buffered 81 mg tab Take 1 Tab by mouth daily.  B.infantis-B.ani-B.long-B.bifi (PROBIOTIC 4X) 10-15 mg TbEC Take 1 Tab by mouth daily.  montelukast (SINGULAIR) 10 mg tablet Take 10 mg by mouth daily.  digoxin (LANOXIN) 0.125 mg tablet Take 0.125 mg by mouth every other day.  metoprolol (LOPRESSOR) 50 mg tablet Take 50 mg by mouth two (2) times a day.  loratadine (CLARITIN) 10 mg tablet Take 10 mg by mouth daily as needed.  lovastatin (MEVACOR) 40 mg tablet Take 40 mg by mouth nightly.            Physical Exam:     Visit Vitals    /76    Pulse 75    Temp 98.2 °F (36.8 °C)    Resp 16    SpO2 96%     BP Readings from Last 3 Encounters:   06/11/18 127/76   03/27/18 150/70   03/23/18 130/40     Pulse Readings from Last 3 Encounters:   06/11/18 75   03/27/18 68   03/23/18 69     Wt Readings from Last 3 Encounters:   06/11/18 53.5 kg (118 lb)   02/14/18 53.3 kg (117 lb 9.6 oz)   03/16/17 49.4 kg (109 lb)       General:  Awake, alert, oriented to self and place  Neck:  supple  Lungs:  Clear to auscultation bilat  Heart: reg rate and rhythm  Abdomen:  Soft, non-tender  Extremities:  No LE edema, atraumatic, no cyanosis  Skin: warm and dry  Neuro: no focal deficits, speech is clear, moves all extremities without difficulty  Psych: normal mood and affect     Data Review:     Recent Labs      06/10/18   2350   WBC  12.0   HGB  11.2*   HCT  32.3*   PLT  243     Recent Labs      06/11/18   1048  06/11/18   0057  06/10/18   2350   NA  127*  125*   --    K  3.2*  3.7   --    CL  94*  89*   --    CO2  22  26   --    GLU  90  101*   --    BUN  25*  25*   --    CREA  1.13  1.17   --    CA  7.5*  8.1*   --    MG   --   1.9   --    ALB   --   3.0*   --    SGOT   --   100*   --    ALT   --   120*   --    INR   --    --   1.2       Results for orders placed or performed during the hospital encounter of 06/10/18   EKG, 12 LEAD, INITIAL   Result Value Ref Range    Ventricular Rate 82 BPM    Atrial Rate 312 BPM    QRS Duration 88 ms    Q-T Interval 400 ms    QTC Calculation (Bezet) 467 ms    Calculated R Axis 50 degrees    Calculated T Axis -154 degrees    Diagnosis       Atrial fibrillation  RSR' or QR pattern in V1 suggests right ventricular conduction delay  Marked ST abnormality, possible inferolateral subendocardial injury  Abnormal ECG  When compared with ECG of 09-FEB-2018 10:58,  Atrial fibrillation has replaced Sinus rhythm  T wave inversion more evident in Inferior leads  QT has lengthened  Confirmed by Drake Bhagat MD, --- ([id number]) on 6/11/2018 10:35:02 AM     Results for orders placed or performed in visit on 03/16/17   AMB POC EKG ROUTINE W/ 12 LEADS, INTER & REP    Narrative    Atrial flutter fibrillation with rate in the mid 70s. There are ST-T changes inferolaterally on the high lateral leads most consistent with digitalis effect. This EKG is similar to the EKG of December 29, 2016 although the ST-T wave changes are much more marked on the current tracing and at that time the patient was in sinus rhythm and now appears to be in atrial flutter fibrillation.        All Cardiac Markers in the last 24 hours:    Lab Results   Component Value Date/Time    TROIQ 0.02 06/11/2018 12:57 AM       Last Lipid:  No results found for: CHOL, CHOLX, CHLST, CHOLV, HDL, LDL, LDLC, DLDLP, TGLX, TRIGL, TRIGP, CHHD, CHHDX    Signed By: YULI Lr     June 11, 2018

## 2018-06-11 NOTE — H&P
History & Physical    Patient: Juan Bernardo MRN: [id number]  CSN: [id number]    YOB: 1935  Age: 80 y.o. Sex: female      DOA: 6/10/2018    Chief Complaint:   Chief Complaint   Patient presents with    Fall          HPI:     Juan Bernardo is a 80 y.o.  female who has hx of CAD, Afib presents with worsening cough and congestion despite using out patient doxycycline given by PCP  Also with urinary frequency and incontinence  ; had an episode of weakness and slid to fall  CT done in ER suggest ligamentous injury but recommends MRI  Patients main complaint is of cough and dizziness   Denies chest pain or shortness of breath   Patient had pulmonary hemorrhage on blood thinners requesting no for of AC be given in hospital except for aspirin     Past Medical History:   Diagnosis Date    Anemia NEC     Arthritis     Atrial fibrillation ([address]     Bursitis of left shoulder     Cardiac cath 01/27/2009    RCA mild. LM patent. pLAD 20%. Very dLAD w/significant tapering. CX mild.  Cardiac stress echo, abnormal 01/05/2009    Positive maximal stress echo by echo & EKG criteria w/o chest pain.  EF >65%. Sm, discrete apical, apical septal hypk.     Cough     Endocrine disease     Hyperlipidemia     Hypertension     Hypothyroidism     Osteopenia     Pulmonary hemorrhage     Pulmonary hypertension (HCC)     Rash and nonspecific skin eruption 9/2012    maculopapular on torso, arms, and upper legs    Reflux     Right hip pain     Right shoulder pain     Stenosing tenosynovitis of finger 04/2015    Bilateral index fingers    Trigger ring finger of right hand     Wears glasses        Past Surgical History:   Procedure Laterality Date    ABDOMEN SURGERY PROC UNLISTED      HX APPENDECTOMY      HX COLECTOMY      partial    HX HYSTERECTOMY      HX MOHS PROCEDURES Right 12/2010    Dr. Mohini Hauser    HX OTHER SURGICAL      Two ribs removed    HX TONSILLECTOMY Family History   Problem Relation Age of Onset    Hypertension Mother     Heart Attack Mother     Stroke Father     Heart Disease Sister     Heart Surgery Sister      CABG    Stroke Brother     Heart Attack Brother        Social History     Social History    Marital status:      Spouse name: N/A    Number of children: N/A    Years of education: N/A     Social History Main Topics    Smoking status: Never Smoker    Smokeless tobacco: Never Used    Alcohol use No    Drug use: No    Sexual activity: Not Asked     Other Topics Concern    None     Social History Narrative       Prior to Admission medications    Medication Sig Start Date End Date Taking? Authorizing Provider   traZODone (DESYREL) 50 mg tablet Take 25 mg by mouth nightly as needed for Sleep. Historical Provider   NIFEdipine ER (PROCARDIA XL) 30 mg ER tablet Take 60 mg by mouth daily. 3/8/17   Historical Provider   carboxymethylcellulose sodium 1 % dlgl Apply 1 Drop to eye nightly. To both eyes    Historical Provider   melatonin 3 mg tablet Take 1 Tab by mouth nightly. 2/15/18   Gus Staples NP   bisacodyl (DULCOLAX) 5 mg EC tablet Take 2 Tabs by mouth daily as needed for Constipation. 2/15/18   Gus Staples NP   hydrALAZINE (APRESOLINE) 50 mg tablet Take 1 Tab by mouth three (3) times daily. 2/15/18   Gus Staples NP   albuterol (PROVENTIL VENTOLIN) 2.5 mg /3 mL (0.083 %) nebulizer solution 1.5 mL by Nebulization route every four (4) hours as needed for Wheezing. 2/15/18   Gus Staples NP   acetaminophen (TYLENOL) 325 mg tablet Take 2 Tabs by mouth every four (4) hours as needed for Fever. 2/15/18   Gus Staples NP   traMADol Thurmond Pavy) 50 mg tablet Take 0.5 Tabs by mouth two (2) times a day. Max Daily Amount: 50 mg. Patient taking differently: Take 25 mg by mouth daily. 2/15/18   Gus Staples NP   levothyroxine (SYNTHROID) 100 mcg tablet Take 100 mcg by mouth Daily (before breakfast). 5/16/16   Historical Provider   pantoprazole (PROTONIX) 40 mg tablet Take 40 mg by mouth daily. 3/9/16   Historical Provider   CALCIUM CARBONATE (CALCIUM 600 PO) Take 1 Tab by mouth daily. Historical Provider   cholecalciferol (VITAMIN D3) 1,000 unit cap Take 1,000 Units by mouth daily. Historical Provider   multivitamin (ONE A DAY) tablet Take 1 Tab by mouth daily. Historical Provider   beclomethasone (QVAR) 40 mcg/actuation inhaler Take 2 Puffs by inhalation two (2) times a day. Use with spacer device 4/9/15   Nasrin Bonilla MD   fish oil-dha-epa [phone] mg cap Take 1 Cap by mouth daily. Historical Provider   biotin 1 mg tab Take 1 Tab by mouth daily. Historical Provider   Aspirin, Buffered 81 mg tab Take 1 Tab by mouth daily. Historical Provider   B.infantis-B.ani-B.long-B.bifi (PROBIOTIC 4X) 10-15 mg TbEC Take 1 Tab by mouth daily. Historical Provider   montelukast (SINGULAIR) 10 mg tablet Take 10 mg by mouth daily. Historical Provider   digoxin (LANOXIN) 0.125 mg tablet Take 0.125 mg by mouth every other day. Historical Provider   metoprolol (LOPRESSOR) 50 mg tablet Take 50 mg by mouth two (2) times a day. Historical Provider   loratadine (CLARITIN) 10 mg tablet Take 10 mg by mouth daily as needed. Historical Provider   lovastatin (MEVACOR) 40 mg tablet Take 40 mg by mouth nightly. Historical Provider       Allergies   Allergen Reactions    Hydrochlorothiazide Hives    Penicillins Other (comments)     Mouth sore, upset stomach.  Sulfa (Sulfonamide Antibiotics) Unknown (comments)    Prednisone Other (comments)     Body sweats, hot and cold, up all night         Review of Systems  GENERAL: Patient alert, awake and oriented times 3, able to communicate full sentences and not in distress. HEENT: No change in vision, no earache, tinnitus, sore throat or sinus congestion. NECK: No pain or stiffness. PULMONARY: No shortness of breath, cough or wheeze. Cardiovascular: no pnd or orthopnea, no CP  GASTROINTESTINAL: No abdominal pain, nausea, vomiting or diarrhea, melena or bright red blood per rectum. GENITOURINARY:+ urinary frequency, urgency, hesitancy or dysuria. MUSCULOSKELETAL: + joint or muscle pain, + back pain,+ recent trauma. DERMATOLOGIC: No rash, no itching, no lesions. ENDOCRINE: No polyuria, polydipsia, no heat or cold intolerance. No recent change in weight. HEMATOLOGICAL: No anemia or easy bruising or bleeding. NEUROLOGIC: No headache, seizures, numbness, tingling +weakness. CT Results  (Last 48 hours)               06/10/18 2138  CT HEAD WO CONT Final result    Impression:  IMPRESSION:        No acute traumatic findings. Motion artifact. Chronic microvascular disease and old infarcts as above. Narrative:  CT OF THE HEAD WITHOUT CONTRAST. CLINICAL HISTORY: Fall hitting back of head. Closed head trauma,   moderate-severe. TECHNIQUE: Helical scan obtained of the head were obtained from the skull vertex   through the base of the skull without intravenous contrast.    All CT scans are   performed using dose optimization techniques as appropriate to the performed   exam including the following: Automated exposure control, adjustment of mA   and/or kV according to patient size, and use of iterative reconstructive   technique. COMPARISON: None. FINDINGS:        Motion artifact at the skull vertex. Chronic right cerebellum infarct. Sulcal   pattern is otherwise maintained, with mild atrophy and mild compensatory lateral   and third ventricle enlargement. Periventricular and deep white matter   hypodensities bilaterally. Small left basal ganglia lacunar infarct. No   intracranial hemorrhage. No mass effect. The visualized paranasal sinuses are   clear. The mastoid air cells are clear. The visualized bony structures are   unremarkable.            06/10/18 2138  CT SPINE CERV WO CONT Final result    Impression: IMPRESSION:       Prevertebral soft tissue swelling at the mid cervical spine level may indicate   ligamentous injury. No fracture seen. MRI is recommended. Spondylolisthesis at C3-C4 and C4-C5 appears chronic based on degenerative and   partially fused facet joint appearances. Degenerative disc and joint disease throughout the mid cervical spine. Narrative:  CT SCAN CERVICAL SPINE WITHOUT CONTRAST       HISTORY: Fall hitting back of head. Closed head injury. Recent trauma, spine. COMPARISON: None. TECHNIQUE: Axial images through the cervical spine were obtained without   intravenous contrast. Coronal and sagittal reformatted images were also obtained   for better evaluation of regional anatomy and alignment. All CT scans are   performed using dose optimization techniques as appropriate to the performed   exam including the following: Automated exposure control, adjustment of mA   and/or kV according to patient size, and use of iterative reconstructive   technique. FINDINGS:        Odontoid intact. Occipital condylar/C-1/C-2 relationships normal. Grade II   anterolisthesis of C3 on C4 and grade I anterolisthesis of C4 on C5. Reversal of   cervical curvature. Vertebral body heights are maintained. Severe disc space   narrowing with osteophytes C5-C6 and mild at other levels. Advanced bilateral   facet joint hypertrophy in the upper and mid cervical spine, fused across the   hypertrophied left C4-C5 facet joint. No acute fracture is identified however there is a significant amount of   prevertebral soft tissue swelling at the C4-C7 levels. Soft tissue swelling   tracks into the left and right neck. Within the limitations of CT, no critical   findings are identified in the cervical spinal canal. Neural foraminal stenoses   from facet hypertrophy.                Physical Exam:     Physical Exam:  Visit Vitals    /71    Pulse 71    Temp 98.1 °F (36.7 °C)    Resp 18  SpO2 94%           Temp (24hrs), Av.1 °F (36.7 °C), Min:98.1 °F (36.7 °C), Max:98.1 °F (36.7 °C)             General:  Alert, cooperative, no distress, appears stated age. Head: Normocephalic, without obvious abnormality, atraumatic. Eyes:  Conjunctivae/corneas clear. PERRL, EOMs intact. Nose: Nares normal. No drainage or sinus tenderness. Neck: Supple, symmetrical, trachea midline, no adenopathy, thyroid: no enlargement, no carotid bruit and no JVD. Lungs:   Clear to auscultation bilaterally. Heart:  Irregular irregular rate and rhythm, S1, S2 normal.     Abdomen: Soft, non-tender. Bowel sounds normal.    Extremities: Extremities normal, atraumatic, no cyanosis or edema. Pulses: 2+ and symmetric all extremities. Skin:  No rashes or lesions   Neurologic: AAOx3, No focal motor or sensory deficit. Labs Reviewed: All lab results for the last 24 hours reviewed. EKG    Procedures/imaging: see electronic medical records for all procedures/Xrays and details which were not copied into this note but were reviewed prior to creation of Plan      Assessment/Plan     Principal Problem:    Pneumonia (2018)  Ro[facility] [address]  Active Problems:    Weakness (2018)  Check urine culture    Injury to ligament of cervical spine (2018)  Check mri  Fall precautions  Hyponatremia    Hold diuretics   Low dose saline     DVT/GI Prophylaxis: SCD's    Discussed with patient at bedside about hospital admission and my plan care, who understood and agree with my plan care.     Amalia Shin MD  2018 3:52 AM

## 2018-06-11 NOTE — PROGRESS NOTES
[facility] Hospitalist Group  Progress Note    Patient: Danay Sandoval Age: 80 y.o. : 1935 MR#: [id number] SSN: [id number]  Date: 2018     Subjective:       Assessment/Plan:   1. FALL. Likely 2ry to progressive generalized weakness, hyponatremia discussed below. No overt injuries. CT head neg for acute traumatic findings. MRI with nonspecific mild prevertebral edema, no acute traumatic findings. Pt denies any injuries, no pain. PT/OT consult. 2. Progressive generalized weakness X 2-3 months. In pt with hx A. Fib followed by Dr Bishop Hunt, hyponatremia, suspected CHF as discussed below. Imaging as discussed above. PT/OT consult    3. Hyponatremia. Sodium 127. Asymptomatic at this time. Gentle IVF. Nephrology consulted. Monitor sodium level Q6.     4. acute vs chronic bronchitis. Failed outpatient abx treatment for bronchitis per pt. No indications for CAP at this time. pt afebrile, no leukocytosis on CBC. CXR with mild vascular congestion. Will continue abx for now. 5. SOB likely 2ry to suspected CHF exacerbation. In pt with A. Fib. Elevated BNP. Echo 2017 EF 55-60%. CXR with mild vascular congestion. Cardiology consulted. 6. A. Fib with RVR. Rate controlled. Cardiology following. Continue ASA, BB, dig. Will defer to cardiology for anticoagulation. 7. HTN. Controlled. Continue home regimen.      8. HLD:   Statin         Additional Notes:      Case discussed with:  []Patient  []Family  []Nursing  []Case Management  DVT Prophylaxis:  []Lovenox  [x]Hep SQ  []SCDs  []Coumadin   []On Heparin gtt    Objective:   VS:   Visit Vitals    /78    Pulse 77    Temp 98.2 °F (36.8 °C)    Resp 16    SpO2 96%      Tmax/24hrs: Temp (24hrs), Av.2 °F (36.8 °C), Min:98.1 °F (36.7 °C), Max:98.2 °F (36.8 °C)  No intake or output data in the 24 hours ending 18 1324    General:  Alert, NAD  Cardiovascular:  RRR  Pulmonary:  LSC throughout; respiratory effort WNL  GI: +BS in all four quadrants, soft, non-tender  Extremities:  No edema; 2+ dorsalis pedis pulses bilaterally  Neuro: alert and oriented X 2-3. Labs:    Recent Results (from the past 24 hour(s))   EKG, 12 LEAD, INITIAL    Collection Time: 06/10/18 11:03 PM   Result Value Ref Range    Ventricular Rate 82 BPM    Atrial Rate 312 BPM    QRS Duration 88 ms    Q-T Interval 400 ms    QTC Calculation (Bezet) 467 ms    Calculated R Axis 50 degrees    Calculated T Axis -154 degrees    Diagnosis       Atrial fibrillation  RSR' or QR pattern in V1 suggests right ventricular conduction delay  Marked ST abnormality, possible inferolateral subendocardial injury  Abnormal ECG  When compared with ECG of 09-FEB-2018 10:58,  Atrial fibrillation has replaced Sinus rhythm  T wave inversion more evident in Inferior leads  QT has lengthened  Confirmed by Linda Mendoza MD, --- ([id number]) on 6/11/2018 10:35:02 AM     CBC WITH AUTOMATED DIFF    Collection Time: 06/10/18 11:50 PM   Result Value Ref Range    WBC 12.0 4.6 - 13.2 K/uL    RBC 3.94 (L) 4.20 - 5.30 M/uL    HGB 11.2 (L) 12.0 - 16.0 g/dL    HCT 32.3 (L) 35.0 - 45.0 %    MCV 82.0 74.0 - 97.0 FL    MCH 28.4 24.0 - 34.0 PG    MCHC 34.7 31.0 - 37.0 g/dL    RDW 17.3 (H) 11.6 - 14.5 %    PLATELET 323 848 - 516 K/uL    MPV 10.8 9.2 - 11.8 FL    NEUTROPHILS 71 40 - 73 %    LYMPHOCYTES 15 (L) 21 - 52 %    MONOCYTES 14 (H) 3 - 10 %    EOSINOPHILS 0 0 - 5 %    BASOPHILS 0 0 - 2 %    ABS. NEUTROPHILS 8.5 (H) 1.8 - 8.0 K/UL    ABS. LYMPHOCYTES 1.7 0.9 - 3.6 K/UL    ABS. MONOCYTES 1.7 (H) 0.05 - 1.2 K/UL    ABS. EOSINOPHILS 0.1 0.0 - 0.4 K/UL    ABS.  BASOPHILS 0.0 0.0 - 0.1 K/UL    DF AUTOMATED     PROTHROMBIN TIME + INR    Collection Time: 06/10/18 11:50 PM   Result Value Ref Range    Prothrombin time 14.7 11.5 - 15.2 sec    INR 1.2 0.8 - 1.2     CULTURE, BLOOD    Collection Time: 06/10/18 11:50 PM   Result Value Ref Range    Special Requests: NO SPECIAL REQUESTS      Culture result: NO GROWTH AFTER 6 HOURS     POC LACTIC ACID    Collection Time: 06/10/18 11:54 PM   Result Value Ref Range    Lactic Acid (POC) 0.9 0.4 - 2.0 mmol/L   CULTURE, BLOOD    Collection Time: 06/11/18 12:15 AM   Result Value Ref Range    Special Requests: NO SPECIAL REQUESTS      Culture result: NO GROWTH AFTER 6 HOURS     URINALYSIS W/ RFLX MICROSCOPIC    Collection Time: 06/11/18 12:30 AM   Result Value Ref Range    Color DARK YELLOW      Appearance CLEAR      Specific gravity 1.020 1.005 - 1.030      pH (UA) 6.0 5.0 - 8.0      Protein 100 (A) NEG mg/dL    Glucose NEGATIVE  NEG mg/dL    Ketone NEGATIVE  NEG mg/dL    Bilirubin NEGATIVE  NEG      Blood NEGATIVE  NEG      Urobilinogen 1.0 0.2 - 1.0 EU/dL    Nitrites NEGATIVE  NEG      Leukocyte Esterase TRACE (A) NEG     URINE MICROSCOPIC ONLY    Collection Time: 06/11/18 12:30 AM   Result Value Ref Range    WBC 0 to 2 0 - 4 /hpf    RBC 0 to 2 0 - 5 /hpf    Epithelial cells FEW 0 - 5 /lpf   MAGNESIUM    Collection Time: 06/11/18 12:57 AM   Result Value Ref Range    Magnesium 1.9 1.6 - 2.6 mg/dL   METABOLIC PANEL, COMPREHENSIVE    Collection Time: 06/11/18 12:57 AM   Result Value Ref Range    Sodium 125 (L) 136 - 145 mmol/L    Potassium 3.7 3.5 - 5.5 mmol/L    Chloride 89 (L) 100 - 108 mmol/L    CO2 26 21 - 32 mmol/L    Anion gap 10 3.0 - 18 mmol/L    Glucose 101 (H) 74 - 99 mg/dL    BUN 25 (H) 7.0 - 18 MG/DL    Creatinine 1.17 0.6 - 1.3 MG/DL    BUN/Creatinine ratio 21 (H) 12 - 20      GFR est AA 54 (L) >60 ml/min/1.73m2    GFR est non-AA 44 (L) >60 ml/min/1.73m2    Calcium 8.1 (L) 8.5 - 10.1 MG/DL    Bilirubin, total 0.5 0.2 - 1.0 MG/DL    ALT (SGPT) 120 (H) 13 - 56 U/L    AST (SGOT) 100 (H) 15 - 37 U/L    Alk.  phosphatase 99 45 - 117 U/L    Protein, total 8.1 6.4 - 8.2 g/dL    Albumin 3.0 (L) 3.4 - 5.0 g/dL    Globulin 5.1 (H) 2.0 - 4.0 g/dL    A-G Ratio 0.6 (L) 0.8 - 1.7     NT-PRO BNP    Collection Time: 06/11/18 12:57 AM   Result Value Ref Range    NT pro-BNP 8486 (H) 0 - 1800 PG/ML TROPONIN I    Collection Time: 06/11/18 12:57 AM   Result Value Ref Range    Troponin-I, Qt. 0.02 0.0 - 0.045 NG/ML   DIGOXIN    Collection Time: 06/11/18 12:57 AM   Result Value Ref Range    Digoxin level 0.5 (L) 0.9 - 2.0 NG/ML   METABOLIC PANEL, BASIC    Collection Time: 06/11/18 10:48 AM   Result Value Ref Range    Sodium 127 (L) 136 - 145 mmol/L    Potassium 3.2 (L) 3.5 - 5.5 mmol/L    Chloride 94 (L) 100 - 108 mmol/L    CO2 22 21 - 32 mmol/L    Anion gap 11 3.0 - 18 mmol/L    Glucose 90 74 - 99 mg/dL    BUN 25 (H) 7.0 - 18 MG/DL    Creatinine 1.13 0.6 - 1.3 MG/DL    BUN/Creatinine ratio 22 (H) 12 - 20      GFR est AA 56 (L) >60 ml/min/1.73m2    GFR est non-AA 46 (L) >60 ml/min/1.73m2    Calcium 7.5 (L) 8.5 - 10.1 MG/DL       Signed By: Bobbetta Paget, NP     June 11, 2018

## 2018-06-11 NOTE — CONSULTS
Consult Note  Consult requested by: Dr. Flor Pérez is a 80 y.o. female [facility] [place] who is being seen on consult for   Chief Complaint   Patient presents with    Fall     Admission diagnosis: Pneumonia   80y F with PMH HTN, presented to ER with weakness, fall, seen for hyponatremia   Impression & Plan:   IMPRESSION:   Hyponatremia, acute  Hypokalemia, mild   HTN  Fall, weakness   PLAN:   Suspect higher ADH status from her URI. check urine sodium, osmolarity, serum osmolarity. Her sodium is improving avoid Q6 lab draw. Appears dry side, okay to give about NS 500cc. abx per primary team.   Strict I and O  Will follow echo, check TSH and morning cortisol           HPI:  80y F with PMH HTN, Afib, CHF, came to ER after fall. She has been having generalized weakness, fatigue, cough over last few days. In ER, her work up shows, hyponatremia, negetive imaging for any intracranial bleed. Nephrology service consulted for hyponatremia. During my eval, she feels tired and fatigue. She denies for any fever at home but admit she has been coughing. Patient denies for any known kidney problem in past.   Denies for any history of kidney stone, gout, gross hematuria  frequent UTI. Denies for any chronic use of nsadis. Past Medical History:   Diagnosis Date    Anemia NEC     Arthritis     Atrial fibrillation (HCC)     Bursitis of left shoulder     Cardiac cath 01/27/2009    RCA mild. LM patent. pLAD 20%. Very dLAD w/significant tapering. CX mild.  Cardiac stress echo, abnormal 01/05/2009    Positive maximal stress echo by echo & EKG criteria w/o chest pain.  EF >65%. Sm, discrete apical, apical septal hypk.     Cough     Endocrine disease     Hyperlipidemia     Hypertension     Hypothyroidism     Osteopenia     Pulmonary hemorrhage     Pulmonary hypertension (HCC)     Rash and nonspecific skin eruption 9/2012    maculopapular on torso, arms, and upper legs    Reflux     Right hip pain     Right shoulder pain     Stenosing tenosynovitis of finger 04/2015    Bilateral index fingers    Trigger ring finger of right hand     Wears glasses       Past Surgical History:   Procedure Laterality Date    ABDOMEN SURGERY PROC UNLISTED      HX APPENDECTOMY      HX COLECTOMY      partial    HX HYSTERECTOMY      HX MOHS PROCEDURES Right 12/2010    Dr. Arvis Spatz    HX OTHER SURGICAL      Two ribs removed    HX TONSILLECTOMY         Social History     Social History    Marital status:      Spouse name: N/A    Number of children: N/A    Years of education: N/A     Occupational History    Not on file. Social History Main Topics    Smoking status: Never Smoker    Smokeless tobacco: Never Used    Alcohol use No    Drug use: No    Sexual activity: Not on file     Other Topics Concern    Not on file     Social History Narrative       Family History   Problem Relation Age of Onset    Hypertension Mother     Heart Attack Mother     Stroke Father     Heart Disease Sister     Heart Surgery Sister      CABG    Stroke Brother     Heart Attack Brother      Allergies   Allergen Reactions    Hydrochlorothiazide Hives    Penicillins Other (comments)     Mouth sore, upset stomach.  Sulfa (Sulfonamide Antibiotics) Unknown (comments)    Prednisone Other (comments)     Body sweats, hot and cold, up all night        Home Medications:     Prior to Admission Medications   Prescriptions Last Dose Informant Patient Reported? Taking? Aspirin, Buffered 81 mg tab   Yes No   Sig: Take 1 Tab by mouth daily. B.infantis-B.ani-B.long-B.bifi (PROBIOTIC 4X) 10-15 mg TbEC   Yes No   Sig: Take 1 Tab by mouth daily. CALCIUM CARBONATE (CALCIUM 600 PO)   Yes No   Sig: Take 1 Tab by mouth daily. NIFEdipine ER (PROCARDIA XL) 30 mg ER tablet   Yes No   Sig: Take 60 mg by mouth daily.    acetaminophen (TYLENOL) 325 mg tablet   No No   Sig: Take 2 Tabs by mouth every four (4) hours as needed for Fever. albuterol (PROVENTIL VENTOLIN) 2.5 mg /3 mL (0.083 %) nebulizer solution   No No   Si.5 mL by Nebulization route every four (4) hours as needed for Wheezing. beclomethasone (QVAR) 40 mcg/actuation inhaler   No No   Sig: Take 2 Puffs by inhalation two (2) times a day. Use with spacer device   biotin 1 mg tab   Yes No   Sig: Take 1 Tab by mouth daily. bisacodyl (DULCOLAX) 5 mg EC tablet   No No   Sig: Take 2 Tabs by mouth daily as needed for Constipation. carboxymethylcellulose sodium 1 % dlgl   Yes No   Sig: Apply 1 Drop to eye nightly. To both eyes   cholecalciferol (VITAMIN D3) 1,000 unit cap   Yes No   Sig: Take 1,000 Units by mouth daily. digoxin (LANOXIN) 0.125 mg tablet   Yes No   Sig: Take 0.125 mg by mouth every other day. fish oil-dha-epa 1,200-144-216 mg cap   Yes No   Sig: Take 1 Cap by mouth daily. hydrALAZINE (APRESOLINE) 50 mg tablet   No No   Sig: Take 1 Tab by mouth three (3) times daily. levothyroxine (SYNTHROID) 100 mcg tablet   Yes No   Sig: Take 100 mcg by mouth Daily (before breakfast). loratadine (CLARITIN) 10 mg tablet   Yes No   Sig: Take 10 mg by mouth daily as needed. lovastatin (MEVACOR) 40 mg tablet   Yes No   Sig: Take 40 mg by mouth nightly. melatonin 3 mg tablet   No No   Sig: Take 1 Tab by mouth nightly. metoprolol (LOPRESSOR) 50 mg tablet   Yes No   Sig: Take 50 mg by mouth two (2) times a day. montelukast (SINGULAIR) 10 mg tablet   Yes No   Sig: Take 10 mg by mouth daily. multivitamin (ONE A DAY) tablet   Yes No   Sig: Take 1 Tab by mouth daily. pantoprazole (PROTONIX) 40 mg tablet   Yes No   Sig: Take 40 mg by mouth daily. traMADol (ULTRAM) 50 mg tablet   No No   Sig: Take 0.5 Tabs by mouth two (2) times a day. Max Daily Amount: 50 mg. Patient taking differently: Take 25 mg by mouth daily. traZODone (DESYREL) 50 mg tablet   Yes No   Sig: Take 25 mg by mouth nightly as needed for Sleep.       Facility-Administered Medications: None       Current Facility-Administered Medications   Medication Dose Route Frequency    acetaminophen (TYLENOL) tablet 650 mg  650 mg Oral Q4H PRN    digoxin (LANOXIN) tablet 0.125 mg  0.125 mg Oral EVERY OTHER DAY    loratadine (CLARITIN) tablet 10 mg  10 mg Oral DAILY PRN    [START ON 6/12/2018] montelukast (SINGULAIR) tablet 10 mg  10 mg Oral DAILY    [START ON 6/12/2018] levothyroxine (SYNTHROID) tablet 100 mcg  100 mcg Oral ACB    [START ON 6/12/2018] pantoprazole (PROTONIX) tablet 40 mg  40 mg Oral ACB    [START ON 6/12/2018] cholecalciferol (VITAMIN D3) tablet 1,000 Units  1,000 Units Oral DAILY    [START ON 6/12/2018] therapeutic multivitamin (THERAGRAN) tablet 1 Tab  1 Tab Oral DAILY    melatonin tablet 3 mg  3 mg Oral QHS    bisacodyl (DULCOLAX) tablet 10 mg  10 mg Oral DAILY PRN    hydrALAZINE (APRESOLINE) tablet 50 mg  50 mg Oral TID    [START ON 6/12/2018] traMADol (ULTRAM) tablet 25 mg  25 mg Oral DAILY    traZODone (DESYREL) tablet 25 mg  25 mg Oral QHS PRN    [START ON 6/12/2018] NIFEdipine ER (PROCARDIA XL) tablet 60 mg  60 mg Oral DAILY    [START ON 6/12/2018] aspirin chewable tablet 81 mg  81 mg Oral DAILY    Lactobacillus Acidoph & Bulgar (FLORANEX) tablet 2 Tab  2 Tab Oral TID    [START ON 6/12/2018] fluticasone furoate (ARNUITY ELLIPTA) 100 mcg/puff  1 Puff Inhalation DAILY    polyvinyl alcohol (LIQUIFILM TEARS) 1.4 % ophthalmic solution 1 Drop  1 Drop Both Eyes QHS    [START ON 6/12/2018] fish oil-omega-3 fatty acids 340-1,000 mg capsule 1 Cap  1 Cap Oral DAILY    sodium chloride (NS) flush 5-10 mL  5-10 mL IntraVENous Q8H    sodium chloride (NS) flush 5-10 mL  5-10 mL IntraVENous PRN    oxyCODONE-acetaminophen (PERCOCET) 5-325 mg per tablet 1 Tab  1 Tab Oral Q4H PRN    naloxone (NARCAN) injection 0.4 mg  0.4 mg IntraVENous PRN    cefTRIAXone (ROCEPHIN) 1 g in sterile water (preservative free) 10 mL IV syringe  1 g IntraVENous Q24H    azithromycin (ZITHROMAX) 500 mg in 0.9% sodium chloride (MBP/ADV) 250 mL adv  500 mg IntraVENous Q24H    albuterol (PROVENTIL VENTOLIN) nebulizer solution 2.5 mg  2.5 mg Nebulization QID RT    metoprolol tartrate (LOPRESSOR) tablet 50 mg  50 mg Oral Q12H    simvastatin (ZOCOR) tablet 20 mg  20 mg Oral QHS    benzonatate (TESSALON) capsule 200 mg  200 mg Oral TID PRN       Review of Systems:     Complete 10-point review of systems were obtained and discussed in length  with the patient. Complete review of systems was negative/unremarkable  except as mentioned in HPI section. Data Review:    Labs: Results:       Chemistry Recent Labs      06/11/18   1530  06/11/18   1048  06/11/18   0057   GLU  85  90  101*   NA  129*  127*  125*   K  3.6  3.2*  3.7   CL  94*  94*  89*   CO2  23  22  26   BUN  22*  25*  25*   CREA  1.16  1.13  1.17   CA  7.7*  7.5*  8.1*   AGAP  12  11  10   BUCR  19  22*  21*   AP   --    --   99   TP   --    --   8.1   ALB   --    --   3.0*   GLOB   --    --   5.1*   AGRAT   --    --   0.6*      CBC w/Diff Recent Labs      06/10/18   2350   WBC  12.0   RBC  3.94*   HGB  11.2*   HCT  32.3*   PLT  243   GRANS  71   LYMPH  15*   EOS  0      Coagulation Recent Labs      06/10/18   2350   PTP  14.7   INR  1.2       Iron/Ferritin No results for input(s): IRON in the last 72 hours. No lab exists for component: TIBCCALC   BNP No results for input(s): BNPP in the last 72 hours. Cardiac Enzymes No results for input(s): CPK, CKND1, AFRICA in the last 72 hours. No lab exists for component: CKRMB, TROIP   Liver Enzymes Recent Labs      06/11/18   0057   TP  8.1   ALB  3.0*   AP  99   SGOT  100*      Thyroid Studies Lab Results   Component Value Date/Time    TSH 2.35 02/10/2018 11:44 AM         EKG: unchanged from previous tracings.     Physical Assessment:     Visit Vitals    /72    Pulse 78    Temp 98.4 °F (36.9 °C)    Resp 16    Wt 54 kg (119 lb)    SpO2 97%    BMI 29.79 kg/m2     Weight change:   No intake or output data in the 24 hours ending 06/11/18 8429  Physical Exam:   General: comfortable, no acute distress   HEENT sclera anicteric, supple neck, no thyromegaly  CVS: S1S2 heard,  no rub  RS: + air entry b/l,   Abd: Soft, Non tender,   Neuro:  awake, alert ,   Extrm: no edema, no cyanosis, clubbing   Skin: dry  Musculoskeletal: No gross joints or bone deformities     Procedures/imaging: see electronic medical records for all procedures, Xrays and details which were not copied into this note but were reviewed prior to creation of Leos MD Raf  June 11, 2018  [facility]  Office [phone]

## 2018-06-11 NOTE — ED NOTES
Bedside shift change report given to Gerardo Ding RN  (oncoming nurse) by Abhijit Perrin RN (offgoing nurse). Report included the following information SBAR, Kardex, ED Summary, [facility] and Recent Results.

## 2018-06-11 NOTE — PROGRESS NOTES
Albuterol 2.5 mg nebulizer was therapeutically interchanged for albuterol 90 mcg inhaler per the P&T Committee approved Therapeutic Interchanges Policy.      Conor Gama RPH, Pharmacist  6/11/2018 4:20 AM

## 2018-06-11 NOTE — ED TRIAGE NOTES
PT arrived to ED via EMS. Per EMS,pt had fall hitting back of head and sliding down the wall. Pt has no LOC and denies any pain at this time.

## 2018-06-11 NOTE — ED PROVIDER NOTES
HPI Comments: Dav Culver is a 80 y.o. female with PMHx of hypertension, hypothyroidism, and hyperlipidemia who presents to the ED with a moderate fall causing her to hit her head that occurred tonight. Her nurse at her nursing care facility reported to her son that she missed her head and hit the bed. She was moving from the bed to the chair, without use of her walker. Patient's son also reports that she has been experiencing a productive cough with phlegm causing her to become progressively weaker. She seen an internalist 4 days ago on Wednesday was diagnosed with an upper respiratory infection and given a prescription for Doxycycline that she began Thursday morning to relieve symptoms. For 3 days after seeing internalist, her son reports that she just laid in bed all day and would not get dressed, however the week prior she was attending activities at her care facility. Today, patient's son decided to dress her as it is her birthday. Patient denies being in pain or head hurting. She cannot take blood thinners, as she has been diagnosed with chronic atrial-fibrillation. Patient's son also reports that she had a resistant UTI last month and that she has been urinating in her bed and chair which is unusual.     Patient is a 80 y.o. female presenting with fall. Fall   Pertinent negatives include no nausea and no headaches. Past Medical History:   Diagnosis Date    Anemia NEC     Arthritis     Atrial fibrillation (HCC)     Bursitis of left shoulder     Cardiac cath 01/27/2009    RCA mild. LM patent. pLAD 20%. Very dLAD w/significant tapering. CX mild.  Cardiac stress echo, abnormal 01/05/2009    Positive maximal stress echo by echo & EKG criteria w/o chest pain.  EF >65%. Sm, discrete apical, apical septal hypk.     Cough     Endocrine disease     Hyperlipidemia     Hypertension     Hypothyroidism     Osteopenia     Pulmonary hemorrhage     Pulmonary hypertension (HCC)     Rash and nonspecific skin eruption 9/2012    maculopapular on torso, arms, and upper legs    Reflux     Right hip pain     Right shoulder pain     Stenosing tenosynovitis of finger 04/2015    Bilateral index fingers    Trigger ring finger of right hand     Wears glasses        Past Surgical History:   Procedure Laterality Date    ABDOMEN SURGERY PROC UNLISTED      HX APPENDECTOMY      HX COLECTOMY      partial    HX HYSTERECTOMY      HX MOHS PROCEDURES Right 12/2010    Dr. Minnie Dao    HX OTHER SURGICAL      Two ribs removed    HX TONSILLECTOMY           Family History:   Problem Relation Age of Onset    Hypertension Mother     Heart Attack Mother     Stroke Father     Heart Disease Sister     Heart Surgery Sister      CABG    Stroke Brother     Heart Attack Brother        Social History     Social History    Marital status:      Spouse name: N/A    Number of children: N/A    Years of education: N/A     Occupational History    Not on file. Social History Main Topics    Smoking status: Never Smoker    Smokeless tobacco: Never Used    Alcohol use No    Drug use: No    Sexual activity: Not on file     Other Topics Concern    Not on file     Social History Narrative         ALLERGIES: Hydrochlorothiazide; Penicillins; Sulfa (sulfonamide antibiotics); and Prednisone    Review of Systems   Constitutional: Positive for activity change. HENT: Negative for congestion. Eyes: Negative for visual disturbance. Respiratory: Positive for cough. Cardiovascular: Negative for chest pain. Gastrointestinal: Negative for nausea. Endocrine: Positive for polyuria. Genitourinary: Positive for frequency. Musculoskeletal: Negative for back pain. Allergic/Immunologic: Negative for food allergies. Neurological: Positive for weakness. Negative for headaches. All other systems reviewed and are negative.       Vitals:    06/10/18 2045   BP: 132/71   Pulse: 71   Resp: 18 Temp: 98.1 °F (36.7 °C)   SpO2: 94%            Physical Exam     MDM  Number of Diagnoses or Management Options  Acute congestive heart failure, unspecified heart failure type [facility]): Injury to ligament of cervical spine, initial encounter:   Pneumonia of right middle lobe due to infectious organism [facility]):   Weakness:   Diagnosis management comments: Pt and pts Son is at bedside. Pt awake alert and does answer questions although her Son is very well versed in his mothers health history. Her son explains that him mother has had a URI for the past week and she has progressively gotten weaker. Pt with a persistent cough with sputum production. Chest xray was read by radiologist and it showed some congestion but no infiltrate but with my read with her hx and my clinical suspicion the congestion in the right perihilar area and middle area is suggestive of infiltrate. Pt treated for facility acquired pneumonia,  Her bnp is elevated and she has some cardiomegaly on xray. She has low sodium and chloride and I am gently hydrating her to supplement her electrolytes. Pt also has a ligament injury to her cervical spine related to the fall and hospitalist informed of need for mri while in hospital.      pts son also stresses that pt can not take any anticoagulant besides aspirin daily. Hospitalist informed.          Amount and/or Complexity of Data Reviewed  Clinical lab tests: ordered and reviewed  Tests in the radiology section of CPT®: ordered and reviewed  Tests in the medicine section of CPT®: ordered and reviewed  Decide to obtain previous medical records or to obtain history from someone other than the patient: yes  Obtain history from someone other than the patient: yes  Review and summarize past medical records: yes  Discuss the patient with other providers: yes  Independent visualization of images, tracings, or specimens: yes    Risk of Complications, Morbidity, and/or Mortality  Presenting problems: moderate  Diagnostic procedures: moderate  Management options: moderate    Critical Care  Total time providing critical care: < 30 minutes (I have personally provided _30__ minutes of critical care time exclusive of time spent on separately billable procedures. Time includes review of laboratory data, radiology results, discussion with consultants, and monitoring for potential decompensation.  Interventions were performed as documented above  )    Patient Progress  Patient progress: improved        ED Course       Procedures          Vitals:  Patient Vitals for the past 12 hrs:   Temp Pulse Resp BP SpO2   06/10/18 2045 98.1 °F (36.7 °C) 71 18 132/71 94 %       Medications ordered:   Medications   benzonatate (TESSALON) capsule 200 mg (200 mg Oral Given 6/11/18 0101)   vancomycin (VANCOCIN) 1,000 mg in 0.9% sodium chloride (MBP/ADV) 250 mL adv (1,000 mg IntraVENous New Bag 6/11/18 0103)   0.9% sodium chloride infusion (100 mL/hr IntraVENous New Bag 6/11/18 0103)   albuterol-ipratropium (DUO-NEB) 2.5 MG-0.5 MG/3 ML (3 mL Nebulization Given 6/11/18 0102)   cefepime (MAXIPIME) 1 g in sterile water (preservative free) 10 mL IV syringe (1 g IntraVENous Given 6/11/18 0103)         Lab findings:  Recent Results (from the past 12 hour(s))   EKG, 12 LEAD, INITIAL    Collection Time: 06/10/18 11:03 PM   Result Value Ref Range    Ventricular Rate 82 BPM    Atrial Rate 312 BPM    QRS Duration 88 ms    Q-T Interval 400 ms    QTC Calculation (Bezet) 467 ms    Calculated R Axis 50 degrees    Calculated T Axis -154 degrees    Diagnosis       Atrial fibrillation  RSR' or QR pattern in V1 suggests right ventricular conduction delay  Marked ST abnormality, possible inferolateral subendocardial injury  Abnormal ECG  When compared with ECG of 09-FEB-2018 10:58,  Atrial fibrillation has replaced Sinus rhythm  T wave inversion more evident in Inferior leads  QT has lengthened     CBC WITH AUTOMATED DIFF    Collection Time: 06/10/18 11:50 PM Result Value Ref Range    WBC 12.0 4.6 - 13.2 K/uL    RBC 3.94 (L) 4.20 - 5.30 M/uL    HGB 11.2 (L) 12.0 - 16.0 g/dL    HCT 32.3 (L) 35.0 - 45.0 %    MCV 82.0 74.0 - 97.0 FL    MCH 28.4 24.0 - 34.0 PG    MCHC 34.7 31.0 - 37.0 g/dL    RDW 17.3 (H) 11.6 - 14.5 %    PLATELET 288 044 - 894 K/uL    MPV 10.8 9.2 - 11.8 FL    NEUTROPHILS 71 40 - 73 %    LYMPHOCYTES 15 (L) 21 - 52 %    MONOCYTES 14 (H) 3 - 10 %    EOSINOPHILS 0 0 - 5 %    BASOPHILS 0 0 - 2 %    ABS. NEUTROPHILS 8.5 (H) 1.8 - 8.0 K/UL    ABS. LYMPHOCYTES 1.7 0.9 - 3.6 K/UL    ABS. MONOCYTES 1.7 (H) 0.05 - 1.2 K/UL    ABS. EOSINOPHILS 0.1 0.0 - 0.4 K/UL    ABS.  BASOPHILS 0.0 0.0 - 0.1 K/UL    DF AUTOMATED     PROTHROMBIN TIME + INR    Collection Time: 06/10/18 11:50 PM   Result Value Ref Range    Prothrombin time 14.7 11.5 - 15.2 sec    INR 1.2 0.8 - 1.2     POC LACTIC ACID    Collection Time: 06/10/18 11:54 PM   Result Value Ref Range    Lactic Acid (POC) 0.9 0.4 - 2.0 mmol/L   URINALYSIS W/ RFLX MICROSCOPIC    Collection Time: 06/11/18 12:30 AM   Result Value Ref Range    Color DARK YELLOW      Appearance CLEAR      Specific gravity 1.020 1.005 - 1.030      pH (UA) 6.0 5.0 - 8.0      Protein 100 (A) NEG mg/dL    Glucose NEGATIVE  NEG mg/dL    Ketone NEGATIVE  NEG mg/dL    Bilirubin NEGATIVE  NEG      Blood NEGATIVE  NEG      Urobilinogen 1.0 0.2 - 1.0 EU/dL    Nitrites NEGATIVE  NEG      Leukocyte Esterase TRACE (A) NEG     URINE MICROSCOPIC ONLY    Collection Time: 06/11/18 12:30 AM   Result Value Ref Range    WBC 0 to 2 0 - 4 /hpf    RBC 0 to 2 0 - 5 /hpf    Epithelial cells FEW 0 - 5 /lpf   MAGNESIUM    Collection Time: 06/11/18 12:57 AM   Result Value Ref Range    Magnesium 1.9 1.6 - 2.6 mg/dL   METABOLIC PANEL, COMPREHENSIVE    Collection Time: 06/11/18 12:57 AM   Result Value Ref Range    Sodium 125 (L) 136 - 145 mmol/L    Potassium 3.7 3.5 - 5.5 mmol/L    Chloride 89 (L) 100 - 108 mmol/L    CO2 26 21 - 32 mmol/L    Anion gap 10 3.0 - 18 mmol/L Glucose 101 (H) 74 - 99 mg/dL    BUN 25 (H) 7.0 - 18 MG/DL    Creatinine 1.17 0.6 - 1.3 MG/DL    BUN/Creatinine ratio 21 (H) 12 - 20      GFR est AA 54 (L) >60 ml/min/1.73m2    GFR est non-AA 44 (L) >60 ml/min/1.73m2    Calcium 8.1 (L) 8.5 - 10.1 MG/DL    Bilirubin, total 0.5 0.2 - 1.0 MG/DL    ALT (SGPT) 120 (H) 13 - 56 U/L    AST (SGOT) 100 (H) 15 - 37 U/L    Alk. phosphatase 99 45 - 117 U/L    Protein, total 8.1 6.4 - 8.2 g/dL    Albumin 3.0 (L) 3.4 - 5.0 g/dL    Globulin 5.1 (H) 2.0 - 4.0 g/dL    A-G Ratio 0.6 (L) 0.8 - 1.7     NT-PRO BNP    Collection Time: 06/11/18 12:57 AM   Result Value Ref Range    NT pro-BNP 8486 (H) 0 - 1800 PG/ML   TROPONIN I    Collection Time: 06/11/18 12:57 AM   Result Value Ref Range    Troponin-I, Qt. 0.02 0.0 - 0.045 NG/ML         EKG:  Atrial fibrillation   RSR' or QR pattern in V1 suggests right ventricular conduction delay   Marked ST abnormality, possible inferolateral subendocardial injury   Abnormal ECG   When compared with ECG of 09-FEB-2018 10:58,   Atrial fibrillation has replaced Sinus rhythm   T wave inversion more evident in Inferior leads   QT has lengthened                 X-Ray, CT or other radiology findings or impressions:  XR CHEST PORT   Final Result      CT HEAD WO CONT   Final Result      CT SPINE CERV WO CONT   Final Result        Portable Chest     CPT CODE: 55411     HISTORY: Fall.     FINDINGS:      Comparison with 2/13/2018; 8/2/2012     Multiple wires overlie the patient. Clips at the right axillary region. Heart  size and mediastinal contours within normal limits. Mild interstitial  prominence. No acute consolidation. No effusion or pneumothorax. .     IMPRESSION  IMPRESSION:     Mildly prominent interstitium. May represent mild vascular congestion      CT OF THE HEAD WITHOUT CONTRAST.     CLINICAL HISTORY: Fall hitting back of head.  Closed head trauma,  moderate-severe.     TECHNIQUE: Helical scan obtained of the head were obtained from the skull vertex  through the base of the skull without intravenous contrast.    All CT scans are  performed using dose optimization techniques as appropriate to the performed  exam including the following: Automated exposure control, adjustment of mA  and/or kV according to patient size, and use of iterative reconstructive  technique.      COMPARISON: None.     FINDINGS:      Motion artifact at the skull vertex. Chronic right cerebellum infarct. Sulcal  pattern is otherwise maintained, with mild atrophy and mild compensatory lateral  and third ventricle enlargement. Periventricular and deep white matter  hypodensities bilaterally. Small left basal ganglia lacunar infarct. No  intracranial hemorrhage. No mass effect. The visualized paranasal sinuses are  clear. The mastoid air cells are clear. The visualized bony structures are  unremarkable.     IMPRESSION  IMPRESSION:      No acute traumatic findings. Motion artifact. Chronic microvascular disease and old infarcts as above.         CT SCAN CERVICAL SPINE WITHOUT CONTRAST     HISTORY: Fall hitting back of head. Closed head injury. Recent trauma, spine.     COMPARISON: None.     TECHNIQUE: Axial images through the cervical spine were obtained without  intravenous contrast. Coronal and sagittal reformatted images were also obtained  for better evaluation of regional anatomy and alignment. All CT scans are  performed using dose optimization techniques as appropriate to the performed  exam including the following: Automated exposure control, adjustment of mA  and/or kV according to patient size, and use of iterative reconstructive  technique.      FINDINGS:      Odontoid intact. Occipital condylar/C-1/C-2 relationships normal. Grade II  anterolisthesis of C3 on C4 and grade I anterolisthesis of C4 on C5. Reversal of  cervical curvature. Vertebral body heights are maintained. Severe disc space  narrowing with osteophytes C5-C6 and mild at other levels.  Advanced bilateral  facet joint hypertrophy in the upper and mid cervical spine, fused across the  hypertrophied left C4-C5 facet joint.     No acute fracture is identified however there is a significant amount of  prevertebral soft tissue swelling at the C4-C7 levels. Soft tissue swelling  tracks into the left and right neck. Within the limitations of CT, no critical  findings are identified in the cervical spinal canal. Neural foraminal stenoses  from facet hypertrophy.     IMPRESSION  IMPRESSION:     Prevertebral soft tissue swelling at the mid cervical spine level may indicate  ligamentous injury. No fracture seen. MRI is recommended.     Spondylolisthesis at C3-C4 and C4-C5 appears chronic based on degenerative and  partially fused facet joint appearances.     Degenerative disc and joint disease throughout the mid cervical spine. Disposition:    Diagnosis:   1. Pneumonia of right middle lobe due to infectious organism ([facility] [address]    2. Weakness    3. Acute congestive heart failure, unspecified heart failure type ([facility] [address]    4. Injury to ligament of cervical spine, initial encounter        Disposition: admitted to telemetry       Patient's Medications   Start Taking    No medications on file   Continue Taking    ACETAMINOPHEN (TYLENOL) 325 MG TABLET    Take 2 Tabs by mouth every four (4) hours as needed for Fever. ALBUTEROL (PROVENTIL VENTOLIN) 2.5 MG /3 ML (0.083 %) NEBULIZER SOLUTION    1.5 mL by Nebulization route every four (4) hours as needed for Wheezing. ASPIRIN, BUFFERED 81 MG TAB    Take 1 Tab by mouth daily. B.INFANTIS-B. ANI-B. LONG-B.BIFI (PROBIOTIC 4X) 10-15 MG TBEC    Take 1 Tab by mouth daily. BECLOMETHASONE (QVAR) 40 MCG/ACTUATION INHALER    Take 2 Puffs by inhalation two (2) times a day. Use with spacer device    BIOTIN 1 MG TAB    Take 1 Tab by mouth daily. BISACODYL (DULCOLAX) 5 MG EC TABLET    Take 2 Tabs by mouth daily as needed for Constipation.     CALCIUM CARBONATE (CALCIUM 600 PO)    Take 1 Tab by mouth daily. CARBOXYMETHYLCELLULOSE SODIUM 1 % DLGL    Apply 1 Drop to eye nightly. To both eyes    CHOLECALCIFEROL (VITAMIN D3) 1,000 UNIT CAP    Take 1,000 Units by mouth daily. DIGOXIN (LANOXIN) 0.125 MG TABLET    Take 0.125 mg by mouth every other day. FISH OIL-DHA-EPA 1,200-144-216 MG CAP    Take 1 Cap by mouth daily. HYDRALAZINE (APRESOLINE) 50 MG TABLET    Take 1 Tab by mouth three (3) times daily. LEVOTHYROXINE (SYNTHROID) 100 MCG TABLET    Take 100 mcg by mouth Daily (before breakfast). LORATADINE (CLARITIN) 10 MG TABLET    Take 10 mg by mouth daily as needed. LOVASTATIN (MEVACOR) 40 MG TABLET    Take 40 mg by mouth nightly. MELATONIN 3 MG TABLET    Take 1 Tab by mouth nightly. METOPROLOL (LOPRESSOR) 50 MG TABLET    Take 50 mg by mouth two (2) times a day. MONTELUKAST (SINGULAIR) 10 MG TABLET    Take 10 mg by mouth daily. MULTIVITAMIN (ONE A DAY) TABLET    Take 1 Tab by mouth daily. NIFEDIPINE ER (PROCARDIA XL) 30 MG ER TABLET    Take 60 mg by mouth daily. PANTOPRAZOLE (PROTONIX) 40 MG TABLET    Take 40 mg by mouth daily. TRAMADOL (ULTRAM) 50 MG TABLET    Take 0.5 Tabs by mouth two (2) times a day. Max Daily Amount: 50 mg. TRAZODONE (DESYREL) 50 MG TABLET    Take 25 mg by mouth nightly as needed for Sleep. These Medications have changed    No medications on file   Stop Taking    No medications on file          ADMISSION NOTE:  1:46 AM  Patient is being admitted to the hospital by Dr. Damari Mills. The results of their tests and reasons for their admission have been discussed with them and/or available family. They convey agreement and understanding for the need to be admitted and for their admission diagnosis.          Army Roberts ENP-C,FNP-C

## 2018-06-11 NOTE — PROGRESS NOTES
Problem: Falls - Risk of  Goal: *Absence of Falls  Document Ronnie Fall Risk and appropriate interventions in the flowsheet. Outcome: Progressing Towards Goal  Fall Risk Interventions:            Medication Interventions: Patient to call before getting OOB                  Problem: Pressure Injury - Risk of  Goal: *Prevention of pressure injury  Document Balta Scale and appropriate interventions in the flowsheet. Outcome: Progressing Towards Goal  Pressure Injury Interventions:               Fall prevention program maintained.   Bed alarm

## 2018-06-11 NOTE — ROUTINE PROCESS
TRANSFER - OUT REPORT:    Verbal report given to Jordan Jack RN on Yair Skinner  being transferred to  for routine progression of care         Report consisted of patients Situation, Background, Assessment and   Recommendations(SBAR). Information from the following report(s) SBAR was reviewed with the receiving nurse. Lines:       Opportunity for questions and clarification was provided.       Patient transported with:   Thinker Thing

## 2018-06-12 LAB
ANION GAP SERPL CALC-SCNC: 11 MMOL/L (ref 3–18)
BACTERIA SPEC CULT: NORMAL
BASOPHILS # BLD: 0 K/UL (ref 0–0.06)
BASOPHILS NFR BLD: 0 % (ref 0–3)
BUN SERPL-MCNC: 22 MG/DL (ref 7–18)
BUN/CREAT SERPL: 22 (ref 12–20)
CALCIUM SERPL-MCNC: 7.8 MG/DL (ref 8.5–10.1)
CHLORIDE SERPL-SCNC: 95 MMOL/L (ref 100–108)
CO2 SERPL-SCNC: 22 MMOL/L (ref 21–32)
CORTIS AM PEAK SERPL-MCNC: 37.4 UG/DL (ref 4.3–22.4)
CREAT SERPL-MCNC: 1 MG/DL (ref 0.6–1.3)
DIFFERENTIAL METHOD BLD: ABNORMAL
EOSINOPHIL # BLD: 0 K/UL (ref 0–0.4)
EOSINOPHIL NFR BLD: 0 % (ref 0–5)
ERYTHROCYTE [DISTWIDTH] IN BLOOD BY AUTOMATED COUNT: 17.8 % (ref 11.6–14.5)
GLUCOSE SERPL-MCNC: 80 MG/DL (ref 74–99)
HCT VFR BLD AUTO: 32.2 % (ref 35–45)
HGB BLD-MCNC: 10.9 G/DL (ref 12–16)
LYMPHOCYTES # BLD: 2 K/UL (ref 0.8–3.5)
LYMPHOCYTES NFR BLD: 16 % (ref 20–51)
MCH RBC QN AUTO: 28.2 PG (ref 24–34)
MCHC RBC AUTO-ENTMCNC: 33.9 G/DL (ref 31–37)
MCV RBC AUTO: 83.2 FL (ref 74–97)
MONOCYTES # BLD: 1.4 K/UL (ref 0–1)
MONOCYTES NFR BLD: 11 % (ref 2–9)
NEUTS BAND NFR BLD MANUAL: 1 % (ref 0–5)
NEUTS SEG # BLD: 9.3 K/UL (ref 1.8–8)
NEUTS SEG NFR BLD: 72 % (ref 42–75)
OSMOLALITY SERPL: 273 MOSM/KG H2O (ref 280–300)
OSMOLALITY UR: 542 MOSM/KG H2O (ref 300–900)
PLATELET # BLD AUTO: 229 K/UL (ref 135–420)
PLATELET COMMENTS,PCOM: ABNORMAL
PMV BLD AUTO: 10.8 FL (ref 9.2–11.8)
POTASSIUM SERPL-SCNC: 3.4 MMOL/L (ref 3.5–5.5)
RBC # BLD AUTO: 3.87 M/UL (ref 4.2–5.3)
RBC MORPH BLD: ABNORMAL
RBC MORPH BLD: ABNORMAL
SERVICE CMNT-IMP: NORMAL
SODIUM SERPL-SCNC: 128 MMOL/L (ref 136–145)
SODIUM UR-SCNC: 18 MMOL/L (ref 20–110)
TSH SERPL DL<=0.05 MIU/L-ACNC: 2.65 UIU/ML (ref 0.36–3.74)
WBC # BLD AUTO: 12.7 K/UL (ref 4.6–13.2)

## 2018-06-12 PROCEDURE — 82533 TOTAL CORTISOL: CPT | Performed by: INTERNAL MEDICINE

## 2018-06-12 PROCEDURE — 65270000029 HC RM PRIVATE

## 2018-06-12 PROCEDURE — 74011250637 HC RX REV CODE- 250/637: Performed by: NURSE PRACTITIONER

## 2018-06-12 PROCEDURE — 94640 AIRWAY INHALATION TREATMENT: CPT

## 2018-06-12 PROCEDURE — 77030038269 HC DRN EXT URIN PURWCK BARD -A

## 2018-06-12 PROCEDURE — 97161 PT EVAL LOW COMPLEX 20 MIN: CPT

## 2018-06-12 PROCEDURE — 74011250637 HC RX REV CODE- 250/637: Performed by: INTERNAL MEDICINE

## 2018-06-12 PROCEDURE — 74011250636 HC RX REV CODE- 250/636: Performed by: NURSE PRACTITIONER

## 2018-06-12 PROCEDURE — 93306 TTE W/DOPPLER COMPLETE: CPT

## 2018-06-12 PROCEDURE — 84443 ASSAY THYROID STIM HORMONE: CPT | Performed by: INTERNAL MEDICINE

## 2018-06-12 PROCEDURE — 74011250636 HC RX REV CODE- 250/636: Performed by: INTERNAL MEDICINE

## 2018-06-12 PROCEDURE — 80048 BASIC METABOLIC PNL TOTAL CA: CPT | Performed by: INTERNAL MEDICINE

## 2018-06-12 PROCEDURE — 36415 COLL VENOUS BLD VENIPUNCTURE: CPT | Performed by: INTERNAL MEDICINE

## 2018-06-12 PROCEDURE — 85025 COMPLETE CBC W/AUTO DIFF WBC: CPT | Performed by: INTERNAL MEDICINE

## 2018-06-12 PROCEDURE — 74011000250 HC RX REV CODE- 250: Performed by: INTERNAL MEDICINE

## 2018-06-12 PROCEDURE — 74011000250 HC RX REV CODE- 250: Performed by: FAMILY MEDICINE

## 2018-06-12 RX ORDER — AZITHROMYCIN 250 MG/1
500 TABLET, FILM COATED ORAL DAILY
Status: DISCONTINUED | OUTPATIENT
Start: 2018-06-13 | End: 2018-06-12

## 2018-06-12 RX ORDER — POTASSIUM CHLORIDE 20 MEQ/1
20 TABLET, EXTENDED RELEASE ORAL 2 TIMES DAILY
Status: DISCONTINUED | OUTPATIENT
Start: 2018-06-12 | End: 2018-06-15

## 2018-06-12 RX ORDER — POTASSIUM CHLORIDE 20 MEQ/1
20 TABLET, EXTENDED RELEASE ORAL
Status: COMPLETED | OUTPATIENT
Start: 2018-06-12 | End: 2018-06-12

## 2018-06-12 RX ORDER — AZITHROMYCIN 250 MG/1
500 TABLET, FILM COATED ORAL DAILY
Status: COMPLETED | OUTPATIENT
Start: 2018-06-13 | End: 2018-06-15

## 2018-06-12 RX ORDER — SODIUM CHLORIDE 9 MG/ML
25 INJECTION, SOLUTION INTRAVENOUS ONCE
Status: COMPLETED | OUTPATIENT
Start: 2018-06-12 | End: 2018-06-12

## 2018-06-12 RX ORDER — SODIUM CHLORIDE 9 MG/ML
25 INJECTION, SOLUTION INTRAVENOUS ONCE
Status: DISPENSED | OUTPATIENT
Start: 2018-06-12 | End: 2018-06-13

## 2018-06-12 RX ORDER — ALBUTEROL SULFATE 0.83 MG/ML
2.5 SOLUTION RESPIRATORY (INHALATION) 3 TIMES DAILY
Status: DISCONTINUED | OUTPATIENT
Start: 2018-06-12 | End: 2018-06-15 | Stop reason: HOSPADM

## 2018-06-12 RX ORDER — HYDRALAZINE HYDROCHLORIDE 20 MG/ML
10 INJECTION INTRAMUSCULAR; INTRAVENOUS ONCE
Status: COMPLETED | OUTPATIENT
Start: 2018-06-12 | End: 2018-06-12

## 2018-06-12 RX ORDER — HYDRALAZINE HYDROCHLORIDE 20 MG/ML
10 INJECTION INTRAMUSCULAR; INTRAVENOUS
Status: DISCONTINUED | OUTPATIENT
Start: 2018-06-12 | End: 2018-06-15 | Stop reason: HOSPADM

## 2018-06-12 RX ORDER — HYDRALAZINE HYDROCHLORIDE 20 MG/ML
20 INJECTION INTRAMUSCULAR; INTRAVENOUS ONCE
Status: COMPLETED | OUTPATIENT
Start: 2018-06-12 | End: 2018-06-12

## 2018-06-12 RX ORDER — LORATADINE 10 MG/1
10 TABLET ORAL DAILY
Status: DISCONTINUED | OUTPATIENT
Start: 2018-06-13 | End: 2018-06-13

## 2018-06-12 RX ORDER — MONTELUKAST SODIUM 10 MG/1
10 TABLET ORAL
Status: DISCONTINUED | OUTPATIENT
Start: 2018-06-12 | End: 2018-06-15 | Stop reason: HOSPADM

## 2018-06-12 RX ADMIN — Medication 10 ML: at 22:00

## 2018-06-12 RX ADMIN — ALBUTEROL SULFATE 2.5 MG: 2.5 SOLUTION RESPIRATORY (INHALATION) at 21:03

## 2018-06-12 RX ADMIN — POTASSIUM CHLORIDE 20 MEQ: 20 TABLET, EXTENDED RELEASE ORAL at 21:31

## 2018-06-12 RX ADMIN — POTASSIUM CHLORIDE 20 MEQ: 20 TABLET, EXTENDED RELEASE ORAL at 11:04

## 2018-06-12 RX ADMIN — HYDRALAZINE HYDROCHLORIDE 50 MG: 50 TABLET, FILM COATED ORAL at 21:16

## 2018-06-12 RX ADMIN — Medication 10 ML: at 19:10

## 2018-06-12 RX ADMIN — OXYCODONE HYDROCHLORIDE AND ACETAMINOPHEN 1 TABLET: 5; 325 TABLET ORAL at 08:53

## 2018-06-12 RX ADMIN — OXYCODONE HYDROCHLORIDE AND ACETAMINOPHEN 1 TABLET: 5; 325 TABLET ORAL at 05:06

## 2018-06-12 RX ADMIN — LEVOTHYROXINE SODIUM 100 MCG: 100 TABLET ORAL at 10:17

## 2018-06-12 RX ADMIN — TRAMADOL HYDROCHLORIDE 25 MG: 50 TABLET, FILM COATED ORAL at 11:04

## 2018-06-12 RX ADMIN — POLYVINYL ALCOHOL 1 DROP: 14 SOLUTION/ DROPS OPHTHALMIC at 21:17

## 2018-06-12 RX ADMIN — SIMVASTATIN 20 MG: 20 TABLET, FILM COATED ORAL at 21:16

## 2018-06-12 RX ADMIN — VITAMIN D, TAB 1000IU (100/BT) 1000 UNITS: 25 TAB at 11:04

## 2018-06-12 RX ADMIN — LACTOBACILLUS TAB 2 TABLET: TAB at 21:16

## 2018-06-12 RX ADMIN — SODIUM CHLORIDE 25 ML/HR: 900 INJECTION, SOLUTION INTRAVENOUS at 17:15

## 2018-06-12 RX ADMIN — HYDRALAZINE HYDROCHLORIDE 10 MG: 20 INJECTION INTRAMUSCULAR; INTRAVENOUS at 14:47

## 2018-06-12 RX ADMIN — WATER 1 G: 1 INJECTION INTRAMUSCULAR; INTRAVENOUS; SUBCUTANEOUS at 04:14

## 2018-06-12 RX ADMIN — ALBUTEROL SULFATE 2.5 MG: 2.5 SOLUTION RESPIRATORY (INHALATION) at 08:10

## 2018-06-12 RX ADMIN — NIFEDIPINE 60 MG: 30 TABLET, FILM COATED, EXTENDED RELEASE ORAL at 11:04

## 2018-06-12 RX ADMIN — Medication 1 CAPSULE: at 11:04

## 2018-06-12 RX ADMIN — MONTELUKAST SODIUM 10 MG: 10 TABLET, FILM COATED ORAL at 21:16

## 2018-06-12 RX ADMIN — POTASSIUM CHLORIDE 20 MEQ: 20 TABLET, EXTENDED RELEASE ORAL at 17:38

## 2018-06-12 RX ADMIN — THERA TABS 1 TABLET: TAB at 11:03

## 2018-06-12 RX ADMIN — HYDRALAZINE HYDROCHLORIDE 20 MG: 20 INJECTION INTRAMUSCULAR; INTRAVENOUS at 04:14

## 2018-06-12 RX ADMIN — ASPIRIN 81 MG CHEWABLE TABLET 81 MG: 81 TABLET CHEWABLE at 11:04

## 2018-06-12 RX ADMIN — METOPROLOL TARTRATE 50 MG: 50 TABLET ORAL at 21:17

## 2018-06-12 RX ADMIN — Medication 10 ML: at 15:59

## 2018-06-12 RX ADMIN — LACTOBACILLUS TAB 2 TABLET: TAB at 10:17

## 2018-06-12 RX ADMIN — MELATONIN TAB 3 MG 3 MG: 3 TAB at 21:17

## 2018-06-12 RX ADMIN — ALBUTEROL SULFATE 2.5 MG: 2.5 SOLUTION RESPIRATORY (INHALATION) at 15:20

## 2018-06-12 RX ADMIN — FLUTICASONE FUROATE 1 PUFF: 100 POWDER RESPIRATORY (INHALATION) at 09:00

## 2018-06-12 RX ADMIN — HYDRALAZINE HYDROCHLORIDE 50 MG: 50 TABLET, FILM COATED ORAL at 10:17

## 2018-06-12 RX ADMIN — LACTOBACILLUS TAB 2 TABLET: TAB at 17:27

## 2018-06-12 RX ADMIN — SODIUM CHLORIDE 500 MG: 900 INJECTION, SOLUTION INTRAVENOUS at 04:14

## 2018-06-12 RX ADMIN — MONTELUKAST SODIUM 10 MG: 10 TABLET, FILM COATED ORAL at 11:04

## 2018-06-12 RX ADMIN — OXYCODONE HYDROCHLORIDE AND ACETAMINOPHEN 1 TABLET: 5; 325 TABLET ORAL at 14:47

## 2018-06-12 RX ADMIN — Medication 10 ML: at 05:29

## 2018-06-12 RX ADMIN — METOPROLOL TARTRATE 50 MG: 50 TABLET ORAL at 10:16

## 2018-06-12 RX ADMIN — PANTOPRAZOLE SODIUM 40 MG: 40 TABLET, DELAYED RELEASE ORAL at 10:16

## 2018-06-12 NOTE — PROGRESS NOTES
RENAL DAILY PROGRESS NOTE            80y F with PMH HTN, presented to ER with weakness, fall, seen for hyponatremia   Subjective:   Complaint:   Overnight events noted  Seems confused,   Not interested in eating. TSH within limit. IMPRESSION:   · Hyponatremia, acute, suspect higher ADH from URI  · Hypokalemia, mild   · HTN  · Fall, weakness   PLAN:   · Her sodium is not improving much, has relatively low urine sodium, will give NS 500cc total.   · Need urine osmolarity   · Strict I and O     discussed with Dr. Melvern Lombard.          Current Facility-Administered Medications   Medication Dose Route Frequency    potassium chloride (K-DUR, KLOR-CON) SR tablet 20 mEq  20 mEq Oral BID    albuterol (PROVENTIL VENTOLIN) nebulizer solution 2.5 mg  2.5 mg Nebulization TID    hydrALAZINE (APRESOLINE) 20 mg/mL injection 10 mg  10 mg IntraVENous Q6H PRN    [START ON 6/13/2018] azithromycin (ZITHROMAX) tablet 500 mg  500 mg Oral DAILY    [START ON 6/13/2018] loratadine (CLARITIN) tablet 10 mg  10 mg Oral DAILY    montelukast (SINGULAIR) tablet 10 mg  10 mg Oral QHS    0.9% sodium chloride infusion  25 mL/hr IntraVENous ONCE    acetaminophen (TYLENOL) tablet 650 mg  650 mg Oral Q4H PRN    digoxin (LANOXIN) tablet 0.125 mg  0.125 mg Oral EVERY OTHER DAY    loratadine (CLARITIN) tablet 10 mg  10 mg Oral DAILY PRN    levothyroxine (SYNTHROID) tablet 100 mcg  100 mcg Oral ACB    pantoprazole (PROTONIX) tablet 40 mg  40 mg Oral ACB    cholecalciferol (VITAMIN D3) tablet 1,000 Units  1,000 Units Oral DAILY    therapeutic multivitamin (THERAGRAN) tablet 1 Tab  1 Tab Oral DAILY    melatonin tablet 3 mg  3 mg Oral QHS    bisacodyl (DULCOLAX) tablet 10 mg  10 mg Oral DAILY PRN    hydrALAZINE (APRESOLINE) tablet 50 mg  50 mg Oral TID    traMADol (ULTRAM) tablet 25 mg  25 mg Oral DAILY    traZODone (DESYREL) tablet 25 mg  25 mg Oral QHS PRN    NIFEdipine ER (PROCARDIA XL) tablet 60 mg  60 mg Oral DAILY    aspirin chewable tablet 81 mg  81 mg Oral DAILY    Lactobacillus Acidkathy & Bulgar CRESTUniversal Health Services) tablet 2 Tab  2 Tab Oral TID    fluticasone furoate (ARNUITY ELLIPTA) 100 mcg/puff  1 Puff Inhalation DAILY    polyvinyl alcohol (LIQUIFILM TEARS) 1.4 % ophthalmic solution 1 Drop  1 Drop Both Eyes QHS    fish oil-omega-3 fatty acids 340-1,000 mg capsule 1 Cap  1 Cap Oral DAILY    sodium chloride (NS) flush 5-10 mL  5-10 mL IntraVENous Q8H    sodium chloride (NS) flush 5-10 mL  5-10 mL IntraVENous PRN    oxyCODONE-acetaminophen (PERCOCET) 5-325 mg per tablet 1 Tab  1 Tab Oral Q4H PRN    naloxone (NARCAN) injection 0.4 mg  0.4 mg IntraVENous PRN    metoprolol tartrate (LOPRESSOR) tablet 50 mg  50 mg Oral Q12H    simvastatin (ZOCOR) tablet 20 mg  20 mg Oral QHS    benzonatate (TESSALON) capsule 200 mg  200 mg Oral TID PRN       Review of Symptoms: as above   Objective:   Patient Vitals for the past 24 hrs:   Temp Pulse Resp BP SpO2   06/12/18 1727 - - - 124/71 -   06/12/18 1623 97.7 °F (36.5 °C) 79 18 (!) 180/92 97 %   06/12/18 1433 - 84 - (!) 181/97 -   06/12/18 1140 96.5 °F (35.8 °C) 78 18 - 93 %   06/12/18 1124 - - - (!) 190/95 -   06/12/18 0958 - - - (!) 183/100 -   06/12/18 0806 98.7 °F (37.1 °C) 78 18 (!) 200/100 97 %   06/12/18 0503 - - - (!) 178/94 -   06/12/18 0501 97.9 °F (36.6 °C) 85 20 (!) 189/98 93 %   06/12/18 0109 96.4 °F (35.8 °C) 84 16 192/90 92 %   06/11/18 2048 98.1 °F (36.7 °C) 87 24 (!) 193/105 99 %        Weight change:      06/10 1901 - 06/12 0700  In: 120 [P.O.:120]  Out: -     Intake/Output Summary (Last 24 hours) at 06/12/18 1758  Last data filed at 06/12/18 1734   Gross per 24 hour   Intake              600 ml   Output                0 ml   Net              600 ml     Physical Exam:   General: comfortable, no acute distress   HEENT sclera anicteric, supple neck, no thyromegaly  CVS: S1S2 heard,  no rub  RS: + air entry b/l,   Abd: Soft, Non tender,   Neuro:  confused    Extrm: no edema, no cyanosis, clubbing   Skin: dry  Musculoskeletal: No gross joints or bone deformities          Data Review:     LABS:   Hematology: Recent Labs      06/12/18   0212  06/10/18   2350   WBC  12.7  12.0   HGB  10.9*  11.2*   HCT  32.2*  32.3*     Chemistry: Recent Labs      06/12/18   0212  06/11/18   1530  06/11/18   1048  06/11/18   0057   BUN  22*  22*  25*  25*   CREA  1.00  1.16  1.13  1.17   CA  7.8*  7.7*  7.5*  8.1*   ALB   --    --    --   3.0*   K  3.4*  3.6  3.2*  3.7   NA  128*  129*  127*  125*   CL  95*  94*  94*  89*   CO2  22  23  22  26   GLU  80  85  90  101*            Procedures/imaging: see electronic medical records for all procedures, Xrays and details which were not copied into this note but were reviewed prior to creation of Plan          Assessment & Plan:             Ancelmo Correia MD  6/12/2018  5:58 PM

## 2018-06-12 NOTE — PROGRESS NOTES
ARU/IPR REFERRAL CONTACT NOTE  [address] [place] [address] Physical Rehabilitation    RE: Samantha Bowser    Referral received to review this patient's case for admission to [address] [place] [address] Physical [facility]. Current status reviewed.  When appropriate, will need OT evaluation/treatment on this patient to complete the pre-admission evaluation.  Will continue to follow. Thank you for this referral.  Should you have any questions please do not hesitate to call. Sincerely,  Bruce Harrington.  [address] [place] [address] Physical Rehabilitation  [phone]

## 2018-06-12 NOTE — PROGRESS NOTES
[facility] - Progress Note  Admit Date: 6/10/2018    Assessment:     Hospital Problems  Date Reviewed: 6/11/2018          Codes Class Noted POA    Weakness ICD-10-CM: R53.1  ICD-9-CM: 780.79  6/11/2018 Unknown        Injury to ligament of cervical spine ICD-10-CM: S13. 4XXA  ICD-9-CM: 847.0  6/11/2018 Unknown        Hyponatremia ICD-10-CM: E87.1  ICD-9-CM: 276.1  6/11/2018 Unknown        Fall ICD-10-CM: [id number]. Jose Del  ICD-9-CM: E888.9  6/11/2018 Unknown        Afib ([address] ICD-10-CM: I48.91  ICD-9-CM: 427.31  6/11/2018 Unknown        * (Principal)Pneumonia ICD-10-CM: J18.9  ICD-9-CM: 456  2/9/2018 Unknown              -Generalized weakness with fall, no acute injuries, CT head negative. -URI, bronchitis picture, no fevers or white count.  -SOB, multifactorial with bronchitis and possible diastolic HF exacerbation, BNP 8k, echo Jan 2017 with normal LV function and G2DD, elevated PA pressures at 51mmHg, mild to mod mitral regurge  -Hyponatremia, on IVF  -Hx of afib, rates controlled, on BB and low dose digoxin as outpatient. NOT on [facility] d/t history of significant upper GI bleed. (see Maury's notes)  -Confusion, 2/2 to hyponatremia? Unclear of patient's baseline mentation given no family is present  -HTN hx, controlled  -HLD, on statin      Primary cardiologist Dr Ellie Black:     BP elevated this AM, has not had any morning medications, controlled yesterday, will recheck after morning medications. Son at bedside reports history of difficult to control BP complicated by history of orthostasis, stable as outpatient on current regimen with goal -150. Continue digoxin and asa   Will review echocardiogram once complete. Not planning further cardiac work-up. Continue treatment of underlying infectious process. Replace electrolytes as needed.       Subjective:     Very sleepy this AM    Objective:      Patient Vitals for the past 8 hrs:   Temp Pulse Resp BP SpO2   06/12/18 0806 98.7 °F (37.1 °C) 78 18 (!) 200/100 97 %   06/12/18 0503 - - - (!) 178/94 -   06/12/18 0501 97.9 °F (36.6 °C) 85 20 (!) 189/98 93 %         Patient Vitals for the past 96 hrs:   Weight   06/11/18 1655 54 kg (119 lb)                    Intake/Output Summary (Last 24 hours) at 06/12/18 1009  Last data filed at 06/11/18 1840   Gross per 24 hour   Intake              120 ml   Output                0 ml   Net              120 ml       Physical Exam:  General:  no distress  Neck:  no JVD  Lungs:  clear to auscultation bilaterally  Heart:  irregular rate and rhythm  Abdomen:  abdomen is soft without significant tenderness, masses, organomegaly or guarding  Extremities:  extremities normal, atraumatic, no cyanosis or edema    Data Review:     Labs: Results:       Chemistry Recent Labs      06/12/18   0212  06/11/18   1530  06/11/18   1048  06/11/18   0057   GLU  80  85  90  101*   NA  128*  129*  127*  125*   K  3.4*  3.6  3.2*  3.7   CL  95*  94*  94*  89*   CO2  22  23  22  26   BUN  22*  22*  25*  25*   CREA  1.00  1.16  1.13  1.17   CA  7.8*  7.7*  7.5*  8.1*   MG   --    --    --   1.9   AGAP  11  12  11  10   BUCR  22*  19  22*  21*   AP   --    --    --   99   TP   --    --    --   8.1   ALB   --    --    --   3.0*   GLOB   --    --    --   5.1*   AGRAT   --    --    --   0.6*      CBC w/Diff Recent Labs      06/12/18   0212  06/10/18   2350   WBC  12.7  12.0   RBC  3.87*  3.94*   HGB  10.9*  11.2*   HCT  32.2*  32.3*   PLT  229  243   GRANS  72  71   LYMPH  16*  15*   EOS  0  0      Cardiac Enzymes No results found for: CPK, CK, CKMMB, CKMB, RCK3, CKMBT, CKNDX, CKND1, AFRICA, TROPT, TROIQ, DIONNE, TROPT, TNIPOC, BNP, BNPP   Coagulation Recent Labs      06/10/18   2350   PTP  14.7   INR  1.2       Lipid Panel No results found for: CHOL, CHOLPOCT, CHOLX, CHLST, CHOLV, 850085, HDL, LDL, LDLC, DLDLP, 915263, VLDLC, VLDL, TGLX, TRIGL, TRIGP, TGLPOCT, CHHD, CHHDX   BNP No results found for: BNP, BNPP, XBNPT   Liver Enzymes Recent Labs 06/11/18   0057   TP  8.1   ALB  3.0*   AP  99   SGOT  100*      Digoxin    Thyroid Studies Lab Results   Component Value Date/Time    TSH 2.65 06/12/2018 02:12 AM          Signed By: YULI Shine     June 12, 2018

## 2018-06-12 NOTE — PROGRESS NOTES
This pt was provided an [facility] for this pt who chose [facility]U and [place], this writer called and left a

## 2018-06-12 NOTE — PROGRESS NOTES
Problem: Pressure Injury - Risk of  Goal: *Prevention of pressure injury  Document Balta Scale and appropriate interventions in the flowsheet.    Outcome: Progressing Towards Goal  Pressure Injury Interventions:       Moisture Interventions: Internal/External urinary devices    Activity Interventions: Pressure redistribution bed/mattress(bed type)    Mobility Interventions: HOB 30 degrees or less, PT/OT evaluation    Nutrition Interventions: Document food/fluid/supplement intake

## 2018-06-12 NOTE — PROGRESS NOTES
0234. Светлана Andres MD d/t pt elevated BP    0326. Светлана Bueno Spoke with Dr. Avila Wynn regarding pt elevated SBP thorughout shift despite pt having scheduled PO antihypertensives.  Placed order for Hydralazine IV 20mg for SBP>170

## 2018-06-12 NOTE — PROGRESS NOTES
Problem: Mobility Impaired (Adult and Pediatric)  Goal: *Acute Goals and Plan of Care (Insert Text)  Physical Therapy Goals  Initiated 6/12/2018 and to be accomplished within 7 day(s)  1. Patient will move from supine to sit and sit to supine , scoot up and down and roll side to side in bed with supervision/set-up. 2.  Patient will transfer from bed to chair and chair to bed with minimal assistance/contact guard assist using the least restrictive device. 3.  Patient will perform sit to stand with minimal assistance/contact guard assist.  4.  Patient will ambulate with minimal assistance/contact guard assist for >50 feet with the least restrictive device. Outcome: Progressing Towards Goal  physical Therapy EVALUATION    Patient: Dayday Pascal (8 y.o. female)  Date: 6/12/2018  Primary Diagnosis: Pneumonia  Weakness  Injury to ligament of cervical spine  Hyponatremia  Pneumonia  Fall  Afib [facility])  Precautions: Fall    OBJECTIVE/ASSESSMENT :  Based on the objective data described below, the patient presents with impaired functional mobility including bed mobility, transfers, ambulation, and general activity tolerance following admission for fall resulting in prevertebral cervical edema (per MRI). Patient presented today semi-reclined in bed, lethargic but agreeable to PT evaluation. Accuracy of PLOF questionable d/t AMS, however no family present in room for clarification at this time. Telemonitor indicates HR 76-86 at rest (A-Fib). Patient required consistent verbal and tactile cuing to maintain attention to task. Attempted to transfer patient to sitting EOB, however patient actively resisting and not able to follow commands appropriately to complete transfer. Patient left resting in bed with bed alarm in place and call bell in reach.   Education: bed mobility, activity pacing, position change -- patient requires reinforcement    Patient will benefit from skilled intervention to address the above impairments. Patients rehabilitation potential is considered to be Fair  Factors which may influence rehabilitation potential include:   []         None noted  [x]         Mental ability/status  []         Medical condition  []         Home/family situation and support systems  [x]         Safety awareness  []         Pain tolerance/management  []         Other:      PLAN :  Recommendations and Planned Interventions:  [x]           Bed Mobility Training             [x]    Neuromuscular Re-Education  [x]           Transfer Training                   []    Orthotic/Prosthetic Training  [x]           Gait Training                          []    Modalities  [x]           Therapeutic Exercises          []    Edema Management/Control  [x]           Therapeutic Activities            [x]    Patient and Family Training/Education  []           Other (comment):    Frequency/Duration: Patient will be followed by physical therapy 1-2 times per day, 4-7 days per week to address goals. Discharge Recommendations: Aron Rendon  Further Equipment Recommendations for Discharge: TBD     SUBJECTIVE:   Patient stated It's 1923.     OBJECTIVE DATA SUMMARY:     Past Medical History:   Diagnosis Date    Anemia NEC     Arthritis     Atrial fibrillation ([facility] [address]     Bursitis of left shoulder     Cardiac cath 01/27/2009    RCA mild. LM patent. pLAD 20%. Very dLAD w/significant tapering. CX mild.  Cardiac stress echo, abnormal 01/05/2009    Positive maximal stress echo by echo & EKG criteria w/o chest pain.  EF >65%. Sm, discrete apical, apical septal hypk.     Cough     Endocrine disease     Hyperlipidemia     Hypertension     Hypothyroidism     Osteopenia     Pulmonary hemorrhage     Pulmonary hypertension (HCC)     Rash and nonspecific skin eruption 9/2012    maculopapular on torso, arms, and upper legs    Reflux     Right hip pain     Right shoulder pain     Stenosing tenosynovitis of finger 04/2015 Bilateral index fingers    Trigger ring finger of right hand     Wears glasses      Past Surgical History:   Procedure Laterality Date    ABDOMEN SURGERY PROC UNLISTED      HX APPENDECTOMY      HX COLECTOMY      partial    HX HYSTERECTOMY      HX MOHS PROCEDURES Right 12/2010    Dr. Thomas Arms    HX OTHER SURGICAL      Two ribs removed    HX TONSILLECTOMY       Barriers to Learning/Limitations: yes;  altered mental status (i.e.Sedation, Confusion)  Compensate with: Visual Cues, Verbal Cues and Tactile Cues  Prior Level of Function/Home Situation: Patient reports living alone in single story home with 0 BRANDON, states she was ambulating without assistive device PTA. PLOF questionable d/t AMS, no family present for clarification/verification. Home Situation  Home Environment: Private residence (Per patient report)  # Steps to Enter: 0  One/Two Story Residence: One story  Support Systems: Assisted living  Patient Expects to be Discharged to[de-identified] Unknown  Current DME Used/Available at Home: None  Critical Behavior:  Neurologic State: Confused; Lethargic  Psychosocial  Patient Behaviors: Calm;Confused; Lethargic  Needs Expressed: Educational  Purposeful Interaction: Yes  Pt Identified Daily Priority: Clinical issues (comment)  Caritas Process: Teaching/learning; Attend basic human needs  Caring Interventions: Reassure  Reassure: Caring rounds  Strength:    Strength: Generally decreased, functional (BLE)  Tone & Sensation:   Tone: Normal (BLE)  Sensation: Intact (BLE)   Range Of Motion:  AROM: Within functional limits (BLE)  Functional Mobility:  Bed Mobility:  Supine to Sit:  (Attempted with Min/ModA to initiate)  Transfers:  Sit to Stand:  (N/A)  Balance:    N/A  Ambulation/Gait Training:   N/A  Pain:  Pre session: 0/10  Post session: 0/10  Activity Tolerance:   Fair/poor  Please refer to the flowsheet for vital signs taken during this treatment.   After treatment:   [] Patient left in no apparent distress sitting up in chair  [] Patient left sitting on EOB  [x] Patient left in no apparent distress in bed  [] Patient declined to be OOB at this time due to  [x] Call bell left within reach  [x] Nursing notified(Haylee)  [] Caregiver present  [x] Bed alarm activated  [] SCDs in place    COMMUNICATION/EDUCATION:   [x]         Fall prevention education was provided and the patient/caregiver indicated understanding. [x]         Patient/family have participated as able in goal setting and plan of care. []         Patient/family agree to work toward stated goals and plan of care. []         Patient understands intent and goals of therapy, but is neutral about his/her participation. [x]         Patient is unable to participate in goal setting and plan of care. Thank you for this referral.  Michael Smith   Time Calculation: 8 mins      Mobility  Current  CM= 80-99%   Goal  CJ= 20-39%. The severity rating is based on the Level of Assistance required for Functional Mobility and ADLs.     Eval Complexity: History: MEDIUM  Complexity : 1-2 comorbidities / personal factors will impact the outcome/ POC Exam:MEDIUM Complexity : 3 Standardized tests and measures addressing body structure, function, activity limitation and / or participation in recreation  Presentation: MEDIUM Complexity : Evolving with changing characteristics  Clinical Decision Making:Medium Complexity   Overall Complexity:MEDIUM

## 2018-06-12 NOTE — ROUTINE PROCESS
9872 Received lying in bed on contact isolation. A/OX2 name, place. Disoriented to time, place situation. Fall precautions, bed alarm on. Side rails x3. Appears to be in no resp distress. Moaniog and groaning. Medicated with Percocet 1 po. C/O abdominal pain 3/10. Applied Sensi care ointment to inner buttock. Skin red to touch, blanchable. Thin skin poor tugor. Incont of urine and stool. Ena care provided. Call bell phone within reach.

## 2018-06-12 NOTE — CDMP QUERY
Please clarify if this patient is being treated/managed for:    =>Hypothyroidism  =>Other Explanation of clinical findings  =>Unable to Determine (no explanation of clinical findings)    The medical record reflects the following:    Risk:elderly,     Clinical Indicators:Pt has history of hypothyroidism    Treatment: Synthroid    Please clarify and document your clinical opinion in the progress notes and discharge summary including the definitive and/or presumptive diagnosis, (suspected or probable), related to the above clinical findings. Please include clinical findings supporting your diagnosis. If you DECLINE this query or would like to communicate with [facility], please utilize the \"[facility] message box\" at the TOP of the Progress Note on the right.       Thank you,  Willis Gonzales RN [facility]  [phone]

## 2018-06-12 NOTE — PROGRESS NOTES
[facility] Hospitalist Group  Progress Note    Patient: Olga Cassidy Age: 80 y.o. : 1935 MR#: [id number] SSN: [id number]  Date: 2018     Subjective:   I have examined pt at the bedside this morning. Son and nurse at bedside. Pt alert and oriented to person and place. She is aware she lives alone in an assisted living facility, aware she is lives in [place], able to recognize and correctly idenity son. She is aware she is at [facility]. No focal neuro deficits. Strength 3/5 to all extremities. Pt unable to recall date/ time. Assessment/Plan:   1. FALL. Likely 2ry to progressive generalized weakness, hyponatremia discussed below. No acute injuries. imaging neg for acute traumatic findings. PT/OT rec SNF. Case management following. 2. Progressive generalized weakness over 2-3 months. In pt with hx A. Fib followed by Dr Ginny Hurst, hyponatremia, suspected CHF as discussed below. Imaging as discussed above. No neuro focal deficits. PT/OT rec SNF. Case management following. Anticipate discharge in 24-48 hrs. 3. Hyponatremia. Of unknown origin. in pt with poor po intake, CHF. Asymptomatic at this time. Sodium unchanged. Urine sodium 18, urine osmolarity 273. Continue gentle IVF X 500cc. Nephrology consult appreciated. Trend BMP. 4. Hypokalemia. Replaced. Repeat BMP in am. Pt also initiated on potassium daily supplement. 5. acute vs chronic bronchitis. Failed outpatient abx treatment for bronchitis per pt's son. No indications for CAP at this time. pt afebrile, no leukocytosis on CBC. CXR with mild vascular congestion. Continue Zithromax PO.     6. SOB likely 2ry to suspected CHF exacerbation. In pt bronchitis, with A. Fib. Elevated BNP. Echo EF 50%. Cardiology consult appreciated. SOB RESOLVED. 6. A. Fib with RVR. Rate controlled. Cardiology following. Continue ASA, BB, dig. NOT on [address] d/t history of significant upper GI bleed. 7. HTN.  Liable and difficult to control per son. Target SBP goal 140-150s per son. Slightly elevated in AM, pt was at echo during morning med pass. Continue home regimen, PRN hydralazine. Monitor. 8. HLD: Statin     9. Seasonal allergies, with rhinorrhea and post nasal drip per son. Continue home regimen. 10 ?confusion, currently A&OX2. 2ry to hyponatremia, ?underline dementia. Pt alert and oriented to person and place. She is aware she lives alone in an assisted living facility, aware she is lives in [place], able to recognize and correctly idenity son. She is aware she is at [facility]. No focal neuro deficits. Strength 3/5 to all extremities. Monitor. Will consider neuro consult if no improvement. Additional Notes:      Case discussed with:  [x]Patient  [x]Family  []Nursing  []Case Management  DVT Prophylaxis:  []Lovenox  [x]Hep SQ  []SCDs  []Coumadin   []On Heparin gtt    Objective:   VS:   Visit Vitals    BP (!) 181/97 (BP 1 Location: Left arm, BP Patient Position: At rest)    Pulse 84    Temp 96.5 °F (35.8 °C)    Resp 18    Wt 54 kg (119 lb)    SpO2 93%    Breastfeeding No    BMI 29.79 kg/m2      Tmax/24hrs: Temp (24hrs), Av.5 °F (36.4 °C), Min:96.4 °F (35.8 °C), Max:98.7 °F (37.1 °C)      Intake/Output Summary (Last 24 hours) at 18 1602  Last data filed at 18 1142   Gross per 24 hour   Intake              480 ml   Output                0 ml   Net              480 ml       General:  Alert, NAD  Cardiovascular:  RRR  Pulmonary:  LSC throughout; respiratory effort WNL  GI:  +BS in all four quadrants, soft, non-tender  Extremities:  No edema; 2+ dorsalis pedis pulses bilaterally  Neuro: alert and oriented X 2.      Labs:    Recent Results (from the past 24 hour(s))   OSMOLALITY, SERUM/PLASMA    Collection Time: 18  7:11 PM   Result Value Ref Range    Osmolality, serum/plasma 273 (L) 280 - 300 MOSM/kg Q6F   METABOLIC PANEL, BASIC    Collection Time: 18  2:12 AM   Result Value Ref Range    Sodium 128 (L) 136 - 145 mmol/L    Potassium 3.4 (L) 3.5 - 5.5 mmol/L    Chloride 95 (L) 100 - 108 mmol/L    CO2 22 21 - 32 mmol/L    Anion gap 11 3.0 - 18 mmol/L    Glucose 80 74 - 99 mg/dL    BUN 22 (H) 7.0 - 18 MG/DL    Creatinine 1.00 0.6 - 1.3 MG/DL    BUN/Creatinine ratio 22 (H) 12 - 20      GFR est AA >60 >60 ml/min/1.73m2    GFR est non-AA 53 (L) >60 ml/min/1.73m2    Calcium 7.8 (L) 8.5 - 10.1 MG/DL   TSH 3RD GENERATION    Collection Time: 06/12/18  2:12 AM   Result Value Ref Range    TSH 2.65 0.36 - 3.74 uIU/mL   CBC WITH AUTOMATED DIFF    Collection Time: 06/12/18  2:12 AM   Result Value Ref Range    WBC 12.7 4.6 - 13.2 K/uL    RBC 3.87 (L) 4.20 - 5.30 M/uL    HGB 10.9 (L) 12.0 - 16.0 g/dL    HCT 32.2 (L) 35.0 - 45.0 %    MCV 83.2 74.0 - 97.0 FL    MCH 28.2 24.0 - 34.0 PG    MCHC 33.9 31.0 - 37.0 g/dL    RDW 17.8 (H) 11.6 - 14.5 %    PLATELET 179 128 - 091 K/uL    MPV 10.8 9.2 - 11.8 FL    NEUTROPHILS 72 42 - 75 %    BAND NEUTROPHILS 1 0 - 5 %    LYMPHOCYTES 16 (L) 20 - 51 %    MONOCYTES 11 (H) 2 - 9 %    EOSINOPHILS 0 0 - 5 %    BASOPHILS 0 0 - 3 %    ABS. NEUTROPHILS 9.3 (H) 1.8 - 8.0 K/UL    ABS. LYMPHOCYTES 2.0 0.8 - 3.5 K/UL    ABS. MONOCYTES 1.4 (H) 0 - 1.0 K/UL    ABS. EOSINOPHILS 0.0 0.0 - 0.4 K/UL    ABS.  BASOPHILS 0.0 0.0 - 0.06 K/UL    DF MANUAL      PLATELET COMMENTS ADEQUATE PLATELETS      RBC COMMENTS ANISOCYTOSIS  1+        RBC COMMENTS POLYCHROMASIA  1+           Signed By: Aydin Smart NP     June 12, 2018

## 2018-06-12 NOTE — PROGRESS NOTES
visited with the family of Leatha Bolden, who is a 80 y.o.,female. The  provided the following Interventions:  Initiated a relationship of care and support. Offered prayer and assurance of continued prayers on patients behalf. Plan:  Chaplains will continue to follow and will provide pastoral care on an as needed/requested basis.  recommends bedside caregivers page  on duty if patient shows signs of acute spiritual or emotional distress.  visited with the family of Leatha Bolden, who is a 80 y.o.,female. The  provided the following Interventions:  Initiated a relationship of care and support. Offered prayer and assurance of continued prayers on patients behalf. Plan:  Chaplains will continue to follow and will provide pastoral care on an as needed/requested basis.  recommends bedside caregivers page  on duty if patient shows signs of acute spiritual or emotional distress.

## 2018-06-12 NOTE — PROGRESS NOTES
Problem: Falls - Risk of  Goal: *Absence of Falls  Document Ronnie Fall Risk and appropriate interventions in the flowsheet.    Outcome: Progressing Towards Goal  Fall Risk Interventions:  Mobility Interventions: Bed/chair exit alarm    Mentation Interventions: Door open when patient unattended    Medication Interventions: Bed/chair exit alarm    Elimination Interventions: Bed/chair exit alarm, Call light in reach    History of Falls Interventions: Consult care management for discharge planning, Door open when patient unattended

## 2018-06-12 NOTE — PROGRESS NOTES
PT orders received, chart reviewed. Pt unable to participate with PT due to:  []  Nausea/vomiting  []  Eating  []  Pain  []  Pt lethargic  [x]  Off Unit  Will f/u later as patient's schedule allows. Thank you.   Alexa Beltran PT, DPT

## 2018-06-13 LAB
ANION GAP SERPL CALC-SCNC: 9 MMOL/L (ref 3–18)
BASOPHILS # BLD: 0 K/UL (ref 0–0.06)
BASOPHILS NFR BLD: 0 % (ref 0–3)
BUN SERPL-MCNC: 24 MG/DL (ref 7–18)
BUN/CREAT SERPL: 26 (ref 12–20)
CALCIUM SERPL-MCNC: 7.9 MG/DL (ref 8.5–10.1)
CHLORIDE SERPL-SCNC: 95 MMOL/L (ref 100–108)
CO2 SERPL-SCNC: 22 MMOL/L (ref 21–32)
CREAT SERPL-MCNC: 0.91 MG/DL (ref 0.6–1.3)
DIFFERENTIAL METHOD BLD: ABNORMAL
EOSINOPHIL # BLD: 0.2 K/UL (ref 0–0.4)
EOSINOPHIL NFR BLD: 1 % (ref 0–5)
ERYTHROCYTE [DISTWIDTH] IN BLOOD BY AUTOMATED COUNT: 17.8 % (ref 11.6–14.5)
GLUCOSE SERPL-MCNC: 108 MG/DL (ref 74–99)
HCT VFR BLD AUTO: 36.7 % (ref 35–45)
HGB BLD-MCNC: 12.7 G/DL (ref 12–16)
LYMPHOCYTES # BLD: 1.3 K/UL (ref 0.8–3.5)
LYMPHOCYTES NFR BLD: 8 % (ref 20–51)
MCH RBC QN AUTO: 28.4 PG (ref 24–34)
MCHC RBC AUTO-ENTMCNC: 34.6 G/DL (ref 31–37)
MCV RBC AUTO: 82.1 FL (ref 74–97)
METAMYELOCYTES NFR BLD MANUAL: 2 %
MONOCYTES # BLD: 2.2 K/UL (ref 0–1)
MONOCYTES NFR BLD: 14 % (ref 2–9)
NEUTS BAND NFR BLD MANUAL: 3 % (ref 0–5)
NEUTS SEG # BLD: 12 K/UL (ref 1.8–8)
NEUTS SEG NFR BLD: 72 % (ref 42–75)
PLATELET # BLD AUTO: 289 K/UL (ref 135–420)
PLATELET COMMENTS,PCOM: ABNORMAL
PMV BLD AUTO: 10.2 FL (ref 9.2–11.8)
POTASSIUM SERPL-SCNC: 3.8 MMOL/L (ref 3.5–5.5)
RBC # BLD AUTO: 4.47 M/UL (ref 4.2–5.3)
RBC MORPH BLD: ABNORMAL
SODIUM SERPL-SCNC: 126 MMOL/L (ref 136–145)
WBC # BLD AUTO: 16 K/UL (ref 4.6–13.2)

## 2018-06-13 PROCEDURE — 74011000250 HC RX REV CODE- 250: Performed by: FAMILY MEDICINE

## 2018-06-13 PROCEDURE — 77030038269 HC DRN EXT URIN PURWCK BARD -A

## 2018-06-13 PROCEDURE — 80048 BASIC METABOLIC PNL TOTAL CA: CPT | Performed by: NURSE PRACTITIONER

## 2018-06-13 PROCEDURE — 36415 COLL VENOUS BLD VENIPUNCTURE: CPT | Performed by: NURSE PRACTITIONER

## 2018-06-13 PROCEDURE — 74011250637 HC RX REV CODE- 250/637: Performed by: INTERNAL MEDICINE

## 2018-06-13 PROCEDURE — 94760 N-INVAS EAR/PLS OXIMETRY 1: CPT

## 2018-06-13 PROCEDURE — 65270000029 HC RM PRIVATE

## 2018-06-13 PROCEDURE — 85025 COMPLETE CBC W/AUTO DIFF WBC: CPT | Performed by: NURSE PRACTITIONER

## 2018-06-13 PROCEDURE — 74011250637 HC RX REV CODE- 250/637: Performed by: NURSE PRACTITIONER

## 2018-06-13 PROCEDURE — 94640 AIRWAY INHALATION TREATMENT: CPT

## 2018-06-13 PROCEDURE — 74011250636 HC RX REV CODE- 250/636: Performed by: NURSE PRACTITIONER

## 2018-06-13 RX ORDER — LORATADINE 10 MG/1
10 TABLET ORAL
Status: DISCONTINUED | OUTPATIENT
Start: 2018-06-15 | End: 2018-06-15 | Stop reason: HOSPADM

## 2018-06-13 RX ORDER — SODIUM CHLORIDE 9 MG/ML
25 INJECTION, SOLUTION INTRAVENOUS ONCE
Status: COMPLETED | OUTPATIENT
Start: 2018-06-13 | End: 2018-06-13

## 2018-06-13 RX ORDER — HYDRALAZINE HYDROCHLORIDE 25 MG/1
25 TABLET, FILM COATED ORAL 3 TIMES DAILY
Status: DISCONTINUED | OUTPATIENT
Start: 2018-06-13 | End: 2018-06-15 | Stop reason: HOSPADM

## 2018-06-13 RX ADMIN — TRAMADOL HYDROCHLORIDE 25 MG: 50 TABLET, FILM COATED ORAL at 09:40

## 2018-06-13 RX ADMIN — THERA TABS 1 TABLET: TAB at 09:40

## 2018-06-13 RX ADMIN — METOPROLOL TARTRATE 50 MG: 50 TABLET ORAL at 21:22

## 2018-06-13 RX ADMIN — PANTOPRAZOLE SODIUM 40 MG: 40 TABLET, DELAYED RELEASE ORAL at 09:40

## 2018-06-13 RX ADMIN — HYDRALAZINE HYDROCHLORIDE 25 MG: 25 TABLET, FILM COATED ORAL at 16:54

## 2018-06-13 RX ADMIN — VITAMIN D, TAB 1000IU (100/BT) 1000 UNITS: 25 TAB at 09:40

## 2018-06-13 RX ADMIN — Medication 10 ML: at 16:44

## 2018-06-13 RX ADMIN — LORATADINE 10 MG: 10 TABLET ORAL at 09:40

## 2018-06-13 RX ADMIN — ASPIRIN 81 MG CHEWABLE TABLET 81 MG: 81 TABLET CHEWABLE at 09:40

## 2018-06-13 RX ADMIN — LACTOBACILLUS TAB 2 TABLET: TAB at 09:40

## 2018-06-13 RX ADMIN — AZITHROMYCIN 500 MG: 250 TABLET, FILM COATED ORAL at 09:40

## 2018-06-13 RX ADMIN — LEVOTHYROXINE SODIUM 100 MCG: 100 TABLET ORAL at 09:40

## 2018-06-13 RX ADMIN — POTASSIUM CHLORIDE 20 MEQ: 20 TABLET, EXTENDED RELEASE ORAL at 19:02

## 2018-06-13 RX ADMIN — Medication 10 ML: at 21:29

## 2018-06-13 RX ADMIN — POLYVINYL ALCOHOL 1 DROP: 14 SOLUTION/ DROPS OPHTHALMIC at 21:22

## 2018-06-13 RX ADMIN — ALBUTEROL SULFATE 2.5 MG: 2.5 SOLUTION RESPIRATORY (INHALATION) at 08:23

## 2018-06-13 RX ADMIN — Medication 10 ML: at 06:00

## 2018-06-13 RX ADMIN — HYDRALAZINE HYDROCHLORIDE 50 MG: 50 TABLET, FILM COATED ORAL at 09:40

## 2018-06-13 RX ADMIN — HYDRALAZINE HYDROCHLORIDE 25 MG: 25 TABLET, FILM COATED ORAL at 21:22

## 2018-06-13 RX ADMIN — POTASSIUM CHLORIDE 20 MEQ: 20 TABLET, EXTENDED RELEASE ORAL at 09:40

## 2018-06-13 RX ADMIN — SODIUM CHLORIDE 25 ML/HR: 900 INJECTION, SOLUTION INTRAVENOUS at 10:00

## 2018-06-13 RX ADMIN — ALBUTEROL SULFATE 2.5 MG: 2.5 SOLUTION RESPIRATORY (INHALATION) at 20:09

## 2018-06-13 RX ADMIN — METOPROLOL TARTRATE 50 MG: 50 TABLET ORAL at 09:40

## 2018-06-13 RX ADMIN — DIGOXIN 0.12 MG: 0.25 TABLET ORAL at 14:47

## 2018-06-13 RX ADMIN — NIFEDIPINE 60 MG: 30 TABLET, FILM COATED, EXTENDED RELEASE ORAL at 09:40

## 2018-06-13 RX ADMIN — MELATONIN TAB 3 MG 3 MG: 3 TAB at 21:22

## 2018-06-13 RX ADMIN — LACTOBACILLUS TAB 2 TABLET: TAB at 16:54

## 2018-06-13 RX ADMIN — BENZONATATE 200 MG: 100 CAPSULE ORAL at 21:22

## 2018-06-13 RX ADMIN — FLUTICASONE FUROATE 1 PUFF: 100 POWDER RESPIRATORY (INHALATION) at 08:23

## 2018-06-13 RX ADMIN — Medication 1 CAPSULE: at 09:40

## 2018-06-13 RX ADMIN — MONTELUKAST SODIUM 10 MG: 10 TABLET, FILM COATED ORAL at 21:22

## 2018-06-13 RX ADMIN — LACTOBACILLUS TAB 2 TABLET: TAB at 21:22

## 2018-06-13 RX ADMIN — SIMVASTATIN 20 MG: 20 TABLET, FILM COATED ORAL at 21:22

## 2018-06-13 NOTE — PROGRESS NOTES
ARU/IPR REFERRAL CONTACT NOTE  [facility]      RE:  Sania Cagle                Thank you for the opportunity to review this patient's case for admission to [facility]. Per further review with team,  patient does not meet criteria for admission to [facility].  Documents do not reflect rehab diagnosis.  [address] 3  upon discharge. Again, Thank you for this referral. Should you have any questions please do not hesitate to call. Sincerely,  Clarisa Rodriguez.  [address] [facility]  [phone]

## 2018-06-13 NOTE — PROGRESS NOTES
Spoke with Jacques Ruggiero at [facility] re: acceptance to the facility. Requesting therapy notes.

## 2018-06-13 NOTE — PROGRESS NOTES
[facility] - Progress Note  Admit Date: 6/10/2018    Assessment:     Hospital Problems  Date Reviewed: 6/11/2018          Codes Class Noted POA    Weakness ICD-10-CM: R53.1  ICD-9-CM: 780.79  6/11/2018 Unknown        Injury to ligament of cervical spine ICD-10-CM: S13. 4XXA  ICD-9-CM: 847.0  6/11/2018 Unknown        Hyponatremia ICD-10-CM: E87.1  ICD-9-CM: 276.1  6/11/2018 Unknown        Fall ICD-10-CM: [address] [place]  ICD-9-CM: E888.9  6/11/2018 Unknown        Afib ([address] ICD-10-CM: I48.91  ICD-9-CM: 427.31  6/11/2018 Unknown        * (Principal)Pneumonia ICD-10-CM: J18.9  ICD-9-CM: 901  2/9/2018 Unknown               -Generalized weakness with fall, no acute injuries, CT head negative. -URI, bronchitis picture, no fevers or white count.  -SOB, multifactorial with bronchitis and possible diastolic HF exacerbation, BNP 8k, echo Jan 2017 with normal LV function and G2DD, elevated PA pressures at 51mmHg, mild to mod mitral regurge  -Hyponatremia, on IVF  -Hx of afib, rates controlled, on BB and low dose digoxin as outpatient. NOT [place] [facility] [place] of significant upper GI bleed. (see Maury's notes)  -Confusion, 2/2 to hyponatremia? Unclear of patient's baseline mentation given no family is present  -HTN hx, controlled  -HLD, on statin       Primary cardiologist Dr Abdul Eisenmenger:     Echo with EF 13% with diastolic dysfunction. No evidence of decompensated heart failure. Diuretics remain on hold. Ok to hydrate in setting of infectious process. Hemodynamics stable, rate stable, continue current regimen. Subjective:     No acute events.     Objective:      Patient Vitals for the past 8 hrs:   Temp Pulse Resp BP SpO2   06/13/18 1115 97.1 °F (36.2 °C) 86 18 (!) 137/99 91 %   06/13/18 0836 97.1 °F (36.2 °C) 77 18 133/59 95 %   06/13/18 0352 97.3 °F (36.3 °C) 92 20 136/79 95 %         Patient Vitals for the past 96 hrs:   Weight   06/13/18 0411 54 kg (119 lb 1.6 oz)   06/11/18 1655 54 kg (119 lb) Intake/Output Summary (Last 24 hours) at 06/13/18 1130  Last data filed at 06/12/18 2109   Gross per 24 hour   Intake              360 ml   Output              200 ml   Net              160 ml       Physical Exam:  General:  no distress  Neck:  no JVD  Lungs:  clear to auscultation bilaterally  Heart:  irregularly irregular rhythm  Abdomen:  abdomen is soft without significant tenderness, masses, organomegaly or guarding  Extremities:  extremities normal, atraumatic, no cyanosis or edema    Data Review:     Labs: Results:       Chemistry Recent Labs      06/13/18   0119  06/12/18   0212  06/11/18   1530   06/11/18   0057   GLU  108*  80  85   < >  101*   NA  126*  128*  129*   < >  125*   K  3.8  3.4*  3.6   < >  3.7   CL  95*  95*  94*   < >  89*   CO2  22  22  23   < >  26   BUN  24*  22*  22*   < >  25*   CREA  0.91  1.00  1.16   < >  1.17   CA  7.9*  7.8*  7.7*   < >  8.1*   MG   --    --    --    --   1.9   AGAP  9  11  12   < >  10   BUCR  26*  22*  19   < >  21*   AP   --    --    --    --   99   TP   --    --    --    --   8.1   ALB   --    --    --    --   3.0*   GLOB   --    --    --    --   5.1*   AGRAT   --    --    --    --   0.6*    < > = values in this interval not displayed.       CBC w/Diff Recent Labs      06/13/18   0119  06/12/18   0212  06/10/18   2350   WBC  16.0*  12.7  12.0   RBC  4.47  3.87*  3.94*   HGB  12.7  10.9*  11.2*   HCT  36.7  32.2*  32.3*   PLT  289  229  243   GRANS  72  72  71   LYMPH  8*  16*  15*   EOS  1  0  0      Cardiac Enzymes No results found for: CPK, CK, CKMMB, CKMB, RCK3, CKMBT, CKNDX, CKND1, AFRICA, TROPT, TROIQ, DIONNE, TROPT, TNIPOC, BNP, BNPP   Coagulation Recent Labs      06/10/18   2350   PTP  14.7   INR  1.2       Lipid Panel No results found for: CHOL, CHOLPOCT, CHOLX, CHLST, CHOLV, 321250, HDL, LDL, LDLC, DLDLP, 929411, VLDLC, VLDL, TGLX, TRIGL, TRIGP, TGLPOCT, CHHD, CHHDX   BNP No results found for: BNP, BNPP, XBNPT   Liver Enzymes Recent Labs 06/11/18   0057   TP  8.1   ALB  3.0*   AP  99   SGOT  100*      Digoxin    Thyroid Studies Lab Results   Component Value Date/Time    TSH 2.65 06/12/2018 02:12 AM          Signed By: YULI Gifford     June 13, 2018

## 2018-06-13 NOTE — PROGRESS NOTES
ARU/IPR REFERRAL CONTACT NOTE  [address] [place] [address] Physical Rehabilitation    RE: Marina Mckeon  Referral received to review this patient's case for admission to [address] [place] [address] Physical [facility]. Current status reviewed.  When appropriate, will need OT evaluation/treatment on this patient to complete the pre-admission evaluation.  Will continue to follow. Thank you for this referral.  Should you have any questions please do not hesitate to call. Sincerely,  Rudolph Polanco.  [address] [place] [address] Physical Rehabilitation  [phone]

## 2018-06-13 NOTE — PROGRESS NOTES
[facility] Hospitalist Group  Progress Note    Patient: Rina Kong Age: 80 y.o. : 1935 MR#: [id number] SSN: [id number]  Date: 2018     Subjective:   I have examined pt at the bedside this morning. Son and nurse at bedside, endorsing improvement in cognition, appetite. Son requesting ensure supplements with meals. Assessment/Plan:   1. FALL. Likely 2ry to progressive generalized weakness, hyponatremia discussed below. No acute injuries. imaging neg for acute traumatic findings. PT/OT rec SNF. Case management following. 2. Progressive generalized weakness over 2-3 months. IMPROVED. In pt with hx A. Fib followed by Dr Leena Reyes, hyponatremia, suspected CHF as discussed below. Imaging as discussed above. No neuro focal deficits. PT/OT rec SNF. Case management following. Anticipate discharge in 24-48 hrs to SNF. 3. Hyponatremia. ? Suspect higher ADH in setting of URI, poor po intake, CHF. Asymptomatic at this time. Sodium down-trending in setting of gentle IVF. Urine sodium 18, urine osmolarity 273. Nephrology consult appreciated. Pt initiated on fluid restriction, urine sodium, potassium, osmolarity daily. Trend BMP. 4. Hypokalemia. Replaced. Repeat BMP in am. Pt also initiated on potassium daily supplement. 5. acute vs chronic bronchitis. IMPROVED. Failed outpatient abx treatment for bronchitis per pt's son. No indications for CAP at this time. pt afebrile, no leukocytosis on CBC. CXR with mild vascular congestion. Continue Zithromax PO.     6. SOB likely 2ry to suspected CHF exacerbation. In pt bronchitis, with A. Fib. Elevated BNP. Echo EF 50%. Cardiology consult appreciated. SOB RESOLVED. 6. A. Fib with RVR. Rate controlled. Cardiology following. Continue ASA, BB, dig. NOT on [address] d/t history of significant upper GI bleed. 7. HTN. Liable and difficult to control per son. Target SBP goal 140-150s per son. Continue home regimen, PRN hydralazine. Monitor. 8. HLD: Statin     9. Seasonal allergies, with rhinorrhea and post nasal drip per son. Continue home regimen. 10 ?confusion, currently A&OX2. 2ry to hyponatremia, ?underline dementia. Son reports IMPROVED cognition this morning. Pt alert and oriented to person and place. She is aware she lives alone in an assisted living facility, aware she is lives in [place], able to recognize and correctly idenity son. She is aware she is at [facility]. No focal neuro deficits. Strength 3/5 to all extremities. Monitor. Additional Notes:      Case discussed with:  [x]Patient  [x]Family  []Nursing  []Case Management  DVT Prophylaxis:  []Lovenox  [x]Hep SQ  []SCDs  []Coumadin   []On Heparin gtt    Objective:   VS:   Visit Vitals    /82 (BP 1 Location: Right arm, BP Patient Position: At rest)    Pulse 78    Temp 97.5 °F (36.4 °C)    Resp 18    Wt 54 kg (119 lb 1.6 oz)    SpO2 96%    Breastfeeding No    BMI 29.81 kg/m2      Tmax/24hrs: Temp (24hrs), Av.5 °F (36.4 °C), Min:97.1 °F (36.2 °C), Max:98.2 °F (36.8 °C)      Intake/Output Summary (Last 24 hours) at 18 1534  Last data filed at 18 1200   Gross per 24 hour   Intake              240 ml   Output              200 ml   Net               40 ml       General:  Alert, NAD  Cardiovascular:  RRR  Pulmonary:  LSC throughout; respiratory effort WNL  GI:  +BS in all four quadrants, soft, non-tender  Extremities:  No edema; 2+ dorsalis pedis pulses bilaterally  Neuro: alert and oriented X 2.      Labs:    Recent Results (from the past 24 hour(s))   CBC WITH AUTOMATED DIFF    Collection Time: 18  1:19 AM   Result Value Ref Range    WBC 16.0 (H) 4.6 - 13.2 K/uL    RBC 4.47 4.20 - 5.30 M/uL    HGB 12.7 12.0 - 16.0 g/dL    HCT 36.7 35.0 - 45.0 %    MCV 82.1 74.0 - 97.0 FL    MCH 28.4 24.0 - 34.0 PG    MCHC 34.6 31.0 - 37.0 g/dL    RDW 17.8 (H) 11.6 - 14.5 %    PLATELET 314 833 - 855 K/uL    MPV 10.2 9.2 - 11.8 FL    NEUTROPHILS 72 42 - 75 % BAND NEUTROPHILS 3 0 - 5 %    LYMPHOCYTES 8 (L) 20 - 51 %    MONOCYTES 14 (H) 2 - 9 %    EOSINOPHILS 1 0 - 5 %    BASOPHILS 0 0 - 3 %    METAMYELOCYTES 2 (H) 0 %    ABS. NEUTROPHILS 12.0 (H) 1.8 - 8.0 K/UL    ABS. LYMPHOCYTES 1.3 0.8 - 3.5 K/UL    ABS. MONOCYTES 2.2 (H) 0 - 1.0 K/UL    ABS. EOSINOPHILS 0.2 0.0 - 0.4 K/UL    ABS.  BASOPHILS 0.0 0.0 - 0.06 K/UL    DF AUTOMATED      PLATELET COMMENTS ADEQUATE PLATELETS      RBC COMMENTS ANISOCYTOSIS  1+       METABOLIC PANEL, BASIC    Collection Time: 06/13/18  1:19 AM   Result Value Ref Range    Sodium 126 (L) 136 - 145 mmol/L    Potassium 3.8 3.5 - 5.5 mmol/L    Chloride 95 (L) 100 - 108 mmol/L    CO2 22 21 - 32 mmol/L    Anion gap 9 3.0 - 18 mmol/L    Glucose 108 (H) 74 - 99 mg/dL    BUN 24 (H) 7.0 - 18 MG/DL    Creatinine 0.91 0.6 - 1.3 MG/DL    BUN/Creatinine ratio 26 (H) 12 - 20      GFR est AA >60 >60 ml/min/1.73m2    GFR est non-AA 59 (L) >60 ml/min/1.73m2    Calcium 7.9 (L) 8.5 - 10.1 MG/DL       Signed By: Niki Greer NP     June 13, 2018

## 2018-06-13 NOTE — PROGRESS NOTES
ARU/IPR REFERRAL CONTACT NOTE  [address] [place] [address] Physical Rehabilitation    RE: Anais Lowery    Referral received to review this patient's case for admission to [address] [place] [address] Physical Rehabilitation. Current status reviewed.  When appropriate, will need OT evaluation/treatment on this patient to complete the pre-admission evaluation.  Will continue to follow. Thank you for this referral.  Should you have any questions please do not hesitate to call. Sincerely,  Juan Manuel Moran.  [address] [place] [address] Physical Rehabilitation  [phone]

## 2018-06-13 NOTE — PROGRESS NOTES
9777.. Bedside and Verbal shift change report given to Leeanna Charels RN and Elizabeth Crystal RN (oncoming nurse) by Abisai Negrete RN (offgoing nurse). Report included the following information SBAR, Intake/Output, MAR, Recent Results and Cardiac Rhythm controlled Afib.

## 2018-06-13 NOTE — PROGRESS NOTES
RENAL DAILY PROGRESS NOTE            80y F with PMH HTN, presented to ER with weakness, fall, seen for hyponatremia   Subjective:   Complaint:   Overnight events noted  Son at bedside  Appears more awake compare to yesterday      IMPRESSION:   · Hyponatremia, acute, suspect higher ADH from URI  · Hypokalemia, mild   · HTN  · Fall, weakness   PLAN:   · Her sodium trending down with saline and she has very higher urine osmolarity suggestive of higher ADH status. · Will start her on fluid restriction . · Check urine sodium , potassium and osmoarity today   · Need urine osmolarity   · Strict I and O     discussed with Dr. Randolph Tavarez.          Current Facility-Administered Medications   Medication Dose Route Frequency    [START ON 6/15/2018] loratadine (CLARITIN) tablet 10 mg  10 mg Oral Q48H    potassium chloride (K-DUR, KLOR-CON) SR tablet 20 mEq  20 mEq Oral BID    albuterol (PROVENTIL VENTOLIN) nebulizer solution 2.5 mg  2.5 mg Nebulization TID    hydrALAZINE (APRESOLINE) 20 mg/mL injection 10 mg  10 mg IntraVENous Q6H PRN    azithromycin (ZITHROMAX) tablet 500 mg  500 mg Oral DAILY    montelukast (SINGULAIR) tablet 10 mg  10 mg Oral QHS    acetaminophen (TYLENOL) tablet 650 mg  650 mg Oral Q4H PRN    digoxin (LANOXIN) tablet 0.125 mg  0.125 mg Oral EVERY OTHER DAY    levothyroxine (SYNTHROID) tablet 100 mcg  100 mcg Oral ACB    pantoprazole (PROTONIX) tablet 40 mg  40 mg Oral ACB    cholecalciferol (VITAMIN D3) tablet 1,000 Units  1,000 Units Oral DAILY    therapeutic multivitamin (THERAGRAN) tablet 1 Tab  1 Tab Oral DAILY    melatonin tablet 3 mg  3 mg Oral QHS    bisacodyl (DULCOLAX) tablet 10 mg  10 mg Oral DAILY PRN    hydrALAZINE (APRESOLINE) tablet 50 mg  50 mg Oral TID    traMADol (ULTRAM) tablet 25 mg  25 mg Oral DAILY    traZODone (DESYREL) tablet 25 mg  25 mg Oral QHS PRN    NIFEdipine ER (PROCARDIA XL) tablet 60 mg  60 mg Oral DAILY    aspirin chewable tablet 81 mg  81 mg Oral DAILY    Lactobacillus Acidkathy & Bulgar [facility]) tablet 2 Tab  2 Tab Oral TID    fluticasone furoate (ARNUITY ELLIPTA) 100 mcg/puff  1 Puff Inhalation DAILY    polyvinyl alcohol (LIQUIFILM TEARS) 1.4 % ophthalmic solution 1 Drop  1 Drop Both Eyes QHS    fish oil-omega-3 fatty acids 340-1,000 mg capsule 1 Cap  1 Cap Oral DAILY    sodium chloride (NS) flush 5-10 mL  5-10 mL IntraVENous Q8H    sodium chloride (NS) flush 5-10 mL  5-10 mL IntraVENous PRN    oxyCODONE-acetaminophen (PERCOCET) 5-325 mg per tablet 1 Tab  1 Tab Oral Q4H PRN    naloxone (NARCAN) injection 0.4 mg  0.4 mg IntraVENous PRN    metoprolol tartrate (LOPRESSOR) tablet 50 mg  50 mg Oral Q12H    simvastatin (ZOCOR) tablet 20 mg  20 mg Oral QHS    benzonatate (TESSALON) capsule 200 mg  200 mg Oral TID PRN       Review of Symptoms: as above   Objective:     Patient Vitals for the past 24 hrs:   Temp Pulse Resp BP SpO2   06/13/18 1517 97.5 °F (36.4 °C) 78 18 143/82 96 %   06/13/18 1115 97.1 °F (36.2 °C) 86 18 (!) 137/99 91 %   06/13/18 0836 97.1 °F (36.2 °C) 77 18 133/59 95 %   06/13/18 0352 97.3 °F (36.3 °C) 92 20 136/79 95 %   06/13/18 0026 97.3 °F (36.3 °C) 78 18 121/72 97 %   06/12/18 2104 - - - - 92 %   06/12/18 2000 98.2 °F (36.8 °C) 85 18 141/82 90 %   06/12/18 1727 - - - 124/71 -   06/12/18 1623 97.7 °F (36.5 °C) 79 18 (!) 180/92 97 %        Weight change: 0.045 kg (1.6 oz)     06/11 1901 - 06/13 0700  In: 480 [P.O.:480]  Out: 200 [Urine:200]    Intake/Output Summary (Last 24 hours) at 06/13/18 1527  Last data filed at 06/12/18 2109   Gross per 24 hour   Intake              120 ml   Output              200 ml   Net              -80 ml     Physical Exam:   General: comfortable, no acute distress   HEENT sclera anicteric, supple neck, no thyromegaly  CVS: S1S2 heard,  no rub  RS: + air entry b/l,   Abd: Soft, Non tender,   Neuro:  confused    Extrm: no edema, no cyanosis, clubbing   Skin: dry  Musculoskeletal: No gross joints or bone deformities          Data Review:     LABS:   Hematology:   Recent Labs      06/13/18   0119  06/12/18   0212  06/10/18   2350   WBC  16.0*  12.7  12.0   HGB  12.7  10.9*  11.2*   HCT  36.7  32.2*  32.3*     Chemistry:   Recent Labs      06/13/18   0119  06/12/18   0212  06/11/18   1530  06/11/18   1048  06/11/18   0057   BUN  24*  22*  22*  25*  25*   CREA  0.91  1.00  1.16  1.13  1.17   CA  7.9*  7.8*  7.7*  7.5*  8.1*   ALB   --    --    --    --   3.0*   K  3.8  3.4*  3.6  3.2*  3.7   NA  126*  128*  129*  127*  125*   CL  95*  95*  94*  94*  89*   CO2  22  22  23  22  26   GLU  108*  80  85  90  101*            Procedures/imaging: see electronic medical records for all procedures, Xrays and details which were not copied into this note but were reviewed prior to creation of Plan          Assessment & Plan:             Marylynn Soulier, MD  6/13/2018  5:58 PM

## 2018-06-14 LAB
ANION GAP SERPL CALC-SCNC: 8 MMOL/L (ref 3–18)
BASOPHILS # BLD: 0 K/UL (ref 0–0.06)
BASOPHILS NFR BLD: 0 % (ref 0–3)
BUN SERPL-MCNC: 21 MG/DL (ref 7–18)
BUN/CREAT SERPL: 22 (ref 12–20)
CALCIUM SERPL-MCNC: 8.8 MG/DL (ref 8.5–10.1)
CHLORIDE SERPL-SCNC: 98 MMOL/L (ref 100–108)
CO2 SERPL-SCNC: 23 MMOL/L (ref 21–32)
CREAT SERPL-MCNC: 0.96 MG/DL (ref 0.6–1.3)
DIFFERENTIAL METHOD BLD: ABNORMAL
EOSINOPHIL # BLD: 0.5 K/UL (ref 0–0.4)
EOSINOPHIL NFR BLD: 4 % (ref 0–5)
ERYTHROCYTE [DISTWIDTH] IN BLOOD BY AUTOMATED COUNT: 18.3 % (ref 11.6–14.5)
GLUCOSE SERPL-MCNC: 125 MG/DL (ref 74–99)
HCT VFR BLD AUTO: 35.1 % (ref 35–45)
HGB BLD-MCNC: 11.8 G/DL (ref 12–16)
LYMPHOCYTES # BLD: 2.9 K/UL (ref 0.8–3.5)
LYMPHOCYTES NFR BLD: 22 % (ref 20–51)
MCH RBC QN AUTO: 28.4 PG (ref 24–34)
MCHC RBC AUTO-ENTMCNC: 33.6 G/DL (ref 31–37)
MCV RBC AUTO: 84.6 FL (ref 74–97)
MONOCYTES # BLD: 2 K/UL (ref 0–1)
MONOCYTES NFR BLD: 15 % (ref 2–9)
NEUTS SEG # BLD: 7.9 K/UL (ref 1.8–8)
NEUTS SEG NFR BLD: 59 % (ref 42–75)
PLATELET # BLD AUTO: 309 K/UL (ref 135–420)
PLATELET COMMENTS,PCOM: ABNORMAL
PMV BLD AUTO: 10.2 FL (ref 9.2–11.8)
POTASSIUM SERPL-SCNC: 4 MMOL/L (ref 3.5–5.5)
RBC # BLD AUTO: 4.15 M/UL (ref 4.2–5.3)
RBC MORPH BLD: ABNORMAL
RBC MORPH BLD: ABNORMAL
SODIUM SERPL-SCNC: 129 MMOL/L (ref 136–145)
WBC # BLD AUTO: 13.3 K/UL (ref 4.6–13.2)

## 2018-06-14 PROCEDURE — 80048 BASIC METABOLIC PNL TOTAL CA: CPT | Performed by: NURSE PRACTITIONER

## 2018-06-14 PROCEDURE — 74011000250 HC RX REV CODE- 250: Performed by: FAMILY MEDICINE

## 2018-06-14 PROCEDURE — 97530 THERAPEUTIC ACTIVITIES: CPT

## 2018-06-14 PROCEDURE — 94640 AIRWAY INHALATION TREATMENT: CPT

## 2018-06-14 PROCEDURE — 36415 COLL VENOUS BLD VENIPUNCTURE: CPT | Performed by: NURSE PRACTITIONER

## 2018-06-14 PROCEDURE — 93005 ELECTROCARDIOGRAM TRACING: CPT

## 2018-06-14 PROCEDURE — 74011250637 HC RX REV CODE- 250/637: Performed by: INTERNAL MEDICINE

## 2018-06-14 PROCEDURE — 65270000029 HC RM PRIVATE

## 2018-06-14 PROCEDURE — 97166 OT EVAL MOD COMPLEX 45 MIN: CPT

## 2018-06-14 PROCEDURE — 85025 COMPLETE CBC W/AUTO DIFF WBC: CPT | Performed by: NURSE PRACTITIONER

## 2018-06-14 PROCEDURE — 97116 GAIT TRAINING THERAPY: CPT

## 2018-06-14 PROCEDURE — 74011250637 HC RX REV CODE- 250/637: Performed by: NURSE PRACTITIONER

## 2018-06-14 PROCEDURE — 97110 THERAPEUTIC EXERCISES: CPT

## 2018-06-14 RX ORDER — AZITHROMYCIN 250 MG/1
500 TABLET, FILM COATED ORAL DAILY
Qty: 1 TAB | Refills: 0 | Status: SHIPPED | OUTPATIENT
Start: 2018-06-14 | End: 2018-06-15

## 2018-06-14 RX ORDER — BENZONATATE 200 MG/1
200 CAPSULE ORAL
Qty: 10 CAP | Refills: 0 | Status: SHIPPED | OUTPATIENT
Start: 2018-06-14 | End: 2018-06-21

## 2018-06-14 RX ADMIN — BENZONATATE 200 MG: 100 CAPSULE ORAL at 17:42

## 2018-06-14 RX ADMIN — HYDRALAZINE HYDROCHLORIDE 25 MG: 25 TABLET, FILM COATED ORAL at 09:08

## 2018-06-14 RX ADMIN — MONTELUKAST SODIUM 10 MG: 10 TABLET, FILM COATED ORAL at 21:50

## 2018-06-14 RX ADMIN — MELATONIN TAB 3 MG 3 MG: 3 TAB at 21:50

## 2018-06-14 RX ADMIN — LEVOTHYROXINE SODIUM 100 MCG: 100 TABLET ORAL at 09:08

## 2018-06-14 RX ADMIN — METOPROLOL TARTRATE 50 MG: 50 TABLET ORAL at 09:07

## 2018-06-14 RX ADMIN — Medication 10 ML: at 21:51

## 2018-06-14 RX ADMIN — LACTOBACILLUS TAB 2 TABLET: TAB at 17:17

## 2018-06-14 RX ADMIN — ASPIRIN 81 MG CHEWABLE TABLET 81 MG: 81 TABLET CHEWABLE at 09:07

## 2018-06-14 RX ADMIN — ALBUTEROL SULFATE 2.5 MG: 2.5 SOLUTION RESPIRATORY (INHALATION) at 15:39

## 2018-06-14 RX ADMIN — POTASSIUM CHLORIDE 20 MEQ: 20 TABLET, EXTENDED RELEASE ORAL at 17:43

## 2018-06-14 RX ADMIN — THERA TABS 1 TABLET: TAB at 09:07

## 2018-06-14 RX ADMIN — TRAMADOL HYDROCHLORIDE 25 MG: 50 TABLET, FILM COATED ORAL at 09:07

## 2018-06-14 RX ADMIN — HYDRALAZINE HYDROCHLORIDE 25 MG: 25 TABLET, FILM COATED ORAL at 17:17

## 2018-06-14 RX ADMIN — LACTOBACILLUS TAB 2 TABLET: TAB at 21:50

## 2018-06-14 RX ADMIN — PANTOPRAZOLE SODIUM 40 MG: 40 TABLET, DELAYED RELEASE ORAL at 09:07

## 2018-06-14 RX ADMIN — LACTOBACILLUS TAB 2 TABLET: TAB at 09:08

## 2018-06-14 RX ADMIN — VITAMIN D, TAB 1000IU (100/BT) 1000 UNITS: 25 TAB at 09:07

## 2018-06-14 RX ADMIN — HYDRALAZINE HYDROCHLORIDE 25 MG: 25 TABLET, FILM COATED ORAL at 21:50

## 2018-06-14 RX ADMIN — Medication 10 ML: at 17:18

## 2018-06-14 RX ADMIN — Medication 10 ML: at 06:59

## 2018-06-14 RX ADMIN — Medication 1 CAPSULE: at 09:08

## 2018-06-14 RX ADMIN — METOPROLOL TARTRATE 50 MG: 50 TABLET ORAL at 21:50

## 2018-06-14 RX ADMIN — AZITHROMYCIN 500 MG: 250 TABLET, FILM COATED ORAL at 09:08

## 2018-06-14 RX ADMIN — POTASSIUM CHLORIDE 20 MEQ: 20 TABLET, EXTENDED RELEASE ORAL at 09:07

## 2018-06-14 RX ADMIN — SIMVASTATIN 20 MG: 20 TABLET, FILM COATED ORAL at 23:42

## 2018-06-14 RX ADMIN — NIFEDIPINE 60 MG: 30 TABLET, FILM COATED, EXTENDED RELEASE ORAL at 09:08

## 2018-06-14 RX ADMIN — OXYCODONE HYDROCHLORIDE AND ACETAMINOPHEN 1 TABLET: 5; 325 TABLET ORAL at 23:42

## 2018-06-14 NOTE — PROGRESS NOTES
Problem: Falls - Risk of  Goal: *Absence of Falls  Document Ronnie Fall Risk and appropriate interventions in the flowsheet. Outcome: Progressing Towards Goal  Fall Risk Interventions:  Mobility Interventions: Bed/chair exit alarm, Patient to call before getting OOB, PT Consult for mobility concerns    Mentation Interventions: Adequate sleep, hydration, pain control, Door open when patient unattended    Medication Interventions: Bed/chair exit alarm    Elimination Interventions: Patient to call for help with toileting needs, Toilet paper/wipes in reach    History of Falls Interventions: Bed/chair exit alarm, Door open when patient unattended, Room close to nurse's station        Problem: Pressure Injury - Risk of  Goal: *Prevention of pressure injury  Document Balta Scale and appropriate interventions in the flowsheet.    Outcome: Progressing Towards Goal  Pressure Injury Interventions:  Sensory Interventions: Assess changes in LOC, Avoid rigorous massage over bony prominences, Discuss PT/OT consult with provider, Keep linens dry and wrinkle-free, Pad between skin to skin    Moisture Interventions: Apply protective barrier, creams and emollients, Check for incontinence Q2 hours and as needed, Maintain skin hydration (lotion/cream)    Activity Interventions: Pressure redistribution bed/mattress(bed type), PT/OT evaluation    Mobility Interventions: HOB 30 degrees or less, Pressure redistribution bed/mattress (bed type), PT/OT evaluation    Nutrition Interventions: Document food/fluid/supplement intake, Offer support with meals,snacks and hydration    Friction and Shear Interventions: Apply protective barrier, creams and emollients, HOB 30 degrees or less, Minimize layers

## 2018-06-14 NOTE — PROGRESS NOTES
Bedside and Verbal shift change report given to Delonte Flowers (oncoming nurse) by Odell Schuster RN (offgoing nurse). Report included the following information SBAR, Intake/Output, MAR and Cardiac Rhythm Afib. transport carrier

## 2018-06-14 NOTE — PROGRESS NOTES
RENAL DAILY PROGRESS NOTE            80y F with PMH HTN, presented to ER with weakness, fall, seen for hyponatremia   Subjective:   Complaint:   Overnight events noted  Son at bedside  Plan for discharge noted. IMPRESSION:   · Hyponatremia, acute, suspect higher ADH from URI  · Hypokalemia, mild   · HTN  · Fall, weakness   PLAN:   · Gradually improving sodium , clinically seems more better compare to admission. Discussed with patient and son about risk for fall with low sodium. Continue fluid restriction 1.5L a day, repeat chemistry in week. · Will be available if any question or concern. discussed with Dr. Haley Huff.          Current Facility-Administered Medications   Medication Dose Route Frequency    [START ON 6/15/2018] loratadine (CLARITIN) tablet 10 mg  10 mg Oral Q48H    hydrALAZINE (APRESOLINE) tablet 25 mg  25 mg Oral TID    potassium chloride (K-DUR, KLOR-CON) SR tablet 20 mEq  20 mEq Oral BID    albuterol (PROVENTIL VENTOLIN) nebulizer solution 2.5 mg  2.5 mg Nebulization TID    hydrALAZINE (APRESOLINE) 20 mg/mL injection 10 mg  10 mg IntraVENous Q6H PRN    azithromycin (ZITHROMAX) tablet 500 mg  500 mg Oral DAILY    montelukast (SINGULAIR) tablet 10 mg  10 mg Oral QHS    acetaminophen (TYLENOL) tablet 650 mg  650 mg Oral Q4H PRN    digoxin (LANOXIN) tablet 0.125 mg  0.125 mg Oral EVERY OTHER DAY    levothyroxine (SYNTHROID) tablet 100 mcg  100 mcg Oral ACB    pantoprazole (PROTONIX) tablet 40 mg  40 mg Oral ACB    cholecalciferol (VITAMIN D3) tablet 1,000 Units  1,000 Units Oral DAILY    therapeutic multivitamin (THERAGRAN) tablet 1 Tab  1 Tab Oral DAILY    melatonin tablet 3 mg  3 mg Oral QHS    bisacodyl (DULCOLAX) tablet 10 mg  10 mg Oral DAILY PRN    traMADol (ULTRAM) tablet 25 mg  25 mg Oral DAILY    traZODone (DESYREL) tablet 25 mg  25 mg Oral QHS PRN    NIFEdipine ER (PROCARDIA XL) tablet 60 mg  60 mg Oral DAILY    aspirin chewable tablet 81 mg  81 mg Oral DAILY    Lactobacillus Acidkathy & Bulgar [facility]) tablet 2 Tab  2 Tab Oral TID    fluticasone furoate (ARNUITY ELLIPTA) 100 mcg/puff  1 Puff Inhalation DAILY    polyvinyl alcohol (LIQUIFILM TEARS) 1.4 % ophthalmic solution 1 Drop  1 Drop Both Eyes QHS    fish oil-omega-3 fatty acids 340-1,000 mg capsule 1 Cap  1 Cap Oral DAILY    sodium chloride (NS) flush 5-10 mL  5-10 mL IntraVENous Q8H    sodium chloride (NS) flush 5-10 mL  5-10 mL IntraVENous PRN    oxyCODONE-acetaminophen (PERCOCET) 5-325 mg per tablet 1 Tab  1 Tab Oral Q4H PRN    naloxone (NARCAN) injection 0.4 mg  0.4 mg IntraVENous PRN    metoprolol tartrate (LOPRESSOR) tablet 50 mg  50 mg Oral Q12H    simvastatin (ZOCOR) tablet 20 mg  20 mg Oral QHS    benzonatate (TESSALON) capsule 200 mg  200 mg Oral TID PRN       Review of Symptoms: as above   Objective:     Patient Vitals for the past 24 hrs:   Temp Pulse Resp BP SpO2   06/14/18 1531 97.1 °F (36.2 °C) 76 20 138/77 96 %   06/14/18 1214 97.6 °F (36.4 °C) 85 18 147/77 97 %   06/14/18 0834 98.2 °F (36.8 °C) 92 20 134/71 98 %   06/14/18 0400 98.5 °F (36.9 °C) 88 18 140/79 95 %   06/14/18 0000 98 °F (36.7 °C) 78 18 127/68 95 %   06/13/18 2010 - - - - 99 %   06/13/18 2000 98.6 °F (37 °C) 81 18 131/67 99 %        Weight change:      06/12 1901 - 06/14 0700  In: 120 [P.O.:120]  Out: 500 [Urine:500]    Intake/Output Summary (Last 24 hours) at 06/14/18 1535  Last data filed at 06/14/18 1027   Gross per 24 hour   Intake              120 ml   Output              300 ml   Net             -180 ml     Physical Exam:   General: comfortable, no acute distress   HEENT sclera anicteric, supple neck, no thyromegaly  CVS: S1S2 heard,  no rub  RS: + air entry b/l,   Abd: Soft, Non tender,   Neuro:  confused    Extrm: no edema, no cyanosis, clubbing   Skin: dry  Musculoskeletal: No gross joints or bone deformities          Data Review:     LABS:   Hematology:   Recent Labs      06/14/18   0346  06/13/18   0119 06/12/18   0212   WBC  13.3*  16.0*  12.7   HGB  11.8*  12.7  10.9*   HCT  35.1  36.7  32.2*     Chemistry:   Recent Labs      06/14/18   0346  06/13/18   0119  06/12/18   0212   BUN  21*  24*  22*   CREA  0.96  0.91  1.00   CA  8.8  7.9*  7.8*   K  4.0  3.8  3.4*   NA  129*  126*  128*   CL  98*  95*  95*   CO2  23  22  22   GLU  125*  108*  80            Procedures/imaging: see electronic medical records for all procedures, Xrays and details which were not copied into this note but were reviewed prior to creation of Plan          Assessment & Plan:             Efren Gonzalez MD  6/14/2018  5:58 PM

## 2018-06-14 NOTE — PROGRESS NOTES
[facility] - Progress Note  Admit Date: 6/10/2018     The patient was seen, examined, and independently evaluated and I agree with the below assessment and plan by Young Jameson PA-C with the following comments. This lady has had atrial fibrillation for some time and we have discussed anticoagulation in the past, but she was put on Pradaxa a few years ago and had severe hemoptysis. Consequently, she has been kept off anticoagulants. She appears to be much more alert today, but her exam reveals a regularly irregular rhythm which is more suggestive of ventricular bigeminy than atrial fibrillation so will check an EKG. However, if just in atrial fibrillation with controlled rate would be OK to discharge to nursing facility from [facility]. Assessment:     Hospital Problems  Date Reviewed: 6/11/2018          Codes Class Noted POA    Weakness ICD-10-CM: R53.1  ICD-9-CM: 780.79  6/11/2018 Unknown        Injury to ligament of cervical spine ICD-10-CM: S13. 4XXA  ICD-9-CM: 847.0  6/11/2018 Unknown        Hyponatremia ICD-10-CM: E87.1  ICD-9-CM: 276.1  6/11/2018 Unknown        Fall ICD-10-CM: [address] [place]  ICD-9-CM: E888.9  6/11/2018 Unknown        Afib ([facility] [address] ICD-10-CM: I48.91  ICD-9-CM: 427.31  6/11/2018 Unknown        * (Principal)Pneumonia ICD-10-CM: J18.9  ICD-9-CM: 483  2/9/2018 Unknown                 -Generalized weakness with fall, no acute injuries, CT head negative. -URI, bronchitis picture, no fevers or white count.  -SOB, multifactorial with bronchitis and possible diastolic HF exacerbation, BNP 8k, echo Jan 2017 with normal LV function and G2DD, elevated PA pressures at 51mmHg, mild to mod mitral regurge  -Hyponatremia, suspected higher ADH from URI, appreciate nephrology evaluation.  -Hx of afib, rates controlled, on BB and low dose digoxin as outpatient. NOT on [address] d/t history of significant upper GI bleed. (see Maury's notes)  -Confusion, multifactorial doubt secondary to hyponatremia? Unclear of patient's baseline mentation given no family is present  -HTN hx, controlled  -HLD, on statin       Primary cardiologist Dr Martha Betancourt:     NA with slow improvement from admission. Appreciate ongoing nephrology evaluation. Does not appear to be in acute decompensated heart failure, ok to hold diuretics or hydrate as needed. Continue dig and bb for rate control. Continue ASA. Subjective:     Pleasantly confused, family not at bedside.  Attempting to get out of bed on my arrival.     Objective:      Patient Vitals for the past 8 hrs:   Temp Pulse Resp BP SpO2   06/14/18 1555 - - - - 96 %   06/14/18 1531 97.1 °F (36.2 °C) 76 20 138/77 96 %   06/14/18 1214 97.6 °F (36.4 °C) 85 18 147/77 97 %   06/14/18 0834 98.2 °F (36.8 °C) 92 20 134/71 98 %         Patient Vitals for the past 96 hrs:   Weight   06/13/18 0411 54 kg (119 lb 1.6 oz)   06/11/18 1655 54 kg (119 lb)                    Intake/Output Summary (Last 24 hours) at 06/14/18 1618  Last data filed at 06/14/18 1027   Gross per 24 hour   Intake              120 ml   Output              300 ml   Net             -180 ml       Physical Exam:  General:  no distress  Neck:  no JVD  Lungs:  clear to auscultation bilaterally  Heart:  irregularly irregular rhythm  Abdomen:  abdomen is soft without significant tenderness, masses, organomegaly or guarding  Extremities:  extremities normal, atraumatic, no cyanosis or edema    Data Review:     Labs: Results:       Chemistry Recent Labs      06/14/18   0346  06/13/18   0119  06/12/18   0212   GLU  125*  108*  80   NA  129*  126*  128*   K  4.0  3.8  3.4*   CL  98*  95*  95*   CO2  23  22  22   BUN  21*  24*  22*   CREA  0.96  0.91  1.00   CA  8.8  7.9*  7.8*   AGAP  8  9  11   BUCR  22*  26*  22*      CBC w/Diff Recent Labs      06/14/18   0346  06/13/18   0119  06/12/18   0212   WBC  13.3*  16.0*  12.7   RBC  4.15*  4.47  3.87*   HGB  11.8*  12.7  10.9*   HCT  35.1  36.7  32.2*   PLT  309  697 833 GRANS  59  72  72   LYMPH  22  8*  16*   EOS  4  1  0      Cardiac Enzymes No results found for: CPK, CK, CKMMB, CKMB, RCK3, CKMBT, CKNDX, CKND1, AFRICA, TROPT, TROIQ, DIONNE, TROPT, TNIPOC, BNP, BNPP   Coagulation No results for input(s): PTP, INR, APTT in the last 72 hours. No lab exists for component: INREXT, INREXT    Lipid Panel No results found for: CHOL, CHOLPOCT, CHOLX, CHLST, CHOLV, 023662, HDL, LDL, LDLC, DLDLP, 126579, VLDLC, VLDL, TGLX, TRIGL, TRIGP, TGLPOCT, CHHD, CHHDX   BNP No results found for: BNP, BNPP, XBNPT   Liver Enzymes No results for input(s): TP, ALB, TBIL, AP, SGOT, GPT in the last 72 hours.     No lab exists for component: DBIL   Digoxin    Thyroid Studies Lab Results   Component Value Date/Time    TSH 2.65 06/12/2018 02:12 AM          Signed By: Jennifer Swan DO     June 14, 2018

## 2018-06-14 NOTE — DISCHARGE SUMMARY
[facility]  [facility]       Patient: Loise Dancer Age: 80 y.o. : 1935 MR#: [id number] SSN: [id number]  PCP on record: Nidhi Santiago MD  Admit date: 6/10/2018  Discharge date: 2018    Disposition:    []Home   []Home with Home Health   [x]SNF/NH   []Rehab   []Home with family   []Alternate Facility:____________________    Discharge Diagnoses:                             1. Fall w/o injuries. 2. Hyponatremia. IMPROVED. 3. Hypokalemia. RESOLVED. 4. Generalized weakness. IMPROVE. 5. Acute bronchitis. IMPROVED. 6. SOB, multifactorial in setting of bronchitis, suspected diastolic HF. IMPROVED. 7. A. Fib with RVR. Rate controlled. 8. HLD. Statin   9. Seasonal Allergies. 9. ?confusion. AT BASELINE. Discharge Medications:     Current Discharge Medication List      START taking these medications    Details   azithromycin (ZITHROMAX) 250 mg tablet Take 2 Tabs by mouth daily for 1 day. Qty: 1 Tab, Refills: 0    Associated Diagnoses: Bronchitis      benzonatate (TESSALON) 200 mg capsule Take 1 Cap by mouth three (3) times daily as needed for Cough for up to 7 days. Qty: 10 Cap, Refills: 0    Associated Diagnoses: Bronchitis         CONTINUE these medications which have NOT CHANGED    Details   traMADol (ULTRAM) 50 mg tablet Take 0.5 Tabs by mouth two (2) times a day. Max Daily Amount: 50 mg.  Qty: 20 Tab, Refills: 0    Associated Diagnoses: Back pain without sciatica      CALCIUM CARBONATE (CALCIUM 600 PO) Take 1 Tab by mouth daily. cholecalciferol (VITAMIN D3) 1,000 unit cap Take 1,000 Units by mouth daily. B.infantis-B.ani-B.long-B.bifi (PROBIOTIC 4X) 10-15 mg TbEC Take 1 Tab by mouth daily. traZODone (DESYREL) 50 mg tablet Take 25 mg by mouth nightly as needed for Sleep. NIFEdipine ER (PROCARDIA XL) 30 mg ER tablet Take 60 mg by mouth daily. carboxymethylcellulose sodium 1 % dlgl Apply 1 Drop to eye nightly. To both eyes      melatonin 3 mg tablet Take 1 Tab by mouth nightly. Qty: 30 Tab, Refills: 0      bisacodyl (DULCOLAX) 5 mg EC tablet Take 2 Tabs by mouth daily as needed for Constipation. Qty: 30 Tab, Refills: 0      hydrALAZINE (APRESOLINE) 50 mg tablet Take 1 Tab by mouth three (3) times daily. Qty: 90 Tab, Refills: 0      albuterol (PROVENTIL VENTOLIN) 2.5 mg /3 mL (0.083 %) nebulizer solution 1.5 mL by Nebulization route every four (4) hours as needed for Wheezing. Qty: 30 Each, Refills: 0      acetaminophen (TYLENOL) 325 mg tablet Take 2 Tabs by mouth every four (4) hours as needed for Fever. Qty: 30 Tab, Refills: 0      levothyroxine (SYNTHROID) 100 mcg tablet Take 100 mcg by mouth Daily (before breakfast). pantoprazole (PROTONIX) 40 mg tablet Take 40 mg by mouth daily. multivitamin (ONE A DAY) tablet Take 1 Tab by mouth daily. beclomethasone (QVAR) 40 mcg/actuation inhaler Take 2 Puffs by inhalation two (2) times a day. Use with spacer device  Qty: 1 Inhaler, Refills: 6      fish oil-dha-epa 1,200-144-216 mg cap Take 1 Cap by mouth daily. biotin 1 mg tab Take 1 Tab by mouth daily. Aspirin, Buffered 81 mg tab Take 1 Tab by mouth daily. montelukast (SINGULAIR) 10 mg tablet Take 10 mg by mouth daily. digoxin (LANOXIN) 0.125 mg tablet Take 0.125 mg by mouth every other day. metoprolol (LOPRESSOR) 50 mg tablet Take 50 mg by mouth two (2) times a day. loratadine (CLARITIN) 10 mg tablet Take 10 mg by mouth daily as needed. lovastatin (MEVACOR) 40 mg tablet Take 40 mg by mouth nightly. Consults:    - Cardiology  - Nephrology    Procedures:  -   None    Significant Diagnostic Studies:   -  MR CERVICAL SPINE WITHOUT CONTRAST  CPT CODE: 36765  HISTORY: Concern for acute cervical spine injury. Tomás Burroughsper, reportedly hitting back  of head.  -Also started on doxycycline for a URI 5 days previous.  Last 3 days, declining  activity level from usual and unusual urinary incontinence  -No leukocytosis (WBC 12 K). No lactic acidosis  COMPARISONS:   -Cervical spine CT previous day demonstrated remarkable prevertebral soft tissue  swelling approximately C3-T1. No fracture was identified. Grade II anterior  listhesis C3 on C4. Solid degenerative intersegmental bone fusion C4-C5 and  likely C5/C6. TECHNIQUE: Cervical spine scanned with sagittal T1W and T2W scans and axial T2W  scans. FINDINGS:  Incidental intracranial observations on low-quality localizer scans are  extensive increased T2 signal cerebral white matter disease plus a small old  right posterior inferior cerebellar stroke.     Grade IIanterior listhesis C3 on C4, with grade I anterior listhesis several  levels, including C4 anterior on C5. Disc space heights are variably narrowed at  multiple levels, including essentially absent disc C5/C6 (based on the CT). The  odontoid is intact. Occiput/C1/C2 relationships are normal. Facets have  generally normal relationships. No acute fractures identified      Remarkable pathologic increased T2 signal at swollen prevertebral soft tissues  extending from just below the skull base to T1, with appearance of acute edema. Nothing specifically indicating hemorrhage here and no discrete rounded fluid  collections identified although sensitivity for abscess detection is limited  without contrast. There is no erosive/inflammatory/infectious appearing changes  at adjacent spine or skull base or discs. This type of edema can be seen with  hyperextension injury, prompting further evaluation of the immediately more  posterior ligaments, etc. There is a punctate focus of increased T2 signal at  the anterior longitudinal ligament C4/C5, but, since these 2 vertebrae are fused  this would not support acute hyperextension injury at this level. There is trace  increased T2 signal and some other discs, none of which strongly supports acute  hyperextension disc injury.  There is no lordotic angulation across any disc  supportive of hyperextension injury.     Sagittal T2 inversion recovery scan is motion degraded but there is, at most,  minimal interspinous edema lower cervical and upper thoracic spine, not  compelling for an acute hypertrophy flexion injury; nothing elsewhere supporting  hyperflexion injury. No acute fracture is identified.     Cord suboptimally evaluated; some increased T2 signal projected into the cord is  very likely artifactual, unless clinical picture suggests acute cord disease. No  pathologic fluid collections are identified within the spinal canal. Flow status  of vertebral arteries is not well assessed.     Foramen magnum through C2: Canal nonstenotic. C2/C3: Degenerative changes do not cause central spinal stenosis. Foramen not  well evaluated at these or other levels although foraminal narrowing secondary  to evident DJD is present on the prior CT.  C3/C4: Listhesis, pseudodisc bulge, disc bulge, and small central disc  protrusion with annular fissure and some posterior canal ligament hypertrophy  cause mild-to-moderate central spinal stenosis with some flattening of the  ventral cord  C4/C5: Pseudodisc bulge. No central spinal stenosis. C5/C6: Disc bulge plus disc spur plus small to medium sized left central to left  lateral recess disc protrusion. The ventral cord is impressed and there is mild  central spinal stenosis. C6/C7, C7/T1, visualized upper thoracic levels: Degenerative changes do not  cause central spinal stenosis     IMPRESSION  IMPRESSION:     1. Remarkable prevertebral edema/swelling extending from just below the skull  base through T1, which is nonspecific  -No fracture identified. No specific evidence of ligament injury.  -Cord suboptimally evaluated but grossly normal  2.  This prevertebral edema could be secondary to hyperextension injury of the  cervical spine, despite lack of any additional signs of such  -Does history or physical exam (for example, forehead contusion) support  cervical spine hyperextension injury? 3. This prevertebral edema could be unusual nontraumatic.  -Infection is possible. Nothing suggesting infection origin at disc/bone. Does  patient have pharyngitis? Primary infection or solitary hematogenous infection  of prevertebral soft tissues is unusual  -Swelling such as this can be seen with acute calcific tendinitis but this  condition is generally associated with sizable C2 level prevertebral  calcification (at most, some punctate calcifications here on sagittal  reconstructions)  -Unusual angioedema is a consideration  -This does not look hemorrhagic  4. Remarkable, grade II, anterolisthesis C3 on C4, which is very probably  chronic/degenerative. Advanced DDD, DJD  -Up to mild/moderate central spinal stenosis  -Degenerative foraminal narrowing present, not well evaluated    Portable Chest     CPT CODE: 20077     HISTORY: Fall.     FINDINGS:      Comparison with 2/13/2018; 8/2/2012     Multiple wires overlie the patient. Clips at the right axillary region. Heart  size and mediastinal contours within normal limits. Mild interstitial  prominence. No acute consolidation. No effusion or pneumothorax. .     IMPRESSION  IMPRESSION:     Mildly prominent interstitium. May represent mild vascular congestion      HISTORY: Fall hitting back of head. Closed head injury. Recent trauma, spine.     COMPARISON: None.     TECHNIQUE: Axial images through the cervical spine were obtained without  intravenous contrast. Coronal and sagittal reformatted images were also obtained  for better evaluation of regional anatomy and alignment. All CT scans are  performed using dose optimization techniques as appropriate to the performed  exam including the following: Automated exposure control, adjustment of mA  and/or kV according to patient size, and use of iterative reconstructive  technique.      FINDINGS:      Odontoid intact.  Occipital condylar/C-1/C-2 relationships normal. Grade II  anterolisthesis of C3 on C4 and grade I anterolisthesis of C4 on C5. Reversal of  cervical curvature. Vertebral body heights are maintained. Severe disc space  narrowing with osteophytes C5-C6 and mild at other levels. Advanced bilateral  facet joint hypertrophy in the upper and mid cervical spine, fused across the  hypertrophied left C4-C5 facet joint.     No acute fracture is identified however there is a significant amount of  prevertebral soft tissue swelling at the C4-C7 levels. Soft tissue swelling  tracks into the left and right neck. Within the limitations of CT, no critical  findings are identified in the cervical spinal canal. Neural foraminal stenoses  from facet hypertrophy.     IMPRESSION  IMPRESSION:     Prevertebral soft tissue swelling at the mid cervical spine level may indicate  ligamentous injury. No fracture seen. MRI is recommended.     Spondylolisthesis at C3-C4 and C4-C5 appears chronic based on degenerative and  partially fused facet joint appearances.     Degenerative disc and joint disease throughout the mid cervical spine. CT OF THE HEAD WITHOUT CONTRAST.     CLINICAL HISTORY: Fall hitting back of head. Closed head trauma,  moderate-severe.     TECHNIQUE: Helical scan obtained of the head were obtained from the skull vertex  through the base of the skull without intravenous contrast.    All CT scans are  performed using dose optimization techniques as appropriate to the performed  exam including the following: Automated exposure control, adjustment of mA  and/or kV according to patient size, and use of iterative reconstructive  technique.      COMPARISON: None.     FINDINGS:      Motion artifact at the skull vertex. Chronic right cerebellum infarct. Sulcal  pattern is otherwise maintained, with mild atrophy and mild compensatory lateral  and third ventricle enlargement. Periventricular and deep white matter  hypodensities bilaterally.  Small left basal ganglia lacunar infarct. No  intracranial hemorrhage. No mass effect. The visualized paranasal sinuses are  clear. The mastoid air cells are clear. The visualized bony structures are  unremarkable.     IMPRESSION  IMPRESSION:      No acute traumatic findings. Motion artifact. Chronic microvascular disease and old infarcts as above. Hospital Course by Problem     This is a 80 y.o.  female who has hx of CAD, Afib presents with worsening cough and congestion despite using out patient doxycycline given by PCP. Pt also with urinary frequency and incontinence, an episode of weakness and slid to fall. Denies chest pain or shortness of breath. 1. FALL. Likely 2ry to progressive generalized weakness, hyponatremia discussed below. No acute injuries. imaging neg for acute traumatic findings. PT/OT consulted. SNF.      2. Progressive generalized weakness over 2-3 months. IMPROVED. In pt with hx A. Fib followed by Dr Phyllis Godwin, hyponatremia, suspected CHF as discussed below. Imaging as discussed above. No neuro focal deficits. PT/OT consulted. SNF.      3. Hyponatremia. Nephrology consulted. ? Suspect higher ADH in setting of URI, poor po intake, CHF. Asymptomatic at this time. Sodium trending towards normal with fluid restriction. Urine sodium 18, urine osmolarity 273. At this time, pt is hemodynamically stable for discharge. To continue on fluid restriction of 36-38oz / 24hrs as instructed by nephrology. Sodium tabs as prescribed by nephrology. To follow up with PCP in 5-7 days. Repeat BMP in 1 week. To follow up with nephrology in 1-2 weeks.     4. Hypokalemia. POA. Replaced. RESOLVED.      5. acute vs chronic bronchitis. Likely acute bronchitis given clinical presentation. IMPROVED. No indications for CAP at this time. pt afebrile, no leukocytosis on CBC. CXR with mild vascular congestion. Continue Zithromax PO for 2 more days, continue Tessalon pearls. At this time, pt is hemodynamically stable for discharge. To follow up with PCP in 5-7 days      6. SOB likely 2ry to suspected CHF exacerbation. In pt bronchitis, with A. Fib. Elevated BNP. Echo EF 50%. Cardiology consult appreciated. SOB RESOLVED. Diuretics remain on hold at this time. pt is hemodynamically stable for discharge. To follow up with PCP in 5-7 days. To follow up with cardiology in 1 week.       6. A. Fib with RVR. Rate controlled. Continue ASA, BB, dig. NOT on [address] d/t history of significant upper GI bleed.       7. HTN. Liable and difficult to control per son. Target SBP goal 140-150s per son. Continue home regimen. No further interventions warranted. At time of discharge, /77. pt is hemodynamically stable for discharge. To follow up with PCP in 5-7 days.      8. HLD: Statin      9. Seasonal allergies, with rhinorrhea and post nasal drip per son. Continue home regimen.      10 ? confusion, currently A&OX2. 2ry to hyponatremia, ?underline dementia. Son reports IMPROVED cognition this morning. Pt alert and oriented to person and place. She is aware she lives alone in an assisted living facility, aware she is lives in [place], able to recognize and correctly idenity son. She is aware she is at [facility]. No focal neuro deficits. Strength 3/5 to all extremities. Monitor. Today's examination of the patient revealed:     Subjective:   I have examined pt at the bedside this morning. Son and nurse at bedside, endorsing improvement. Anticipating discharge to SNF.      Objective:   VS:   Visit Vitals    /77 (BP 1 Location: Left arm, BP Patient Position: At rest)    Pulse 85    Temp 97.6 °F (36.4 °C)    Resp 18    Wt 54 kg (119 lb 1.6 oz)    SpO2 97%    Breastfeeding No    BMI 29.81 kg/m2      Tmax/24hrs: Temp (24hrs), Av.1 °F (36.7 °C), Min:97.5 °F (36.4 °C), Max:98.6 °F (37 °C)     Input/Output:   Intake/Output Summary (Last 24 hours) at 18 1510  Last data filed at 18 1027   Gross per 24 hour   Intake              120 ml   Output 300 ml   Net             -180 ml       General:  Alert, NAD  Cardiovascular:  RRR  Pulmonary:  LSC throughout; respiratory effort WNL  GI:  +BS in all four quadrants, soft, non-tender  Extremities:  No edema; 2+ dorsalis pedis pulses bilaterally  Neuro: alert and oriented X 2. Labs:    Recent Results (from the past 24 hour(s))   CBC WITH AUTOMATED DIFF    Collection Time: 06/14/18  3:46 AM   Result Value Ref Range    WBC 13.3 (H) 4.6 - 13.2 K/uL    RBC 4.15 (L) 4.20 - 5.30 M/uL    HGB 11.8 (L) 12.0 - 16.0 g/dL    HCT 35.1 35.0 - 45.0 %    MCV 84.6 74.0 - 97.0 FL    MCH 28.4 24.0 - 34.0 PG    MCHC 33.6 31.0 - 37.0 g/dL    RDW 18.3 (H) 11.6 - 14.5 %    PLATELET 064 626 - 321 K/uL    MPV 10.2 9.2 - 11.8 FL    NEUTROPHILS 59 42 - 75 %    LYMPHOCYTES 22 20 - 51 %    MONOCYTES 15 (H) 2 - 9 %    EOSINOPHILS 4 0 - 5 %    BASOPHILS 0 0 - 3 %    ABS. NEUTROPHILS 7.9 1.8 - 8.0 K/UL    ABS. LYMPHOCYTES 2.9 0.8 - 3.5 K/UL    ABS. MONOCYTES 2.0 (H) 0 - 1.0 K/UL    ABS. EOSINOPHILS 0.5 (H) 0.0 - 0.4 K/UL    ABS. BASOPHILS 0.0 0.0 - 0.06 K/UL    DF MANUAL      PLATELET COMMENTS ADEQUATE PLATELETS      RBC COMMENTS ANISOCYTOSIS  1+        RBC COMMENTS POLYCHROMASIA  1+       METABOLIC PANEL, BASIC    Collection Time: 06/14/18  3:46 AM   Result Value Ref Range    Sodium 129 (L) 136 - 145 mmol/L    Potassium 4.0 3.5 - 5.5 mmol/L    Chloride 98 (L) 100 - 108 mmol/L    CO2 23 21 - 32 mmol/L    Anion gap 8 3.0 - 18 mmol/L    Glucose 125 (H) 74 - 99 mg/dL    BUN 21 (H) 7.0 - 18 MG/DL    Creatinine 0.96 0.6 - 1.3 MG/DL    BUN/Creatinine ratio 22 (H) 12 - 20      GFR est AA >60 >60 ml/min/1.73m2    GFR est non-AA 56 (L) >60 ml/min/1.73m2    Calcium 8.8 8.5 - 10.1 MG/DL     Additional Data Reviewed:     Condition:   Follow-up Appointments:   1. Your PCP: Debbie Rollins MD, within 5-7 days  2. Follow up with Nephrology in 1-2 weeks.    3. Repeat BMP in 1 week.       >30 minutes spent coordinating this discharge (review instructions/follow-up, prescriptions, preparing report for sign off)    Signed:  oClby Spivey NP  6/14/2018  3:10 PM

## 2018-06-14 NOTE — PROGRESS NOTES
[facility] Hospitalist Group  Progress Note    Patient: Lana Pacheco Age: 80 y.o. : 1935 MR#: [id number] SSN: [id number]  Date: 2018     Subjective:   I have examined pt at bedside. Son at bedside. NAD. No f/c, n/v/d/c, sob, cp or abd pain. Son states mom continues to improve, anticipating d/c to SNF tomorrow. Assessment/Plan:    1. FALL. Likely 2ry to progressive generalized weakness, hyponatremia discussed below. No acute injuries. imaging neg for acute traumatic findings. PT/OT consulted. SNF.      2. Progressive generalized weakness over 2-3 months. IMPROVED. In pt with hx A. Fib followed by Dr Fritz Leroy, hyponatremia, suspected CHF as discussed below. Imaging as discussed above. No neuro focal deficits. PT/OT consulted. SNF.      3. Hyponatremia. Nephrology consulted. ? Suspect higher ADH in setting of URI, poor po intake, CHF. Asymptomatic at this time. Sodium trending towards normal with fluid restriction. Urine sodium 18, urine osmolarity 273. At this time, pt is hemodynamically stable for discharge. To continue on fluid restriction of 36-38oz / 24hrs. To follow up with PCP in 5-7 days, repeat BMP in 1 week. To follow up with nephrology in 1-2 weeks.     4. Hypokalemia. POA. Replaced. RESOLVED.      5. acute vs chronic bronchitis. Likely acute bronchitis given clinical presentation. IMPROVED. No indications for CAP at this time. pt afebrile, no leukocytosis on CBC. CXR with mild vascular congestion. Continue Zithromax PO for 2 more days, continue Tessalon pearls. At this time, pt is hemodynamically stable for discharge. To follow up with PCP in 5-7 days      6. SOB likely 2ry to suspected CHF exacerbation. In pt bronchitis, with A. Fib. Elevated BNP. Echo EF 50%. Cardiology consult appreciated. SOB RESOLVED. Diuretics remain on hold at this time. pt is hemodynamically stable for discharge. To follow up with PCP in 5-7 days. To follow up with cardiology in 1 week.     6. A. Fib with RVR. Rate controlled. Continue ASA, BB, dig. NOT on [facility] d/t history of significant upper GI bleed.       7. HTN. Liable and difficult to control per son. Target SBP goal 140-150s per son. Continue home regimen. No further interventions warranted. At time of discharge, /77. pt is hemodynamically stable for discharge. To follow up with PCP in 5-7 days.      8. HLD: Statin      9. Seasonal allergies, with rhinorrhea and post nasal drip per son. Continue home regimen.      10 ? confusion, currently A&OX2. 2ry to hyponatremia, ?underline dementia. Son reports IMPROVED cognition this morning. Pt alert and oriented to person and place. She is aware she lives alone in an assisted living facility, aware she is lives in [place], able to recognize and correctly idenity son. She is aware she is at [facility]. No focal neuro deficits. Strength 3/5 to all extremities. Monitor.     Additional Notes:      Case discussed with:  [x]Patient  [x]Family  []Nursing  []Case Management  DVT Prophylaxis:  []Lovenox  []Hep SQ  [x]SCDs  []Coumadin   []On Heparin gtt    Objective:   VS:   Visit Vitals    /77 (BP 1 Location: Left arm, BP Patient Position: At rest)    Pulse 76    Temp 97.1 °F (36.2 °C)    Resp 20    Wt 54 kg (119 lb 1.6 oz)    SpO2 96%    Breastfeeding No    BMI 29.81 kg/m2      Tmax/24hrs: Temp (24hrs), Av °F (36.7 °C), Min:97.1 °F (36.2 °C), Max:98.6 °F (37 °C)    Intake/Output Summary (Last 24 hours) at 18 1722  Last data filed at 18 1027   Gross per 24 hour   Intake              120 ml   Output              300 ml   Net             -180 ml       General:  Alert, NAD  Cardiovascular:  RRR  Pulmonary:  LSC throughout; respiratory effort WNL  GI:  +BS in all four quadrants, soft, non-tender  Extremities:  No edema; 2+ dorsalis pedis pulses bilaterally    Labs:    Recent Results (from the past 24 hour(s))   CBC WITH AUTOMATED DIFF    Collection Time: 18  3:46 AM   Result Value Ref Range    WBC 13.3 (H) 4.6 - 13.2 K/uL    RBC 4.15 (L) 4.20 - 5.30 M/uL    HGB 11.8 (L) 12.0 - 16.0 g/dL    HCT 35.1 35.0 - 45.0 %    MCV 84.6 74.0 - 97.0 FL    MCH 28.4 24.0 - 34.0 PG    MCHC 33.6 31.0 - 37.0 g/dL    RDW 18.3 (H) 11.6 - 14.5 %    PLATELET 061 969 - 864 K/uL    MPV 10.2 9.2 - 11.8 FL    NEUTROPHILS 59 42 - 75 %    LYMPHOCYTES 22 20 - 51 %    MONOCYTES 15 (H) 2 - 9 %    EOSINOPHILS 4 0 - 5 %    BASOPHILS 0 0 - 3 %    ABS. NEUTROPHILS 7.9 1.8 - 8.0 K/UL    ABS. LYMPHOCYTES 2.9 0.8 - 3.5 K/UL    ABS. MONOCYTES 2.0 (H) 0 - 1.0 K/UL    ABS. EOSINOPHILS 0.5 (H) 0.0 - 0.4 K/UL    ABS.  BASOPHILS 0.0 0.0 - 0.06 K/UL    DF MANUAL      PLATELET COMMENTS ADEQUATE PLATELETS      RBC COMMENTS ANISOCYTOSIS  1+        RBC COMMENTS POLYCHROMASIA  1+       METABOLIC PANEL, BASIC    Collection Time: 06/14/18  3:46 AM   Result Value Ref Range    Sodium 129 (L) 136 - 145 mmol/L    Potassium 4.0 3.5 - 5.5 mmol/L    Chloride 98 (L) 100 - 108 mmol/L    CO2 23 21 - 32 mmol/L    Anion gap 8 3.0 - 18 mmol/L    Glucose 125 (H) 74 - 99 mg/dL    BUN 21 (H) 7.0 - 18 MG/DL    Creatinine 0.96 0.6 - 1.3 MG/DL    BUN/Creatinine ratio 22 (H) 12 - 20      GFR est AA >60 >60 ml/min/1.73m2    GFR est non-AA 56 (L) >60 ml/min/1.73m2    Calcium 8.8 8.5 - 10.1 MG/DL       Signed By: Michael Brambila NP     June 14, 2018

## 2018-06-14 NOTE — PROGRESS NOTES
Problem: Self Care Deficits Care Plan (Adult)  Goal: *Acute Goals and Plan of Care (Insert Text)  Occupational Therapy Goals  Initiated 6/14/2018 within 7 day(s), if 3 day trial successful. 1.  Patient will perform self-feeding with modified independence. 2.  Patient will perform upper body dressing with modified independence. 3.  Patient will perform lower body dressing with modified independence. 4.  Patient will perform toilet transfers with modified independence. 5.  Patient will participate in upper extremity therapeutic exercise/activities with supervision/set-up for 8 minutes to increase strength/endurance for ADLs. Outcome: Progressing Towards Goal  Occupational Therapy EVALUATION    Patient: Johny Ray (46 y.o. female)  Date: 6/14/2018  Primary Diagnosis: Pneumonia  Weakness  Injury to ligament of cervical spine  Hyponatremia  Pneumonia  Fall  Afib [facility])        Precautions:   Fall    ASSESSMENT :  Based on the objective data described below, the patient presents at a supervision to min assist level of care. Per nursing, she is able to maneuver to the bathroom without an AD but given supervision. She requires max encouragement to participate and declined OOB activity today. No ROM deficits noted in BUEs and strength is 4/5 throughout. Patient consistently requested that she be left alone. Will initiate a 3 day trial of skilled OT to assess her ability to participate and benefit from skilled OT. Recommend continued therapy at SNF to increase her functional independence for return to [facility]. Patient will benefit from skilled intervention to address the above impairments.   Patients rehabilitation potential is considered to be Fair  Factors which may influence rehabilitation potential include:   []             None noted  []             Mental ability/status  [x]             Medical condition  []             Home/family situation and support systems  []             Safety awareness  [] Pain tolerance/management  []             Other:      PLAN :  Recommendations and Planned Interventions:  [x]               Self Care Training                  [x]        Therapeutic Activities  [x]               Functional Mobility Training    []        Cognitive Retraining  [x]               Therapeutic Exercises           [x]        Endurance Activities  [x]               Balance Training                   []        Neuromuscular Re-Education  []               Visual/Perceptual Training     [x]   Home Safety Training  [x]               Patient Education                 [x]        Family Training/Education  []               Other (comment):    Frequency/Duration: Patient will be followed by occupational therapy 1-2 times per day/4-7 days per week to address goals. Discharge Recommendations: Aron Rendon  Further Equipment Recommendations for Discharge: TBD at next level of care     Barriers to Learning/Limitations: yes;  cognitive  Compensate with: visual, verbal, tactile, kinesthetic cues/model     PATIENT COMPLEXITY      Eval Complexity: History: MEDIUM Complexity : Expanded review of history including physical, cognitive and psychosocial  history ; Examination: MEDIUM Complexity : 3-5 performance deficits relating to physical, cognitive , or psychosocial skils that result in activity limitations and / or participation restrictions; Decision Making:MEDIUM Complexity : Patient may present with comorbidities that affect occupational performnce. Miniml to moderate modification of tasks or assistance (eg, physical or verbal ) with assesment(s) is necessary to enable patient to complete evaluation  Assessment: Moderate Complexity     G-CODES:     Self Care  Current  CJ= 20-39%   Goal  CI= 1-19%. The severity rating is based on the Level of Assistance required for Functional Mobility and ADLs. SUBJECTIVE:   Patient stated I don't want to be bothered.     OBJECTIVE DATA SUMMARY: Past Medical History:   Diagnosis Date    Anemia NEC     Arthritis     Atrial fibrillation (HCC)     Bursitis of left shoulder     Cardiac cath 01/27/2009    RCA mild. LM patent. pLAD 20%. Very dLAD w/significant tapering. CX mild.  Cardiac stress echo, abnormal 01/05/2009    Positive maximal stress echo by echo & EKG criteria w/o chest pain.  EF >65%. Sm, discrete apical, apical septal hypk.  Cough     Endocrine disease     Hyperlipidemia     Hypertension     Hypothyroidism     Osteopenia     Pulmonary hemorrhage     Pulmonary hypertension (HCC)     Rash and nonspecific skin eruption 9/2012    maculopapular on torso, arms, and upper legs    Reflux     Right hip pain     Right shoulder pain     Stenosing tenosynovitis of finger 04/2015    Bilateral index fingers    Trigger ring finger of right hand     Wears glasses      Past Surgical History:   Procedure Laterality Date    ABDOMEN SURGERY PROC UNLISTED      HX APPENDECTOMY      HX COLECTOMY      partial    HX HYSTERECTOMY      HX MOHS PROCEDURES Right 12/2010    Dr. Arben Lowery    HX OTHER SURGICAL      Two ribs removed    HX TONSILLECTOMY       Prior Level of Function/Home Situation: Patient states she was able to perform basic self care tasks and functional transfers without assistance but accuracy is questionable secondary to confusion.     Home Situation  Home Environment: [address] [place] [address] Road Name: [address] [place] [address]  # Steps to Enter: 0  One/Two Story Residence: One story  Living Alone: No  Support Systems: Child(priyank)  Patient Expects to be Discharged to[de-identified] Assisted living  Current DME Used/Available at Home: None  Tub or Shower Type: Shower  [x]  Right hand dominant   []  Left hand dominant  Cognitive/Behavioral Status:  Neurologic State: Alert;Confused  Orientation Level: Oriented to person;Disoriented to situation;Disoriented to time  Cognition: Decreased command following  Safety/Judgement: Fall prevention    Skin: Intact on UEs    Edema: None noted in UEs    Vision/Perceptual:     Acuity: Within Defined Limits      Coordination:  Fine Motor Skills-Upper: Left Intact; Right Intact    Gross Motor Skills-Upper: Left Intact; Right Intact    Strength:  Strength: Generally decreased, functional (UEs; 4/5 throughout)    Tone & Sensation:  Tone: Normal (UEs)  Sensation: Intact (UEs)    Range of Motion:  AROM: Within functional limits (UEs)    Functional Mobility and Transfers for ADLs:  Bed Mobility:  Supine to Sit:  (attempted but refused)  Transfers: Toilet Transfer :  (Pt declined OOB activity.)    ADL Assessment:  Feeding: Supervision    Oral Facial Hygiene/Grooming: Setup;Supervision    Bathing: Minimum assistance (per patient report)    Upper Body Dressing: Setup;Supervision    Lower Body Dressing: Setup;Supervision    Toileting: Supervision (per nursing)    Pain:  Pt reports 0/10 pain or discomfort prior to treatment.    Pt reports 0/10 pain or discomfort post treatment. Activity Tolerance:   Fair    Please refer to the flowsheet for vital signs taken during this treatment. After treatment:   [] Patient left in no apparent distress sitting up in chair  [x] Patient left in no apparent distress in bed  [x] Call bell left within reach  [x] Nursing notified  [] Caregiver present  [] Bed alarm activated    COMMUNICATION/EDUCATION:   [] Home safety education was provided and the patient/caregiver indicated understanding. [] Patient/family have participated as able in goal setting and plan of care. [] Patient/family agree to work toward stated goals and plan of care. [x] Patient understands intent and goals of therapy, but is neutral about his/her participation. [] Patient is unable to participate in goal setting and plan of care.     Thank you for this referral.  Florecita Loo MS OTR/L  Time Calculation: 10 mins

## 2018-06-14 NOTE — PROGRESS NOTES
Problem: Mobility Impaired (Adult and Pediatric)  Goal: *Acute Goals and Plan of Care (Insert Text)  Physical Therapy Goals  Initiated 6/12/2018 and to be accomplished within 7 day(s)  1. Patient will move from supine to sit and sit to supine , scoot up and down and roll side to side in bed with supervision/set-up. 2.  Patient will transfer from bed to chair and chair to bed with minimal assistance/contact guard assist using the least restrictive device. 3.  Patient will perform sit to stand with minimal assistance/contact guard assist.  4.  Patient will ambulate with minimal assistance/contact guard assist for >50 feet with the least restrictive device. Outcome: Not Progressing Towards Goal  physical Therapy TREATMENT    Patient: Arlin Willoughby (70 y.o. female)  Date: 6/14/2018  Diagnosis: Pneumonia  Weakness  Injury to ligament of cervical spine  Hyponatremia  Pneumonia  Fall  Afib [facility]) Pneumonia       Precautions: Fall   Chart, physical therapy assessment, plan of care and goals were reviewed. OBJECTIVE/ ASSESSMENT:  Cleared PT with nursing (beronica) prior to tx. Patient found supine in bed very drowsy but agreeable to therapy stating \"what do you want me to do. \" Pt was disoriented to time stating it was 1985. Pt performed supine therex (ankle pumps x10 bilaterally) with multiple verbal and tactile cues to stay on task. Pt attempted to do heel slides with multiple verbal and tactile cues but is unable to stay on task. Pt refuses to sit up on EOB despite multiple attempts to encourage pt and stated \" I am cold. I don't want to do anything. \"  Pt's breakfast tray is in room and declined eating at this time. Instructed pt on proper use of call bell and pt was able to demonstrate understanding of how to contact nurse for assistance. Pt left supine in bed with all needs in reach.      Education: therex, importance of OOB activity,   Progression toward goals:  []      Improving appropriately and progressing toward goals  [x]      Improving slowly and progressing toward goals  []      Not making progress toward goals and plan of care will be adjusted     PLAN:  Patient continues to benefit from skilled intervention to address the above impairments. Continue treatment per established plan of care. Discharge Recommendations: Aron Rendon  Further Equipment Recommendations for Discharge:  rolling walker     SUBJECTIVE:   Patient stated I don't want to\"    OBJECTIVE DATA SUMMARY:   Critical Behavior:  Neurologic State: Confused  Orientation Level: Disoriented to situation, Disoriented to time  Cognition: Decreased command following  Functional Mobility Training:  Bed Mobility:  Supine to Sit:  (attempted but refused)  Therapeutic Exercises:       EXERCISE   Sets   Reps   Active Active Assist   Passive Self- assited ROM   Comments   Ankle Pumps 1 10  [] [x] [] [] bilaterally   Quad Sets/Glut Sets   [] [] [] []    Seated Knee Flexion   [] [] [] []    Short Arc Quads   [] [] [] []    Heel Slides   [] [] [] []    Straight Leg Raises   [] [] [] []    Hip Abd/Add   [] [] [] []    Long Arc Quads   [] [] [] []    Seated Marching   [] [] [] []    Standing Marching   [] [] [] []       [] [] [] []      Pain:  Pre tx pain: 0/10  Post tx pain: 0/10  Activity Tolerance:   poor  Please refer to the flowsheet for vital signs taken during this treatment. After treatment:   [] Patient left in no apparent distress sitting up in chair  [x] Patient left in no apparent distress in bed  [x] Call bell left within reach  [x] Nursing Camryn Cook) notified  [] Caregiver present  [x] Bed alarm activated  [] SCDs applied  [] Ice applied      Lauren E D'Amico, PTA   Time Calculation: 16 mins    Mobility  Current  CM= 80-99%. The severity rating is based on the Level of Assistance required for Functional Mobility and ADLs. Mobility   Goal  CJ= 20-39%.   The severity rating is based on the Level of Assistance required for Functional Mobility and ADLs.

## 2018-06-14 NOTE — CDMP QUERY
Thank you for documenting the diagnosis of A-fib for this patient. Please clarify if this diagnosis could be further specified as:     =>Paroxysmal  =>Persistent  =>Permanent  =>Other explanations of clinical findings  =>Clinically Undetermined (no explanation for clinical findings)    Please document your response indicating your clinical opinion in your progress notes and discharge summary. -------------------------------------------------------------------------------------------------  Paroxysmal:  begins suddenly and stops on its own. Symptoms can be mild or severe. They stop within about a week, but usually in less than 24 hours. Persistent: continues for more than a week. It may stop on its own, or it can be stopped with treatment.     Permanent: cannot be restored with treatment    Willis Gonzales RN [facility]  [phone]

## 2018-06-14 NOTE — PROGRESS NOTES
Problem: Mobility Impaired (Adult and Pediatric)  Goal: *Acute Goals and Plan of Care (Insert Text)  Physical Therapy Goals  Initiated 6/12/2018 and to be accomplished within 7 day(s)  1. Patient will move from supine to sit and sit to supine , scoot up and down and roll side to side in bed with supervision/set-up. 2.  Patient will transfer from bed to chair and chair to bed with minimal assistance/contact guard assist using the least restrictive device. 3.  Patient will perform sit to stand with minimal assistance/contact guard assist.  4.  Patient will ambulate with minimal assistance/contact guard assist for >50 feet with the least restrictive device. Outcome: Progressing Towards Goal  physical Therapy TREATMENT    Patient: Lana Pacheco (25 y.o. female)  Date: 6/14/2018  Diagnosis: Pneumonia  Weakness  Injury to ligament of cervical spine  Hyponatremia  Pneumonia  Fall  Afib [facility]) Pneumonia       Precautions: Fall   Chart, physical therapy assessment, plan of care and goals were reviewed. OBJECTIVE/ ASSESSMENT:  Patient found supine in bed with son present willing to work with PT with encouragement from son and this LPTA. Pt completed supine therex prior to gait training (see below). Pt transferred from supine to sitting on EOB with SBA. Pt completed seated therex on EOB (LAQ's x10 bilaterally). Pt stood to walker performed standing marches. Pt then  ambulated in room with CGA for first trial (10ft). Pt would benefit from a youth RW during ambulation as the walker in room did not fit pt. This LPTA provided hand held assist for remaining 3 trials. Pt required encouragement with each ambulation trial and VC's for sequencing when performing sit to stand transfers. Pt returned to supine in bed and required Max A x2 for scooting. Pt was left supine in bed with all needs in reach, caregiver present, bed alarm activated, and nursing notified. Education:gait, transfers, bed mobility, therex. Progression toward goals:  []      Improving appropriately and progressing toward goals  [x]      Improving slowly and progressing toward goals  []      Not making progress toward goals and plan of care will be adjusted     PLAN:  Patient continues to benefit from skilled intervention to address the above impairments. Continue treatment per established plan of care. Discharge Recommendations:  Aron Rendon  Further Equipment Recommendations for Discharge: Youth rolling walker     SUBJECTIVE:   Patient stated Easy for you to say    OBJECTIVE DATA SUMMARY:   Functional Mobility Training:  Bed Mobility:  Supine to Sit: Contact guard assistance   Sit to Supine: Contact guard assistance  Scooting: Max A x2. Transfers:  Sit to Stand: Contact guard assistance  Stand to Sit: Contact guard assistance   Balance:  Sitting: Intact  Sitting - Static: Fair (occasional)  Sitting - Dynamic: Fair (occasional)  Standing: Impaired; With support  Standing - Static: Fair  Standing - Dynamic : Fair  Ambulation/Gait Training:   10ft x4 -Hand held assist    Therapeutic Exercises:       EXERCISE   Sets   Reps   Active Active Assist   Passive Self- assited ROM   Comments   Ankle Pumps 1 10  [x] [] [] [] bilaterally   Quad Sets/Glut Sets   [] [] [] []    Seated Knee Flexion   [] [] [] []    Short Arc Quads   [] [] [] []    Heel Slides   [] [] [] []    Straight Leg Raises 1 5 [x] [] [] [] bilaterally   Hip Abd/Add   [] [] [] []    Long Arc Quads   [] [] [] []    Seated Marching   [] [] [] []    Standing Marching 1 10 [] [] [] [] Bilaterally       [] [] [] []      Pain:  Pre tx pain:  0/10  Post tx pain: not rated  Activity Tolerance:   poor  Please refer to the flowsheet for vital signs taken during this treatment.   After treatment:   [] Patient left in no apparent distress sitting up in chair  [x] Patient left in no apparent distress in bed  [x] Call bell left within reach  [x] Nursing (beronica) notified  [] Caregiver present  [x] Bed alarm activated  [] SCDs applied  [] Ice applied      Lauren E D'Amico, PTA   Time Calculation: 25 mins    Mobility  Current  CM= 80-99%. The severity rating is based on the Level of Assistance required for Functional Mobility and ADLs. Mobility   Goal  CJ= 20-39%. The severity rating is based on the Level of Assistance required for Functional Mobility and ADLs.

## 2018-06-14 NOTE — PROGRESS NOTES
Received bedside shift change report from night nurse Cathleen Maldonado (outgoing nurse) to Renald Merlin (incoming nurse). Kardex info included as well as labs result,vital signs . Assumed care ,patient in bed sleeping comfortably . No problems identified at this time. PIV dressing clean and intact no signs of infiltrations. Will continue care.

## 2018-06-14 NOTE — PROGRESS NOTES
Care Management Specialists reviewed over Important Medicare Rights Letter with patient. Patient signed the letter. Patient given a copy of letter by bedside. Signed copy placed into patient chart.

## 2018-06-15 VITALS
TEMPERATURE: 99.4 F | SYSTOLIC BLOOD PRESSURE: 140 MMHG | OXYGEN SATURATION: 93 % | RESPIRATION RATE: 20 BRPM | BODY MASS INDEX: 29.81 KG/M2 | DIASTOLIC BLOOD PRESSURE: 84 MMHG | HEART RATE: 84 BPM | WEIGHT: 119.1 LBS

## 2018-06-15 LAB
ANION GAP SERPL CALC-SCNC: 7 MMOL/L (ref 3–18)
BASOPHILS # BLD: 0 K/UL (ref 0–0.1)
BASOPHILS NFR BLD: 0 % (ref 0–2)
BUN SERPL-MCNC: 17 MG/DL (ref 7–18)
BUN/CREAT SERPL: 18 (ref 12–20)
CALCIUM SERPL-MCNC: 8.3 MG/DL (ref 8.5–10.1)
CHLORIDE SERPL-SCNC: 98 MMOL/L (ref 100–108)
CO2 SERPL-SCNC: 22 MMOL/L (ref 21–32)
CREAT SERPL-MCNC: 0.97 MG/DL (ref 0.6–1.3)
DIFFERENTIAL METHOD BLD: ABNORMAL
EOSINOPHIL # BLD: 0.1 K/UL (ref 0–0.4)
EOSINOPHIL NFR BLD: 1 % (ref 0–5)
ERYTHROCYTE [DISTWIDTH] IN BLOOD BY AUTOMATED COUNT: 18.6 % (ref 11.6–14.5)
GLUCOSE SERPL-MCNC: 106 MG/DL (ref 74–99)
HCT VFR BLD AUTO: 32.1 % (ref 35–45)
HGB BLD-MCNC: 10.8 G/DL (ref 12–16)
LYMPHOCYTES # BLD: 1.9 K/UL (ref 0.9–3.6)
LYMPHOCYTES NFR BLD: 17 % (ref 21–52)
MCH RBC QN AUTO: 28.1 PG (ref 24–34)
MCHC RBC AUTO-ENTMCNC: 33.6 G/DL (ref 31–37)
MCV RBC AUTO: 83.4 FL (ref 74–97)
MONOCYTES # BLD: 1.6 K/UL (ref 0.05–1.2)
MONOCYTES NFR BLD: 14 % (ref 3–10)
NEUTS SEG # BLD: 7.3 K/UL (ref 1.8–8)
NEUTS SEG NFR BLD: 68 % (ref 40–73)
OSMOLALITY SERPL: 281 MOSM/KG H2O (ref 280–300)
PLATELET # BLD AUTO: 299 K/UL (ref 135–420)
PMV BLD AUTO: 10.1 FL (ref 9.2–11.8)
POTASSIUM SERPL-SCNC: 4.7 MMOL/L (ref 3.5–5.5)
RBC # BLD AUTO: 3.85 M/UL (ref 4.2–5.3)
SODIUM SERPL-SCNC: 127 MMOL/L (ref 136–145)
WBC # BLD AUTO: 10.9 K/UL (ref 4.6–13.2)

## 2018-06-15 PROCEDURE — 74011250637 HC RX REV CODE- 250/637: Performed by: INTERNAL MEDICINE

## 2018-06-15 PROCEDURE — 80048 BASIC METABOLIC PNL TOTAL CA: CPT | Performed by: NURSE PRACTITIONER

## 2018-06-15 PROCEDURE — 83930 ASSAY OF BLOOD OSMOLALITY: CPT | Performed by: INTERNAL MEDICINE

## 2018-06-15 PROCEDURE — 85025 COMPLETE CBC W/AUTO DIFF WBC: CPT | Performed by: NURSE PRACTITIONER

## 2018-06-15 PROCEDURE — 94640 AIRWAY INHALATION TREATMENT: CPT

## 2018-06-15 PROCEDURE — 74011000250 HC RX REV CODE- 250: Performed by: FAMILY MEDICINE

## 2018-06-15 PROCEDURE — 36415 COLL VENOUS BLD VENIPUNCTURE: CPT | Performed by: NURSE PRACTITIONER

## 2018-06-15 PROCEDURE — 74011000250 HC RX REV CODE- 250: Performed by: INTERNAL MEDICINE

## 2018-06-15 PROCEDURE — 74011250637 HC RX REV CODE- 250/637: Performed by: NURSE PRACTITIONER

## 2018-06-15 RX ORDER — SODIUM CHLORIDE TAB 1 GM 1 G
1 TAB MISCELLANEOUS
Qty: 30 TAB | Refills: 0 | Status: SHIPPED | OUTPATIENT
Start: 2018-06-15 | End: 2019-03-20 | Stop reason: ALTCHOICE

## 2018-06-15 RX ORDER — TRAMADOL HYDROCHLORIDE 50 MG/1
25 TABLET ORAL DAILY
Qty: 30 TAB | Refills: 0 | Status: SHIPPED | OUTPATIENT
Start: 2018-06-16 | End: 2019-07-08

## 2018-06-15 RX ORDER — HYDRALAZINE HYDROCHLORIDE 25 MG/1
25 TABLET, FILM COATED ORAL 3 TIMES DAILY
Qty: 30 TAB | Refills: 0 | Status: SHIPPED | OUTPATIENT
Start: 2018-06-15 | End: 2019-07-08

## 2018-06-15 RX ORDER — TRAMADOL HYDROCHLORIDE 50 MG/1
TABLET ORAL
Qty: 1 TAB | Refills: 0 | Status: SHIPPED | OUTPATIENT
Start: 2018-06-15 | End: 2018-10-08

## 2018-06-15 RX ORDER — SODIUM CHLORIDE TAB 1 GM 1 G
1 TAB MISCELLANEOUS
Status: DISCONTINUED | OUTPATIENT
Start: 2018-06-15 | End: 2018-06-15 | Stop reason: HOSPADM

## 2018-06-15 RX ADMIN — NIFEDIPINE 60 MG: 30 TABLET, FILM COATED, EXTENDED RELEASE ORAL at 09:08

## 2018-06-15 RX ADMIN — Medication 1 CAPSULE: at 09:11

## 2018-06-15 RX ADMIN — POTASSIUM CHLORIDE 20 MEQ: 20 TABLET, EXTENDED RELEASE ORAL at 09:14

## 2018-06-15 RX ADMIN — ALBUTEROL SULFATE 2.5 MG: 2.5 SOLUTION RESPIRATORY (INHALATION) at 07:31

## 2018-06-15 RX ADMIN — LEVOTHYROXINE SODIUM 100 MCG: 100 TABLET ORAL at 09:10

## 2018-06-15 RX ADMIN — PANTOPRAZOLE SODIUM 40 MG: 40 TABLET, DELAYED RELEASE ORAL at 09:14

## 2018-06-15 RX ADMIN — AZITHROMYCIN 500 MG: 250 TABLET, FILM COATED ORAL at 09:13

## 2018-06-15 RX ADMIN — HYDRALAZINE HYDROCHLORIDE 25 MG: 25 TABLET, FILM COATED ORAL at 09:10

## 2018-06-15 RX ADMIN — METOPROLOL TARTRATE 50 MG: 50 TABLET ORAL at 09:11

## 2018-06-15 RX ADMIN — DIGOXIN 0.12 MG: 0.25 TABLET ORAL at 13:16

## 2018-06-15 RX ADMIN — POLYVINYL ALCOHOL 1 DROP: 14 SOLUTION/ DROPS OPHTHALMIC at 01:19

## 2018-06-15 RX ADMIN — FLUTICASONE FUROATE 1 PUFF: 100 POWDER RESPIRATORY (INHALATION) at 09:00

## 2018-06-15 RX ADMIN — SODIUM CHLORIDE TAB 1 GM 1 G: 1 TAB at 13:16

## 2018-06-15 RX ADMIN — VITAMIN D, TAB 1000IU (100/BT) 1000 UNITS: 25 TAB at 09:12

## 2018-06-15 RX ADMIN — LACTOBACILLUS TAB 2 TABLET: TAB at 09:09

## 2018-06-15 RX ADMIN — ASPIRIN 81 MG CHEWABLE TABLET 81 MG: 81 TABLET CHEWABLE at 09:10

## 2018-06-15 RX ADMIN — TRAMADOL HYDROCHLORIDE 25 MG: 50 TABLET, FILM COATED ORAL at 09:12

## 2018-06-15 RX ADMIN — LORATADINE 10 MG: 10 TABLET ORAL at 09:11

## 2018-06-15 RX ADMIN — THERA TABS 1 TABLET: TAB at 09:14

## 2018-06-15 NOTE — PROGRESS NOTES
Called [facility] set up transport for 1:00 pm to [facility] with [facility]. Notified [employer]) of transportation arrangements.

## 2018-06-15 NOTE — PROGRESS NOTES
Spoke with Jeff Huang at [facility]. Patient has been accepted to facility.      1200 Son, nurse and Lucina at [facility] informed of transport time of 1300

## 2018-06-15 NOTE — PROGRESS NOTES
RENAL DAILY PROGRESS NOTE    Patient: Arlin Willoughby               Sex: female          DOA: 6/10/2018  8:26 PM        YOB: 1935      Age:  80 y.o.        LOS:  LOS: 4 days     Subjective: Arlin Willoughby is a 80 y.o.  who presents with Pneumonia  Weakness  Injury to ligament of cervical spine  Hyponatremia  Pneumonia  Fall  [facility]).    Asked to evaluate for hyponatremia,admitted with pneumonia,hx of htn,afib  Chief complains: Patient denies nausea, vomiting, chest pain, dizziness, shortness of breath or headache.  - Reviewed last 24 hrs events     Current Facility-Administered Medications   Medication Dose Route Frequency    sodium chloride tablet 1 g  1 g Oral TID WITH MEALS    loratadine (CLARITIN) tablet 10 mg  10 mg Oral Q48H    hydrALAZINE (APRESOLINE) tablet 25 mg  25 mg Oral TID    potassium chloride (K-DUR, KLOR-CON) SR tablet 20 mEq  20 mEq Oral BID    albuterol (PROVENTIL VENTOLIN) nebulizer solution 2.5 mg  2.5 mg Nebulization TID    hydrALAZINE (APRESOLINE) 20 mg/mL injection 10 mg  10 mg IntraVENous Q6H PRN    montelukast (SINGULAIR) tablet 10 mg  10 mg Oral QHS    acetaminophen (TYLENOL) tablet 650 mg  650 mg Oral Q4H PRN    digoxin (LANOXIN) tablet 0.125 mg  0.125 mg Oral EVERY OTHER DAY    levothyroxine (SYNTHROID) tablet 100 mcg  100 mcg Oral ACB    pantoprazole (PROTONIX) tablet 40 mg  40 mg Oral ACB    cholecalciferol (VITAMIN D3) tablet 1,000 Units  1,000 Units Oral DAILY    therapeutic multivitamin (THERAGRAN) tablet 1 Tab  1 Tab Oral DAILY    melatonin tablet 3 mg  3 mg Oral QHS    bisacodyl (DULCOLAX) tablet 10 mg  10 mg Oral DAILY PRN    traMADol (ULTRAM) tablet 25 mg  25 mg Oral DAILY    traZODone (DESYREL) tablet 25 mg  25 mg Oral QHS PRN    NIFEdipine ER (PROCARDIA XL) tablet 60 mg  60 mg Oral DAILY    aspirin chewable tablet 81 mg  81 mg Oral DAILY    Lactobacillus Acidoph & Bulgar (FLORANEX) tablet 2 Tab  2 Tab Oral TID   [facility] fluticasone furoate (ARNUITY ELLIPTA) 100 mcg/puff  1 Puff Inhalation DAILY    polyvinyl alcohol (LIQUIFILM TEARS) 1.4 % ophthalmic solution 1 Drop  1 Drop Both Eyes QHS    fish oil-omega-3 fatty acids 340-1,000 mg capsule 1 Cap  1 Cap Oral DAILY    sodium chloride (NS) flush 5-10 mL  5-10 mL IntraVENous Q8H    sodium chloride (NS) flush 5-10 mL  5-10 mL IntraVENous PRN    oxyCODONE-acetaminophen (PERCOCET) 5-325 mg per tablet 1 Tab  1 Tab Oral Q4H PRN    naloxone (NARCAN) injection 0.4 mg  0.4 mg IntraVENous PRN    metoprolol tartrate (LOPRESSOR) tablet 50 mg  50 mg Oral Q12H    simvastatin (ZOCOR) tablet 20 mg  20 mg Oral QHS    benzonatate (TESSALON) capsule 200 mg  200 mg Oral TID PRN       Objective:     Visit Vitals    /67 (BP 1 Location: Left arm, BP Patient Position: At rest)    Pulse (!) 108    Temp 99.5 °F (37.5 °C)    Resp 20    Wt 54 kg (119 lb 1.6 oz)    SpO2 94%    Breastfeeding No    BMI 29.81 kg/m2       Intake/Output Summary (Last 24 hours) at 06/15/18 1116  Last data filed at 06/14/18 1824   Gross per 24 hour   Intake              240 ml   Output                0 ml   Net              240 ml       Physical Examination:     GEN: confused  RS: Chest is bilateral equal, no wheezing / rales / crackles  CVS: S1-S2 heard, RRR, No S3 / murmur  Abdomen: Soft, Non tender, Not distended, Positive bowel sounds, no organomegaly, no CVA / supra pubic tenderness  Extremities: No edema, no cyanosis, skin is warm on touch  HEENT: Head is atraumatic, PERRLA, conjunctiva pink & non icteric. No JVD or carotid bruit   Musculoskeletal: No gross joints or bone deformities   Lymph Node: No palpable cervical, axillary or groin lymphadenopathy.       Data Review:      Labs:     Hematology: Recent Labs      06/15/18   0127  06/14/18   0346  06/13/18   0119   WBC  10.9  13.3*  16.0*   HGB  10.8*  11.8*  12.7   HCT  32.1*  35.1  36.7     Chemistry: Recent Labs      06/15/18   0127  06/14/18   0349 06/13/18   0119   BUN  17  21*  24*   CREA  0.97  0.96  0.91   CA  8.3*  8.8  7.9*   K  4.7  4.0  3.8   NA  127*  129*  126*   CL  98*  98*  95*   CO2  22  23  22   GLU  106*  125*  108*        Images:    XR (Most Recent). CXR reviewed by me and compared with previous CXR   Results from Hospital Encounter encounter on 06/10/18   XR CHEST PORT   Narrative Portable Chest    CPT CODE: 16602    HISTORY: Fall. FINDINGS:     Comparison with 2/13/2018; 8/2/2012    Multiple wires overlie the patient. Clips at the right axillary region. Heart  size and mediastinal contours within normal limits. Mild interstitial  prominence. No acute consolidation. No effusion or pneumothorax. .         Impression IMPRESSION:    Mildly prominent interstitium. May represent mild vascular congestion         CT (Most Recent)   Results from Hospital Encounter encounter on 06/10/18   CT SPINE CERV WO CONT   Narrative CT SCAN CERVICAL SPINE WITHOUT CONTRAST    HISTORY: Fall hitting back of head. Closed head injury. Recent trauma, spine. COMPARISON: None. TECHNIQUE: Axial images through the cervical spine were obtained without  intravenous contrast. Coronal and sagittal reformatted images were also obtained  for better evaluation of regional anatomy and alignment. All CT scans are  performed using dose optimization techniques as appropriate to the performed  exam including the following: Automated exposure control, adjustment of mA  and/or kV according to patient size, and use of iterative reconstructive  technique. FINDINGS:    Odontoid intact. Occipital condylar/C-1/C-2 relationships normal. Grade II  anterolisthesis of C3 on C4 and grade I anterolisthesis of C4 on C5. Reversal of  cervical curvature. Vertebral body heights are maintained. Severe disc space  narrowing with osteophytes C5-C6 and mild at other levels.  Advanced bilateral  facet joint hypertrophy in the upper and mid cervical spine, fused across the  hypertrophied left C4-C5 facet joint. No acute fracture is identified however there is a significant amount of  prevertebral soft tissue swelling at the C4-C7 levels. Soft tissue swelling  tracks into the left and right neck. Within the limitations of CT, no critical  findings are identified in the cervical spinal canal. Neural foraminal stenoses  from facet hypertrophy. Impression IMPRESSION:    Prevertebral soft tissue swelling at the mid cervical spine level may indicate  ligamentous injury. No fracture seen. MRI is recommended. Spondylolisthesis at C3-C4 and C4-C5 appears chronic based on degenerative and  partially fused facet joint appearances. Degenerative disc and joint disease throughout the mid cervical spine. EKG Results for orders placed or performed in visit on 03/16/17   AMB POC EKG ROUTINE W/ 12 LEADS, INTER & REP     Status: None    Narrative    Atrial flutter fibrillation with rate in the mid 70s. There are ST-T changes inferolaterally on the high lateral leads most consistent with digitalis effect. This EKG is similar to the EKG of December 29, 2016 although the ST-T wave changes are much more marked on the current tracing and at that time the patient was in sinus rhythm and now appears to be in atrial flutter fibrillation. I have personally reviewed the old medical records and patient's labs    Plan / Recommendation:      1. Hyponatremia,low serum osmolarity and high serum osmolarity are suggestive of increase adh release. add fluid restrictions and salt tabs  2.hypokalemia ,resolved. stop potassium supplements  3.htn,controlled    D/w Dr. Elier Gleason MD  Nephrology  6/15/2018

## 2018-06-15 NOTE — ROUTINE PROCESS
Bedside and Verbal shift change report given to Kimber Tipton RN (oncoming nurse) by Phu Devine RN (offgoing nurse). Report included the following information SBAR, Kardex, ED Summary, Intake/Output, MAR and Recent Results.      Patient Vitals for the past 12 hrs:   Temp Pulse Resp BP SpO2   06/14/18 2000 97.1 °F (36.2 °C) 91 20 143/81 92 %   06/14/18 1555 - - - - 96 %   06/14/18 1531 97.1 °F (36.2 °C) 76 20 138/77 96 %   06/14/18 1214 97.6 °F (36.4 °C) 85 18 147/77 97 %   06/14/18 0834 98.2 °F (36.8 °C) 92 20 134/71 98 %

## 2018-06-15 NOTE — PROGRESS NOTES
Call placed to [facility] to check on status of acceptance. Message left for Tone Dalton, admissions with request for return call.

## 2018-06-15 NOTE — DISCHARGE SUMMARY
ADDENDUM TO DISCHARGE SUMMARY    Patient Vitals for the past 12 hrs:   Temp Pulse Resp BP SpO2   06/15/18 1154 99.4 °F (37.4 °C) 84 20 140/84 93 %   06/15/18 0807 99.5 °F (37.5 °C) (!) 108 20 138/67 94 %   06/15/18 0400 98.7 °F (37.1 °C) (!) 102 20 157/85 93 %        General:  Alert, NAD  Cardiovascular:  RRR  Pulmonary:  LSC throughout; respiratory effort WNL  GI:  +BS in all four quadrants, soft, non-tender  Extremities:  No edema; 2+ dorsalis pedis pulses bilaterally    Plan:   Remains unchanged from 5/14 D/C summary with exception of addition of 1 salt tab tid with meals. Current Discharge Medication List      START taking these medications    Details   sodium chloride 1 gram tablet Take 1 Tab by mouth three (3) times daily (with meals). Qty: 30 Tab, Refills: 0    Associated Diagnoses: Hyponatremia      azithromycin (ZITHROMAX) 250 mg tablet Take 2 Tabs by mouth daily for 1 day. Qty: 1 Tab, Refills: 0    Associated Diagnoses: Bronchitis      benzonatate (TESSALON) 200 mg capsule Take 1 Cap by mouth three (3) times daily as needed for Cough for up to 7 days. Qty: 10 Cap, Refills: 0    Associated Diagnoses: Bronchitis         CONTINUE these medications which have CHANGED    Details   hydrALAZINE (APRESOLINE) 25 mg tablet Take 1 Tab by mouth three (3) times daily. Qty: 30 Tab, Refills: 0    Associated Diagnoses: Essential hypertension, benign      traMADol (ULTRAM) 50 mg tablet Take 0.5 tabs (25mg) PO Q AM and 1 tab (50mg) PO Q PM.  Qty: 1 Tab, Refills: 0    Associated Diagnoses: Back pain without sciatica         CONTINUE these medications which have NOT CHANGED    Details   CALCIUM CARBONATE (CALCIUM 600 PO) Take 1 Tab by mouth daily. cholecalciferol (VITAMIN D3) 1,000 unit cap Take 1,000 Units by mouth daily. B.infantis-B.ani-B.long-B.bifi (PROBIOTIC 4X) 10-15 mg TbEC Take 1 Tab by mouth daily. traZODone (DESYREL) 50 mg tablet Take 25 mg by mouth nightly as needed for Sleep.       NIFEdipine ER (PROCARDIA XL) 30 mg ER tablet Take 60 mg by mouth daily. carboxymethylcellulose sodium 1 % dlgl Apply 1 Drop to eye nightly. To both eyes      melatonin 3 mg tablet Take 1 Tab by mouth nightly. Qty: 30 Tab, Refills: 0      bisacodyl (DULCOLAX) 5 mg EC tablet Take 2 Tabs by mouth daily as needed for Constipation. Qty: 30 Tab, Refills: 0      albuterol (PROVENTIL VENTOLIN) 2.5 mg /3 mL (0.083 %) nebulizer solution 1.5 mL by Nebulization route every four (4) hours as needed for Wheezing. Qty: 30 Each, Refills: 0      acetaminophen (TYLENOL) 325 mg tablet Take 2 Tabs by mouth every four (4) hours as needed for Fever. Qty: 30 Tab, Refills: 0      levothyroxine (SYNTHROID) 100 mcg tablet Take 100 mcg by mouth Daily (before breakfast). pantoprazole (PROTONIX) 40 mg tablet Take 40 mg by mouth daily. multivitamin (ONE A DAY) tablet Take 1 Tab by mouth daily. beclomethasone (QVAR) 40 mcg/actuation inhaler Take 2 Puffs by inhalation two (2) times a day. Use with spacer device  Qty: 1 Inhaler, Refills: 6      fish oil-dha-epa 1,200-144-216 mg cap Take 1 Cap by mouth daily. biotin 1 mg tab Take 1 Tab by mouth daily. Aspirin, Buffered 81 mg tab Take 1 Tab by mouth daily. montelukast (SINGULAIR) 10 mg tablet Take 10 mg by mouth daily. digoxin (LANOXIN) 0.125 mg tablet Take 0.125 mg by mouth every other day. metoprolol (LOPRESSOR) 50 mg tablet Take 50 mg by mouth two (2) times a day. loratadine (CLARITIN) 10 mg tablet Take 10 mg by mouth daily as needed. lovastatin (MEVACOR) 40 mg tablet Take 40 mg by mouth nightly.

## 2018-06-15 NOTE — DISCHARGE INSTRUCTIONS
Pneumonia: Care Instructions  Your Care Instructions    Pneumonia is an infection of the lungs. Most cases are caused by infections from bacteria or viruses. Pneumonia may be mild or very severe. If it is caused by bacteria, you will be treated with antibiotics. It may take a few weeks to a few months to recover fully from pneumonia, depending on how sick you were and whether your overall health is good. Follow-up care is a key part of your treatment and safety. Be sure to make and go to all appointments, and call your doctor if you are having problems. It's also a good idea to know your test results and keep a list of the medicines you take. How can you care for yourself at home? · Take your antibiotics exactly as directed. Do not stop taking the medicine just because you are feeling better. You need to take the full course of antibiotics. · Take your medicines exactly as prescribed. Call your doctor if you think you are having a problem with your medicine. · Get plenty of rest and sleep. You may feel weak and tired for a while, but your energy level will improve with time. · To prevent dehydration, drink plenty of fluids, enough so that your urine is light yellow or clear like water. Choose water and other caffeine-free clear liquids until you feel better. If you have kidney, heart, or liver disease and have to limit fluids, talk with your doctor before you increase the amount of fluids you drink. · Take care of your cough so you can rest. A cough that brings up mucus from your lungs is common with pneumonia. It is one way your body gets rid of the infection. But if coughing keeps you from resting or causes severe fatigue and chest-wall pain, talk to your doctor. He or she may suggest that you take a medicine to reduce the cough. · Use a vaporizer or humidifier to add moisture to your bedroom. Follow the directions for cleaning the machine. · Do not smoke or allow others to smoke around you.  Smoke will make your cough last longer. If you need help quitting, talk to your doctor about stop-smoking programs and medicines. These can increase your chances of quitting for good. · Take an over-the-counter pain medicine, such as acetaminophen (Tylenol), ibuprofen (Advil, Motrin), or naproxen (Aleve). Read and follow all instructions on the label. · Do not take two or more pain medicines at the same time unless the doctor told you to. Many pain medicines have acetaminophen, which is Tylenol. Too much acetaminophen (Tylenol) can be harmful. · If you were given a spirometer to measure how well your lungs are working, use it as instructed. This can help your doctor tell how your recovery is going. · To prevent pneumonia in the future, talk to your doctor about getting a flu vaccine (once a year) and a pneumococcal vaccine (one time only for most people). When should you call for help? Call 911 anytime you think you may need emergency care. For example, call if:  ? · You have severe trouble breathing. ?Call your doctor now or seek immediate medical care if:  ? · You cough up dark brown or bloody mucus (sputum). ? · You have new or worse trouble breathing. ? · You are dizzy or lightheaded, or you feel like you may faint. ? Watch closely for changes in your health, and be sure to contact your doctor if:  ? · You have a new or higher fever. ? · You are coughing more deeply or more often. ? · You are not getting better after 2 days (48 hours). ? · You do not get better as expected. Where can you learn more? Go to http://molina-frida.info/. Enter [id number] in the search box to learn more about \"Pneumonia: Care Instructions. \"  Current as of: May 12, 2017  Content Version: 11.4  © 2317-3956 [employer]. Care instructions adapted under license by [employer] (which disclaims liability or warranty for this information).  If you have questions about a medical condition or this instruction, always ask your healthcare professional. [place] [address] any warranty or liability for your use of this information. 1. Take all your medications as prescribed. 2. Follow up with your PCP in 5-7 days  3. Follow up with Cardiology in 1 week. 4. Follow up with nephrology in 1 week. 5. Follow fluid restriction as discussed by the Nephrologist.   6. Repeat BMP in 1 week. Bronchitis: Care Instructions  Your Care Instructions    Bronchitis is inflammation of the bronchial tubes, which carry air to the lungs. The tubes swell and produce mucus, or phlegm. The mucus and inflamed bronchial tubes make you cough. You may have trouble breathing. Most cases of bronchitis are caused by viruses like those that cause colds. Antibiotics usually do not help and they may be harmful. Bronchitis usually develops rapidly and lasts about 2 to 3 weeks in otherwise healthy people. Follow-up care is a key part of your treatment and safety. Be sure to make and go to all appointments, and call your doctor if you are having problems. It's also a good idea to know your test results and keep a list of the medicines you take. How can you care for yourself at home? · Take all medicines exactly as prescribed. Call your doctor if you think you are having a problem with your medicine. · Get some extra rest.  · Take an over-the-counter pain medicine, such as acetaminophen (Tylenol), ibuprofen (Advil, Motrin), or naproxen (Aleve) to reduce fever and relieve body aches. Read and follow all instructions on the label. · Do not take two or more pain medicines at the same time unless the doctor told you to. Many pain medicines have acetaminophen, which is Tylenol. Too much acetaminophen (Tylenol) can be harmful. · Take an over-the-counter cough medicine that contains dextromethorphan to help quiet a dry, hacking cough so that you can sleep. Avoid cough medicines that have more than one active ingredient. Read and follow all instructions on the label. · Breathe moist air from a humidifier, hot shower, or sink filled with hot water. The heat and moisture will thin mucus so you can cough it out. · Do not smoke. Smoking can make bronchitis worse. If you need help quitting, talk to your doctor about stop-smoking programs and medicines. These can increase your chances of quitting for good. When should you call for help? Call 911 anytime you think you may need emergency care. For example, call if:  ? · You have severe trouble breathing. ?Call your doctor now or seek immediate medical care if:  ? · You have new or worse trouble breathing. ? · You cough up dark brown or bloody mucus (sputum). ? · You have a new or higher fever. ? · You have a new rash. ? Watch closely for changes in your health, and be sure to contact your doctor if:  ? · You cough more deeply or more often, especially if you notice more mucus or a change in the color of your mucus. ? · You are not getting better as expected. Where can you learn more? Go to http://molina-frida.info/. Enter H333 in the search box to learn more about \"Bronchitis: Care Instructions. \"  Current as of: May 12, 2017  Content Version: 11.4  © 7224-7012 [employer]. Care instructions adapted under license by [employer] (which disclaims liability or warranty for this information). If you have questions about a medical condition or this instruction, always ask your healthcare professional. [place] [address] any warranty or liability for your use of this information. Electrolyte Imbalance: Care Instructions  Your Care Instructions  Electrolytes are minerals in your blood. They include sodium, potassium, calcium, and magnesium. When they are not at the right levels, you can feel very ill. You may not know what is causing it, but you know something is wrong.  You may feel weak or numb, have muscle spasms, or twitch. Your heart may beat fast. Symptoms are different with each mineral. Too much is as bad as too little. Minerals help keep your body working as it should. Vomiting, diarrhea, and fever can cause you to lose minerals. A problem with your kidneys can tip a mineral out of balance. So can taking certain medicines. Your doctor may do more tests. He or she may change your medicine and diet. If you are low in one or more minerals, they may be given through a tube into your vein (IV). Your doctor may have you take or drink special fluids or pills. If your kidneys are failing, your blood may be filtered. This is called dialysis. It can put the minerals back in balance. Follow-up care is a key part of your treatment and safety. Be sure to make and go to all appointments, and call your doctor if you are having problems. It's also a good idea to know your test results and keep a list of the medicines you take. How can you care for yourself at home? · Take your medicines exactly as prescribed. Call your doctor if you have any problems with your medicines. You will get more details on the specific medicines your doctor prescribes. · Do not take any medicine without talking to your doctor first. This includes prescription, over-the-counter, and herbal medicines. · If you have kidney, heart, or liver disease and have to limit fluids, talk with your doctor before you increase the amount of fluids you drink. · Your doctor or dietitian may give you a diet plan to help balance your minerals. Follow the diet carefully. When should you call for help? Call 911 anytime you think you may need emergency care. For example, call if:  ? · You passed out (lost consciousness). ? · Your heartbeat seems to be irregular. It might be speeding up and then slowing down or skipping beats. ?Call your doctor now or seek immediate medical care if:  ? · You have muscle aches. ? · You feel very weak.    ? · You are confused or cannot think clearly. ? Watch closely for changes in your health, and be sure to contact your doctor if:  ? · You do not get better as expected. Where can you learn more? Go to http://molina-frida.info/. Enter X517 in the search box to learn more about \"Electrolyte Imbalance: Care Instructions. \"  Current as of: May 12, 2017  Content Version: 11.4  © 2006-2017 [employer]. Care instructions adapted under license by [facility] (which disclaims liability or warranty for this information). If you have questions about a medical condition or this instruction, always ask your healthcare professional. [place] [address] any warranty or liability for your use of this information. Heart Failure: Care Instructions  Your Care Instructions    Heart failure occurs when your heart does not pump as much blood as the body needs. Failure does not mean that the heart has stopped pumping but rather that it is not pumping as well as it should. Over time, this causes fluid buildup in your lungs and other parts of your body. Fluid buildup can cause shortness of breath, fatigue, swollen ankles, and other problems. By taking medicines regularly, reducing sodium (salt) in your diet, checking your weight every day, and making lifestyle changes, you can feel better and live longer. Follow-up care is a key part of your treatment and safety. Be sure to make and go to all appointments, and call your doctor if you are having problems. It's also a good idea to know your test results and keep a list of the medicines you take. How can you care for yourself at home? Medicines  ? · Be safe with medicines. Take your medicines exactly as prescribed. Call your doctor if you think you are having a problem with your medicine.    ? · Do not take any vitamins, over-the-counter medicine, or herbal products without talking to your doctor first. Diego Patelt not take ibuprofen (Advil or Motrin) and naproxen (Aleve) without talking to your doctor first. They could make your heart failure worse. ? · You may be taking some of the following medicine. ¨ Beta-blockers can slow heart rate, decrease blood pressure, and improve your condition. Taking a beta-blocker may lower your chance of needing to be hospitalized. ¨ Angiotensin-converting enzyme inhibitors (ACEIs) reduce the heart's workload, lower blood pressure, and reduce swelling. Taking an ACEI may lower your chance of needing to be hospitalized again. ¨ Angiotensin II receptor blockers (ARBs) work like ACEIs. Your doctor may prescribe them instead of ACEIs. ¨ Diuretics, also called water pills, reduce swelling. ¨ Potassium supplements replace this important mineral, which is sometimes lost with diuretics. ¨ Aspirin and other blood thinners prevent blood clots, which can cause a stroke or heart attack. ? You will get more details on the specific medicines your doctor prescribes. Diet  ? · Your doctor may suggest that you limit sodium to 2,000 milligrams (mg) a day or less. That is less than 1 teaspoon of salt a day, including all the salt you eat in cooking or in packaged foods. People get most of their sodium from processed foods. Fast food and restaurant meals also tend to be very high in sodium. ? · Ask your doctor how much liquid you can drink each day. You may have to limit liquids. ?Weight  ? · Weigh yourself without clothing at the same time each day. Record your weight. Call your doctor if you have a sudden weight gain, such as more than 2 to 3 pounds in a day or 5 pounds in a week. (Your doctor may suggest a different range of weight gain.) A sudden weight gain may mean that your heart failure is getting worse. ? Activity level  ? · Start light exercise (if your doctor says it is okay). Even if you can only do a small amount, exercise will help you get stronger, have more energy, and manage your weight and your stress.  Walking is an easy way to get exercise. Start out by walking a little more than you did before. Bit by bit, increase the amount you walk. ? · When you exercise, watch for signs that your heart is working too hard. You are pushing yourself too hard if you cannot talk while you are exercising. If you become short of breath or dizzy or have chest pain, stop, sit down, and rest.   ? · If you feel \"wiped out\" the day after you exercise, walk slower or for a shorter distance until you can work up to a better pace. ? · Get enough rest at night. Sleeping with 1 or 2 pillows under your upper body and head may help you breathe easier. ? Lifestyle changes  ? · Do not smoke. Smoking can make a heart condition worse. If you need help quitting, talk to your doctor about stop-smoking programs and medicines. These can increase your chances of quitting for good. Quitting smoking may be the most important step you can take to protect your heart. ? · Limit alcohol to 2 drinks a day for men and 1 drink a day for women. Too much alcohol can cause health problems. ? · Avoid getting sick from colds and the flu. Get a pneumococcal vaccine shot. If you have had one before, ask your doctor whether you need another dose. Get a flu shot each year. If you must be around people with colds or the flu, wash your hands often. When should you call for help? Call 911 if you have symptoms of sudden heart failure such as:  ? · You have severe trouble breathing. ? · You cough up pink, foamy mucus. ? · You have a new irregular or rapid heartbeat. ?Call your doctor now or seek immediate medical care if:  ? · You have new or increased shortness of breath. ? · You are dizzy or lightheaded, or you feel like you may faint. ? · You have sudden weight gain, such as more than 2 to 3 pounds in a day or 5 pounds in a week. (Your doctor may suggest a different range of weight gain.)   ? · You have increased swelling in your legs, ankles, or feet.    ? · You are suddenly so tired or weak that you cannot do your usual activities. ? Watch closely for changes in your health, and be sure to contact your doctor if you develop new symptoms. Where can you learn more? Go to http://molina-frida.info/. Enter K047 in the search box to learn more about \"Heart Failure: Care Instructions. \"  Current as of: September 21, 2016  Content Version: 11.4  © 2070-5575 [employer]. Care instructions adapted under license by [employer] (which disclaims liability or warranty for this information). If you have questions about a medical condition or this instruction, always ask your healthcare professional. [address] any warranty or liability for your use of this information. Hypokalemia: Care Instructions  Your Care Instructions    Hypokalemia (say \"zh-tm-izd-FREDI-david-uh\") is a low level of potassium. The heart, muscles, kidneys, and nervous system all need potassium to work well. This problem has many different causes. Kidney problems, diet, and medicines like diuretics and laxatives can cause it. So can vomiting or diarrhea. In some cases, cancer is the cause. Your doctor may do tests to find the cause of your low potassium levels. You may need medicines to bring your potassium levels back to normal. You may also need regular blood tests to check your potassium. If you have very low potassium, you may need intravenous (IV) medicines. You also may need tests to check the electrical activity of your heart. Heart problems caused by low potassium levels can be very serious. Follow-up care is a key part of your treatment and safety. Be sure to make and go to all appointments, and call your doctor if you are having problems. It's also a good idea to know your test results and keep a list of the medicines you take. How can you care for yourself at home?   · If your doctor recommends it, eat foods that have a lot of potassium. These include fresh fruits, juices, and vegetables. They also include nuts, beans, and milk. · Be safe with medicines. If your doctor prescribes medicines or potassium supplements, take them exactly as directed. Call your doctor if you have any problems with your medicines. · Get your potassium levels tested as often as your doctor tells you. When should you call for help? Call 911 anytime you think you may need emergency care. For example, call if:  ? · You feel like your heart is missing beats. Heart problems caused by low potassium can cause death. ? · You passed out (lost consciousness). ? · You have a seizure. ?Call your doctor now or seek immediate medical care if:  ? · You feel weak or unusually tired. ? · You have severe arm or leg cramps. ? · You have tingling or numbness. ? · You feel sick to your stomach, or you vomit. ? · You have belly cramps. ? · You feel bloated or constipated. ? · You have to urinate a lot. ? · You feel very thirsty most of the time. ? · You are dizzy or lightheaded, or you feel like you may faint. ? · You feel depressed, or you lose touch with reality. ? Watch closely for changes in your health, and be sure to contact your doctor if:  ? · You do not get better as expected. Where can you learn more? Go to http://molina-frida.info/. Enter G358 in the search box to learn more about \"Hypokalemia: Care Instructions. \"  Current as of: May 12, 2017  Content Version: 11.4  © 6604-5316 [employer]. Care instructions adapted under license by [employer] (which disclaims liability or warranty for this information). If you have questions about a medical condition or this instruction, always ask your healthcare professional. [address] any warranty or liability for your use of this information.          Hyponatremia: Care Instructions  Your Care Instructions    Hyponatremia (say \"ou-su-oft-TREE-david-uh\") means that you don't have enough sodium in your blood. It can cause nausea, vomiting, and headaches. Or you may not feel hungry. In serious cases, it can cause seizures, a coma, or even death. Hyponatremia is not a disease. It is a problem caused by something else, such as medicines or exercising for a long time in hot weather. You can get hyponatremia if you lose a lot of fluids and then you drink a lot of water or other liquids that don't have much sodium. You can also get it if you have kidney, liver, heart, or other health problems. Treatment is focused on getting your sodium levels back to normal.  Follow-up care is a key part of your treatment and safety. Be sure to make and go to all appointments, and call your doctor if you are having problems. It's also a good idea to know your test results and keep a list of the medicines you take. How can you care for yourself at home? · If your doctor recommends it, drink fluids that have sodium. Sports drinks are a good choice. Or you can eat salty foods. · If your doctor recommends it, limit the amount of water you drink. And limit fluids that are mostly water. These include tea, coffee, and juice. · Take your medicines exactly as prescribed. Call your doctor if you have any problems with your medicine. · Get your sodium levels tested when your doctor tells you to. When should you call for help? Call 911 anytime you think you may need emergency care. For example, call if:  ? · You have a seizure. ? · You passed out (lost consciousness). ?Call your doctor now or seek immediate medical care if:  ? · You are confused or it is hard to focus. ? · You have little or no appetite. ? · You feel sick to your stomach or you vomit. ? · You have a headache. ? · You have mood changes. ? · You feel more tired than usual.   ? Watch closely for changes in your health, and be sure to contact your doctor if:  ? · You do not get better as expected. Where can you learn more? Go to http://molina-frida.info/. Enter U658 in the search box to learn more about \"Hyponatremia: Care Instructions. \"  Current as of: 2016  Content Version: 11.4  © 0380-3780 [employer]. Care instructions adapted under license by [employer] (which disclaims liability or warranty for this information). If you have questions about a medical condition or this instruction, always ask your healthcare professional. [address] any warranty or liability for your use of this information. [employer] Activation    Thank you for requesting access to [facility] [facility]. Please follow the instructions below to securely access and download your online medical record. [facility] [facility] allows you to send messages to your doctor, view your test results, renew your prescriptions, schedule appointments, and more. How Do I Sign Up? 1. In your internet browser, go to [email or web address]  2. Click on the First Time User? Click Here link in the Sign In box. You will be redirect to the New Member Sign Up page. 3. Enter your [facility] [facility] Access Code exactly as it appears below. You will not need to use this code after youve completed the sign-up process. If you do not sign up before the expiration date, you must request a new code. [employer] [facility] Access Code: Activation code not generated  Current [facility] [facility] Status: Active (This is the date your [employer] access code will )    4. Enter the last four digits of your Social Security Number (xxxx) and Date of Birth (mm/dd/yyyy) as indicated and click Submit. You will be taken to the next sign-up page. 5. Create a [facility] [facility] ID. This will be your [facility] [facility] login ID and cannot be changed, so think of one that is secure and easy to remember. 6. Create a [employer] password. You can change your password at any time. 7. Enter your Password Reset Question and Answer.  This can be used at a later time if you forget your password. 8. Enter your e-mail address. You will receive e-mail notification when new information is available in [address] 9. Click Sign Up. You can now view and download portions of your medical record. 10. Click the Download Summary menu link to download a portable copy of your medical information. Additional Information    If you have questions, please visit the Frequently Asked Questions section of the [employer] website at [email or web address]/. Remember, [employer] is NOT to be used for urgent needs. For medical emergencies, dial 911. Patient armband removed and shredded. DISCHARGE SUMMARY from Nurse    PATIENT INSTRUCTIONS:    After general anesthesia or intravenous sedation, for 24 hours or while taking prescription Narcotics:  · Limit your activities  · Do not drive and operate hazardous machinery  · Do not make important personal or business decisions  · Do  not drink alcoholic beverages  · If you have not urinated within 8 hours after discharge, please contact your surgeon on call. Report the following to your surgeon:  · Excessive pain, swelling, redness or odor of or around the surgical area  · Temperature over 100.5  · Nausea and vomiting lasting longer than 4 hours or if unable to take medications  · Any signs of decreased circulation or nerve impairment to extremity: change in color, persistent  numbness, tingling, coldness or increase pain  · Any questions    What to do at Home:  Recommended activity: Activity as tolerated,     If you experience any of the following symptoms severe back pain, shortness of breath, wheezing, chest pain, elevated temperature, please follow up with primary care doctor. .    *  Please give a list of your current medications to your Primary Care Provider. *  Please update this list whenever your medications are discontinued, doses are      changed, or new medications (including over-the-counter products) are added.     * Please carry medication information at all times in case of emergency situations. These are general instructions for a healthy lifestyle:    No smoking/ No tobacco products/ Avoid exposure to second hand smoke  Surgeon General's Warning:  Quitting smoking now greatly reduces serious risk to your health. Obesity, smoking, and sedentary lifestyle greatly increases your risk for illness    A healthy diet, regular physical exercise & weight monitoring are important for maintaining a healthy lifestyle    You may be retaining fluid if you have a history of heart failure or if you experience any of the following symptoms:  Weight gain of 3 pounds or more overnight or 5 pounds in a week, increased swelling in our hands or feet or shortness of breath while lying flat in bed. Please call your doctor as soon as you notice any of these symptoms; do not wait until your next office visit. Recognize signs and symptoms of STROKE:    F-face looks uneven    A-arms unable to move or move unevenly    S-speech slurred or non-existent    T-time-call 911 as soon as signs and symptoms begin-DO NOT go       Back to bed or wait to see if you get better-TIME IS BRAIN. Warning Signs of HEART ATTACK     Call 911 if you have these symptoms:   Chest discomfort. Most heart attacks involve discomfort in the center of the chest that lasts more than a few minutes, or that goes away and comes back. It can feel like uncomfortable pressure, squeezing, fullness, or pain.  Discomfort in other areas of the upper body. Symptoms can include pain or discomfort in one or both arms, the back, neck, jaw, or stomach.  Shortness of breath with or without chest discomfort.  Other signs may include breaking out in a cold sweat, nausea, or lightheadedness. Don't wait more than five minutes to call 911 - MINUTES MATTER! Fast action can save your life. Calling 911 is almost always the fastest way to get lifesaving treatment.  Emergency Medical Services staff can begin treatment when they arrive -- up to an hour sooner than if someone gets to the hospital by car. The discharge information has been reviewed with the patient. The patient verbalized understanding. Discharge medications reviewed with the patient and appropriate educational materials and side effects teaching were provided.   ___________________________________________________________________________________________________________________________________

## 2018-06-19 LAB
ATRIAL RATE: 89 BPM
CALCULATED R AXIS, ECG10: 16 DEGREES
CALCULATED T AXIS, ECG11: -142 DEGREES
DIAGNOSIS, 93000: NORMAL
Q-T INTERVAL, ECG07: 302 MS
QRS DURATION, ECG06: 82 MS
QTC CALCULATION (BEZET), ECG08: 381 MS
VENTRICULAR RATE, ECG03: 96 BPM

## 2018-07-03 ENCOUNTER — OFFICE VISIT (OUTPATIENT)
Dept: CARDIOLOGY CLINIC | Age: 83
End: 2018-07-03

## 2018-07-03 VITALS
WEIGHT: 109 LBS | DIASTOLIC BLOOD PRESSURE: 86 MMHG | HEART RATE: 80 BPM | BODY MASS INDEX: 25.22 KG/M2 | SYSTOLIC BLOOD PRESSURE: 142 MMHG | OXYGEN SATURATION: 94 % | HEIGHT: 55 IN

## 2018-07-03 DIAGNOSIS — I10 ESSENTIAL HYPERTENSION: ICD-10-CM

## 2018-07-03 DIAGNOSIS — R53.83 OTHER FATIGUE: ICD-10-CM

## 2018-07-03 DIAGNOSIS — I48.91 ATRIAL FIBRILLATION, UNSPECIFIED TYPE (HCC): Primary | ICD-10-CM

## 2018-07-03 DIAGNOSIS — E78.5 DYSLIPIDEMIA: ICD-10-CM

## 2018-07-03 RX ORDER — NIFEDIPINE 30 MG/1
30 TABLET, EXTENDED RELEASE ORAL DAILY
Qty: 1 TAB | Refills: 0
Start: 2018-07-03 | End: 2019-06-20

## 2018-07-03 RX ORDER — METOPROLOL TARTRATE 100 MG/1
TABLET ORAL 2 TIMES DAILY
COMMUNITY
End: 2019-03-20 | Stop reason: ALTCHOICE

## 2018-07-03 NOTE — PATIENT INSTRUCTIONS
Decrease metoprolol to 50mg twice a day  Restart Digoxin 0.125mg - one tablet every other day  Lasix 20mg daily for 2 days and then as needed if weight increases 3-5 pounds in 2-3 days  Claritin 10mg daily for allergy symptoms  Follow-up with Dr. Chuyita Calzada to be scheduled (appointment reminder in chart for appointment due in Sept. 2018)

## 2018-07-03 NOTE — MR AVS SNAPSHOT
[address] [place] [address] [place] [address] 
[phone] Patient: Fadumo Garcia MRN: [id number] XPD:[birth year] Visit Information Date & Time Provider Department Dept. Phone Encounter #  
 7/3/2018 10:00 AM Conrad Osler, NP [facility] [id number] Upcoming Health Maintenance Date Due DTaP/Tdap/Td series (1 - Tdap) 6/10/1956 ZOSTER VACCINE AGE 60> 4/10/1995 GLAUCOMA SCREENING Q2Y 6/10/2000 Bone Densitometry (Dexa) Screening 6/10/2000 MEDICARE YEARLY EXAM 3/14/2018 Influenza Age 5 to Adult 8/1/2018 Pneumococcal 65+ Low/Medium Risk (2 of 2 - PPSV23) 10/1/2019 Allergies as of 7/3/2018  Review Complete On: 7/3/2018 By: Conrad Osler, NP Severity Noted Reaction Type Reactions Hydrochlorothiazide  08/08/2012    Hives Penicillins  08/13/2012    Other (comments) Mouth sore, upset stomach. Sulfa (Sulfonamide Antibiotics)    Unknown (comments) Prednisone Low 08/03/2015   Systemic Other (comments) Body sweats, hot and cold, up all night Current Immunizations  Never Reviewed Name Date Influenza High Dose Vaccine PF 9/22/2015, 10/1/2014 Pneumococcal Vaccine (Unspecified Type) 10/1/2014 Not reviewed this visit You Were Diagnosed With   
  
 Codes Comments Atrial fibrillation, unspecified type ([facility].)    -  Primary ICD-10-CM: I48.91 
ICD-9-CM: 427.31 Essential hypertension     ICD-10-CM: I10 
ICD-9-CM: 401.9 Dyslipidemia     ICD-10-CM: E78.5 ICD-9-CM: 272.4 Other fatigue     ICD-10-CM: R53.83 ICD-9-CM: 780.79 Vitals BP Pulse Height(growth percentile) Weight(growth percentile) SpO2 BMI  
 142/86 (BP 1 Location: Left arm, BP Patient Position: Sitting) 80 4' 5\" (1.346 m) 109 lb (49.4 kg) 94% 27.28 kg/m2 OB Status Smoking Status Hysterectomy Never Smoker Vitals History BMI and BSA Data Body Mass Index Body Surface Area  
 27.28 kg/m 2 1.36 m 2 Preferred Pharmacy Pharmacy Name Phone [address] [place] [address], [address] [place] [phone] Your Updated Medication List  
  
   
This list is accurate as of 7/3/18 11:58 AM.  Always use your most recent med list.  
  
  
  
  
 acetaminophen 325 mg tablet Commonly known as:  TYLENOL Take 2 Tabs by mouth every four (4) hours as needed for Fever. albuterol 2.5 mg /3 mL (0.083 %) nebulizer solution Commonly known as:  PROVENTIL VENTOLIN  
1.5 mL by Nebulization route every four (4) hours as needed for Wheezing. aspirin, buffered 81 mg Tab Take 1 Tab by mouth daily. beclomethasone 40 mcg/actuation [employer] Commonly known as:  QVAR Take 2 Puffs by inhalation two (2) times a day. Use with spacer device  
  
 biotin 1 mg Tab Take 1 Tab by mouth daily. bisacodyl 5 mg EC tablet Commonly known as:  DULCOLAX Take 2 Tabs by mouth daily as needed for Constipation. CALCIUM 600 PO Take 1 Tab by mouth daily. carboxymethylcellulose sodium 1 % Dlgl Apply 1 Drop to eye nightly. To both eyes  
  
 cholecalciferol 1,000 unit Cap Commonly known as:  VITAMIN D3 Take 1,000 Units by mouth daily. CLARITIN 10 mg tablet Generic drug:  loratadine Take 10 mg by mouth daily as needed. fish oil-dha-epa 1,200-144-216 mg Cap Take 1 Cap by mouth daily. hydrALAZINE 25 mg tablet Commonly known as:  APRESOLINE Take 1 Tab by mouth three (3) times daily. levothyroxine 100 mcg tablet Commonly known as:  SYNTHROID Take 100 mcg by mouth Daily (before breakfast). lovastatin 40 mg tablet Commonly known as:  MEVACOR Take 40 mg by mouth nightly. melatonin 3 mg tablet Take 1 Tab by mouth nightly. metoprolol tartrate 100 mg IR tablet Commonly known as:  LOPRESSOR  
 Take  by mouth two (2) times a day. multivitamin tablet Commonly known as:  ONE A DAY Take 1 Tab by mouth daily. NIFEdipine ER 30 mg ER tablet Commonly known as:  PROCARDIA XL Take 1 Tab by mouth daily. pantoprazole 40 mg tablet Commonly known as:  PROTONIX Take 40 mg by mouth daily. PROBIOTIC 4X 10-15 mg Tbec Generic drug:  B.infantis-B.ani-B.long-B.bifi Take 1 Tab by mouth daily. SINGULAIR 10 mg tablet Generic drug:  montelukast  
Take 10 mg by mouth daily. sodium chloride 1 gram tablet Take 1 Tab by mouth three (3) times daily (with meals). * traMADol 50 mg tablet Commonly known as:  ULTRAM  
Take 0.5 tabs (25mg) PO Q AM and 1 tab (50mg) PO Q PM.  
  
 * traMADol 50 mg tablet Commonly known as:  ULTRAM  
Take 0.5 Tabs by mouth daily. Max Daily Amount: 25 mg. Indications: Pain  
  
 traZODone 50 mg tablet Commonly known as:  Hessie Guaman Take 25 mg by mouth nightly as needed for Sleep. * Notice: This list has 2 medication(s) that are the same as other medications prescribed for you. Read the directions carefully, and ask your doctor or other care provider to review them with you. We Performed the Following AMB POC EKG ROUTINE W/ 12 LEADS, INTER & REP [81934 CPT(R)] Patient Instructions Decrease metoprolol to 50mg twice a day Restart Digoxin 0.125mg - one tablet every other day Lasix 20mg daily for 2 days and then as needed if weight increases 3-5 pounds in 2-3 days Claritin 10mg daily for allergy symptoms Follow-up with Dr. Danielle Garcia to be scheduled (appointment reminder in chart for appointment due in Sept. 2018) Introducing [facility]! Dear Fatimah Sanders: Thank you for requesting a [employer] account. Our records indicate that you already have an active [employer] account. You can access your account anytime at [email or web address] Did you know that you can access your hospital and ER discharge instructions at any time in [employer]? You can also review all of your test results from your hospital stay or ER visit. Additional Information If you have questions, please visit the Frequently Asked Questions section of the [employer] website at [email or web address] Remember, [employer] is NOT to be used for urgent needs. For medical emergencies, dial 911. Now available from your iPhone and Android! Please provide this summary of care documentation to your next provider. Your primary care clinician is listed as Archana Champagne. If you have any questions after today's visit, please call [phone].

## 2018-07-04 NOTE — PROGRESS NOTES
Jairo Chowdhury presents today for a post-hospital follow-up. She was hospitalized from 6/10/18 through 6/15/18 for bronchitis and hyponatremia. She did not appear to be in heart failure but her NT pro-BNP was 8486. Her sodium level upon presentation was 125. She was followed by nephrology. An echocardiogram was done and it showed an EF of 50% (down from 60% in Jan. 2017), no wall motion abnormalities, grade 3 diastolic dysfunction, RVSP of 69 mmHg, and moderate to severe TR (which has worsened since 2017). Her sodium level slowly improved and she was discharged to [facility] (skilled nursing facility/rehab) and according to her son, she will be there another 1-2 weeks. She is an 80year old female with history of paroxysmal atrial fibrillation, mild CAD (cardiac catheterization in 2009), essential hypertension, and dyslipidemia. She was on Xarelto in July 2012 but it had to be discontinued due to hemoptysis and has only been anticoagulated with aspirin since that time. She was last seen by Dr. Nakita Bullard in March 2017 and during that visit, she was in atrial flutter/fibrillation. Because of history of digoxin toxicity, her digoxin was decreased to 0.125mg every other day. Today, she was accompanied by her son who provides much of her history. He states that changes were made to her medications recently due to complaints of fatigue. Her digoxin was discontinued (digoxin level was 0.9), metoprolol was increased to 100mg BID, and her hydralazine and nifedipine were discontinued). Her blood pressure became elevated and the hydralazine and nifedipine were restarted at lower doses. Since the metoprolol was increased, her son states that she has complained of even more fatigue. She also complains of chronic cough (neurogenic cough according to son, managed by Dr. Ernestine Garcia, generally relieved by taking Ultram), \"itchy nose and throat\", and post-nasal drip.   Her son states that prior to admission, she was taking loratidine daily but it is only prescribed as needed at the facility where she currently resides. He states that if she does not report her symptoms, she does not receive the medication and that is likely why she is currently symptomatic. He also reports that her normal weight is around 102 lbs and her weight today is 109 lbs. He also reports that they have had to buy her larger size clothing. Denies chest pain, tightness, heaviness, and palpitations. Denies shortness of breath at rest, dyspnea on exertion, orthopnea and admits to some PND. She states that she is sleeping on 2 pillows. Admits to some abdominal bloating. Denies lightheadedness, dizziness, and syncope. Denies lower extremity edema and claudication. Denies nausea, vomiting, diarrhea, melena, hematochezia. Denies hematuria, urgency, frequency. Denies fever, chills. PMH:  Past Medical History:   Diagnosis Date    Anemia NEC     Arthritis     Atrial fibrillation (HCC)     Bursitis of left shoulder     Cardiac cath 01/27/2009    RCA mild. LM patent. pLAD 20%. Very dLAD w/significant tapering. CX mild.  Cardiac stress echo, abnormal 01/05/2009    Positive maximal stress echo by echo & EKG criteria w/o chest pain.  EF >65%. Sm, discrete apical, apical septal hypk.     Cough     Endocrine disease     Hyperlipidemia     Hypertension     Hypothyroidism     Osteopenia     Pulmonary hemorrhage     Pulmonary hypertension (HCC)     Rash and nonspecific skin eruption 9/2012    maculopapular on torso, arms, and upper legs    Reflux     Right hip pain     Right shoulder pain     Stenosing tenosynovitis of finger 04/2015    Bilateral index fingers    Trigger ring finger of right hand     Wears glasses        PSH:  Past Surgical History:   Procedure Laterality Date    ABDOMEN SURGERY PROC UNLISTED      HX APPENDECTOMY      HX COLECTOMY      partial    HX HYSTERECTOMY      HX MOHS PROCEDURES Right 12/2010    Dr. Shahbaz Borden      Two ribs removed    HX TONSILLECTOMY         MEDS:  Current Outpatient Prescriptions   Medication Sig    metoprolol tartrate (LOPRESSOR) 100 mg IR tablet Take  by mouth two (2) times a day.  NIFEdipine ER (PROCARDIA XL) 30 mg ER tablet Take 1 Tab by mouth daily.  sodium chloride 1 gram tablet Take 1 Tab by mouth three (3) times daily (with meals).  hydrALAZINE (APRESOLINE) 25 mg tablet Take 1 Tab by mouth three (3) times daily.  traMADol (ULTRAM) 50 mg tablet Take 0.5 tabs (25mg) PO Q AM and 1 tab (50mg) PO Q PM.    traMADol (ULTRAM) 50 mg tablet Take 0.5 Tabs by mouth daily. Max Daily Amount: 25 mg. Indications: Pain    traZODone (DESYREL) 50 mg tablet Take 25 mg by mouth nightly as needed for Sleep.  carboxymethylcellulose sodium 1 % dlgl Apply 1 Drop to eye nightly. To both eyes    melatonin 3 mg tablet Take 1 Tab by mouth nightly.  bisacodyl (DULCOLAX) 5 mg EC tablet Take 2 Tabs by mouth daily as needed for Constipation.  albuterol (PROVENTIL VENTOLIN) 2.5 mg /3 mL (0.083 %) nebulizer solution 1.5 mL by Nebulization route every four (4) hours as needed for Wheezing.  acetaminophen (TYLENOL) 325 mg tablet Take 2 Tabs by mouth every four (4) hours as needed for Fever.  levothyroxine (SYNTHROID) 100 mcg tablet Take 100 mcg by mouth Daily (before breakfast).  pantoprazole (PROTONIX) 40 mg tablet Take 40 mg by mouth daily.  CALCIUM CARBONATE (CALCIUM 600 PO) Take 1 Tab by mouth daily.  cholecalciferol (VITAMIN D3) 1,000 unit cap Take 1,000 Units by mouth daily.  multivitamin (ONE A DAY) tablet Take 1 Tab by mouth daily.  beclomethasone (QVAR) 40 mcg/actuation inhaler Take 2 Puffs by inhalation two (2) times a day. Use with spacer device    fish oil-dha-epa 1,200-144-216 mg cap Take 1 Cap by mouth daily.  biotin 1 mg tab Take 1 Tab by mouth daily.  Aspirin, Buffered 81 mg tab Take 1 Tab by mouth daily.  B.infantis-B.ani-B.long-B.bifi (PROBIOTIC 4X) 10-15 mg TbEC Take 1 Tab by mouth daily.  montelukast (SINGULAIR) 10 mg tablet Take 10 mg by mouth daily.  loratadine (CLARITIN) 10 mg tablet Take 10 mg by mouth daily as needed.  lovastatin (MEVACOR) 40 mg tablet Take 40 mg by mouth nightly. No current facility-administered medications for this visit. Allergies and Sensitivities:  Allergies   Allergen Reactions    Hydrochlorothiazide Hives    Penicillins Other (comments)     Mouth sore, upset stomach.  Sulfa (Sulfonamide Antibiotics) Unknown (comments)    Prednisone Other (comments)     Body sweats, hot and cold, up all night       Family History:  Family History   Problem Relation Age of Onset    Hypertension Mother     Heart Attack Mother     Stroke Father     Heart Disease Sister     Heart Surgery Sister      CABG    Stroke Brother     Heart Attack Brother        Social History:  She  reports that she has never smoked. She has never used smokeless tobacco.  She  reports that she does not drink alcohol. Physical:  Visit Vitals    /86 (BP 1 Location: Left arm, BP Patient Position: Sitting)    Pulse 80    Ht 4' 5\" (1.346 m)    Wt 49.4 kg (109 lb)    SpO2 94%    BMI 27.28 kg/m2         Exam:  Neck:  Supple, no JVD, no carotid bruits  CV:  Normal S1 and  S2, no murmurs, rubs, or gallops noted. Irregularly, irregular rhythm  Lungs:  Clear to ausculation throughout, no wheezes or rales  Abd:  Soft, non-tender, non-distended with good bowel sounds. No hepatosplenomegaly  Extremities:  No edema      Data:  EKG:    Read by Vamsi Kirk, DO - Atrial fibrillation, rate 80.  Mild nonspecific ST-T depression and early T-wave inversions anterolaterally and in lead II.       LABS:  Lab Results   Component Value Date/Time    Sodium 127 (L) 06/15/2018 01:27 AM    Potassium 4.7 06/15/2018 01:27 AM    Chloride 98 (L) 06/15/2018 01:27 AM    CO2 22 06/15/2018 01:27 AM    Glucose 106 (H) 06/15/2018 01:27 AM    BUN 17 06/15/2018 01:27 AM    Creatinine 0.97 06/15/2018 01:27 AM     No results found for: CHOL, CHOLX, CHLST, CHOLV, HDL, LDL, LDLC, DLDLP, TGLX, TRIGL, TRIGP, CHHD, CHHDX  Lab Results   Component Value Date/Time    ALT (SGPT) 120 (H) 06/11/2018 12:57 AM         Impression/Plan:  1. Recurrent atrial fibrillation, rate controlled, not a candidate for anticoagulation due to history of hemoptysis on anticoagulation  2. Essential hypertension, blood pressure stable  3. Dyslipidemia, on lovastatin 40mg  4. Shortness of breath  5. Hyponatremia, resolved, most recent Na level was 137 on 6/29/18    Mrs. Kenneth Christopher was seen today for follow-up after being hospitalized for bronchitis and hyponatremia. Her sodium levels have improved and her most recent BMP done on 6/29/18 was 137. BUN 20, creatinine 1.2, potassium 4.2. CBC was within normal limits. Her son provides most of her history. He reports that changes were recently made to her medication regimen. Her digoxin was discontinued and her metoprolol was increased to 100mg BID. Her nifedipine and hydralazine were discontinued but then restarted (but at lower doses) after her blood pressure became elevated after the changes. Her son reports that she has complained of more fatigue since the metoprolol was increased. He also reports that her weight was increased and that prior to admission, she was on lasix. She complains of some shortness of breath, orthopnea, and occasional PND, as well as some abdominal bloating. She denies chest pain, tightness, and palpitations. Her blood pressure is slightly elevated and her heart rate is controlled. Her breath sounds are clear but she does complain of symptoms of mild failure as noted above. She will be restarted on Lasix 20mg and orders were written for her to take it for 2 days and then as needed if her weight increases 3-5 pounds in 2-3 days.   Her metoprolol will be decreased to 50mg BID (her original dose) and her digoxin will be restarted at 0.125mg every other day. Her labs are followed closely at that skilled nursing facility. For her allergy symptoms, I asked that they administer her loratadine 10mg daily instead of PRN as she does not request the medication on her own. She will be scheduled for follow-up with Dr. Monique Parada. Reminder was placed in her chart for appointment in September 2018. Her son states that hopefully, she will be able to return to her Assisted living facility once she is discharged from [facility]. Her son knows to call if she has any cardiac problems after discharge. Greater than 50% of a 40 minute visit was spent in discussion, counseling, and reviewing records as well as preparing progress notes for the facility. José Manuel Matos MSN, FNP-BC    Please note:  Portions of this chart were created with Dragon medical speech to text program.  Unrecognized errors may be present.

## 2018-07-13 ENCOUNTER — HOME HEALTH ADMISSION (OUTPATIENT)
Dept: HOME HEALTH SERVICES | Facility: HOME HEALTH | Age: 83
End: 2018-07-13

## 2018-07-23 ENCOUNTER — HOSPITAL ENCOUNTER (OUTPATIENT)
Dept: LAB | Age: 83
Discharge: HOME | End: 2018-07-23
Payer: MEDICARE

## 2018-07-23 LAB
ALBUMIN SERPL-MCNC: 2.7 G/DL (ref 3.4–5)
ALBUMIN/GLOB SERPL: 0.6 {RATIO} (ref 0.8–1.7)
ALP SERPL-CCNC: 85 U/L (ref 45–117)
ALT SERPL-CCNC: 16 U/L (ref 13–56)
AMORPH CRY URNS QL MICRO: ABNORMAL
ANION GAP SERPL CALC-SCNC: 9 MMOL/L (ref 3–18)
APPEARANCE UR: ABNORMAL
AST SERPL-CCNC: 28 U/L (ref 15–37)
BACTERIA URNS QL MICRO: ABNORMAL /HPF
BILIRUB SERPL-MCNC: 0.6 MG/DL (ref 0.2–1)
BILIRUB UR QL: NEGATIVE
BUN SERPL-MCNC: 12 MG/DL (ref 7–18)
BUN/CREAT SERPL: 12 (ref 12–20)
CALCIUM SERPL-MCNC: 8.2 MG/DL (ref 8.5–10.1)
CHLORIDE SERPL-SCNC: 100 MMOL/L (ref 100–108)
CO2 SERPL-SCNC: 25 MMOL/L (ref 21–32)
COLOR UR: YELLOW
CREAT SERPL-MCNC: 1.02 MG/DL (ref 0.6–1.3)
EPITH CASTS URNS QL MICRO: ABNORMAL /LPF (ref 0–5)
ERYTHROCYTE [DISTWIDTH] IN BLOOD BY AUTOMATED COUNT: 16.9 % (ref 11.6–14.5)
GLOBULIN SER CALC-MCNC: 4.5 G/DL (ref 2–4)
GLUCOSE SERPL-MCNC: 80 MG/DL (ref 74–99)
GLUCOSE UR STRIP.AUTO-MCNC: NEGATIVE MG/DL
HCT VFR BLD AUTO: 32.7 % (ref 35–45)
HGB BLD-MCNC: 10.5 G/DL (ref 12–16)
HGB UR QL STRIP: NEGATIVE
KETONES UR QL STRIP.AUTO: NEGATIVE MG/DL
LEUKOCYTE ESTERASE UR QL STRIP.AUTO: NEGATIVE
MCH RBC QN AUTO: 25.9 PG (ref 24–34)
MCHC RBC AUTO-ENTMCNC: 32.1 G/DL (ref 31–37)
MCV RBC AUTO: 80.5 FL (ref 74–97)
NITRITE UR QL STRIP.AUTO: NEGATIVE
PH UR STRIP: 7 [PH] (ref 5–8)
PLATELET # BLD AUTO: 285 K/UL (ref 135–420)
PMV BLD AUTO: 10.9 FL (ref 9.2–11.8)
POTASSIUM SERPL-SCNC: 3.8 MMOL/L (ref 3.5–5.5)
PROT SERPL-MCNC: 7.2 G/DL (ref 6.4–8.2)
PROT UR STRIP-MCNC: 100 MG/DL
RBC # BLD AUTO: 4.06 M/UL (ref 4.2–5.3)
RBC #/AREA URNS HPF: ABNORMAL /HPF (ref 0–5)
SODIUM SERPL-SCNC: 134 MMOL/L (ref 136–145)
SP GR UR REFRACTOMETRY: 1.01 (ref 1–1.03)
UROBILINOGEN UR QL STRIP.AUTO: 0.2 EU/DL (ref 0.2–1)
WBC # BLD AUTO: 6.8 K/UL (ref 4.6–13.2)
WBC URNS QL MICRO: ABNORMAL /HPF (ref 0–4)

## 2018-07-23 PROCEDURE — 85027 COMPLETE CBC AUTOMATED: CPT | Performed by: INTERNAL MEDICINE

## 2018-07-23 PROCEDURE — 81001 URINALYSIS AUTO W/SCOPE: CPT | Performed by: INTERNAL MEDICINE

## 2018-07-23 PROCEDURE — 80053 COMPREHEN METABOLIC PANEL: CPT | Performed by: INTERNAL MEDICINE

## 2018-07-23 PROCEDURE — 87086 URINE CULTURE/COLONY COUNT: CPT | Performed by: INTERNAL MEDICINE

## 2018-07-26 LAB
BACTERIA SPEC CULT: NORMAL
SERVICE CMNT-IMP: NORMAL

## 2018-07-30 ENCOUNTER — HOSPITAL ENCOUNTER (OUTPATIENT)
Dept: LAB | Age: 83
Discharge: HOME | End: 2018-07-30
Payer: MEDICARE

## 2018-07-30 LAB
ANION GAP SERPL CALC-SCNC: 10 MMOL/L (ref 3–18)
BUN SERPL-MCNC: 14 MG/DL (ref 7–18)
BUN/CREAT SERPL: 12 (ref 12–20)
CALCIUM SERPL-MCNC: 8.6 MG/DL (ref 8.5–10.1)
CHLORIDE SERPL-SCNC: 101 MMOL/L (ref 100–108)
CO2 SERPL-SCNC: 25 MMOL/L (ref 21–32)
CREAT SERPL-MCNC: 1.14 MG/DL (ref 0.6–1.3)
ERYTHROCYTE [DISTWIDTH] IN BLOOD BY AUTOMATED COUNT: 17.3 % (ref 11.6–14.5)
GLUCOSE SERPL-MCNC: 96 MG/DL (ref 74–99)
HCT VFR BLD AUTO: 33.6 % (ref 35–45)
HGB BLD-MCNC: 11.2 G/DL (ref 12–16)
MCH RBC QN AUTO: 25.7 PG (ref 24–34)
MCHC RBC AUTO-ENTMCNC: 33.3 G/DL (ref 31–37)
MCV RBC AUTO: 77.2 FL (ref 74–97)
PLATELET # BLD AUTO: 286 K/UL (ref 135–420)
PMV BLD AUTO: 11.6 FL (ref 9.2–11.8)
POTASSIUM SERPL-SCNC: 2.6 MMOL/L (ref 3.5–5.5)
RBC # BLD AUTO: 4.35 M/UL (ref 4.2–5.3)
SODIUM SERPL-SCNC: 136 MMOL/L (ref 136–145)
WBC # BLD AUTO: 7.2 K/UL (ref 4.6–13.2)

## 2018-07-30 PROCEDURE — 85027 COMPLETE CBC AUTOMATED: CPT | Performed by: INTERNAL MEDICINE

## 2018-07-30 PROCEDURE — 80048 BASIC METABOLIC PNL TOTAL CA: CPT | Performed by: INTERNAL MEDICINE

## 2018-07-30 PROCEDURE — 36415 COLL VENOUS BLD VENIPUNCTURE: CPT | Performed by: INTERNAL MEDICINE

## 2018-08-06 ENCOUNTER — HOSPITAL ENCOUNTER (OUTPATIENT)
Dept: LAB | Age: 83
Discharge: HOME | End: 2018-08-06
Payer: MEDICARE

## 2018-08-06 LAB
ANION GAP SERPL CALC-SCNC: 11 MMOL/L (ref 3–18)
BASOPHILS # BLD: 0.1 K/UL (ref 0–0.1)
BASOPHILS NFR BLD: 1 % (ref 0–2)
BUN SERPL-MCNC: 20 MG/DL (ref 7–18)
BUN/CREAT SERPL: 17 (ref 12–20)
CALCIUM SERPL-MCNC: 8.2 MG/DL (ref 8.5–10.1)
CHLORIDE SERPL-SCNC: 106 MMOL/L (ref 100–108)
CO2 SERPL-SCNC: 21 MMOL/L (ref 21–32)
CREAT SERPL-MCNC: 1.18 MG/DL (ref 0.6–1.3)
DIFFERENTIAL METHOD BLD: ABNORMAL
EOSINOPHIL # BLD: 0.1 K/UL (ref 0–0.4)
EOSINOPHIL NFR BLD: 1 % (ref 0–5)
ERYTHROCYTE [DISTWIDTH] IN BLOOD BY AUTOMATED COUNT: 17.7 % (ref 11.6–14.5)
GLUCOSE SERPL-MCNC: 114 MG/DL (ref 74–99)
HCT VFR BLD AUTO: 33.5 % (ref 35–45)
HGB BLD-MCNC: 11 G/DL (ref 12–16)
LYMPHOCYTES # BLD: 1.6 K/UL (ref 0.9–3.6)
LYMPHOCYTES NFR BLD: 23 % (ref 21–52)
MCH RBC QN AUTO: 25.2 PG (ref 24–34)
MCHC RBC AUTO-ENTMCNC: 32.8 G/DL (ref 31–37)
MCV RBC AUTO: 76.8 FL (ref 74–97)
MONOCYTES # BLD: 1.2 K/UL (ref 0.05–1.2)
MONOCYTES NFR BLD: 18 % (ref 3–10)
NEUTS SEG # BLD: 3.8 K/UL (ref 1.8–8)
NEUTS SEG NFR BLD: 57 % (ref 40–73)
PLATELET # BLD AUTO: 221 K/UL (ref 135–420)
POTASSIUM SERPL-SCNC: 4.8 MMOL/L (ref 3.5–5.5)
RBC # BLD AUTO: 4.36 M/UL (ref 4.2–5.3)
SODIUM SERPL-SCNC: 138 MMOL/L (ref 136–145)
WBC # BLD AUTO: 6.6 K/UL (ref 4.6–13.2)

## 2018-08-06 PROCEDURE — 85025 COMPLETE CBC W/AUTO DIFF WBC: CPT | Performed by: INTERNAL MEDICINE

## 2018-08-06 PROCEDURE — 80048 BASIC METABOLIC PNL TOTAL CA: CPT | Performed by: INTERNAL MEDICINE

## 2018-08-13 ENCOUNTER — HOSPITAL ENCOUNTER (OUTPATIENT)
Dept: LAB | Age: 83
Discharge: HOME | End: 2018-08-13
Payer: MEDICARE

## 2018-08-13 LAB
ANION GAP SERPL CALC-SCNC: 7 MMOL/L (ref 3–18)
BASOPHILS # BLD: 0.1 K/UL (ref 0–0.1)
BASOPHILS NFR BLD: 1 % (ref 0–2)
BUN SERPL-MCNC: 12 MG/DL (ref 7–18)
BUN/CREAT SERPL: 11 (ref 12–20)
CALCIUM SERPL-MCNC: 8.2 MG/DL (ref 8.5–10.1)
CHLORIDE SERPL-SCNC: 101 MMOL/L (ref 100–108)
CO2 SERPL-SCNC: 25 MMOL/L (ref 21–32)
CREAT SERPL-MCNC: 1.09 MG/DL (ref 0.6–1.3)
DIFFERENTIAL METHOD BLD: ABNORMAL
EOSINOPHIL # BLD: 0.1 K/UL (ref 0–0.4)
EOSINOPHIL NFR BLD: 2 % (ref 0–5)
ERYTHROCYTE [DISTWIDTH] IN BLOOD BY AUTOMATED COUNT: 17.8 % (ref 11.6–14.5)
GLUCOSE SERPL-MCNC: 92 MG/DL (ref 74–99)
HCT VFR BLD AUTO: 32 % (ref 35–45)
HGB BLD-MCNC: 10.4 G/DL (ref 12–16)
LYMPHOCYTES # BLD: 1.1 K/UL (ref 0.9–3.6)
LYMPHOCYTES NFR BLD: 17 % (ref 21–52)
MCH RBC QN AUTO: 25.1 PG (ref 24–34)
MCHC RBC AUTO-ENTMCNC: 32.5 G/DL (ref 31–37)
MCV RBC AUTO: 77.1 FL (ref 74–97)
MONOCYTES # BLD: 1.2 K/UL (ref 0.05–1.2)
MONOCYTES NFR BLD: 18 % (ref 3–10)
NEUTS SEG # BLD: 4 K/UL (ref 1.8–8)
NEUTS SEG NFR BLD: 62 % (ref 40–73)
PLATELET # BLD AUTO: 275 K/UL (ref 135–420)
PMV BLD AUTO: 11.4 FL (ref 9.2–11.8)
POTASSIUM SERPL-SCNC: 4.3 MMOL/L (ref 3.5–5.5)
RBC # BLD AUTO: 4.15 M/UL (ref 4.2–5.3)
SODIUM SERPL-SCNC: 133 MMOL/L (ref 136–145)
WBC # BLD AUTO: 6.5 K/UL (ref 4.6–13.2)

## 2018-08-13 PROCEDURE — 85025 COMPLETE CBC W/AUTO DIFF WBC: CPT | Performed by: INTERNAL MEDICINE

## 2018-08-13 PROCEDURE — 80048 BASIC METABOLIC PNL TOTAL CA: CPT | Performed by: INTERNAL MEDICINE

## 2018-08-14 ENCOUNTER — HOSPITAL ENCOUNTER (OUTPATIENT)
Dept: LAB | Age: 83
Discharge: HOME | End: 2018-08-14
Payer: MEDICARE

## 2018-08-14 LAB
APPEARANCE UR: CLEAR
BACTERIA URNS QL MICRO: ABNORMAL /HPF
BILIRUB UR QL: NEGATIVE
COLOR UR: YELLOW
EPITH CASTS URNS QL MICRO: ABNORMAL /LPF (ref 0–5)
GLUCOSE UR STRIP.AUTO-MCNC: NEGATIVE MG/DL
HGB UR QL STRIP: NEGATIVE
HYALINE CASTS URNS QL MICRO: ABNORMAL /LPF (ref 0–2)
KETONES UR QL STRIP.AUTO: NEGATIVE MG/DL
LEUKOCYTE ESTERASE UR QL STRIP.AUTO: ABNORMAL
NITRITE UR QL STRIP.AUTO: NEGATIVE
PH UR STRIP: 7.5 [PH] (ref 5–8)
PROT UR STRIP-MCNC: 100 MG/DL
RBC #/AREA URNS HPF: ABNORMAL /HPF (ref 0–5)
SP GR UR REFRACTOMETRY: 1.01 (ref 1–1.03)
UROBILINOGEN UR QL STRIP.AUTO: 0.2 EU/DL (ref 0.2–1)
WBC URNS QL MICRO: ABNORMAL /HPF (ref 0–4)

## 2018-08-14 PROCEDURE — 87086 URINE CULTURE/COLONY COUNT: CPT | Performed by: INTERNAL MEDICINE

## 2018-08-14 PROCEDURE — 81001 URINALYSIS AUTO W/SCOPE: CPT | Performed by: INTERNAL MEDICINE

## 2018-08-16 LAB
BACTERIA SPEC CULT: ABNORMAL
BACTERIA SPEC CULT: ABNORMAL
SERVICE CMNT-IMP: ABNORMAL

## 2018-08-20 ENCOUNTER — HOSPITAL ENCOUNTER (OUTPATIENT)
Dept: LAB | Age: 83
Discharge: HOME | End: 2018-08-20
Payer: MEDICARE

## 2018-08-20 LAB
ALBUMIN SERPL-MCNC: 2.5 G/DL (ref 3.4–5)
ALBUMIN/GLOB SERPL: 0.4 {RATIO} (ref 0.8–1.7)
ALP SERPL-CCNC: 102 U/L (ref 45–117)
ALT SERPL-CCNC: 18 U/L (ref 13–56)
ANION GAP SERPL CALC-SCNC: 8 MMOL/L (ref 3–18)
AST SERPL-CCNC: 28 U/L (ref 15–37)
BILIRUB SERPL-MCNC: 0.6 MG/DL (ref 0.2–1)
BUN SERPL-MCNC: 14 MG/DL (ref 7–18)
BUN/CREAT SERPL: 13 (ref 12–20)
CALCIUM SERPL-MCNC: 8.2 MG/DL (ref 8.5–10.1)
CHLORIDE SERPL-SCNC: 102 MMOL/L (ref 100–108)
CO2 SERPL-SCNC: 25 MMOL/L (ref 21–32)
CREAT SERPL-MCNC: 1.07 MG/DL (ref 0.6–1.3)
ERYTHROCYTE [DISTWIDTH] IN BLOOD BY AUTOMATED COUNT: 18.3 % (ref 11.6–14.5)
GLOBULIN SER CALC-MCNC: 5.9 G/DL (ref 2–4)
GLUCOSE SERPL-MCNC: 86 MG/DL (ref 74–99)
HCT VFR BLD AUTO: 34.4 % (ref 35–45)
HGB BLD-MCNC: 10.9 G/DL (ref 12–16)
MCH RBC QN AUTO: 24.3 PG (ref 24–34)
MCHC RBC AUTO-ENTMCNC: 31.7 G/DL (ref 31–37)
MCV RBC AUTO: 76.6 FL (ref 74–97)
PLATELET # BLD AUTO: 331 K/UL (ref 135–420)
PMV BLD AUTO: 11 FL (ref 9.2–11.8)
POTASSIUM SERPL-SCNC: 3.7 MMOL/L (ref 3.5–5.5)
PROT SERPL-MCNC: 8.4 G/DL (ref 6.4–8.2)
RBC # BLD AUTO: 4.49 M/UL (ref 4.2–5.3)
SODIUM SERPL-SCNC: 135 MMOL/L (ref 136–145)
WBC # BLD AUTO: 7 K/UL (ref 4.6–13.2)

## 2018-08-20 PROCEDURE — 80053 COMPREHEN METABOLIC PANEL: CPT | Performed by: INTERNAL MEDICINE

## 2018-08-20 PROCEDURE — 85027 COMPLETE CBC AUTOMATED: CPT | Performed by: INTERNAL MEDICINE

## 2018-08-31 ENCOUNTER — HOSPITAL ENCOUNTER (OUTPATIENT)
Dept: LAB | Age: 83
Discharge: HOME | End: 2018-08-31
Payer: MEDICARE

## 2018-08-31 LAB — URATE SERPL-MCNC: 3.2 MG/DL (ref 2.6–7.2)

## 2018-08-31 PROCEDURE — 36415 COLL VENOUS BLD VENIPUNCTURE: CPT | Performed by: INTERNAL MEDICINE

## 2018-08-31 PROCEDURE — 84550 ASSAY OF BLOOD/URIC ACID: CPT | Performed by: INTERNAL MEDICINE

## 2018-09-16 ENCOUNTER — APPOINTMENT (OUTPATIENT)
Dept: GENERAL RADIOLOGY | Age: 83
End: 2018-09-16
Attending: EMERGENCY MEDICINE
Payer: MEDICARE

## 2018-09-16 ENCOUNTER — HOSPITAL ENCOUNTER (EMERGENCY)
Age: 83
Discharge: HOME | End: 2018-09-16
Attending: EMERGENCY MEDICINE
Payer: MEDICARE

## 2018-09-16 ENCOUNTER — APPOINTMENT (OUTPATIENT)
Dept: CT IMAGING | Age: 83
End: 2018-09-16
Attending: EMERGENCY MEDICINE
Payer: MEDICARE

## 2018-09-16 VITALS
TEMPERATURE: 97.9 F | SYSTOLIC BLOOD PRESSURE: 133 MMHG | BODY MASS INDEX: 21.37 KG/M2 | DIASTOLIC BLOOD PRESSURE: 71 MMHG | HEIGHT: 56 IN | RESPIRATION RATE: 18 BRPM | OXYGEN SATURATION: 97 % | WEIGHT: 95 LBS | HEART RATE: 61 BPM

## 2018-09-16 DIAGNOSIS — S09.90XA CLOSED HEAD INJURY, INITIAL ENCOUNTER: Primary | ICD-10-CM

## 2018-09-16 DIAGNOSIS — S32.10XA CLOSED FRACTURE OF SACRUM, UNSPECIFIED PORTION OF SACRUM, INITIAL ENCOUNTER (HCC): ICD-10-CM
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
## 2018-09-16 PROCEDURE — 70450 CT HEAD/BRAIN W/O DYE: CPT

## 2018-09-16 PROCEDURE — 72220 X-RAY EXAM SACRUM TAILBONE: CPT

## 2018-09-16 PROCEDURE — 99284 EMERGENCY DEPT VISIT MOD MDM: CPT

## 2018-09-16 PROCEDURE — 74011250637 HC RX REV CODE- 250/637: Performed by: EMERGENCY MEDICINE

## 2018-09-16 RX ORDER — ACETAMINOPHEN 500 MG
1000 TABLET ORAL
Status: COMPLETED | OUTPATIENT
Start: 2018-09-16 | End: 2018-09-16

## 2018-09-16 RX ADMIN — ACETAMINOPHEN 1000 MG: 500 TABLET, FILM COATED ORAL at 14:05

## 2018-09-16 NOTE — ED NOTES
Reviewed discharge instructions with the patients son. Questions encouraged and answered. Son verbalized an understanding

## 2018-09-16 NOTE — ED PROVIDER NOTES
EMERGENCY DEPARTMENT HISTORY AND PHYSICAL EXAM 
 
12:00 PM 
 
 
Date: 9/16/2018 Patient Name: Kandice Gilbert History of Presenting Illness No chief complaint on file. History Provided By: Family member Chief Complaint: Bernadine Soria Duration:  Prior to arrival 
Timing:  Acute Location: On to the floor Quality: Mechanical 
Severity: Moderate Modifying Factors: N/A Associated Symptoms: not obtained Additional History (Context): 12:01 PM Kandice Gilbert is a 80 y.o. female with h/o HTN, Endocrine disease, AFIB, HLD, dementia, and Anemia who presents to ED for evaluation after an acute moderate mechanical fall onto the floor onset prior to arrival. Patient fell this morning at her [place] place. She reportedly was sitting at the dining breonna table in her wheelchair when she pushed the chair haja and tried to stand up and fell. She is not on bloodthinners. Per patients family member she has been improving in her health over the last week. Hx limited due to dementia. PCP: Kelli Du MD 
 
 
Current Facility-Administered Medications Medication Dose Route Frequency Provider Last Rate Last Dose  acetaminophen (TYLENOL) tablet 1,000 mg  1,000 mg Oral NOW Nita Sams MD      
 
Current Outpatient Prescriptions Medication Sig Dispense Refill  metoprolol tartrate (LOPRESSOR) 100 mg IR tablet Take  by mouth two (2) times a day.  NIFEdipine ER (PROCARDIA XL) 30 mg ER tablet Take 1 Tab by mouth daily. 1 Tab 0  
 sodium chloride 1 gram tablet Take 1 Tab by mouth three (3) times daily (with meals). 30 Tab 0  
 hydrALAZINE (APRESOLINE) 25 mg tablet Take 1 Tab by mouth three (3) times daily. 30 Tab 0  
 traMADol (ULTRAM) 50 mg tablet Take 0.5 tabs (25mg) PO Q AM and 1 tab (50mg) PO Q PM. 1 Tab 0  
 traMADol (ULTRAM) 50 mg tablet Take 0.5 Tabs by mouth daily. Max Daily Amount: 25 mg.  Indications: Pain 30 Tab 0  
  traZODone (DESYREL) 50 mg tablet Take 25 mg by mouth nightly as needed for Sleep.  carboxymethylcellulose sodium 1 % dlgl Apply 1 Drop to eye nightly. To both eyes  melatonin 3 mg tablet Take 1 Tab by mouth nightly. 30 Tab 0  
 bisacodyl (DULCOLAX) 5 mg EC tablet Take 2 Tabs by mouth daily as needed for Constipation. 30 Tab 0  
 albuterol (PROVENTIL VENTOLIN) 2.5 mg /3 mL (0.083 %) nebulizer solution 1.5 mL by Nebulization route every four (4) hours as needed for Wheezing. 30 Each 0  
 acetaminophen (TYLENOL) 325 mg tablet Take 2 Tabs by mouth every four (4) hours as needed for Fever. 30 Tab 0  
 levothyroxine (SYNTHROID) 100 mcg tablet Take 100 mcg by mouth Daily (before breakfast).  pantoprazole (PROTONIX) 40 mg tablet Take 40 mg by mouth daily.  CALCIUM CARBONATE (CALCIUM 600 PO) Take 1 Tab by mouth daily.  cholecalciferol (VITAMIN D3) 1,000 unit cap Take 1,000 Units by mouth daily.  multivitamin (ONE A DAY) tablet Take 1 Tab by mouth daily.  beclomethasone (QVAR) 40 mcg/actuation inhaler Take 2 Puffs by inhalation two (2) times a day. Use with spacer device 1 Inhaler 6  
 fish oil-dha-epa 1,200-144-216 mg cap Take 1 Cap by mouth daily.  biotin 1 mg tab Take 1 Tab by mouth daily.  Aspirin, Buffered 81 mg tab Take 1 Tab by mouth daily.  B.infantis-B.ani-B.long-B.bifi (PROBIOTIC 4X) 10-15 mg TbEC Take 1 Tab by mouth daily.  montelukast (SINGULAIR) 10 mg tablet Take 10 mg by mouth daily.  loratadine (CLARITIN) 10 mg tablet Take 10 mg by mouth daily as needed.  lovastatin (MEVACOR) 40 mg tablet Take 40 mg by mouth nightly. Past History Past Medical History: 
Past Medical History:  
Diagnosis Date  Anemia NEC  Arthritis  Atrial fibrillation ([address]  Bursitis of left shoulder  Cardiac cath 01/27/2009 RCA mild. LM patent. pLAD 20%. Very dLAD w/significant tapering. CX mild.  Cardiac stress echo, abnormal 01/05/2009 Positive maximal stress echo by echo & EKG criteria w/o chest pain.  EF >65%. Sm, discrete apical, apical septal hypk.  Cough  Endocrine disease  Hyperlipidemia  Hypertension  Hypothyroidism  Osteopenia  Pulmonary hemorrhage  Pulmonary hypertension ([address]  Rash and nonspecific skin eruption 9/2012  
 maculopapular on torso, arms, and upper legs  Reflux  Right hip pain  Right shoulder pain  Stenosing tenosynovitis of finger 04/2015 Bilateral index fingers  Trigger ring finger of right hand  Wears glasses Past Surgical History: 
Past Surgical History:  
Procedure Laterality Date [address] UNLISTED  HX APPENDECTOMY  HX COLECTOMY partial  
 HX HYSTERECTOMY  HX MOHS PROCEDURES Right 12/2010 Dr. Yury Abdalla  HX OTHER SURGICAL Two ribs removed  HX TONSILLECTOMY Family History: 
Family History Problem Relation Age of Onset  Hypertension Mother  Heart Attack Mother  Stroke Father  Heart Disease Sister  Heart Surgery Sister CABG  Stroke Brother  Heart Attack Brother Social History: 
Social History Substance Use Topics  Smoking status: Never Smoker  Smokeless tobacco: Never Used  Alcohol use No  
 
 
Allergies: Allergies Allergen Reactions  Hydrochlorothiazide Hives  Penicillins Other (comments) Mouth sore, upset stomach.  Sulfa (Sulfonamide Antibiotics) Unknown (comments)  Prednisone Other (comments) Body sweats, hot and cold, up all night Review of Systems Review of Systems Unable to perform ROS: Dementia Constitutional: Negative for activity change, appetite change, chills, diaphoresis, fatigue, fever and unexpected weight change.   
HENT: Negative for congestion, dental problem, drooling, ear discharge, ear pain, facial swelling, hearing loss, mouth sores, nosebleeds, postnasal drip, rhinorrhea, sinus pressure, sneezing, sore throat, tinnitus and trouble swallowing. Eyes: Negative for photophobia, pain, discharge, redness, itching and visual disturbance. Respiratory: Negative for apnea, cough, choking, chest tightness, shortness of breath, wheezing and stridor. Cardiovascular: Negative for chest pain, palpitations and leg swelling. Gastrointestinal: Negative for abdominal distention, abdominal pain, anal bleeding, blood in stool, constipation, diarrhea, nausea, rectal pain and vomiting. Endocrine: Negative for cold intolerance, heat intolerance, polydipsia, polyphagia and polyuria. Genitourinary: Negative for decreased urine volume, difficulty urinating, dysuria, enuresis, flank pain, frequency, genital sores, hematuria and urgency. Musculoskeletal: Negative for arthralgias, back pain, gait problem, joint swelling, myalgias, neck pain and neck stiffness. Skin: Positive for wound. Negative for color change, pallor and rash. Allergic/Immunologic: Negative for environmental allergies, food allergies and immunocompromised state. Neurological: Negative for dizziness, tremors, seizures, syncope, facial asymmetry, speech difficulty, weakness, light-headedness, numbness and headaches. Hematological: Negative for adenopathy. Does not bruise/bleed easily. Psychiatric/Behavioral: Negative for agitation, behavioral problems, confusion, decreased concentration, dysphoric mood, hallucinations, self-injury, sleep disturbance and suicidal ideas. The patient is not nervous/anxious and is not hyperactive. Physical Exam  
 
Visit Vitals  /90 (BP 1 Location: Right arm, BP Patient Position: At rest)  Pulse 61  Temp 97.9 °F (36.6 °C)  Resp 18  Ht 4' 8\" (1.422 m)  Wt 43.1 kg (95 lb)  SpO2 96%  BMI 21.3 kg/m2 Physical Exam  
 Constitutional: She appears well-developed and well-nourished. Elderly female, awake but keeps eyes closed unless prompted which son states is baseline; no signs of discomfort or acute respiratory distress HENT:  
Head: Normocephalic. Right Ear: External ear normal.  
Left Ear: External ear normal.  
Mouth/Throat: Oropharynx is clear and moist. No oropharyngeal exudate. 2x2 right occipital hematoma without bony step-off or crepitance Eyes: Conjunctivae and EOM are normal. Pupils are equal, round, and reactive to light. Right eye exhibits no discharge. Left eye exhibits no discharge. No scleral icterus. Neck: Normal range of motion. No tracheal deviation present. No thyromegaly present. Cardiovascular: Normal rate, regular rhythm and normal heart sounds. No murmur heard. Pulmonary/Chest: Effort normal and breath sounds normal. No respiratory distress. She has no wheezes. She has no rales. She exhibits no tenderness. Abdominal: Soft. Bowel sounds are normal. She exhibits no distension. There is no tenderness. There is no rebound and no guarding. Musculoskeletal: Normal range of motion. She exhibits no edema or tenderness. Lymphadenopathy:  
  She has no cervical adenopathy. Neurological: She is alert. No cranial nerve deficit. She exhibits normal muscle tone. Coordination normal.  
Face is symmetrical, moving all extremities Skin: Skin is warm. No erythema. Psychiatric: She has a normal mood and affect. Her behavior is normal. Judgment and thought content normal.  
Nursing note and vitals reviewed. Diagnostic Study Results Labs - No results found for this or any previous visit (from the past 12 hour(s)). Radiologic Studies -  
XR SACRUM AND COCCYX Final Result CT HEAD WO CONT Final Result  
  
chronic changes but no evidence of acute intracranial or bony injury EXAM: SACRUM/COCCYX 
  
CLINICAL HISTORY/INDICATION:   fall with tenderness over the coccyx 
  
 COMPARISON: None. 
  
TECHNIQUE: 3 views  
  
FINDINGS: 
  
Multilevel disc space narrowing with spurring at the lumbar spine. Lateral view 
is overpenetrated. There is however probable cortical interruption through the 
distal sacrum segment. Mineralization is normal. 
  
IMPRESSION IMPRESSION: 
  
Probable lower sacral fracture Medical Decision Making I am the first provider for this patient. I reviewed the vital signs, available nursing notes, past medical history, past surgical history, family history and social history. Vital Signs-Reviewed the patient's vital signs. Pulse Oximetry Analysis -  96% on room air , WNL Cardiac Monitor: 
Rate: 61 
 
Records Reviewed: Nursing Notes and Old Medical Records (Time of Review: 12:00 PM) ED Course: Progress Notes, Reevaluation, and Consults: 
Serial neurologic examinations were stable and normal. Patient remained hemodynamically stable and afebrile. I discussed the clinical findings and concerns with the patient and her son and they agreed with symptomatic treatment and close outpatient follow-up. Precautions given. Provider Notes (Medical Decision Making): Mechanical fall without LOC. No focal tenderness except for right occipital cephalohematoma without focal neurologic deficits. Will continue serial examinations while awaiting CT scan of head to rule out skull fracture and/or ICH. Patient and son agree with this plan. Diagnosis Clinical Impression: 1. Closed head injury, initial encounter 2. Fall, initial encounter Disposition: Discharge Follow-up Information Follow up With Details Comments Contact Info [facility]  As needed, If symptoms worsen [address] [place] [address] [address] Juan A Madrigal MD In 2 days As needed for any new or persistent symptoms [address] [address] 
[phone] Patient's Medications Start Taking No medications on file Continue Taking ACETAMINOPHEN (TYLENOL) 325 MG TABLET    Take 2 Tabs by mouth every four (4) hours as needed for Fever. ALBUTEROL (PROVENTIL VENTOLIN) 2.5 MG /3 ML (0.083 %) NEBULIZER SOLUTION    1.5 mL by Nebulization route every four (4) hours as needed for Wheezing. ASPIRIN, BUFFERED 81 MG TAB    Take 1 Tab by mouth daily. B.INFANTIS-B. ANI-B. LONG-B.BIFI (PROBIOTIC 4X) 10-15 MG TBEC    Take 1 Tab by mouth daily. BECLOMETHASONE (QVAR) 40 MCG/ACTUATION INHALER    Take 2 Puffs by inhalation two (2) times a day. Use with spacer device BIOTIN 1 MG TAB    Take 1 Tab by mouth daily. BISACODYL (DULCOLAX) 5 MG EC TABLET    Take 2 Tabs by mouth daily as needed for Constipation. CALCIUM CARBONATE (CALCIUM 600 PO)    Take 1 Tab by mouth daily. CARBOXYMETHYLCELLULOSE SODIUM 1 % DLGL    Apply 1 Drop to eye nightly. To both eyes CHOLECALCIFEROL (VITAMIN D3) 1,000 UNIT CAP    Take 1,000 Units by mouth daily. FISH OIL-DHA-EPA 1,200-144-216 MG CAP    Take 1 Cap by mouth daily. HYDRALAZINE (APRESOLINE) 25 MG TABLET    Take 1 Tab by mouth three (3) times daily. LEVOTHYROXINE (SYNTHROID) 100 MCG TABLET    Take 100 mcg by mouth Daily (before breakfast). LORATADINE (CLARITIN) 10 MG TABLET    Take 10 mg by mouth daily as needed. LOVASTATIN (MEVACOR) 40 MG TABLET    Take 40 mg by mouth nightly. MELATONIN 3 MG TABLET    Take 1 Tab by mouth nightly. METOPROLOL TARTRATE (LOPRESSOR) 100 MG IR TABLET    Take  by mouth two (2) times a day. MONTELUKAST (SINGULAIR) 10 MG TABLET    Take 10 mg by mouth daily. MULTIVITAMIN (ONE A DAY) TABLET    Take 1 Tab by mouth daily. NIFEDIPINE ER (PROCARDIA XL) 30 MG ER TABLET    Take 1 Tab by mouth daily. PANTOPRAZOLE (PROTONIX) 40 MG TABLET    Take 40 mg by mouth daily. SODIUM CHLORIDE 1 GRAM TABLET    Take 1 Tab by mouth three (3) times daily (with meals).   
 TRAMADOL (ULTRAM) 50 MG TABLET    Take 0.5 tabs (25mg) PO Q AM and 1 tab (50mg) PO Q PM. TRAMADOL (ULTRAM) 50 MG TABLET    Take 0.5 Tabs by mouth daily. Max Daily Amount: 25 mg. Indications: Pain TRAZODONE (DESYREL) 50 MG TABLET    Take 25 mg by mouth nightly as needed for Sleep. These Medications have changed No medications on file Stop Taking No medications on file  
 
_______________________________ Attestations: 
Scribe Attestation Natalia Marquez acting as a scribe for and in the presence of Ru Bright MD     
September 16, 2018 at 12:00 PM 
    
Provider Attestation:     
I personally performed the services described in the documentation, reviewed the documentation, as recorded by the scribe in my presence, and it accurately and completely records my words and actions. September 16, 2018 at 12:00 PM - Ru Bright MD   
_______________________________

## 2018-09-16 NOTE — DISCHARGE INSTRUCTIONS
May continue Tylenol as needed for pain     Learning About a Closed Head Injury  What is a closed head injury? A closed head injury happens when your head gets hit hard. The strong force of the blow causes your brain to shake in your skull. This movement can cause the brain to bruise, swell, or tear. Sometimes nerves or blood vessels also get damaged. This can cause bleeding in or around the brain. A concussion is a type of closed head injury. What are the symptoms? If you have a mild concussion, you may have a mild headache or feel \"not quite right. \" These symptoms are common. They usually go away over a few days to 4 weeks. But sometimes after a concussion, you feel like you can't function as well as before the injury. And you have new symptoms. This is called postconcussive syndrome. You may:  · Find it harder to solve problems, think, concentrate, or remember. · Have headaches. · Have changes in your sleep patterns, such as not being able to sleep or sleeping all the time. · Have changes in your personality. · Not be interested in your usual activities. · Feel angry or anxious without a clear reason. · Lose your sense of taste or smell. · Be dizzy, lightheaded, or unsteady. It may be hard to stand or walk. How is a closed head injury treated? Any person who may have a concussion needs to see a doctor. Some people have to stay in the hospital to be watched. Others can go home safely. If you go home, follow your doctor's instructions. He or she will tell you if you need someone to watch you closely for the next 24 hours or longer. Rest is the best treatment. Get plenty of sleep at night. And try to rest during the day. · Avoid activities that are physically or mentally demanding. These include housework, exercise, and schoolwork. And don't play video games, send text messages, or use the computer. You may need to change your school or work schedule to be able to avoid these activities.   · Ask your doctor when it's okay to drive, ride a bike, or operate machinery. · Take an over-the-counter pain medicine, such as acetaminophen (Tylenol), ibuprofen (Advil, Motrin), or naproxen (Aleve). Be safe with medicines. Read and follow all instructions on the label. · Check with your doctor before you use any other medicines for pain. · Do not drink alcohol or use illegal drugs. They can slow recovery. They can also increase your risk of getting a second head injury. Follow-up care is a key part of your treatment and safety. Be sure to make and go to all appointments, and call your doctor if you are having problems. It's also a good idea to know your test results and keep a list of the medicines you take. Where can you learn more? Go to http://molina-frida.info/. Enter E235 in the search box to learn more about \"Learning About a Closed Head Injury. \"  Current as of: October 9, 2017  Content Version: 11.7  © 7960-7979 [employer]. Care instructions adapted under license by [employer] (which disclaims liability or warranty for this information). If you have questions about a medical condition or this instruction, always ask your healthcare professional. [address] any warranty or liability for your use of this information. Preventing Falls: Care Instructions  Your Care Instructions    Getting around your home safely can be a challenge if you have injuries or health problems that make it easy for you to fall. Loose rugs and furniture in walkways are among the dangers for many older people who have problems walking or who have poor eyesight. People who have conditions such as arthritis, osteoporosis, or dementia also have to be careful not to fall. You can make your home safer with a few simple measures. Follow-up care is a key part of your treatment and safety.  Be sure to make and go to all appointments, and call your doctor if you are having problems. It's also a good idea to know your test results and keep a list of the medicines you take. How can you care for yourself at home? Taking care of yourself  · You may get dizzy if you do not drink enough water. To prevent dehydration, drink plenty of fluids, enough so that your urine is light yellow or clear like water. Choose water and other caffeine-free clear liquids. If you have kidney, heart, or liver disease and have to limit fluids, talk with your doctor before you increase the amount of fluids you drink. · Exercise regularly to improve your strength, muscle tone, and balance. Walk if you can. Swimming may be a good choice if you cannot walk easily. · Have your vision and hearing checked each year or any time you notice a change. If you have trouble seeing and hearing, you might not be able to avoid objects and could lose your balance. · Know the side effects of the medicines you take. Ask your doctor or pharmacist whether the medicines you take can affect your balance. Sleeping pills or sedatives can affect your balance. · Limit the amount of alcohol you drink. Alcohol can impair your balance and other senses. · Ask your doctor whether calluses or corns on your feet need to be removed. If you wear loose-fitting shoes because of calluses or corns, you can lose your balance and fall. · Talk to your doctor if you have numbness in your feet. Preventing falls at home  · Remove raised doorway thresholds, throw rugs, and clutter. Repair loose carpet or raised areas in the floor. · Move furniture and electrical cords to keep them out of walking paths. · Use nonskid floor wax, and wipe up spills right away, especially on ceramic tile floors. · If you use a walker or cane, put rubber tips on it. If you use crutches, clean the bottoms of them regularly with an abrasive pad, such as steel wool. · Keep your house well lit, especially [address], and outside walkways.  Use night-lights in areas such as hallways and bathrooms. Add extra light switches or use remote switches (such as switches that go on or off when you clap your hands) to make it easier to turn lights on if you have to get up during the night. · Install sturdy handrails on stairways. · Move items in your cabinets so that the things you use a lot are on the lower shelves (about waist level). · Keep a cordless phone and a flashlight with new batteries by your bed. If possible, put a phone in each of the main rooms of your house, or carry a cell phone in case you fall and cannot reach a phone. Or, you can wear a device around your neck or wrist. You push a button that sends a signal for help. · Wear low-heeled shoes that fit well and give your feet good support. Use footwear with nonskid soles. Check the heels and soles of your shoes for wear. Repair or replace worn heels or soles. · Do not wear socks without shoes on wood floors. · Walk on the grass when the sidewalks are slippery. If you live in an area that gets snow and ice in the winter, sprinkle salt on slippery steps and sidewalks. Preventing falls in the bath  · Install grab bars and nonskid mats inside and outside your shower or tub and near the toilet and sinks. · Use shower chairs and bath benches. · Use a hand-held shower head that will allow you to sit while showering. · Get into a tub or shower by putting the weaker leg in first. Get out of a tub or shower with your strong side first.  · Repair loose toilet seats and consider installing a raised toilet seat to make getting on and off the toilet easier. · Keep your bathroom door unlocked while you are in the shower. Where can you learn more? Go to http://molina-frida.info/. Enter 0476 [id number] in the search box to learn more about \"Preventing Falls: Care Instructions. \"  Current as of: May 12, 2017  Content Version: 11.7  © 5931-6841 [employer].  Care instructions adapted under license by [employer] (which disclaims liability or warranty for this information). If you have questions about a medical condition or this instruction, always ask your healthcare professional. [address] any warranty or liability for your use of this information.

## 2018-10-08 ENCOUNTER — APPOINTMENT (OUTPATIENT)
Dept: CT IMAGING | Age: 83
End: 2018-10-08
Attending: EMERGENCY MEDICINE
Payer: MEDICARE

## 2018-10-08 ENCOUNTER — HOSPITAL ENCOUNTER (EMERGENCY)
Age: 83
Discharge: HOME | End: 2018-10-08
Attending: EMERGENCY MEDICINE
Payer: MEDICARE

## 2018-10-08 VITALS
HEART RATE: 74 BPM | SYSTOLIC BLOOD PRESSURE: 169 MMHG | HEIGHT: 56 IN | RESPIRATION RATE: 18 BRPM | DIASTOLIC BLOOD PRESSURE: 85 MMHG | OXYGEN SATURATION: 94 % | TEMPERATURE: 98 F | BODY MASS INDEX: 21.59 KG/M2 | WEIGHT: 96 LBS
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
## 2018-10-08 DIAGNOSIS — S00.03XA CONTUSION OF SCALP, INITIAL ENCOUNTER: Primary | ICD-10-CM

## 2018-10-08 DIAGNOSIS — S80.02XA CONTUSION OF LEFT KNEE, INITIAL ENCOUNTER: ICD-10-CM

## 2018-10-08 PROCEDURE — 70450 CT HEAD/BRAIN W/O DYE: CPT

## 2018-10-08 PROCEDURE — 99284 EMERGENCY DEPT VISIT MOD MDM: CPT

## 2018-10-08 PROCEDURE — 74011250637 HC RX REV CODE- 250/637: Performed by: EMERGENCY MEDICINE

## 2018-10-08 RX ORDER — COLCHICINE 0.6 MG/1
0.6 TABLET ORAL DAILY
COMMUNITY
End: 2019-07-08

## 2018-10-08 RX ORDER — LORAZEPAM 0.5 MG/1
0.5 TABLET ORAL
COMMUNITY
End: 2019-07-08

## 2018-10-08 RX ORDER — ATORVASTATIN CALCIUM 10 MG/1
10 TABLET, FILM COATED ORAL DAILY
COMMUNITY
End: 2019-07-08

## 2018-10-08 RX ORDER — ERYTHROMYCIN 5 MG/G
1 OINTMENT OPHTHALMIC
COMMUNITY
End: 2019-07-08

## 2018-10-08 RX ORDER — DIGOXIN 125 MCG
0.12 TABLET ORAL EVERY OTHER DAY
COMMUNITY
End: 2019-07-08

## 2018-10-08 RX ORDER — ACETAMINOPHEN 325 MG/1
650 TABLET ORAL
Status: COMPLETED | OUTPATIENT
Start: 2018-10-08 | End: 2018-10-08

## 2018-10-08 RX ORDER — FUROSEMIDE 20 MG/1
TABLET ORAL DAILY
COMMUNITY
End: 2019-03-15

## 2018-10-08 RX ORDER — POTASSIUM CHLORIDE 20 MEQ/1
20 TABLET, EXTENDED RELEASE ORAL DAILY
COMMUNITY
End: 2019-07-08

## 2018-10-08 RX ADMIN — ACETAMINOPHEN 650 MG: 325 TABLET ORAL at 19:30

## 2018-10-08 NOTE — ED PROVIDER NOTES
HPI Comments: Eryn Lucas is a 80 y.o. female with PMHx of hypertension, arthritis, and A-fib presenting to the ED c/o a fall just prior to arrival. States that she was standing and fell backward hitting her head. She did not get dizzy or lightheaded prior to or after the fall. She states that she was fixing her door, and that she wasn't using her walker when she fell. No modifying or aggravating factors were reported. Denies LOC. Denies neck pain, nausea, vomiting, abdominal pain, neck pain, and chest pain. Denies any further complaints or symptoms at the moment. Past Medical History:  
Diagnosis Date  Anemia NEC  Arthritis  Atrial fibrillation ([address]  Bursitis of left shoulder  Cardiac cath 01/27/2009 RCA mild. LM patent. pLAD 20%. Very dLAD w/significant tapering. CX mild.  Cardiac stress echo, abnormal 01/05/2009 Positive maximal stress echo by echo & EKG criteria w/o chest pain.  EF >65%. Sm, discrete apical, apical septal hypk.  Cough  Endocrine disease  Hyperlipidemia  Hypertension  Hypothyroidism  Osteopenia  Pulmonary hemorrhage  Pulmonary hypertension ([address]  Rash and nonspecific skin eruption 9/2012  
 maculopapular on torso, arms, and upper legs  Reflux  Right hip pain  Right shoulder pain  Stenosing tenosynovitis of finger 04/2015 Bilateral index fingers  Trigger ring finger of right hand  Wears glasses Past Surgical History:  
Procedure Laterality Date [address] UNLISTED  HX APPENDECTOMY  HX COLECTOMY partial  
 HX HYSTERECTOMY  HX MOHS PROCEDURES Right 12/2010 Dr. Liborio Apley  HX OTHER SURGICAL Two ribs removed  HX TONSILLECTOMY Family History:  
Problem Relation Age of Onset  Hypertension Mother  Heart Attack Mother  Stroke Father  Heart Disease Sister  Heart Surgery Sister   CABG  
  Stroke Brother  Heart Attack Brother Social History Social History  Marital status:  Spouse name: N/A  
 Number of children: N/A  
 Years of education: N/A Occupational History  Not on file. Social History Main Topics  Smoking status: Never Smoker  Smokeless tobacco: Never Used  Alcohol use No  
 Drug use: No  
 Sexual activity: Not on file Other Topics Concern  Not on file Social History Narrative ALLERGIES: Hydrochlorothiazide; Penicillins; Sulfa (sulfonamide antibiotics); and Prednisone Review of Systems Constitutional: Negative. Negative for chills. Positive for fall HENT: Negative. Negative for congestion. Eyes: Negative. Negative for visual disturbance. Respiratory: Negative. Negative for shortness of breath. Cardiovascular: Negative. Negative for chest pain. Gastrointestinal: Negative. Negative for abdominal pain, diarrhea and vomiting. Genitourinary: Negative. Negative for dysuria. Musculoskeletal: Negative. Negative for back pain and neck pain. Skin: Negative. Neurological: Negative. Negative for dizziness, syncope, speech difficulty, weakness and light-headedness. Psychiatric/Behavioral: Negative. All other systems reviewed and are negative. Vitals:  
 10/08/18 1859 10/08/18 1945 10/08/18 2000 BP: 175/79 (!) 172/92 (!) 167/95 Pulse: 68 74 76 Resp: 18 17 21 Temp: 98 °F (36.7 °C) SpO2: 97% 91% 96% Weight: 43.5 kg (96 lb) Height: 4' 8\" (1.422 m) Physical Exam  
Constitutional: She is oriented to person, place, and time. She appears well-developed and well-nourished. No distress. HENT:  
Head: Normocephalic and atraumatic. Mouth/Throat: Oropharynx is clear and moist.  
Eyes: Conjunctivae and EOM are normal. Pupils are equal, round, and reactive to light. No scleral icterus. Neck: Trachea normal and normal range of motion. Neck supple. No palpable depression, no cervical or spinal tenderness Cardiovascular: Normal rate, regular rhythm, S1 normal and S2 normal.  Exam reveals no gallop and no friction rub. No murmur heard. Pulmonary/Chest: Effort normal and breath sounds normal. No accessory muscle usage. No respiratory distress. Abdominal: Soft. Normal appearance. She exhibits no distension. There is no tenderness. There is no rigidity, no rebound and no guarding. Musculoskeletal: Normal range of motion. She exhibits no edema or tenderness. Neurological: She is alert and oriented to person, place, and time. She has normal strength. No cranial nerve deficit or sensory deficit. Coordination normal.  
Skin: Skin is warm and intact. No rash noted. Small contusion/hematoma over posterior occiput Small abrasion over left ant knee with full ROM No effusion Psychiatric: Her speech is normal.  
Vitals reviewed. MDM Number of Diagnoses or Management Options Contusion of left knee, initial encounter:  
Contusion of scalp, initial encounter:  
Fall, initial encounter:  
Diagnosis management comments: Carin Rasheed is a 80 y.o. Female coming in after a mechanical fall from standing. No LOC and no blood thinners. Well appearing. CT negative. No neck pain. Counseled on appropriate use of her walker. Will d/c home with PCP follow up. ED Course Procedures Vitals: 
Patient Vitals for the past 12 hrs: 
 Temp Pulse Resp BP SpO2  
10/08/18 2000 - 76 21 (!) 167/95 96 % 10/08/18 1945 - 74 17 (!) 172/92 91 % 10/08/18 1859 98 °F (36.7 °C) 68 18 175/79 97 % Medications Ordered: 
Medications  
acetaminophen (TYLENOL) tablet 650 mg (650 mg Oral Given 10/8/18 1930) Lab Findings: 
No results found for this or any previous visit (from the past 12 hour(s)). X-ray, CT or radiology findings or impressions: 
CT HEAD WO CONT Final Result Impression: 1.  No acute intracranial pathology.   
 2. 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 These appear grossly similar to prior. Lanette Solano Diagnosis: 1. Contusion of scalp, initial encounter 2. Fall, initial encounter 3. Contusion of left knee, initial encounter Disposition: Discharge Follow-up Information Follow up With Details Comments Contact Info Valorie Gomez MD Schedule an appointment as soon as possible for a visit in 3 days For Follow up [address] [address] 
[phone] [facility] EMERGENCY DEPT Go to As needed, If symptoms worsen Kathy Najera [id number] [phone] Patient's Medications Start Taking No medications on file Continue Taking ACETAMINOPHEN (TYLENOL) 325 MG TABLET    Take 2 Tabs by mouth every four (4) hours as needed for Fever. ASPIRIN, BUFFERED 81 MG TAB    Take 1 Tab by mouth daily. ATORVASTATIN (LIPITOR) 10 MG TABLET    Take  by mouth daily. B.INFANTIS-B. ANI-B. LONG-B.BIFI (PROBIOTIC 4X) 10-15 MG TBEC    Take 1 Tab by mouth daily. BECLOMETHASONE (QVAR) 40 MCG/ACTUATION INHALER    Take 2 Puffs by inhalation two (2) times a day. Use with spacer device CALCIUM CARBONATE (CALCIUM 600 PO)    Take 1 Tab by mouth daily. CARBOXYMETHYLCELLULOSE SODIUM 1 % DLGL    Apply 1 Drop to eye nightly. To both eyes CETIRIZINE 10 MG CAP    Take  by mouth. CHOLECALCIFEROL (VITAMIN D3) 1,000 UNIT CAP    Take 1,000 Units by mouth daily. COLCHICINE 0.6 MG TABLET    Take 0.6 mg by mouth daily. DIGOXIN (LANOXIN) 0.125 MG TABLET    Take  by mouth daily. ERYTHROMYCIN (ILOTYCIN) OPHTHALMIC OINTMENT    Administer  to both eyes nightly. FUROSEMIDE (LASIX) 20 MG TABLET    Take  by mouth daily. HYDRALAZINE (APRESOLINE) 25 MG TABLET    Take 1 Tab by mouth three (3) times daily. LEVOTHYROXINE (SYNTHROID) 100 MCG TABLET    Take 100 mcg by mouth Daily (before breakfast). LORATADINE (CLARITIN) 10 MG TABLET    Take 10 mg by mouth daily as needed. LORAZEPAM (ATIVAN) 0.5 MG TABLET    Take  by mouth. MELATONIN 3 MG TABLET    Take 1 Tab by mouth nightly. METOPROLOL TARTRATE (LOPRESSOR) 100 MG IR TABLET    Take  by mouth two (2) times a day. MONTELUKAST (SINGULAIR) 10 MG TABLET    Take 10 mg by mouth daily. MULTIVITAMIN (ONE A DAY) TABLET    Take 1 Tab by mouth daily. NIFEDIPINE ER (PROCARDIA XL) 30 MG ER TABLET    Take 1 Tab by mouth daily. PANTOPRAZOLE (PROTONIX) 40 MG TABLET    Take 40 mg by mouth daily. POLYVINYL ALCOHOL-POVIDON,PF, (REFRESH CLASSIC) 1.4-0.6 % OPHTHALMIC SOLUTION    Administer 1-2 Drops to both eyes as needed. POTASSIUM CHLORIDE (K-DUR, KLOR-CON) 20 MEQ TABLET    Take  by mouth two (2) times a day. SODIUM CHLORIDE 1 GRAM TABLET    Take 1 Tab by mouth three (3) times daily (with meals). TRAMADOL (ULTRAM) 50 MG TABLET    Take 0.5 Tabs by mouth daily. Max Daily Amount: 25 mg. Indications: Pain TRAZODONE (DESYREL) 50 MG TABLET    Take 25 mg by mouth nightly as needed for Sleep. These Medications have changed No medications on file Stop Taking ALBUTEROL (PROVENTIL VENTOLIN) 2.5 MG /3 ML (0.083 %) NEBULIZER SOLUTION    1.5 mL by Nebulization route every four (4) hours as needed for Wheezing. BIOTIN 1 MG TAB    Take 1 Tab by mouth daily. BISACODYL (DULCOLAX) 5 MG EC TABLET    Take 2 Tabs by mouth daily as needed for Constipation. FISH OIL-DHA-EPA 1,200-144-216 MG CAP    Take 1 Cap by mouth daily. LOVASTATIN (MEVACOR) 40 MG TABLET    Take 40 mg by mouth nightly. TRAMADOL (ULTRAM) 50 MG TABLET    Take 0.5 tabs (25mg) PO Q AM and 1 tab (50mg) PO Q PM. Scribe Attestation Kassy Lam acting as a scribe for and in the presence of Zuri Martinez MD     
October 08, 2018 at 7:08 PM 
    
Provider Attestation:     
I personally performed the services described in the documentation, reviewed the documentation, as recorded by the scribe in my presence, and it accurately and completely records my words and actions.  October 08, 2018 at 7:08 PM - Dyana Bills MD

## 2018-11-12 ENCOUNTER — HOSPITAL ENCOUNTER (OUTPATIENT)
Dept: LAB | Age: 83
Discharge: HOME | End: 2018-11-12
Payer: MEDICARE

## 2018-11-12 LAB
APPEARANCE UR: ABNORMAL
BACTERIA URNS QL MICRO: ABNORMAL /HPF
BILIRUB UR QL: NEGATIVE
COLOR UR: ABNORMAL
EPITH CASTS URNS QL MICRO: ABNORMAL /LPF (ref 0–5)
GLUCOSE UR STRIP.AUTO-MCNC: NEGATIVE MG/DL
HGB UR QL STRIP: NEGATIVE
KETONES UR QL STRIP.AUTO: NEGATIVE MG/DL
LEUKOCYTE ESTERASE UR QL STRIP.AUTO: ABNORMAL
NITRITE UR QL STRIP.AUTO: NEGATIVE
PH UR STRIP: 5 [PH] (ref 5–8)
PROT UR STRIP-MCNC: 300 MG/DL
SP GR UR REFRACTOMETRY: 1.02 (ref 1–1.03)
UROBILINOGEN UR QL STRIP.AUTO: 1 EU/DL (ref 0.2–1)
WBC URNS QL MICRO: ABNORMAL /HPF (ref 0–5)

## 2018-11-12 PROCEDURE — 87086 URINE CULTURE/COLONY COUNT: CPT

## 2018-11-12 PROCEDURE — 81001 URINALYSIS AUTO W/SCOPE: CPT

## 2018-11-12 PROCEDURE — 87186 SC STD MICRODIL/AGAR DIL: CPT

## 2018-11-12 PROCEDURE — 87077 CULTURE AEROBIC IDENTIFY: CPT

## 2018-11-15 LAB
BACTERIA SPEC CULT: ABNORMAL
BACTERIA SPEC CULT: ABNORMAL
SERVICE CMNT-IMP: ABNORMAL

## 2018-12-05 ENCOUNTER — HOSPITAL ENCOUNTER (OUTPATIENT)
Dept: LAB | Age: 83
Discharge: HOME | End: 2018-12-05
Payer: MEDICARE

## 2018-12-05 LAB
ANION GAP SERPL CALC-SCNC: 10 MMOL/L (ref 3–18)
BUN SERPL-MCNC: 35 MG/DL (ref 7–18)
BUN/CREAT SERPL: 26 (ref 12–20)
CALCIUM SERPL-MCNC: 8.7 MG/DL (ref 8.5–10.1)
CHLORIDE SERPL-SCNC: 105 MMOL/L (ref 100–108)
CO2 SERPL-SCNC: 25 MMOL/L (ref 21–32)
CREAT SERPL-MCNC: 1.35 MG/DL (ref 0.6–1.3)
ERYTHROCYTE [DISTWIDTH] IN BLOOD BY AUTOMATED COUNT: 21 % (ref 11.6–14.5)
GLUCOSE SERPL-MCNC: 76 MG/DL (ref 74–99)
HCT VFR BLD AUTO: 36.8 % (ref 35–45)
HGB BLD-MCNC: 11.9 G/DL (ref 12–16)
MCH RBC QN AUTO: 25.6 PG (ref 24–34)
MCHC RBC AUTO-ENTMCNC: 32.3 G/DL (ref 31–37)
MCV RBC AUTO: 79.1 FL (ref 74–97)
PLATELET # BLD AUTO: 272 K/UL (ref 135–420)
POTASSIUM SERPL-SCNC: 4.2 MMOL/L (ref 3.5–5.5)
RBC # BLD AUTO: 4.65 M/UL (ref 4.2–5.3)
SODIUM SERPL-SCNC: 140 MMOL/L (ref 136–145)
WBC # BLD AUTO: 10.7 K/UL (ref 4.6–13.2)

## 2018-12-05 PROCEDURE — 36415 COLL VENOUS BLD VENIPUNCTURE: CPT

## 2018-12-05 PROCEDURE — 80048 BASIC METABOLIC PNL TOTAL CA: CPT

## 2018-12-05 PROCEDURE — 85027 COMPLETE CBC AUTOMATED: CPT

## 2019-01-11 ENCOUNTER — HOSPITAL ENCOUNTER (OUTPATIENT)
Dept: LAB | Age: 84
Discharge: HOME | End: 2019-01-11
Payer: MEDICARE

## 2019-01-11 LAB
ALBUMIN SERPL-MCNC: 2.6 G/DL (ref 3.4–5)
ANION GAP SERPL CALC-SCNC: 8 MMOL/L (ref 3–18)
BUN SERPL-MCNC: 23 MG/DL (ref 7–18)
BUN/CREAT SERPL: 18 (ref 12–20)
CALCIUM SERPL-MCNC: 8.1 MG/DL (ref 8.5–10.1)
CHLORIDE SERPL-SCNC: 106 MMOL/L (ref 100–108)
CO2 SERPL-SCNC: 22 MMOL/L (ref 21–32)
CREAT SERPL-MCNC: 1.26 MG/DL (ref 0.6–1.3)
GLUCOSE SERPL-MCNC: 79 MG/DL (ref 74–99)
PHOSPHATE SERPL-MCNC: 3.2 MG/DL (ref 2.5–4.9)
POTASSIUM SERPL-SCNC: 4.2 MMOL/L (ref 3.5–5.5)
SODIUM SERPL-SCNC: 136 MMOL/L (ref 136–145)

## 2019-01-11 PROCEDURE — 80069 RENAL FUNCTION PANEL: CPT

## 2019-02-04 ENCOUNTER — HOSPITAL ENCOUNTER (OUTPATIENT)
Dept: LAB | Age: 84
Discharge: HOME | End: 2019-02-04
Payer: MEDICARE

## 2019-02-04 LAB
ANION GAP SERPL CALC-SCNC: 7 MMOL/L (ref 3–18)
BUN SERPL-MCNC: 22 MG/DL (ref 7–18)
BUN/CREAT SERPL: 17 (ref 12–20)
CALCIUM SERPL-MCNC: 8.3 MG/DL (ref 8.5–10.1)
CHLORIDE SERPL-SCNC: 111 MMOL/L (ref 100–108)
CO2 SERPL-SCNC: 23 MMOL/L (ref 21–32)
CREAT SERPL-MCNC: 1.28 MG/DL (ref 0.6–1.3)
GLUCOSE SERPL-MCNC: 53 MG/DL (ref 74–99)
POTASSIUM SERPL-SCNC: 5.2 MMOL/L (ref 3.5–5.5)
SODIUM SERPL-SCNC: 141 MMOL/L (ref 136–145)

## 2019-02-04 PROCEDURE — 36415 COLL VENOUS BLD VENIPUNCTURE: CPT

## 2019-02-04 PROCEDURE — 80048 BASIC METABOLIC PNL TOTAL CA: CPT

## 2019-02-21 ENCOUNTER — HOSPITAL ENCOUNTER (OUTPATIENT)
Dept: MAMMOGRAPHY | Age: 84
Discharge: HOME | End: 2019-02-21
Attending: INTERNAL MEDICINE
Payer: MEDICARE

## 2019-02-21 ENCOUNTER — HOSPITAL ENCOUNTER (OUTPATIENT)
Dept: ULTRASOUND IMAGING | Age: 84
Discharge: HOME | End: 2019-02-21
Attending: INTERNAL MEDICINE
Payer: MEDICARE

## 2019-02-21 DIAGNOSIS — N63.0 BREAST LUMP: ICD-10-CM

## 2019-02-21 DIAGNOSIS — N64.4 BREAST PAIN: ICD-10-CM

## 2019-02-21 PROCEDURE — 77066 DX MAMMO INCL CAD BI: CPT

## 2019-02-21 PROCEDURE — 76642 ULTRASOUND BREAST LIMITED: CPT

## 2019-03-15 ENCOUNTER — APPOINTMENT (OUTPATIENT)
Dept: CT IMAGING | Age: 84
End: 2019-03-15
Attending: EMERGENCY MEDICINE
Payer: MEDICARE

## 2019-03-15 ENCOUNTER — APPOINTMENT (OUTPATIENT)
Dept: GENERAL RADIOLOGY | Age: 84
End: 2019-03-15
Attending: EMERGENCY MEDICINE
Payer: MEDICARE

## 2019-03-15 ENCOUNTER — HOSPITAL ENCOUNTER (EMERGENCY)
Age: 84
Discharge: HOME | End: 2019-03-15
Attending: EMERGENCY MEDICINE | Admitting: EMERGENCY MEDICINE
Payer: MEDICARE

## 2019-03-15 VITALS
RESPIRATION RATE: 22 BRPM | HEART RATE: 66 BPM | DIASTOLIC BLOOD PRESSURE: 68 MMHG | OXYGEN SATURATION: 99 % | WEIGHT: 120.4 LBS | TEMPERATURE: 97.8 F | BODY MASS INDEX: 26.99 KG/M2 | SYSTOLIC BLOOD PRESSURE: 149 MMHG

## 2019-03-15 DIAGNOSIS — I50.9 CONGESTIVE HEART FAILURE, UNSPECIFIED HF CHRONICITY, UNSPECIFIED HEART FAILURE TYPE (HCC): Primary | ICD-10-CM

## 2019-03-15 LAB
ALBUMIN SERPL-MCNC: 2.1 G/DL (ref 3.4–5)
ALBUMIN/GLOB SERPL: 0.3 {RATIO} (ref 0.8–1.7)
ALP SERPL-CCNC: 112 U/L (ref 45–117)
ALT SERPL-CCNC: 13 U/L (ref 13–56)
ANION GAP SERPL CALC-SCNC: 6 MMOL/L (ref 3–18)
APPEARANCE UR: ABNORMAL
AST SERPL-CCNC: 43 U/L (ref 15–37)
ATRIAL RATE: 258 BPM
BACTERIA URNS QL MICRO: ABNORMAL /HPF
BASOPHILS # BLD: 0.1 K/UL (ref 0–0.1)
BASOPHILS NFR BLD: 1 % (ref 0–2)
BILIRUB SERPL-MCNC: 0.8 MG/DL (ref 0.2–1)
BILIRUB UR QL: NEGATIVE
BNP SERPL-MCNC: ABNORMAL PG/ML (ref 0–1800)
BUN SERPL-MCNC: 18 MG/DL (ref 7–18)
BUN/CREAT SERPL: 16 (ref 12–20)
CALCIUM SERPL-MCNC: 8.5 MG/DL (ref 8.5–10.1)
CALCULATED R AXIS, ECG10: 20 DEGREES
CALCULATED T AXIS, ECG11: -152 DEGREES
CHLORIDE SERPL-SCNC: 105 MMOL/L (ref 100–108)
CK MB CFR SERPL CALC: 1.7 % (ref 0–4)
CK MB SERPL-MCNC: 1.2 NG/ML (ref 5–25)
CK SERPL-CCNC: 72 U/L (ref 26–192)
CO2 SERPL-SCNC: 24 MMOL/L (ref 21–32)
COLOR UR: ABNORMAL
CREAT SERPL-MCNC: 1.12 MG/DL (ref 0.6–1.3)
DIAGNOSIS, 93000: NORMAL
DIFFERENTIAL METHOD BLD: ABNORMAL
EOSINOPHIL # BLD: 0.3 K/UL (ref 0–0.4)
EOSINOPHIL NFR BLD: 6 % (ref 0–5)
EPITH CASTS URNS QL MICRO: ABNORMAL /LPF (ref 0–5)
ERYTHROCYTE [DISTWIDTH] IN BLOOD BY AUTOMATED COUNT: 22.5 % (ref 11.6–14.5)
GLOBULIN SER CALC-MCNC: 6.7 G/DL (ref 2–4)
GLUCOSE BLD STRIP.AUTO-MCNC: 67 MG/DL (ref 70–110)
GLUCOSE SERPL-MCNC: 72 MG/DL (ref 74–99)
GLUCOSE UR STRIP.AUTO-MCNC: NEGATIVE MG/DL
HCT VFR BLD AUTO: 36.5 % (ref 35–45)
HGB BLD-MCNC: 12.4 G/DL (ref 12–16)
HGB UR QL STRIP: NEGATIVE
KETONES UR QL STRIP.AUTO: NEGATIVE MG/DL
LACTATE BLD-SCNC: 1.17 MMOL/L (ref 0.4–2)
LEUKOCYTE ESTERASE UR QL STRIP.AUTO: ABNORMAL
LIPASE SERPL-CCNC: 63 U/L (ref 73–393)
LYMPHOCYTES # BLD: 0.8 K/UL (ref 0.9–3.6)
LYMPHOCYTES NFR BLD: 15 % (ref 21–52)
MAGNESIUM SERPL-MCNC: 1.8 MG/DL (ref 1.6–2.6)
MCH RBC QN AUTO: 27.8 PG (ref 24–34)
MCHC RBC AUTO-ENTMCNC: 34 G/DL (ref 31–37)
MCV RBC AUTO: 81.8 FL (ref 74–97)
MONOCYTES # BLD: 0.9 K/UL (ref 0.05–1.2)
MONOCYTES NFR BLD: 17 % (ref 3–10)
NEUTS SEG # BLD: 3.2 K/UL (ref 1.8–8)
NEUTS SEG NFR BLD: 61 % (ref 40–73)
NITRITE UR QL STRIP.AUTO: NEGATIVE
PH UR STRIP: 5.5 [PH] (ref 5–8)
PLATELET # BLD AUTO: 160 K/UL (ref 135–420)
PLATELET COMMENTS,PCOM: ABNORMAL
POTASSIUM SERPL-SCNC: 4.6 MMOL/L (ref 3.5–5.5)
PROT SERPL-MCNC: 8.8 G/DL (ref 6.4–8.2)
PROT UR STRIP-MCNC: 30 MG/DL
Q-T INTERVAL, ECG07: 408 MS
QRS DURATION, ECG06: 88 MS
QTC CALCULATION (BEZET), ECG08: 417 MS
RBC # BLD AUTO: 4.46 M/UL (ref 4.2–5.3)
RBC #/AREA URNS HPF: NEGATIVE /HPF (ref 0–5)
RBC MORPH BLD: ABNORMAL
RBC MORPH BLD: ABNORMAL
SODIUM SERPL-SCNC: 135 MMOL/L (ref 136–145)
SP GR UR REFRACTOMETRY: 1.02 (ref 1–1.03)
TROPONIN I SERPL-MCNC: 0.02 NG/ML (ref 0–0.04)
TSH SERPL DL<=0.05 MIU/L-ACNC: 6.4 UIU/ML (ref 0.36–3.74)
UROBILINOGEN UR QL STRIP.AUTO: 1 EU/DL (ref 0.2–1)
VENTRICULAR RATE, ECG03: 63 BPM
WBC # BLD AUTO: 5.3 K/UL (ref 4.6–13.2)
WBC URNS QL MICRO: ABNORMAL /HPF (ref 0–4)

## 2019-03-15 PROCEDURE — 80053 COMPREHEN METABOLIC PANEL: CPT

## 2019-03-15 PROCEDURE — 83690 ASSAY OF LIPASE: CPT

## 2019-03-15 PROCEDURE — 93005 ELECTROCARDIOGRAM TRACING: CPT

## 2019-03-15 PROCEDURE — 96376 TX/PRO/DX INJ SAME DRUG ADON: CPT

## 2019-03-15 PROCEDURE — 96374 THER/PROPH/DIAG INJ IV PUSH: CPT

## 2019-03-15 PROCEDURE — 83880 ASSAY OF NATRIURETIC PEPTIDE: CPT

## 2019-03-15 PROCEDURE — 85025 COMPLETE CBC W/AUTO DIFF WBC: CPT

## 2019-03-15 PROCEDURE — 99285 EMERGENCY DEPT VISIT HI MDM: CPT

## 2019-03-15 PROCEDURE — 81001 URINALYSIS AUTO W/SCOPE: CPT

## 2019-03-15 PROCEDURE — 87077 CULTURE AEROBIC IDENTIFY: CPT

## 2019-03-15 PROCEDURE — 70450 CT HEAD/BRAIN W/O DYE: CPT

## 2019-03-15 PROCEDURE — 74011250637 HC RX REV CODE- 250/637: Performed by: EMERGENCY MEDICINE

## 2019-03-15 PROCEDURE — 71045 X-RAY EXAM CHEST 1 VIEW: CPT

## 2019-03-15 PROCEDURE — 82962 GLUCOSE BLOOD TEST: CPT

## 2019-03-15 PROCEDURE — 87086 URINE CULTURE/COLONY COUNT: CPT

## 2019-03-15 PROCEDURE — 83735 ASSAY OF MAGNESIUM: CPT

## 2019-03-15 PROCEDURE — 74011250636 HC RX REV CODE- 250/636: Performed by: EMERGENCY MEDICINE

## 2019-03-15 PROCEDURE — 82550 ASSAY OF CK (CPK): CPT

## 2019-03-15 PROCEDURE — 87186 SC STD MICRODIL/AGAR DIL: CPT

## 2019-03-15 PROCEDURE — 87040 BLOOD CULTURE FOR BACTERIA: CPT

## 2019-03-15 PROCEDURE — 84443 ASSAY THYROID STIM HORMONE: CPT

## 2019-03-15 PROCEDURE — 83605 ASSAY OF LACTIC ACID: CPT

## 2019-03-15 RX ORDER — FUROSEMIDE 20 MG/1
20 TABLET ORAL DAILY
Status: DISCONTINUED | OUTPATIENT
Start: 2019-03-15 | End: 2019-03-15 | Stop reason: SDUPTHER

## 2019-03-15 RX ORDER — FUROSEMIDE 20 MG/1
20 TABLET ORAL DAILY
Status: DISCONTINUED | OUTPATIENT
Start: 2019-03-16 | End: 2019-03-15 | Stop reason: HOSPADM

## 2019-03-15 RX ORDER — FUROSEMIDE 20 MG/1
20 TABLET ORAL
Status: COMPLETED | OUTPATIENT
Start: 2019-03-15 | End: 2019-03-15

## 2019-03-15 RX ORDER — FUROSEMIDE 10 MG/ML
20 INJECTION INTRAMUSCULAR; INTRAVENOUS ONCE
Status: COMPLETED | OUTPATIENT
Start: 2019-03-15 | End: 2019-03-15

## 2019-03-15 RX ORDER — FUROSEMIDE 10 MG/ML
40 INJECTION INTRAMUSCULAR; INTRAVENOUS
Status: COMPLETED | OUTPATIENT
Start: 2019-03-15 | End: 2019-03-15

## 2019-03-15 RX ORDER — FUROSEMIDE 20 MG/1
20 TABLET ORAL DAILY
Status: DISCONTINUED | OUTPATIENT
Start: 2019-03-16 | End: 2019-03-15

## 2019-03-15 RX ORDER — SODIUM CHLORIDE 0.9 % (FLUSH) 0.9 %
5-10 SYRINGE (ML) INJECTION AS NEEDED
Status: DISCONTINUED | OUTPATIENT
Start: 2019-03-15 | End: 2019-03-15 | Stop reason: HOSPADM

## 2019-03-15 RX ORDER — FUROSEMIDE 20 MG/1
TABLET ORAL
Qty: 6 TAB | Refills: 0 | Status: SHIPPED | OUTPATIENT
Start: 2019-03-15 | End: 2019-03-20 | Stop reason: SDUPTHER

## 2019-03-15 RX ADMIN — NITROGLYCERIN 1 INCH: 20 OINTMENT TOPICAL at 15:08

## 2019-03-15 RX ADMIN — FUROSEMIDE 20 MG: 20 TABLET ORAL at 19:13

## 2019-03-15 RX ADMIN — FUROSEMIDE 40 MG: 10 INJECTION, SOLUTION INTRAMUSCULAR; INTRAVENOUS at 09:53

## 2019-03-15 RX ADMIN — FUROSEMIDE 20 MG: 10 INJECTION, SOLUTION INTRAMUSCULAR; INTRAVENOUS at 15:08

## 2019-03-15 NOTE — ED PROVIDER NOTES
EMERGENCY DEPARTMENT HISTORY AND PHYSICAL EXAM    7:48 AM      Date: 3/15/2019  Patient Name: Deven Tam    History of Presenting Illness     Chief Complaint   Patient presents with    Shortness of Breath         History Provided By: Patient      Additional History (Context): Deven Tam is a 80 y.o. female with PMHx of Pulmonary Hemorrhage, Pulmonary HTN, Anemia, Atrial Fibrillation, and Hypothyroidism who presents with  a headache secondary to a fall onset today. Patient reports she had a fall last night, and is experiencing a headache. Reports there is pain to her body when attempting to move. Patient denies to be on anticoagulants. Reports uncertainty for hx of Atrial Fibrillation. Per medical records, the patient does have a hx of Atrial Fibrillation. Patient is also reporting worsening shortness of breath onset today. When asked of the total duration of shortness of breath she states, \"sometime today. \" Patient is currently  92% on Room Air. No other concerns were expressed at this time. HPI is limited secondary to the patient being a poor historian. PCP: Kaykay Kyle MD    Chief Complaint: Headache/Fall  Duration:  Today   Timing:  Acute  Location: Head  Quality: Patient did not dictate  Severity: Patient did not dictate  Modifying Factors: Reports a headache secondary to a fall. Associated Symptoms: Patient also reports shortness of breath. Does not express any other symptoms.      Current Facility-Administered Medications   Medication Dose Route Frequency Provider Last Rate Last Dose    sodium chloride (NS) flush 5-10 mL  5-10 mL IntraVENous PRN Anusha Ny DO        [START ON 3/16/2019] furosemide (LASIX) tablet 20 mg  20 mg Oral DAILY Anusha Ny DO         Current Outpatient Medications   Medication Sig Dispense Refill    furosemide (LASIX) 20 mg tablet Take 20 mg BID for 3 days starting 3/16/19 at 8am, then resume usual dosing 6 Tab 0    Cetirizine 10 mg cap Take  by mouth.  LORazepam (ATIVAN) 0.5 mg tablet Take  by mouth.  polyvinyl alcohol-povidon,PF, (REFRESH CLASSIC) 1.4-0.6 % ophthalmic solution Administer 1-2 Drops to both eyes as needed.  atorvastatin (LIPITOR) 10 mg tablet Take  by mouth daily.  colchicine 0.6 mg tablet Take 0.6 mg by mouth daily.  digoxin (LANOXIN) 0.125 mg tablet Take  by mouth daily.  erythromycin (ILOTYCIN) ophthalmic ointment Administer  to both eyes nightly.  potassium chloride (K-DUR, KLOR-CON) 20 mEq tablet Take  by mouth two (2) times a day.  metoprolol tartrate (LOPRESSOR) 100 mg IR tablet Take  by mouth two (2) times a day.  NIFEdipine ER (PROCARDIA XL) 30 mg ER tablet Take 1 Tab by mouth daily. 1 Tab 0    sodium chloride 1 gram tablet Take 1 Tab by mouth three (3) times daily (with meals). 30 Tab 0    hydrALAZINE (APRESOLINE) 25 mg tablet Take 1 Tab by mouth three (3) times daily. 30 Tab 0    traMADol (ULTRAM) 50 mg tablet Take 0.5 Tabs by mouth daily. Max Daily Amount: 25 mg. Indications: Pain 30 Tab 0    traZODone (DESYREL) 50 mg tablet Take 25 mg by mouth nightly as needed for Sleep.  carboxymethylcellulose sodium 1 % dlgl Apply 1 Drop to eye nightly. To both eyes      melatonin 3 mg tablet Take 1 Tab by mouth nightly. 30 Tab 0    acetaminophen (TYLENOL) 325 mg tablet Take 2 Tabs by mouth every four (4) hours as needed for Fever. 30 Tab 0    levothyroxine (SYNTHROID) 100 mcg tablet Take 100 mcg by mouth Daily (before breakfast).  pantoprazole (PROTONIX) 40 mg tablet Take 40 mg by mouth daily.  CALCIUM CARBONATE (CALCIUM 600 PO) Take 1 Tab by mouth daily.  cholecalciferol (VITAMIN D3) 1,000 unit cap Take 1,000 Units by mouth daily.  multivitamin (ONE A DAY) tablet Take 1 Tab by mouth daily.  beclomethasone (QVAR) 40 mcg/actuation inhaler Take 2 Puffs by inhalation two (2) times a day.  Use with spacer device 1 Inhaler 6    Aspirin, Buffered 81 mg tab Take 1 Tab by mouth daily.  B.infantis-B.ani-B.long-B.bifi (PROBIOTIC 4X) 10-15 mg TbEC Take 1 Tab by mouth daily.  montelukast (SINGULAIR) 10 mg tablet Take 10 mg by mouth daily.  loratadine (CLARITIN) 10 mg tablet Take 10 mg by mouth daily as needed. Past History     Past Medical History:  Past Medical History:   Diagnosis Date    Anemia NEC     Arthritis     Atrial fibrillation ([address]     Bursitis of left shoulder     Cardiac cath 01/27/2009    RCA mild. LM patent. pLAD 20%. Very dLAD w/significant tapering. CX mild.  Cardiac stress echo, abnormal 01/05/2009    Positive maximal stress echo by echo & EKG criteria w/o chest pain.  EF >65%. Sm, discrete apical, apical septal hypk.  Cough     Endocrine disease     Hyperlipidemia     Hypertension     Hypothyroidism     Osteopenia     Pulmonary hemorrhage     Pulmonary hypertension (HCC)     Rash and nonspecific skin eruption 9/2012    maculopapular on torso, arms, and upper legs    Reflux     Right hip pain     Right shoulder pain     Stenosing tenosynovitis of finger 04/2015    Bilateral index fingers    Trigger ring finger of right hand     Wears glasses        Past Surgical History:  Past Surgical History:   Procedure Laterality Date    ABDOMEN SURGERY PROC UNLISTED      HX APPENDECTOMY      HX COLECTOMY      partial    HX HYSTERECTOMY      HX MOHS PROCEDURES Right 12/2010    Dr. Thomas Arms    HX OTHER SURGICAL      Two ribs removed    HX TONSILLECTOMY         Family History:  Family History   Problem Relation Age of Onset    Hypertension Mother     Heart Attack Mother     Stroke Father     Heart Disease Sister     Heart Surgery Sister         CABG    Stroke Brother     Heart Attack Brother        Social History:  Social History     Tobacco Use    Smoking status: Never Smoker    Smokeless tobacco: Never Used   Substance Use Topics    Alcohol use: No    Drug use:  No Allergies: Allergies   Allergen Reactions    Hydrochlorothiazide Hives    Penicillins Other (comments)     Mouth sore, upset stomach.  Sulfa (Sulfonamide Antibiotics) Unknown (comments)    Prednisone Other (comments)     Body sweats, hot and cold, up all night         Review of Systems       Review of Systems   Unable to perform ROS: Other (Patient is a poor historian )         Physical Exam     Visit Vitals  /68   Pulse 66   Temp 97.8 °F (36.6 °C)   Resp 22   Wt 54.6 kg (120 lb 6.4 oz)   SpO2 99%   BMI 26.99 kg/m²       Physical Exam   Constitutional: She appears well-developed and well-nourished. No distress. Patient appears uncomfortable    HENT:   Head: Normocephalic and atraumatic. Right Ear: External ear normal.   Left Ear: External ear normal.   Nose: Nose normal.   Mouth/Throat: Oropharynx is clear and moist.   Eyes: Conjunctivae and EOM are normal. Pupils are equal, round, and reactive to light. No scleral icterus. Neck: Normal range of motion. Neck supple. No JVD present. No tracheal deviation present. No thyromegaly present. Cardiovascular: Normal rate, regular rhythm, normal heart sounds and intact distal pulses. Exam reveals no gallop and no friction rub. No murmur heard. Pulmonary/Chest: Breath sounds normal. She exhibits no tenderness. Poor aeration of the lungs    Abdominal: Soft. Bowel sounds are normal. She exhibits no distension. There is no tenderness. There is no rebound and no guarding. Musculoskeletal: Normal range of motion. She exhibits edema. She exhibits no tenderness. Pitting edema to bilateral lower extremities, RUE  Good pulses t/o   Lymphadenopathy:     She has no cervical adenopathy. Neurological: She is alert. No cranial nerve deficit. Coordination normal.   Patient is awake and alert    Skin: Skin is warm and dry. Abrasion noted. Abrasion to bilateral upper extremities    Nursing note and vitals reviewed.         Diagnostic Study Results Labs -  Recent Results (from the past 12 hour(s))   EKG, 12 LEAD, INITIAL    Collection Time: 03/15/19  7:57 AM   Result Value Ref Range    Ventricular Rate 63 BPM    Atrial Rate 258 BPM    QRS Duration 88 ms    Q-T Interval 408 ms    QTC Calculation (Bezet) 417 ms    Calculated R Axis 20 degrees    Calculated T Axis -152 degrees    Diagnosis       Atrial flutter  Low voltage QRS  Cannot rule out Anterior infarct , age undetermined  Abnormal ECG  When compared with ECG of 14-JUN-2018 16:39,    Vent. rate has decreased BY  33 BPM  QRS voltage has decreased  Nonspecific T wave abnormality has replaced inverted T waves in Inferior   leads  T wave inversion no longer evident in Anterior leads  Confirmed by Fernando Crowe ([id number]) on 3/15/2019 11:35:40 AM     URINALYSIS W/ RFLX MICROSCOPIC    Collection Time: 03/15/19  8:35 AM   Result Value Ref Range    Color DARK YELLOW      Appearance CLOUDY      Specific gravity 1.018 1.005 - 1.030      pH (UA) 5.5 5.0 - 8.0      Protein 30 (A) NEG mg/dL    Glucose NEGATIVE  NEG mg/dL    Ketone NEGATIVE  NEG mg/dL    Bilirubin NEGATIVE  NEG      Blood NEGATIVE  NEG      Urobilinogen 1.0 0.2 - 1.0 EU/dL    Nitrites NEGATIVE  NEG      Leukocyte Esterase LARGE (A) NEG     URINE MICROSCOPIC ONLY    Collection Time: 03/15/19  8:35 AM   Result Value Ref Range    WBC 80 to 90 0 - 4 /hpf    RBC NEGATIVE  0 - 5 /hpf    Epithelial cells 1+ 0 - 5 /lpf    Bacteria 4+ (A) NEG /hpf   GLUCOSE, POC    Collection Time: 03/15/19  9:56 AM   Result Value Ref Range    Glucose (POC) 67 (L) 70 - 110 mg/dL   POC LACTIC ACID    Collection Time: 03/15/19 10:45 AM   Result Value Ref Range    Lactic Acid (POC) 1.17 0.40 - 2.00 mmol/L       Radiologic Studies -   CT HEAD WO CONT   Final Result   IMPRESSION:      1. No evidence of acute intracranial abnormality. 2. Moderately extensive white matter changes most likely chronic small vessel   ischemic change.    3. 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 XR CHEST PORT   Final Result   IMPRESSION:      Patchy bilateral infiltrates. Small pleural effusions are suggested. Stable cardiomegaly. Medical Decision Making     It should be noted that I, Sigrid Devlin, DO   will be the provider of record for this patient. I reviewed the vital signs, available nursing notes, past medical history, past surgical history, family history and social history. Vital Signs-Reviewed the patient's vital signs. Pulse Oximetry Analysis -  92% on room air (Interpretation)Hypoxic    Cardiac Monitor:  Rate: 63bpm  Rhythm:  Normal Sinus Rhythm     EKG: Interpreted by the EP. Time Interpreted: 7:57AM   Rate: 63bpm   Rhythm: Normal Sinus Rhythm     Records Reviewed: Nursing Notes, Old Medical Records and Triage notes (Time of Review: 7:48 AM)    ED Course: Progress Notes, Reevaluation, and Consults:  9:25 AM  I spoke with the patient's son, who knows about the patient and is very knowledgeable about the patient's history. He would prefer if the patient goes back to her facility because if she gets admitted to the hospital, then she gets confused. Also, he states the patient usually has bacteria in her urine. States she has not been getting Lasix like she is supposed to and was having a 7-pound weight gain within 1.5 weeks. Provider Notes (Medical Decision Making):   Patient is a 59-year-old female who comes in with difficulty breathing. Patient is comfortable on 2 L. She appears fluid overloaded with edema in her bilateral lower extremities and right upper extremity. Patient initially with some difficulty breathing that resolved with 2 L nasal cannula. Mental status improved. Had a very long discussion with the patient's son who would prefer that the patient be able to go back to her facility. States that she is not been getting her daily weights and then when she is away today there was a increase in her weight by about 7 pounds. Patient has as needed Lasix ordered. Patient's x-ray is indeterminate. She does not have a fever. Lactic is not elevated. 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 We have discussed giving her antibiotics to cover pneumonia or urine. Son feels and I agree that the patient is mostly fluid overloaded. She has been watched in the emergency department for about 12 hours and has not had any development of fever. Her mentation is improved and the patient has diuresed about 2 L after receiving 60 of Lasix throughout the day. She is able to stand up and ambulate off of oxygen without difficulty and no further respiratory distress. Oxygen saturation drops as low as 87% but quickly rebounds over 90%. I have discussed with her son that I would normally admit the patient for observation and diuresis. He is concerned that this will cause problems with her mental status as it has in the past.  Hospitalist is also seen the patient. In conclusion, given that the patient is diuresed and has no further respiratory distress off of oxygen, I feel at this point that she is able to go back to her facility. I would like her to take 20 mg of Lasix twice a day for the next 3 days starting tomorrow morning. I have attempted to contact her cardiology service but have not received a call back. Otherwise patient is stable at this time for discharge. Son understand risks of not giving antibiotics and not keeping the patient in the hospital overnight. She will return immediately if there is any trouble breathing, pain, or further concerns. Stable for discharge    Diagnosis     Clinical Impression:   1.  Congestive heart failure, unspecified HF chronicity, unspecified heart failure type [facility])        Disposition: Discharge home    Follow-up Information     Follow up With Specialties Details Why Contact Info    Landon Cerda MD Internal Medicine Call today Please take Lasix 20 mg twice daily for the next 3 days. Return immediately if there is any trouble breathing, [address] [facility] [address] [place] [address]      Kavin Kulkarni DO Cardiology Schedule an appointment as soon as possible for a visit in 3 days  [address] [facility] [address] [facility] [address] [phone]                Medication List      CHANGE how you take these medications    furosemide 20 mg tablet  Commonly known as:  LASIX  Take 20 mg BID for 3 days starting 3/16/19 at 8am, then resume usual dosing  What changed:    · how to take this  · when to take this  · additional instructions        ASK your doctor about these medications    acetaminophen 325 mg tablet  Commonly known as:  TYLENOL  Take 2 Tabs by mouth every four (4) hours as needed for Fever. aspirin, buffered 81 mg Tab     beclomethasone 40 mcg/actuation Aero  Commonly known as:  QVAR  Take 2 Puffs by inhalation two (2) times a day. Use with spacer device     CALCIUM 600 PO     carboxymethylcellulose sodium 1 % Dlgl ophthalmic solution  Commonly known as:  REFRESH LIQUIGEL     Cetirizine 10 mg Cap     cholecalciferol 1,000 unit Cap  Commonly known as:  VITAMIN D3     CLARITIN 10 mg tablet  Generic drug:  loratadine     colchicine 0.6 mg tablet     digoxin 0.125 mg tablet  Commonly known as:  LANOXIN     erythromycin ophthalmic ointment  Commonly known as:  ILOTYCIN     hydrALAZINE 25 mg tablet  Commonly known as:  APRESOLINE  Take 1 Tab by mouth three (3) times daily. levothyroxine 100 mcg tablet  Commonly known as:  SYNTHROID     LIPITOR 10 mg tablet  Generic drug:  atorvastatin     LORazepam 0.5 mg tablet  Commonly known as:  ATIVAN     melatonin 3 mg tablet  Take 1 Tab by mouth nightly. metoprolol tartrate 100 mg IR tablet  Commonly known as:  LOPRESSOR     multivitamin tablet  Commonly known as:  ONE A DAY     NIFEdipine ER 30 mg ER tablet  Commonly known as:  PROCARDIA XL  Take 1 Tab by mouth daily.      pantoprazole 40 mg tablet  Commonly known as:  PROTONIX     polyvinyl alcohol-povidon(PF) 1.4-0.6 % ophthalmic solution  Commonly known as:  REFRESH CLASSIC     potassium chloride 20 mEq tablet  Commonly known as:  K-DUR, KLOR-CON     PROBIOTIC 4X 10-15 mg Tbec  Generic drug:  B.infantis-B.ani-B.long-B.bifi     SINGULAIR 10 mg tablet  Generic drug:  montelukast     sodium chloride 1 gram tablet  Take 1 Tab by mouth three (3) times daily (with meals). traMADol 50 mg tablet  Commonly known as:  ULTRAM  Take 0.5 Tabs by mouth daily. Max Daily Amount: 25 mg. Indications: Pain     traZODone 50 mg tablet  Commonly known as:  DESYREL           Where to Get Your Medications      Information about where to get these medications is not yet available    Ask your nurse or doctor about these medications  · furosemide 20 mg tablet       _______________________________       Michael Brooks and Inocente Thakur acting as a scribe for and in the presence of Josué Thakkar DO      March 15, 2019 at 7:48 AM       Provider Attestation:      I personally performed the services described in the documentation, reviewed the documentation, as recorded by the scribe in my presence, and it accurately and completely records my words and actions.  March 15, 2019 at 7:48 AM - Josué MARTINEZ DO        _______________________________

## 2019-03-15 NOTE — ED NOTES
Discharge instructions and prescription given to pt's son - verbalized understanding. Pt wheeled out of ED.

## 2019-03-15 NOTE — ED TRIAGE NOTES
Pt arrived via EMS c/o SOB since this morning. Pt O2 at 92% RA, placed on 2L NC upon arrival.  Pt also c/o headache after fall yesterday. A+Ox4.

## 2019-03-18 LAB
BACTERIA SPEC CULT: ABNORMAL
SERVICE CMNT-IMP: ABNORMAL

## 2019-03-20 ENCOUNTER — OFFICE VISIT (OUTPATIENT)
Dept: CARDIOLOGY CLINIC | Age: 84
End: 2019-03-20

## 2019-03-20 VITALS
OXYGEN SATURATION: 94 % | DIASTOLIC BLOOD PRESSURE: 74 MMHG | BODY MASS INDEX: 24.52 KG/M2 | HEART RATE: 70 BPM | WEIGHT: 109 LBS | SYSTOLIC BLOOD PRESSURE: 132 MMHG | HEIGHT: 56 IN

## 2019-03-20 DIAGNOSIS — I10 ESSENTIAL HYPERTENSION: ICD-10-CM

## 2019-03-20 DIAGNOSIS — I48.91 ATRIAL FIBRILLATION, UNSPECIFIED TYPE (HCC): ICD-10-CM

## 2019-03-20 DIAGNOSIS — I50.33 DIASTOLIC CHF, ACUTE ON CHRONIC (HCC): Primary | ICD-10-CM

## 2019-03-20 RX ORDER — FUROSEMIDE 20 MG/1
20 TABLET ORAL
COMMUNITY
End: 2019-07-08

## 2019-03-20 RX ORDER — LANOLIN ALCOHOL/MO/W.PET/CERES
325 CREAM (GRAM) TOPICAL
COMMUNITY
End: 2019-07-08

## 2019-03-20 RX ORDER — QUETIAPINE FUMARATE 25 MG/1
25 TABLET, FILM COATED ORAL
COMMUNITY
End: 2019-07-08

## 2019-03-20 RX ORDER — METOPROLOL TARTRATE 50 MG/1
25 TABLET ORAL 2 TIMES DAILY
COMMUNITY
End: 2019-07-08

## 2019-03-20 NOTE — PROGRESS NOTES
Nancy Stout Chief Complaint Patient presents with  Irregular Heart Beat  
  follow up  Swelling  
  mild CC: urgent visit for ER followup of AECHF/ HFpEF 
 
HPI Nancy Stout is a 80 y.o. female with dementia, here from assisted living facility, for ER followup of AECHF/ diastolic HF. As you know, patient has a long CV history and has followed with my partner Dr. Doria Boast for many years now. She has nonobstructive CAD, at least grade 3 diastolic dysfunction, pAF (not a candidate for anticoagulation), h/o severe hypoNa, HTN, dyslipidemia. She was last seen by Dr. Doria Boast 3/2017 and our NP 7/2018. She was living alone until about a year ago. She had shingles, then pneumonia and some progression of dementia after rehab so went into an assisted living last summer. Her chronic cardiovascular conditions had actually overall been quite stable and she typically only takes Lasix as needed. Unfortunately they did not weigh her at her assisted living for almost 2 weeks and she became very short of breath with fluid overload. Her most recent blood work as well as her ER visits were extensively reviewed. BNP was elevated she diuresed well with IV Lasix in the ER. She was then given Lasix 20 mg daily for 3 days and then again asked to which back to the as needed dosing. Her son accompanies her today also gives details of his mom's medical history. He is also her [address] He is very well versed in her medical history and chronic conditions unfortunately himself was sick and so he was badly that her volume status was not more closely monitored as he usually also pays close attention to this. Past Medical History:  
Diagnosis Date  Anemia NEC  Arthritis  Atrial fibrillation ([address]  Bursitis of left shoulder  Cardiac cath 01/27/2009 RCA mild. LM patent. pLAD 20%. Very dLAD w/significant tapering. CX mild.  Cardiac stress echo, abnormal 01/05/2009 Positive maximal stress echo by echo & EKG criteria w/o chest pain.  EF >65%. Sm, discrete apical, apical septal hypk.  Cough  Endocrine disease  Hyperlipidemia  Hypertension  Hypothyroidism  Osteopenia  Pulmonary hemorrhage  Pulmonary hypertension ([address]  Rash and nonspecific skin eruption 9/2012  
 maculopapular on torso, arms, and upper legs  Reflux  Right hip pain  Right shoulder pain  Stenosing tenosynovitis of finger 04/2015 Bilateral index fingers  Trigger ring finger of right hand  Wears glasses Past Surgical History:  
Procedure Laterality Date [address] UNLISTED  HX APPENDECTOMY  HX COLECTOMY partial  
 HX HYSTERECTOMY  HX MOHS PROCEDURES Right 12/2010 Dr. Yvonne Britton  HX OTHER SURGICAL Two ribs removed  HX TONSILLECTOMY Current Outpatient Medications Medication Sig Dispense Refill  furosemide (LASIX) 20 mg tablet Take 20 mg by mouth daily as needed (Swelling. ).  lactose-reduced food (ENSURE CLEAR PO) Take  by mouth three (3) times daily.  ferrous sulfate (IRON) 325 mg (65 mg iron) tablet Take  by mouth Daily (before breakfast).  QUEtiapine (SEROQUEL) 25 mg tablet Take 25 mg by mouth nightly.  metoprolol tartrate (LOPRESSOR) 50 mg tablet Take 50 mg by mouth two (2) times a day.  Cetirizine 10 mg cap Take  by mouth.  LORazepam (ATIVAN) 0.5 mg tablet Take  by mouth.  polyvinyl alcohol-povidon,PF, (REFRESH CLASSIC) 1.4-0.6 % ophthalmic solution Administer 1-2 Drops to both eyes as needed.  atorvastatin (LIPITOR) 10 mg tablet Take  by mouth daily.  colchicine 0.6 mg tablet Take 0.6 mg by mouth daily.  digoxin (LANOXIN) 0.125 mg tablet Take  by mouth daily.  erythromycin (ILOTYCIN) ophthalmic ointment Administer  to both eyes nightly.     
 potassium chloride (K-DUR, KLOR-CON) 20 mEq tablet Take 20 mEq by mouth daily.    
 NIFEdipine ER (PROCARDIA XL) 30 mg ER tablet Take 1 Tab by mouth daily. 1 Tab 0  
 hydrALAZINE (APRESOLINE) 25 mg tablet Take 1 Tab by mouth three (3) times daily. 30 Tab 0  
 traMADol (ULTRAM) 50 mg tablet Take 0.5 Tabs by mouth daily. Max Daily Amount: 25 mg. Indications: Pain 30 Tab 0  carboxymethylcellulose sodium 1 % dlgl Apply 1 Drop to eye nightly. To both eyes  melatonin 3 mg tablet Take 1 Tab by mouth nightly. 30 Tab 0  
 acetaminophen (TYLENOL) 325 mg tablet Take 2 Tabs by mouth every four (4) hours as needed for Fever. 30 Tab 0  
 levothyroxine (SYNTHROID) 100 mcg tablet Take 100 mcg by mouth Daily (before breakfast).  pantoprazole (PROTONIX) 40 mg tablet Take 40 mg by mouth daily.  CALCIUM CARBONATE (CALCIUM 600 PO) Take 1 Tab by mouth daily.  cholecalciferol (VITAMIN D3) 1,000 unit cap Take 1,000 Units by mouth daily.  Aspirin, Buffered 81 mg tab Take 1 Tab by mouth daily.  B.infantis-B.ani-B.long-B.bifi (PROBIOTIC 4X) 10-15 mg TbEC Take 1 Tab by mouth daily.  montelukast (SINGULAIR) 10 mg tablet Take 10 mg by mouth daily.  loratadine (CLARITIN) 10 mg tablet Take 10 mg by mouth daily as needed. Allergies Allergen Reactions  Hydrochlorothiazide Hives  Penicillins Other (comments) Mouth sore, upset stomach.  Sulfa (Sulfonamide Antibiotics) Unknown (comments)  Prednisone Other (comments) Body sweats, hot and cold, up all night Social History Socioeconomic History  Marital status:  Spouse name: Not on file  Number of children: Not on file  Years of education: Not on file  Highest education level: Not on file Occupational History  Not on file Social Needs  Financial resource strain: Not on file  Food insecurity:  
  Worry: Not on file Inability: Not on file  Transportation needs:  
  Medical: Not on file Non-medical: Not on file Tobacco Use  
  Smoking status: Never Smoker  Smokeless tobacco: Never Used Substance and Sexual Activity  Alcohol use: No  
 Drug use: No  
 Sexual activity: Not on file Lifestyle  Physical activity:  
  Days per week: Not on file Minutes per session: Not on file  Stress: Not on file Relationships  Social connections:  
  Talks on phone: Not on file Gets together: Not on file Attends Synagogue service: Not on file Active member of club or organization: Not on file Attends meetings of clubs or organizations: Not on file Relationship status: Not on file  Intimate partner violence:  
  Fear of current or ex partner: Not on file Emotionally abused: Not on file Physically abused: Not on file Forced sexual activity: Not on file Other Topics Concern  Not on file Social History Narrative  Not on file FH- n/a Review of Systems 14 pt Review of Systems is negative unless otherwise mentioned in the HPI. Wt Readings from Last 3 Encounters:  
03/20/19 49.4 kg (109 lb)  
03/15/19 54.6 kg (120 lb 6.4 oz) 10/08/18 43.5 kg (96 lb) Temp Readings from Last 3 Encounters:  
03/15/19 97.8 °F (36.6 °C) 10/08/18 98 °F (36.7 °C)  
09/16/18 97.9 °F (36.6 °C) BP Readings from Last 3 Encounters:  
03/20/19 132/74  
03/15/19 149/68  
10/08/18 169/85 Pulse Readings from Last 3 Encounters:  
03/20/19 70  
03/15/19 66  
10/08/18 74 Physical Exam:   
Visit Vitals /74 Pulse 70 Ht 4' 8\" (1.422 m) Wt 49.4 kg (109 lb) SpO2 94% BMI 24.44 kg/m² Physical Exam  
Constitutional: She is oriented to person, place, and time. No distress. Frail elderly woman, in a wheelchair, but appropriately responses yes or no to questioning HENT:  
Head: Normocephalic and atraumatic. Eyes: Pupils are equal, round, and reactive to light. EOM are normal.  
Neck: JVD present. +JVP ~5-6 cm above sternal notch Cardiovascular: Normal rate, regular rhythm, normal heart sounds and intact distal pulses. Exam reveals no gallop and no friction rub. No murmur heard. Pulmonary/Chest: Effort normal and breath sounds normal. No respiratory distress. She has no wheezes. She has no rales. She exhibits no tenderness. Abdominal: Soft. Bowel sounds are normal.  
Musculoskeletal: She exhibits no edema or tenderness. Neurological: She is alert and oriented to person, place, and time. Skin: Skin is warm. She is not diaphoretic. Psychiatric: She has a normal mood and affect. Lab Results Component Value Date/Time NT pro-BNP 12,785 (H) 03/15/2019 07:50 AM  
 NT pro-BNP 8,486 (H) 06/11/2018 12:57 AM  
 
Lab Results Component Value Date/Time TSH 6.40 (H) 03/15/2019 07:50 AM  
 
No results found for: CHOL, CHOLPOCT, CHOLX, CHLST, CHOLV, HDL, HDLPOC, LDL, LDLCPOC, LDLC, DLDLP, VLDLC, VLDL, TGLX, TRIGL, TRIGP, TGLPOCT, CHHD, CHHDX Lab Results Component Value Date/Time Sodium 135 (L) 03/15/2019 07:50 AM  
 Potassium 4.6 03/15/2019 07:50 AM  
 Chloride 105 03/15/2019 07:50 AM  
 CO2 24 03/15/2019 07:50 AM  
 Anion gap 6 03/15/2019 07:50 AM  
 Glucose 72 (L) 03/15/2019 07:50 AM  
 BUN 18 03/15/2019 07:50 AM  
 Creatinine 1.12 03/15/2019 07:50 AM  
 BUN/Creatinine ratio 16 03/15/2019 07:50 AM  
 GFR est AA 56 (L) 03/15/2019 07:50 AM  
 GFR est non-AA 46 (L) 03/15/2019 07:50 AM  
 Calcium 8.5 03/15/2019 07:50 AM  
 Bilirubin, total 0.8 03/15/2019 07:50 AM  
 AST (SGOT) 43 (H) 03/15/2019 07:50 AM  
 Alk. phosphatase 112 03/15/2019 07:50 AM  
 Protein, total 8.8 (H) 03/15/2019 07:50 AM  
 Albumin 2.1 (L) 03/15/2019 07:50 AM  
 Globulin 6.7 (H) 03/15/2019 07:50 AM  
 A-G Ratio 0.3 (L) 03/15/2019 07:50 AM  
 ALT (SGPT) 13 03/15/2019 07:50 AM  
 
 
Impression and Plan: 
Rosalind Benavides is a 80 y.o. with: 
 
1.) S/p recent AECHF/ HFpEF, currently euvolemic/ stable 2.) pAF, known 
3.) HTN, well controlled 4.) HypoNatremia, known 
5.) Dyslipidemia, known 
6.) Nonobstructive CAD, known 
7.) Recent fall, with dementia, known 1.) Continue lasix prn for weight gain 2-4 lbs in 2 days 2.) RTC 3 months with NP/ PA recheck 3.) RTC 6 months with Dr. Dorisann Curling (or myself), pt prefers Thurs am if possible to coordinate visits from assisted living I spent significant time reviewing patient's extensive cardiac history and despite her recent fall, exacerbation of shortness of breath she is actually now doing reasonably well. Her blood pressure and heart rate are very well controlled she is euvolemic on today's exam.  I would continue Lasix as needed at this point as we have struggled with electrolyte abnormalities particularly hyponatremia in this patient in the past.  
 
He will continue on rate control strategy for her paroxysmal atrial fibrillation and she is not a candidate for anticoagulation due to history of hemoptysis as well as advanced dementia with falls. On a more regular basis for closer monitoring due to her more recent ER visit and exacerbation of heart failure. It does sound like she is quite responsive to diuretic therapy and my hope is to avoid hospitalization as long as close monitoring of her fluid status (with daily weights) is maintained. Instruction was given to her assisted living facility as well as patient and her son. Their questions were answered to their satisfaction. Thank you for allowing me to participate in the care of your patient, please do not hesitate to call with questions or concerns.  
 
Shaquille Skaggs, DO

## 2019-03-20 NOTE — PROGRESS NOTES
Corlis Staggers presents today for Chief Complaint Patient presents with  Irregular Heart Beat  
  follow up  Swelling  
  mild Corlis Staggers preferred language for health care discussion is english/other. Is someone accompanying this pt? son Is the patient using any DME equipment during OV? wheelchair Depression Screening: 
3 most recent PHQ Screens 2/26/2014 Little interest or pleasure in doing things Not at all Feeling down, depressed, irritable, or hopeless Not at all Total Score PHQ 2 0 Learning Assessment: 
Learning Assessment 3/18/2015 PRIMARY LEARNER Patient HIGHEST LEVEL OF EDUCATION - PRIMARY LEARNER  -  
BARRIERS PRIMARY LEARNER -  
CO-LEARNER CAREGIVER -  
PRIMARY LANGUAGE ENGLISH  
LEARNER PREFERENCE PRIMARY DEMONSTRATION  
  READING  
  -  
ANSWERED BY patient RELATIONSHIP SELF Abuse Screening: No flowsheet data found. Fall Risk Fall Risk Assessment, last 12 mths 9/30/2015 Able to walk? Yes Fall in past 12 months? No  
 
 
Pt currently taking Anticoagulant therapy? ASA 81mg daily Coordination of Care: 1. Have you been to the ER, urgent care clinic since your last visit? Hospitalized since your last visit? 3/15 for SOB; 10/8/18 for fall; 9/16/18 for fall; 2/9/18 - 2/15/18 for SIRS; 6/10/18 - 6/15/18 for SIRS 2. Have you seen or consulted any other health care providers outside of the [address] [place] [address] Ari since your last visit? Include any pap smears or colon screening.  no

## 2019-04-03 ENCOUNTER — HOSPITAL ENCOUNTER (OUTPATIENT)
Dept: LAB | Age: 84
Discharge: HOME | End: 2019-04-03
Payer: MEDICARE

## 2019-04-03 LAB
ANION GAP SERPL CALC-SCNC: 6 MMOL/L (ref 3–18)
BUN SERPL-MCNC: 24 MG/DL (ref 7–18)
BUN/CREAT SERPL: 20 (ref 12–20)
CALCIUM SERPL-MCNC: 8.7 MG/DL (ref 8.5–10.1)
CHLORIDE SERPL-SCNC: 105 MMOL/L (ref 100–108)
CO2 SERPL-SCNC: 26 MMOL/L (ref 21–32)
CREAT SERPL-MCNC: 1.2 MG/DL (ref 0.6–1.3)
GLUCOSE SERPL-MCNC: 72 MG/DL (ref 74–99)
POTASSIUM SERPL-SCNC: 4.5 MMOL/L (ref 3.5–5.5)
SODIUM SERPL-SCNC: 137 MMOL/L (ref 136–145)

## 2019-04-03 PROCEDURE — 80048 BASIC METABOLIC PNL TOTAL CA: CPT

## 2019-04-03 PROCEDURE — 36415 COLL VENOUS BLD VENIPUNCTURE: CPT

## 2019-04-19 ENCOUNTER — HOSPITAL ENCOUNTER (OUTPATIENT)
Dept: LAB | Age: 84
Discharge: HOME | End: 2019-04-19
Payer: MEDICARE

## 2019-04-19 LAB
APPEARANCE UR: ABNORMAL
BACTERIA URNS QL MICRO: ABNORMAL /HPF
BILIRUB UR QL: NEGATIVE
COLOR UR: YELLOW
EPITH CASTS URNS QL MICRO: ABNORMAL /LPF (ref 0–5)
GLUCOSE UR STRIP.AUTO-MCNC: NEGATIVE MG/DL
HGB UR QL STRIP: NEGATIVE
KETONES UR QL STRIP.AUTO: NEGATIVE MG/DL
LEUKOCYTE ESTERASE UR QL STRIP.AUTO: ABNORMAL
NITRITE UR QL STRIP.AUTO: NEGATIVE
PH UR STRIP: 8.5 [PH] (ref 5–8)
PROT UR STRIP-MCNC: 100 MG/DL
SP GR UR REFRACTOMETRY: 1.02 (ref 1–1.03)
TRI-PHOS CRY URNS QL MICRO: ABNORMAL
UROBILINOGEN UR QL STRIP.AUTO: 1 EU/DL (ref 0.2–1)
WBC URNS QL MICRO: ABNORMAL /HPF (ref 0–4)

## 2019-04-19 PROCEDURE — 87086 URINE CULTURE/COLONY COUNT: CPT

## 2019-04-19 PROCEDURE — 81001 URINALYSIS AUTO W/SCOPE: CPT

## 2019-04-19 PROCEDURE — 87077 CULTURE AEROBIC IDENTIFY: CPT

## 2019-04-21 LAB
BACTERIA SPEC CULT: ABNORMAL
BACTERIA SPEC CULT: ABNORMAL
SERVICE CMNT-IMP: ABNORMAL

## 2019-04-27 NOTE — ROUTINE PROCESS
Bedside and Verbal shift change report given to Marga Cheng RN (oncoming nurse) by Ariel Handley RN (offgoing nurse). Report included the following information SBAR, Kardex, MAR and Recent Results. SITUATION:    Code Status: Full Code   Reason for Admission: Pneumonia    [facility] day: 3   Problem List:       Hospital Problems  Date Reviewed: 3/16/2017          Codes Class Noted POA    Pneumonia ICD-10-CM: J18.9  ICD-9-CM: 082  2/9/2018 Unknown              BACKGROUND:    Past Medical History:   Past Medical History:   Diagnosis Date    Anemia NEC     Arthritis     Atrial fibrillation ([address]     Bursitis of left shoulder     Cardiac cath 01/27/2009    RCA mild. LM patent. pLAD 20%. Very dLAD w/significant tapering. CX mild.  Cardiac stress echo, abnormal 01/05/2009    Positive maximal stress echo by echo & EKG criteria w/o chest pain.  EF >65%. Sm, discrete apical, apical septal hypk.    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    Bilateral index fingers    Trigger ring finger of right hand     Wears glasses          Patient taking anticoagulants no, refuses heparin    ASSESSMENT:    Changes in Assessment Throughout Shift: none     Patient has Central Line: no Reasons if yes: n/a   Patient has Kendall Cath: no Reasons if yes: n/a      Last Vitals:     Vitals:    02/11/18 2046 02/12/18 0030 02/12/18 0823 02/12/18 0825   BP: 159/83 164/75 153/70    Pulse: 85 89 89    Resp: 20 18 18    Temp: 97.6 °F (36.4 °C) 97.1 °F (36.2 °C) 96.9 °F (36.1 °C)    SpO2: 90% 92% 91%    Weight:    49.8 kg (109 lb 12.6 oz)   Height:            IV and DRAINS (will only show if present)   Peripheral IV 02/09/18 Left Antecubital-Site Assessment: Clean, dry, & intact     WOUND (if present)   Wound Type:  none   Dressing present Dressing Present : No   Wound Concerns/Notes:  none     PAIN    Pain Assessment    Pain Intensity 1: 2 (02/11/18 1648)    Pain Location 1: Neck, Rib cage    Pain Intervention(s) 1: Medication (see MAR)    Patient Stated Pain Goal: 2  o Interventions for Pain:  Tramadol, morphine  o Intervention effective: yes  o Time of last intervention: 2035  o Reassessment Completed: yes      Last 3 Weights:  Last 3 Recorded Weights in this Encounter    02/09/18 0946 02/12/18 0825   Weight: 49.9 kg (110 lb) 49.8 kg (109 lb 12.6 oz)     Weight change:      INTAKE/OUPUT    Current Shift:      Last three shifts: 02/10 1901 - 02/12 0700  In: -   Out: 75 [Urine:75]     LAB RESULTS     Recent Labs      02/11/18   0355  02/10/18   0400  02/09/18   1102   WBC  24.4*  24.5*  16.0*   HGB  11.8*  11.1*  13.5   HCT  35.5  33.9*  39.4   PLT  382  311  311        Recent Labs      02/12/18   0523  02/11/18   0355  02/10/18   0400   NA  135*  133*  135*   K  3.4*  3.5  3.4*   GLU  96  109*  116*   BUN  33*  25*  20*   CREA  1.01  1.10  1.24   CA  8.9  8.7  7.9*       RECOMMENDATIONS AND DISCHARGE PLANNING     1. Pending tests/procedures/ Plan of Care or Other Needs: O2 therapy, antibiotics, pain control    2. Discharge plan for patient and Needs/Barriers: SNF    3. Estimated Discharge Date: tbd Posted on Whiteboard in [facility]: yes      4. The patient's care plan was reviewed with the oncoming nurse. \"HEALS\" SAFETY CHECK      Fall Risk    Total Score: 2    Safety Measures: Safety Measures: Bed/Chair-Wheels locked, Bed in low position, Call light within reach    A safety check occurred in the patient's room between off going nurse and oncoming nurse listed above.     The safety check included the below items  Area Items   H  High Alert Medications - Verify all high alert medication drips (heparin, PCA, etc.)   E  Equipment - Suction is set up for ALL patients (with erma)  - Red plugs utilized for all equipment (IV pumps, etc.)  - WOWs wiped down at end of shift.  - Room stocked with oxygen, suction, and other unit-specific supplies   A  Alarms - Bed alarm is set for fall risk patients  - Ensure chair alarm is in place and activated if patient is up in a chair   L  Lines - Check IV for any infiltration  - Kendall bag is empty if patient has a Kendall   - Tubing and IV bags are labeled   S  Safety   - Room is clean, patient is clean, and equipment is clean. - Hallways are clear from equipment besides carts. - Fall bracelet on for fall risk patients  - Ensure room is clear and free of clutter  - Suction is set up for ALL patients (with erma)  - Hallways are clear from equipment besides carts.    - Isolation precautions followed, supplies available outside room, sign posted     Ariel Handley RN Yes

## 2019-05-09 ENCOUNTER — HOSPITAL ENCOUNTER (OUTPATIENT)
Dept: LAB | Age: 84
Discharge: HOME | End: 2019-05-09
Payer: MEDICARE

## 2019-05-09 LAB
ANION GAP SERPL CALC-SCNC: 4 MMOL/L (ref 3–18)
BUN SERPL-MCNC: 24 MG/DL (ref 7–18)
BUN/CREAT SERPL: 22 (ref 12–20)
CALCIUM SERPL-MCNC: 8.7 MG/DL (ref 8.5–10.1)
CHLORIDE SERPL-SCNC: 105 MMOL/L (ref 100–108)
CO2 SERPL-SCNC: 27 MMOL/L (ref 21–32)
CREAT SERPL-MCNC: 1.08 MG/DL (ref 0.6–1.3)
GLUCOSE SERPL-MCNC: 74 MG/DL (ref 74–99)
POTASSIUM SERPL-SCNC: 4.4 MMOL/L (ref 3.5–5.5)
SODIUM SERPL-SCNC: 136 MMOL/L (ref 136–145)

## 2019-05-09 PROCEDURE — 36415 COLL VENOUS BLD VENIPUNCTURE: CPT

## 2019-05-09 PROCEDURE — 80048 BASIC METABOLIC PNL TOTAL CA: CPT

## 2019-05-30 ENCOUNTER — HOME HEALTH ADMISSION (OUTPATIENT)
Dept: HOME HEALTH SERVICES | Facility: HOME HEALTH | Age: 84
End: 2019-05-30
Payer: MEDICARE

## 2019-05-31 ENCOUNTER — HOME CARE VISIT (OUTPATIENT)
Dept: HOME HEALTH SERVICES | Facility: HOME HEALTH | Age: 84
End: 2019-05-31

## 2019-05-31 ENCOUNTER — HOME CARE VISIT (OUTPATIENT)
Dept: SCHEDULING | Facility: HOME HEALTH | Age: 84
End: 2019-05-31
Payer: MEDICARE

## 2019-05-31 PROCEDURE — 3331090001 HH PPS REVENUE CREDIT

## 2019-05-31 PROCEDURE — G0151 HHCP-SERV OF PT,EA 15 MIN: HCPCS

## 2019-05-31 PROCEDURE — 3331090002 HH PPS REVENUE DEBIT

## 2019-05-31 PROCEDURE — 400013 HH SOC

## 2019-06-01 PROCEDURE — 3331090001 HH PPS REVENUE CREDIT

## 2019-06-01 PROCEDURE — 3331090002 HH PPS REVENUE DEBIT

## 2019-06-02 ENCOUNTER — HOME CARE VISIT (OUTPATIENT)
Dept: SCHEDULING | Facility: HOME HEALTH | Age: 84
End: 2019-06-02
Payer: MEDICARE

## 2019-06-02 PROCEDURE — 3331090001 HH PPS REVENUE CREDIT

## 2019-06-02 PROCEDURE — 3331090002 HH PPS REVENUE DEBIT

## 2019-06-02 PROCEDURE — G0152 HHCP-SERV OF OT,EA 15 MIN: HCPCS

## 2019-06-03 ENCOUNTER — HOSPITAL ENCOUNTER (OUTPATIENT)
Dept: LAB | Age: 84
Discharge: HOME | End: 2019-06-03
Payer: MEDICARE

## 2019-06-03 ENCOUNTER — HOME CARE VISIT (OUTPATIENT)
Dept: HOME HEALTH SERVICES | Facility: HOME HEALTH | Age: 84
End: 2019-06-03
Payer: MEDICARE

## 2019-06-03 VITALS
SYSTOLIC BLOOD PRESSURE: 142 MMHG | DIASTOLIC BLOOD PRESSURE: 84 MMHG | HEART RATE: 73 BPM | OXYGEN SATURATION: 94 % | TEMPERATURE: 98.4 F

## 2019-06-03 LAB
ANION GAP SERPL CALC-SCNC: 8 MMOL/L (ref 3–18)
BUN SERPL-MCNC: 24 MG/DL (ref 7–18)
BUN/CREAT SERPL: 20 (ref 12–20)
CALCIUM SERPL-MCNC: 8.8 MG/DL (ref 8.5–10.1)
CHLORIDE SERPL-SCNC: 102 MMOL/L (ref 100–108)
CO2 SERPL-SCNC: 24 MMOL/L (ref 21–32)
CREAT SERPL-MCNC: 1.2 MG/DL (ref 0.6–1.3)
GLUCOSE SERPL-MCNC: 72 MG/DL (ref 74–99)
POTASSIUM SERPL-SCNC: 4.3 MMOL/L (ref 3.5–5.5)
SODIUM SERPL-SCNC: 134 MMOL/L (ref 136–145)

## 2019-06-03 PROCEDURE — 36415 COLL VENOUS BLD VENIPUNCTURE: CPT

## 2019-06-03 PROCEDURE — 3331090001 HH PPS REVENUE CREDIT

## 2019-06-03 PROCEDURE — 80048 BASIC METABOLIC PNL TOTAL CA: CPT

## 2019-06-03 PROCEDURE — 3331090002 HH PPS REVENUE DEBIT

## 2019-06-04 ENCOUNTER — HOME CARE VISIT (OUTPATIENT)
Dept: SCHEDULING | Facility: HOME HEALTH | Age: 84
End: 2019-06-04
Payer: MEDICARE

## 2019-06-04 PROCEDURE — 3331090002 HH PPS REVENUE DEBIT

## 2019-06-04 PROCEDURE — G0157 HHC PT ASSISTANT EA 15: HCPCS

## 2019-06-04 PROCEDURE — 3331090001 HH PPS REVENUE CREDIT

## 2019-06-05 VITALS — SYSTOLIC BLOOD PRESSURE: 145 MMHG | HEART RATE: 74 BPM | OXYGEN SATURATION: 90 % | DIASTOLIC BLOOD PRESSURE: 89 MMHG

## 2019-06-05 PROCEDURE — 3331090002 HH PPS REVENUE DEBIT

## 2019-06-05 PROCEDURE — 3331090001 HH PPS REVENUE CREDIT

## 2019-06-06 ENCOUNTER — HOME CARE VISIT (OUTPATIENT)
Dept: SCHEDULING | Facility: HOME HEALTH | Age: 84
End: 2019-06-06
Payer: MEDICARE

## 2019-06-06 PROCEDURE — 3331090001 HH PPS REVENUE CREDIT

## 2019-06-06 PROCEDURE — G0157 HHC PT ASSISTANT EA 15: HCPCS

## 2019-06-06 PROCEDURE — 3331090002 HH PPS REVENUE DEBIT

## 2019-06-07 PROCEDURE — 3331090001 HH PPS REVENUE CREDIT

## 2019-06-07 PROCEDURE — 3331090002 HH PPS REVENUE DEBIT

## 2019-06-08 PROCEDURE — 3331090002 HH PPS REVENUE DEBIT

## 2019-06-08 PROCEDURE — 3331090001 HH PPS REVENUE CREDIT

## 2019-06-09 PROCEDURE — 3331090001 HH PPS REVENUE CREDIT

## 2019-06-09 PROCEDURE — 3331090002 HH PPS REVENUE DEBIT

## 2019-06-10 ENCOUNTER — HOME CARE VISIT (OUTPATIENT)
Dept: SCHEDULING | Facility: HOME HEALTH | Age: 84
End: 2019-06-10
Payer: MEDICARE

## 2019-06-10 VITALS
DIASTOLIC BLOOD PRESSURE: 80 MMHG | HEART RATE: 57 BPM | TEMPERATURE: 97.9 F | SYSTOLIC BLOOD PRESSURE: 132 MMHG | OXYGEN SATURATION: 87 %

## 2019-06-10 PROCEDURE — 3331090001 HH PPS REVENUE CREDIT

## 2019-06-10 PROCEDURE — G0153 HHCP-SVS OF S/L PATH,EA 15MN: HCPCS

## 2019-06-10 PROCEDURE — G0157 HHC PT ASSISTANT EA 15: HCPCS

## 2019-06-10 PROCEDURE — 3331090002 HH PPS REVENUE DEBIT

## 2019-06-11 ENCOUNTER — HOSPITAL ENCOUNTER (EMERGENCY)
Age: 84
Discharge: HOME | End: 2019-06-11
Attending: EMERGENCY MEDICINE
Payer: MEDICARE

## 2019-06-11 VITALS
HEART RATE: 65 BPM | DIASTOLIC BLOOD PRESSURE: 69 MMHG | RESPIRATION RATE: 25 BRPM | OXYGEN SATURATION: 96 % | SYSTOLIC BLOOD PRESSURE: 144 MMHG

## 2019-06-11 DIAGNOSIS — R06.02 SOB (SHORTNESS OF BREATH): Primary | ICD-10-CM

## 2019-06-11 LAB
ALBUMIN SERPL-MCNC: 2.5 G/DL (ref 3.4–5)
ALBUMIN/GLOB SERPL: 0.4 {RATIO} (ref 0.8–1.7)
ALP SERPL-CCNC: 208 U/L (ref 45–117)
ALT SERPL-CCNC: 20 U/L (ref 13–56)
ANION GAP SERPL CALC-SCNC: 5 MMOL/L (ref 3–18)
AST SERPL-CCNC: 35 U/L (ref 15–37)
ATRIAL RATE: 55 BPM
BASOPHILS # BLD: 0 K/UL (ref 0–0.1)
BASOPHILS NFR BLD: 1 % (ref 0–2)
BILIRUB SERPL-MCNC: 0.5 MG/DL (ref 0.2–1)
BUN SERPL-MCNC: 23 MG/DL (ref 7–18)
BUN/CREAT SERPL: 20 (ref 12–20)
CALCIUM SERPL-MCNC: 8.5 MG/DL (ref 8.5–10.1)
CALCULATED R AXIS, ECG10: -6 DEGREES
CALCULATED T AXIS, ECG11: 88 DEGREES
CHLORIDE SERPL-SCNC: 102 MMOL/L (ref 100–108)
CK MB CFR SERPL CALC: 3.8 % (ref 0–4)
CK MB SERPL-MCNC: 1 NG/ML (ref 5–25)
CK SERPL-CCNC: 26 U/L (ref 26–192)
CO2 SERPL-SCNC: 26 MMOL/L (ref 21–32)
CREAT SERPL-MCNC: 1.13 MG/DL (ref 0.6–1.3)
DIAGNOSIS, 93000: NORMAL
DIFFERENTIAL METHOD BLD: ABNORMAL
EOSINOPHIL # BLD: 0.2 K/UL (ref 0–0.4)
EOSINOPHIL NFR BLD: 4 % (ref 0–5)
ERYTHROCYTE [DISTWIDTH] IN BLOOD BY AUTOMATED COUNT: 19 % (ref 11.6–14.5)
GLOBULIN SER CALC-MCNC: 6.9 G/DL (ref 2–4)
GLUCOSE SERPL-MCNC: 94 MG/DL (ref 74–99)
HCT VFR BLD AUTO: 33.9 % (ref 35–45)
HGB BLD-MCNC: 11.1 G/DL (ref 12–16)
LYMPHOCYTES # BLD: 0.9 K/UL (ref 0.9–3.6)
LYMPHOCYTES NFR BLD: 20 % (ref 21–52)
MCH RBC QN AUTO: 30.3 PG (ref 24–34)
MCHC RBC AUTO-ENTMCNC: 32.7 G/DL (ref 31–37)
MCV RBC AUTO: 92.6 FL (ref 74–97)
MONOCYTES # BLD: 0.3 K/UL (ref 0.05–1.2)
MONOCYTES NFR BLD: 7 % (ref 3–10)
NEUTS SEG # BLD: 2.9 K/UL (ref 1.8–8)
NEUTS SEG NFR BLD: 68 % (ref 40–73)
PLATELET # BLD AUTO: 312 K/UL (ref 135–420)
PMV BLD AUTO: 11.2 FL (ref 9.2–11.8)
POTASSIUM SERPL-SCNC: 4.4 MMOL/L (ref 3.5–5.5)
PROT SERPL-MCNC: 9.4 G/DL (ref 6.4–8.2)
Q-T INTERVAL, ECG07: 400 MS
QRS DURATION, ECG06: 84 MS
QTC CALCULATION (BEZET), ECG08: 368 MS
RBC # BLD AUTO: 3.66 M/UL (ref 4.2–5.3)
SODIUM SERPL-SCNC: 133 MMOL/L (ref 136–145)
TROPONIN I SERPL-MCNC: 0.03 NG/ML (ref 0–0.04)
VENTRICULAR RATE, ECG03: 51 BPM
WBC # BLD AUTO: 4.3 K/UL (ref 4.6–13.2)

## 2019-06-11 PROCEDURE — 82550 ASSAY OF CK (CPK): CPT

## 2019-06-11 PROCEDURE — 3331090001 HH PPS REVENUE CREDIT

## 2019-06-11 PROCEDURE — 85025 COMPLETE CBC W/AUTO DIFF WBC: CPT

## 2019-06-11 PROCEDURE — 99285 EMERGENCY DEPT VISIT HI MDM: CPT

## 2019-06-11 PROCEDURE — 3331090002 HH PPS REVENUE DEBIT

## 2019-06-11 PROCEDURE — 93005 ELECTROCARDIOGRAM TRACING: CPT

## 2019-06-11 PROCEDURE — 80053 COMPREHEN METABOLIC PANEL: CPT

## 2019-06-11 NOTE — DISCHARGE INSTRUCTIONS
Your evaluation today showed reassuring vital signs including oxygen saturation, heart test, lab work. Please make sure that your oxygen tank is full. Please follow up with a doctor as discussed. The evaluation and treatment done today requires that you follow up with a physician for re-evaluation. Not following up could result in chronic pain/disability/injury. Medical problems can change over time and symptoms can get worse or new symptoms can develop over time, therefore, it is important that you follow up as we discussed or return immedediately to the ER. Diseases don't read textbooks and there are limitations to our evaluation and testing, so please immediately return to the ER if you have any concerns. Call the ER if you have any questions about what we discussed. Please visit [employer] for discounts on any prescriptions you may have. At the [facility] Emergency Department we are genuinely concerned about your health and comfort. You may be selected to participate in a patient satisfaction survey mailed to your home. We are excited about the opportunity to learn from your experience so we may continue to improve. In striving for the very best we believe good is not good enough, but if you rate us as EXCELLENT in all boxes, we have succeeded. We strive to provide EXCELLENT care to you and your family. We appreciate the opportunity to take care of you, and hope you do well. Shortness of Breath: Care Instructions  Your Care Instructions  Shortness of breath has many causes. Sometimes conditions such as anxiety can lead to shortness of breath. Some people get mild shortness of breath when they exercise. Trouble breathing also can be a symptom of a serious problem, such as asthma, lung disease, emphysema, heart problems, and pneumonia. If your shortness of breath continues, you may need tests and treatment. Watch for any changes in your breathing and other symptoms.   Follow-up care is a key part of your treatment and safety. Be sure to make and go to all appointments, and call your doctor if you are having problems. It's also a good idea to know your test results and keep a list of the medicines you take. How can you care for yourself at home? · Do not smoke or allow others to smoke around you. If you need help quitting, talk to your doctor about stop-smoking programs and medicines. These can increase your chances of quitting for good. · Get plenty of rest and sleep. · Take your medicines exactly as prescribed. Call your doctor if you think you are having a problem with your medicine. · Find healthy ways to deal with stress. ? Exercise daily. ? Get plenty of sleep. ? Eat regularly and well. When should you call for help? Call 911 anytime you think you may need emergency care. For example, call if:    · You have severe shortness of breath.     · You have symptoms of a heart attack. These may include:  ? Chest pain or pressure, or a strange feeling in the chest.  ? Sweating. ? Shortness of breath. ? Nausea or vomiting. ? Pain, pressure, or a strange feeling in the back, neck, jaw, or upper belly or in one or both shoulders or arms. ? Lightheadedness or sudden weakness. ? A fast or irregular heartbeat. After you call 911, the  may tell you to chew 1 adult-strength or 2 to 4 low-dose aspirin. Wait for an ambulance. Do not try to drive yourself.    Call your doctor now or seek immediate medical care if:    · Your shortness of breath gets worse or you start to wheeze. Wheezing is a high-pitched sound when you breathe.     · You wake up at night out of breath or have to prop your head up on several pillows to breathe.     · You are short of breath after only light activity or while at rest.    Watch closely for changes in your health, and be sure to contact your doctor if:    · You do not get better over the next 1 to 2 days. Where can you learn more?   Go to http://molina-frida.info/. Enter S780 in the search box to learn more about \"Shortness of Breath: Care Instructions. \"  Current as of: September 5, 2018  Content Version: 11.9  © 3435-0423 [employer]. Care instructions adapted under license by [employer] (which disclaims liability or warranty for this information). If you have questions about a medical condition or this instruction, always ask your healthcare professional. [address] any warranty or liability for your use of this information.

## 2019-06-11 NOTE — ED TRIAGE NOTES
Patient arrived from assisted living c/o shortness of breath since last night.  Patient ambulatory with assistance, [id number] [id number] chf  Allergies up to date

## 2019-06-11 NOTE — ED PROVIDER NOTES
EMERGENCY DEPARTMENT HISTORY AND PHYSICAL EXAM    4:35 PM  Date: 6/11/2019  Patient Name: Lakisha Rodriguez    History of Presenting Illness     Chief Complaint   Patient presents with    Shortness of Breath        History Provided By: Patient and Patient's Son    HPI: Lakisha Rodriguez is a 80 y.o. female with history of pulmonary hypertension, anemia, A. fib, hypothyroidism, CHF, here for shortness of breath. Patient has DNR/DNI in place, and was feeling fine but had a birthday party yesterday with extricate. The nursing facility has been doing good job trying to keep the patient fluid even per patient's son. She has no complaints including chest pain or shortness of breath. Patient was noted to have hypoxia down to 60s per nursing facility and when EMS responded they noticed that the patient's oxygen tank was empty. After placing the patient back oxygen she had normal oxygen saturations and was asymptomatic. Location: Pulmonary  Severity: Moderate  Timing/course: Resolved  Onset/Duration: This afternoon     PCP: Channie Severance, MD    Past History     Past Medical History:  Past Medical History:   Diagnosis Date    Anemia NEC     Arthritis     Atrial fibrillation ([address]     Bursitis of left shoulder     Cardiac cath 01/27/2009    RCA mild. LM patent. pLAD 20%. Very dLAD w/significant tapering. CX mild.  Cardiac stress echo, abnormal 01/05/2009    Positive maximal stress echo by echo & EKG criteria w/o chest pain.  EF >65%. Sm, discrete apical, apical septal hypk.     Cough     Endocrine disease     Hyperlipidemia     Hypertension     Hypothyroidism     Osteopenia     Pulmonary hemorrhage     Pulmonary hypertension (HCC)     Rash and nonspecific skin eruption 9/2012    maculopapular on torso, arms, and upper legs    Reflux     Right hip pain     Right shoulder pain     Stenosing tenosynovitis of finger 04/2015    Bilateral index fingers    Trigger ring finger of right hand     Wears glasses        Past Surgical History:  Past Surgical History:   Procedure Laterality Date    ABDOMEN SURGERY PROC UNLISTED      HX APPENDECTOMY      HX COLECTOMY      partial    HX HYSTERECTOMY      HX MOHS PROCEDURES Right 12/2010    Dr. Jerry Humphrey    HX OTHER SURGICAL      Two ribs removed    HX TONSILLECTOMY         Family History:  Family History   Problem Relation Age of Onset    Hypertension Mother     Heart Attack Mother     Stroke Father     Heart Disease Sister     Heart Surgery Sister         CABG    Stroke Brother     Heart Attack Brother        Social History:  Social History     Tobacco Use    Smoking status: Never Smoker    Smokeless tobacco: Never Used   Substance Use Topics    Alcohol use: No    Drug use: No       Allergies: Allergies   Allergen Reactions    Hydrochlorothiazide Hives    Penicillins Other (comments)     Mouth sore, upset stomach.  Sulfa (Sulfonamide Antibiotics) Unknown (comments)    Prednisone Other (comments)     Body sweats, hot and cold, up all night       Review of Systems     Constitutional: No fevers. Eyes: No visual symptoms. ENT: No sore throat, runny nose, or ear pain. Respiratory: No cough, dyspnea, or wheezing. Cardiovascular: No chest pain, palpitations, or heaviness. Gastrointestinal: No nausea, vomiting, diarrhea. Genitourinary: No dysuria, frequency, or urgency. Musculoskeletal: No joint pain or swelling. Integumentary: No rashes. Neurological: No headaches, sensory or motor symptoms. All other systems negative. Physical Exam     Patient Vitals for the past 12 hrs:   Pulse Resp BP SpO2   06/11/19 1615 63 19 153/70 98 %   06/11/19 1600 62 22 154/67 98 %   06/11/19 1545 64 20 149/60 97 %   06/11/19 1530 67 17 159/73 98 %   06/11/19 1515 62 24 159/75 97 %       Physical Exam   Constitutional: Appears well-developed. Is not diaphoretic. Eyes: Conjunctiva clear, EOMI  Head: Normocephalic and atraumatic.    Neck: Normal range of motion. No stridor or tracheal deviation. Cardiovascular: Intact distal pulses. Pulmonary/Chest: Effort normal and no respiratory distress. Abdominal: Non distended. Musculoskeletal: Normal range of motion. Exhibits no tenderness. Neurological: Conversant, alert. Oriented to self. At mental baseline according to son. Skin: Skin is warm and dry. Psychiatric: Has a normal mood and affect. Behavior is normal.   Nursing note and vitals reviewed. Diagnostic Study Results     Labs -  Recent Results (from the past 12 hour(s))   EKG, 12 LEAD, INITIAL    Collection Time: 06/11/19  3:00 PM   Result Value Ref Range    Ventricular Rate 51 BPM    Atrial Rate 55 BPM    QRS Duration 84 ms    Q-T Interval 400 ms    QTC Calculation (Bezet) 368 ms    Calculated R Axis -6 degrees    Calculated T Axis 88 degrees    Diagnosis       Atrial fibrillation with slow ventricular response  Low voltage QRS  Nonspecific ST and T wave abnormality  Abnormal ECG  Confirmed by Alec Krishnamurthy MD, Stephanie Meeks ([id number]) on 6/11/2019 4:32:11 PM     CBC WITH AUTOMATED DIFF    Collection Time: 06/11/19  3:36 PM   Result Value Ref Range    WBC 4.3 (L) 4.6 - 13.2 K/uL    RBC 3.66 (L) 4.20 - 5.30 M/uL    HGB 11.1 (L) 12.0 - 16.0 g/dL    HCT 33.9 (L) 35.0 - 45.0 %    MCV 92.6 74.0 - 97.0 FL    MCH 30.3 24.0 - 34.0 PG    MCHC 32.7 31.0 - 37.0 g/dL    RDW 19.0 (H) 11.6 - 14.5 %    PLATELET 257 380 - 565 K/uL    MPV 11.2 9.2 - 11.8 FL    NEUTROPHILS 68 40 - 73 %    LYMPHOCYTES 20 (L) 21 - 52 %    MONOCYTES 7 3 - 10 %    EOSINOPHILS 4 0 - 5 %    BASOPHILS 1 0 - 2 %    ABS. NEUTROPHILS 2.9 1.8 - 8.0 K/UL    ABS. LYMPHOCYTES 0.9 0.9 - 3.6 K/UL    ABS. MONOCYTES 0.3 0.05 - 1.2 K/UL    ABS. EOSINOPHILS 0.2 0.0 - 0.4 K/UL    ABS.  BASOPHILS 0.0 0.0 - 0.1 K/UL    DF AUTOMATED     METABOLIC PANEL, COMPREHENSIVE    Collection Time: 06/11/19  3:36 PM   Result Value Ref Range    Sodium 133 (L) 136 - 145 mmol/L    Potassium 4.4 3.5 - 5.5 mmol/L    Chloride 102 100 - 108 mmol/L    CO2 26 21 - 32 mmol/L    Anion gap 5 3.0 - 18 mmol/L    Glucose 94 74 - 99 mg/dL    BUN 23 (H) 7.0 - 18 MG/DL    Creatinine 1.13 0.6 - 1.3 MG/DL    BUN/Creatinine ratio 20 12 - 20      GFR est AA 56 (L) >60 ml/min/1.73m2    GFR est non-AA 46 (L) >60 ml/min/1.73m2    Calcium 8.5 8.5 - 10.1 MG/DL    Bilirubin, total 0.5 0.2 - 1.0 MG/DL    ALT (SGPT) 20 13 - 56 U/L    AST (SGOT) 35 15 - 37 U/L    Alk. phosphatase 208 (H) 45 - 117 U/L    Protein, total 9.4 (H) 6.4 - 8.2 g/dL    Albumin 2.5 (L) 3.4 - 5.0 g/dL    Globulin 6.9 (H) 2.0 - 4.0 g/dL    A-G Ratio 0.4 (L) 0.8 - 1.7     CARDIAC PANEL,(CK, CKMB & TROPONIN)    Collection Time: 06/11/19  3:36 PM   Result Value Ref Range    CK 26 26 - 192 U/L    CK - MB 1.0 <3.6 ng/ml    CK-MB Index 3.8 0.0 - 4.0 %    Troponin-I, QT 0.03 0.0 - 0.045 NG/ML       Radiologic Studies -   No results found. Medical Decision Making     ED Course: Progress Notes, Reevaluation, and Consults:    4:35 PM Initial assessment performed. The patients presenting problems have been discussed, and they/their family are in agreement with the care plan formulated and outlined with them. I have encouraged them to ask questions as they arise throughout their visit. Provider Notes (Medical Decision Making): Patient with chronic history above. Does not appear to be any acute exacerbation of her disease. She had a reassuring birthday party yesterday and on exam today appears well-appearing and at her baseline. Unsure what happened earlier but perhaps due to the lack of oxygen and oxygen tank at the facility. Patient is currently asymptomatic and her son would rather have patient discharged back to facility instead of admission. I titrated patient oxygen down to almost nothing at this point she appears stable for outpatient follow-up. Vital Signs-Reviewed the patient's vital signs. Reviewed pt's pulse ox reading. EKG: Interpreted by the EP.    Time Interpreted: 1500   Rate: 51   Rhythm: A. fib   Interpretation: A. fib, nonspecific STs,     Records Reviewed: Nursing Notes and Old Medical Records (Time of Review: 4:35 PM)  -I am the first provider for this patient.  -I reviewed the vital signs, available nursing notes, past medical history, past surgical history, family history and social history. Current Outpatient Medications   Medication Sig Dispense Refill    mirtazapine (REMERON) 15 mg tablet Take 15 mg by mouth nightly. To help with appetite      albuterol (PROVENTIL HFA) 90 mcg/actuation inhaler Take 2 Puffs by inhalation every six (6) hours as needed for Wheezing (PRN:  Wheezing). PRN:  Wheezing      miconazole (MICOTIN) 2 % topical cream Apply 1 g to affected area as needed for Itching (PRN Itching). Daily PRN Itching      albuterol-ipratropium (DUO-NEB) 2.5 mg-0.5 mg/3 ml nebu 3 mL by Nebulization route every six (6) hours as needed for Other (PRN:  Wheezing). PRN:  Wheezing      beclomethasone dipropionate (QVAR REDIHALER) 40 mcg/actuation HFAb inhaler Take 2 Puffs by inhalation two (2) times a day.  loperamide (ANTI-DIARRHEA) 2 mg tablet Take 2 mg by mouth three (3) times daily as needed for Diarrhea. PRN: Diarrhea      aspirin delayed-release 81 mg tablet Take 81 mg by mouth daily.  benzonatate (TESSALON) 100 mg capsule Take 200 mg by mouth three (3) times daily as needed for Cough. PRN: Cough      multivit,tx with iron,minerals (THERAPEUTIC M PO) Take 1 Tab by mouth daily.  bisacodyl (LAXATIVE, BISACODYL,) 5 mg tab Take 1 Tab by mouth daily as needed for Other (Constipation). PRN:  Constipation      cyclosporine (RESTASIS MULTIDOSE OP) Administer 1 Drop to both eyes two (2) times a day.  furosemide (LASIX) 20 mg tablet Take 20 mg by mouth daily as needed (Swelling. ).  lactose-reduced food (ENSURE CLEAR PO) Take  by mouth three (3) times daily.       ferrous sulfate (IRON) 325 mg (65 mg iron) tablet Take 325 mg by mouth Daily (before breakfast).  QUEtiapine (SEROQUEL) 25 mg tablet Take 25 mg by mouth nightly.  metoprolol tartrate (LOPRESSOR) 50 mg tablet Take 50 mg by mouth two (2) times a day.  Cetirizine 10 mg cap Take 1 Tab by mouth daily.  LORazepam (ATIVAN) 0.5 mg tablet Take 0.5 mg by mouth every eight (8) hours as needed for Anxiety (PRN:  Agitation, anxiety). PRN:  Agitation, anxiety      polyvinyl alcohol-povidon,PF, (REFRESH CLASSIC) 1.4-0.6 % ophthalmic solution Administer 1-2 Drops to both eyes as needed.  atorvastatin (LIPITOR) 10 mg tablet Take  by mouth daily.  colchicine 0.6 mg tablet Take 0.6 mg by mouth daily.  digoxin (LANOXIN) 0.125 mg tablet Take 0.125 mg by mouth every other day.  erythromycin (ILOTYCIN) ophthalmic ointment Administer 1 g to both eyes nightly. 1 strip      potassium chloride (K-DUR, KLOR-CON) 20 mEq tablet Take 20 mEq by mouth daily.  NIFEdipine ER (PROCARDIA XL) 30 mg ER tablet Take 1 Tab by mouth daily. 1 Tab 0    hydrALAZINE (APRESOLINE) 25 mg tablet Take 1 Tab by mouth three (3) times daily. 30 Tab 0    traMADol (ULTRAM) 50 mg tablet Take 0.5 Tabs by mouth daily. Max Daily Amount: 25 mg. Indications: Pain 30 Tab 0    carboxymethylcellulose sodium 1 % dlgl Apply 1 Drop to eye three (3) times daily. To both eyes      melatonin 3 mg tablet Take 1 Tab by mouth nightly. 30 Tab 0    acetaminophen (TYLENOL) 325 mg tablet Take 2 Tabs by mouth every four (4) hours as needed for Fever. 30 Tab 0    levothyroxine (SYNTHROID) 100 mcg tablet Take 100 mcg by mouth Daily (before breakfast).  pantoprazole (PROTONIX) 40 mg tablet Take 40 mg by mouth daily.  CALCIUM CARBONATE (CALCIUM 600 PO) Take 1 Tab by mouth daily.  cholecalciferol (VITAMIN D3) 1,000 unit cap Take 1,000 Units by mouth daily.  Aspirin, Buffered 81 mg tab Take 1 Tab by mouth daily.       B.infantis-B.ani-B.long-B.bifi (PROBIOTIC 4X) 10-15 mg TbEC Take 1 Tab by mouth daily.      montelukast (SINGULAIR) 10 mg tablet Take 10 mg by mouth daily.  loratadine (CLARITIN) 10 mg tablet Take 10 mg by mouth daily as needed for Allergies or Rhinitis. PRN:  Allergic Rhinitis          Clinical Impression     Clinical Impression:   1. SOB (shortness of breath)        Disposition: DC home      This note was dictated utilizing voice recognition software which may lead to typographical errors. I apologize in advance if the situation occurs. If questions arise please do not hesitate to contact me or call our department.

## 2019-06-12 ENCOUNTER — HOME CARE VISIT (OUTPATIENT)
Dept: HOME HEALTH SERVICES | Facility: HOME HEALTH | Age: 84
End: 2019-06-12
Payer: MEDICARE

## 2019-06-12 PROCEDURE — 3331090001 HH PPS REVENUE CREDIT

## 2019-06-12 PROCEDURE — 3331090002 HH PPS REVENUE DEBIT

## 2019-06-13 ENCOUNTER — HOME CARE VISIT (OUTPATIENT)
Dept: HOME HEALTH SERVICES | Facility: HOME HEALTH | Age: 84
End: 2019-06-13
Payer: MEDICARE

## 2019-06-13 PROCEDURE — 3331090002 HH PPS REVENUE DEBIT

## 2019-06-13 PROCEDURE — 3331090001 HH PPS REVENUE CREDIT

## 2019-06-14 PROCEDURE — 3331090002 HH PPS REVENUE DEBIT

## 2019-06-14 PROCEDURE — 3331090001 HH PPS REVENUE CREDIT

## 2019-06-15 PROCEDURE — 3331090002 HH PPS REVENUE DEBIT

## 2019-06-15 PROCEDURE — 3331090001 HH PPS REVENUE CREDIT

## 2019-06-16 PROCEDURE — 3331090001 HH PPS REVENUE CREDIT

## 2019-06-16 PROCEDURE — 3331090002 HH PPS REVENUE DEBIT

## 2019-06-17 PROCEDURE — 3331090001 HH PPS REVENUE CREDIT

## 2019-06-17 PROCEDURE — 3331090002 HH PPS REVENUE DEBIT

## 2019-06-17 NOTE — PROGRESS NOTES
Manish Miller presents today for a 3 month follow-up. She was last seen by Dr Cande Canchola on 3/20/19. She presented to the ER on 6/11/19 with complaints of shortness of breath that occurred after a birthday party. Work-up was negative and she did not offer complaints of shortness of breath while in the ER. Her son asked that she be discharged back to her skilled nursing facility. She is an 80year old female with history of paroxysmal atrial fibrillation, mild CAD (cardiac catheterization in 2009), essential hypertension, and dyslipidemia. She was on Xarelto in July 2012 but it had to be discontinued due to hemoptysis and has only been anticoagulated with aspirin since that time. She was last seen by Dr. Radha Khan in March 2017 and during that visit, she was in atrial flutter/fibrillation. She was hospitalized from 6/10/18 through 6/15/18 for bronchitis and hyponatremia. She did not appear to be in heart failure but her NT pro-BNP was 8486. Her sodium level upon presentation was 125. She was followed by nephrology. An echocardiogram was done and it showed an EF of 50% (down from 60% in Jan. 2017), no wall motion abnormalities, grade 3 diastolic dysfunction, RVSP of 69 mmHg, and moderate to severe TR (which has worsened since 2017). She is a DNR. Denies chest pain, tightness, heaviness, and palpitations. Denies shortness of breath at rest, admits to occasional mild, dyspnea on exertion, orthopnea and PND. They keep her head elevated at night on either a wedge pillow or sleeps on 2-3 pillows. Denies abdominal bloating. Denies lightheadedness, dizziness, and syncope. Denies lower extremity edema and claudication. Denies nausea, vomiting, diarrhea, melena, hematochezia. Denies hematuria, urgency, frequency. Denies fever, chills. She was accompanied to the office by her son and 2 of her caregivers at the facility.   They report a generalized rash that was noted about 2 days ago on her torso and back.  They state that she began complaining of itchiness this past Sunday. Her son purchased some hydrocortisone cream which staff has been applying and she has some Zyrtec that can be administered as needed (none given yet). PMH:  Past Medical History:   Diagnosis Date    Anemia NEC     Arthritis     Atrial fibrillation (HCC)     Bursitis of left shoulder     Cardiac cath 01/27/2009    RCA mild. LM patent. pLAD 20%. Very dLAD w/significant tapering. CX mild.  Cardiac stress echo, abnormal 01/05/2009    Positive maximal stress echo by echo & EKG criteria w/o chest pain.  EF >65%. Sm, discrete apical, apical septal hypk.  Cough     Endocrine disease     Hyperlipidemia     Hypertension     Hypothyroidism     Osteopenia     Pulmonary hemorrhage     Pulmonary hypertension (HCC)     Rash and nonspecific skin eruption 9/2012    maculopapular on torso, arms, and upper legs    Reflux     Right hip pain     Right shoulder pain     Stenosing tenosynovitis of finger 04/2015    Bilateral index fingers    Trigger ring finger of right hand     Wears glasses        PSH:  Past Surgical History:   Procedure Laterality Date    ABDOMEN SURGERY PROC UNLISTED      HX APPENDECTOMY      HX COLECTOMY      partial    HX HYSTERECTOMY      HX MOHS PROCEDURES Right 12/2010    Dr. Nathalie Escobar    HX OTHER SURGICAL      Two ribs removed    HX TONSILLECTOMY         MEDS:  Current Outpatient Medications   Medication Sig    amLODIPine (NORVASC) 2.5 mg tablet Take  by mouth daily.  mirtazapine (REMERON) 15 mg tablet Take 15 mg by mouth nightly. To help with appetite    albuterol (PROVENTIL HFA) 90 mcg/actuation inhaler Take 2 Puffs by inhalation every six (6) hours as needed for Wheezing (PRN:  Wheezing). PRN:  Wheezing    miconazole (MICOTIN) 2 % topical cream Apply 1 g to affected area as needed for Itching (PRN Itching).  Daily PRN Itching    albuterol-ipratropium (DUO-NEB) 2.5 mg-0.5 mg/3 ml nebu 3 mL by Nebulization route every six (6) hours as needed for Other (PRN:  Wheezing). PRN:  Wheezing    beclomethasone dipropionate (QVAR REDIHALER) 40 mcg/actuation HFAb inhaler Take 2 Puffs by inhalation two (2) times a day.  loperamide (ANTI-DIARRHEA) 2 mg tablet Take 2 mg by mouth three (3) times daily as needed for Diarrhea. PRN: Diarrhea    aspirin delayed-release 81 mg tablet Take 81 mg by mouth daily.  benzonatate (TESSALON) 100 mg capsule Take 200 mg by mouth three (3) times daily as needed for Cough. PRN: Cough    multivit,tx with iron,minerals (THERAPEUTIC M PO) Take 1 Tab by mouth daily.  bisacodyl (LAXATIVE, BISACODYL,) 5 mg tab Take 1 Tab by mouth daily as needed for Other (Constipation). PRN:  Constipation    furosemide (LASIX) 20 mg tablet Take 20 mg by mouth daily as needed (Swelling. ).  lactose-reduced food (ENSURE CLEAR PO) Take 1 Can by mouth three (3) times daily.  ferrous sulfate (IRON) 325 mg (65 mg iron) tablet Take 325 mg by mouth Daily (before breakfast).  QUEtiapine (SEROQUEL) 25 mg tablet Take 25 mg by mouth nightly.  metoprolol tartrate (LOPRESSOR) 50 mg tablet Take 50 mg by mouth two (2) times a day.  Cetirizine 10 mg cap Take 1 Tab by mouth daily.  LORazepam (ATIVAN) 0.5 mg tablet Take 0.5 mg by mouth every eight (8) hours as needed for Anxiety (PRN:  Agitation, anxiety). PRN:  Agitation, anxiety    polyvinyl alcohol-povidon,PF, (REFRESH CLASSIC) 1.4-0.6 % ophthalmic solution Administer 1-2 Drops to both eyes as needed.  atorvastatin (LIPITOR) 10 mg tablet Take 10 mg by mouth daily.  colchicine 0.6 mg tablet Take 0.6 mg by mouth daily.  digoxin (LANOXIN) 0.125 mg tablet Take 0.125 mg by mouth every other day.  erythromycin (ILOTYCIN) ophthalmic ointment Administer 1 g to both eyes nightly. 1 strip    potassium chloride (K-DUR, KLOR-CON) 20 mEq tablet Take 20 mEq by mouth daily.     hydrALAZINE (APRESOLINE) 25 mg tablet Take 1 Tab by mouth three (3) times daily.  traMADol (ULTRAM) 50 mg tablet Take 0.5 Tabs by mouth daily. Max Daily Amount: 25 mg. Indications: Pain    carboxymethylcellulose sodium 1 % dlgl Apply 1 Drop to eye three (3) times daily. To both eyes    melatonin 3 mg tablet Take 1 Tab by mouth nightly.  levothyroxine (SYNTHROID) 100 mcg tablet Take 100 mcg by mouth Daily (before breakfast).  pantoprazole (PROTONIX) 40 mg tablet Take 40 mg by mouth daily.  CALCIUM CARBONATE (CALCIUM 600 PO) Take 1 Tab by mouth daily.  cholecalciferol (VITAMIN D3) 1,000 unit cap Take 1,000 Units by mouth daily.  B.infantis-B.ani-B.long-B.bifi (PROBIOTIC 4X) 10-15 mg TbEC Take 1 Tab by mouth daily.  montelukast (SINGULAIR) 10 mg tablet Take 10 mg by mouth daily.  loratadine (CLARITIN) 10 mg tablet Take 10 mg by mouth daily as needed for Allergies or Rhinitis. PRN:  Allergic Rhinitis    cyclosporine (RESTASIS MULTIDOSE OP) Administer 1 Drop to both eyes two (2) times a day. No current facility-administered medications for this visit. Allergies and Sensitivities:  Allergies   Allergen Reactions    Hydrochlorothiazide Hives    Penicillins Other (comments)     Mouth sore, upset stomach.  Sulfa (Sulfonamide Antibiotics) Unknown (comments)    Prednisone Other (comments)     Body sweats, hot and cold, up all night       Family History:  Family History   Problem Relation Age of Onset    Hypertension Mother     Heart Attack Mother     Stroke Father     Heart Disease Sister     Heart Surgery Sister         CABG    Stroke Brother     Heart Attack Brother        Social History:  She  reports that she has never smoked. She has never used smokeless tobacco.  She  reports that she does not drink alcohol.       Physical:  Visit Vitals  /64   Pulse (!) 52   Ht 4' 8\" (1.422 m)   Wt 41.5 kg (91 lb 9.6 oz)   BMI 20.54 kg/m²       Her weight is down 18 lbs since her last visit      Exam:  Neck:  Supple, no JVD, no carotid bruits  CV:  Normal S1 and  S2, no murmurs, rubs, or gallops noted. Irregularly, irregular rhythm , bradycardic  Lungs:  Clear to ausculation throughout, no wheezes or rales  Abd:  Soft, non-tender, non-distended with good bowel sounds. No hepatosplenomegaly  Extremities:  No edema  Integumentary:  Generalized rash noted on her torso and back      Data:  EKG:    Read by Raghu Schmidt MD - Atrial fibrillation, occasional ectopic ventricular beat, bradycardia. Low voltage in limb leads.  Poor R-wave progression, consider old anterior infarct.  Nonspecific ST depression and T-wave abnormality      LABS:  Lab Results   Component Value Date/Time    Sodium 133 (L) 06/11/2019 03:36 PM    Potassium 4.4 06/11/2019 03:36 PM    Chloride 102 06/11/2019 03:36 PM    CO2 26 06/11/2019 03:36 PM    Glucose 94 06/11/2019 03:36 PM    BUN 23 (H) 06/11/2019 03:36 PM    Creatinine 1.13 06/11/2019 03:36 PM     No results found for: CHOL, CHOLX, CHLST, CHOLV, HDL, LDL, LDLC, DLDLP, TGLX, TRIGL, TRIGP, CHHD, CHHDX  Lab Results   Component Value Date/Time    ALT (SGPT) 20 06/11/2019 03:36 PM         Impression/Plan:  1. Recurrent atrial fibrillation, rate bradycardic today, not a candidate for anticoagulation due to history of hemoptysis on anticoagulation  2. Essential hypertension, blood pressure stable  3. Dyslipidemia, on lovastatin 40mg  4. Known CAD, non-obstructive    Mrs. Fish Spine was seen today for a 3 month follow-up. Her son and 2 caregivers provide most of her history. She is alert and answers most questions appropriately. She denies chest pain and palpitations but admits to occasional shortness of breath. Her weight is down 18 pounds since her last visit. Her Lasix is now just given on as needed basis. Her caregivers state that her appetite has not been good and was recently given some medication to boost her appetite. Her PCP is aware of the weight loss as well. Her pressure is well controlled.   She remains in atrial fibrillation but bradycardic today with a heart rate around 52 bpm.  She is on digoxin 0.125 mg every other day as well as metoprolol tartrate 50 mg twice a day. At this time, I will decrease her metoprolol tartrate to 25 mg twice a day. According to her caregivers, they monitor her blood pressure frequently at the assisted living facility and I asked that they call the office if they notice her heart rate consistently above 100 bpm.  They inform me that at times, they do hold her hydralazine if her systolic blood pressure is less than 150 mmHg. As for the rash that is noted on her torso and back, they will continue to use the hydrocortisone cream and will administer cetirizine for the itching. I asked that if there is no improvement noted in the rash within the next couple of days that they notify her primary care physician so that it can be further evaluated. They state that she is not using any new soaps or lotions and they continue to use the same laundry detergent. She will follow-up with Dr Kathy Stephens as scheduled and as needed. Jaky Wayne MSN, FNP-BC    Please note:  Portions of this chart were created with Dragon medical speech to text program.  Unrecognized errors may be present.

## 2019-06-18 ENCOUNTER — HOME CARE VISIT (OUTPATIENT)
Dept: SCHEDULING | Facility: HOME HEALTH | Age: 84
End: 2019-06-18
Payer: MEDICARE

## 2019-06-18 PROCEDURE — 3331090001 HH PPS REVENUE CREDIT

## 2019-06-18 PROCEDURE — G0157 HHC PT ASSISTANT EA 15: HCPCS

## 2019-06-18 PROCEDURE — 3331090002 HH PPS REVENUE DEBIT

## 2019-06-19 VITALS
DIASTOLIC BLOOD PRESSURE: 60 MMHG | TEMPERATURE: 98.7 F | SYSTOLIC BLOOD PRESSURE: 116 MMHG | OXYGEN SATURATION: 90 % | HEART RATE: 58 BPM

## 2019-06-19 PROCEDURE — 3331090001 HH PPS REVENUE CREDIT

## 2019-06-19 PROCEDURE — 3331090002 HH PPS REVENUE DEBIT

## 2019-06-20 ENCOUNTER — OFFICE VISIT (OUTPATIENT)
Dept: CARDIOLOGY CLINIC | Age: 84
End: 2019-06-20

## 2019-06-20 VITALS
HEART RATE: 52 BPM | BODY MASS INDEX: 20.61 KG/M2 | OXYGEN SATURATION: 93 % | SYSTOLIC BLOOD PRESSURE: 122 MMHG | HEIGHT: 56 IN | DIASTOLIC BLOOD PRESSURE: 64 MMHG | WEIGHT: 91.6 LBS

## 2019-06-20 DIAGNOSIS — I48.91 ATRIAL FIBRILLATION, UNSPECIFIED TYPE (HCC): Primary | ICD-10-CM

## 2019-06-20 DIAGNOSIS — I50.33 DIASTOLIC CHF, ACUTE ON CHRONIC (HCC): ICD-10-CM

## 2019-06-20 DIAGNOSIS — I10 ESSENTIAL HYPERTENSION: ICD-10-CM

## 2019-06-20 DIAGNOSIS — E78.5 DYSLIPIDEMIA: ICD-10-CM

## 2019-06-20 PROCEDURE — 3331090002 HH PPS REVENUE DEBIT

## 2019-06-20 PROCEDURE — 3331090001 HH PPS REVENUE CREDIT

## 2019-06-20 RX ORDER — AMLODIPINE BESYLATE 2.5 MG/1
TABLET ORAL DAILY
COMMUNITY
End: 2019-07-08

## 2019-06-20 NOTE — PATIENT INSTRUCTIONS
Decrease metoprolol tartrate to 25mg twice a day Follow-up with Dr. Painting Rising as scheduled and as needed

## 2019-06-21 ENCOUNTER — HOME CARE VISIT (OUTPATIENT)
Dept: SCHEDULING | Facility: HOME HEALTH | Age: 84
End: 2019-06-21
Payer: MEDICARE

## 2019-06-21 PROCEDURE — 3331090002 HH PPS REVENUE DEBIT

## 2019-06-21 PROCEDURE — 3331090001 HH PPS REVENUE CREDIT

## 2019-06-21 PROCEDURE — G0157 HHC PT ASSISTANT EA 15: HCPCS

## 2019-06-22 PROCEDURE — 3331090001 HH PPS REVENUE CREDIT

## 2019-06-22 PROCEDURE — 3331090002 HH PPS REVENUE DEBIT

## 2019-06-23 PROCEDURE — 3331090002 HH PPS REVENUE DEBIT

## 2019-06-23 PROCEDURE — 3331090001 HH PPS REVENUE CREDIT

## 2019-06-24 ENCOUNTER — HOME CARE VISIT (OUTPATIENT)
Dept: SCHEDULING | Facility: HOME HEALTH | Age: 84
End: 2019-06-24
Payer: MEDICARE

## 2019-06-24 PROCEDURE — 3331090003 HH PPS REVENUE ADJ

## 2019-06-24 PROCEDURE — 3331090001 HH PPS REVENUE CREDIT

## 2019-06-24 PROCEDURE — 3331090002 HH PPS REVENUE DEBIT

## 2019-06-24 PROCEDURE — G0151 HHCP-SERV OF PT,EA 15 MIN: HCPCS

## 2019-06-25 VITALS
DIASTOLIC BLOOD PRESSURE: 54 MMHG | SYSTOLIC BLOOD PRESSURE: 117 MMHG | OXYGEN SATURATION: 94 % | HEART RATE: 75 BPM | TEMPERATURE: 98.6 F

## 2019-06-25 PROCEDURE — 3331090001 HH PPS REVENUE CREDIT

## 2019-06-25 PROCEDURE — 3331090002 HH PPS REVENUE DEBIT

## 2019-06-26 PROCEDURE — 3331090001 HH PPS REVENUE CREDIT

## 2019-06-26 PROCEDURE — 3331090002 HH PPS REVENUE DEBIT

## 2019-07-03 ENCOUNTER — HOSPITAL ENCOUNTER (OUTPATIENT)
Dept: CT IMAGING | Age: 84
Discharge: HOME | DRG: 291 | End: 2019-07-03
Attending: EMERGENCY MEDICINE
Payer: MEDICARE

## 2019-07-03 ENCOUNTER — HOSPITAL ENCOUNTER (INPATIENT)
Age: 84
LOS: 4 days | Discharge: HOSPICE | DRG: 291 | End: 2019-07-08
Attending: EMERGENCY MEDICINE | Admitting: INTERNAL MEDICINE
Payer: MEDICARE

## 2019-07-03 ENCOUNTER — HOSPITAL ENCOUNTER (OUTPATIENT)
Dept: LAB | Age: 84
Discharge: HOME | DRG: 291 | End: 2019-07-03
Payer: MEDICARE

## 2019-07-03 DIAGNOSIS — N30.00 ACUTE CYSTITIS WITHOUT HEMATURIA: Primary | ICD-10-CM

## 2019-07-03 DIAGNOSIS — Z51.5 HOSPICE CARE PATIENT: ICD-10-CM

## 2019-07-03 DIAGNOSIS — J90 PLEURAL EFFUSION: ICD-10-CM

## 2019-07-03 DIAGNOSIS — R18.8 OTHER ASCITES: ICD-10-CM

## 2019-07-03 LAB
ALBUMIN SERPL-MCNC: 2.5 G/DL (ref 3.4–5)
ALBUMIN/GLOB SERPL: 0.4 {RATIO} (ref 0.8–1.7)
ALP SERPL-CCNC: 241 U/L (ref 45–117)
ALT SERPL-CCNC: 27 U/L (ref 13–56)
ANION GAP SERPL CALC-SCNC: 7 MMOL/L (ref 3–18)
ANION GAP SERPL CALC-SCNC: 9 MMOL/L (ref 3–18)
AST SERPL-CCNC: 49 U/L (ref 15–37)
BASOPHILS # BLD: 0 K/UL (ref 0–0.1)
BASOPHILS NFR BLD: 0 % (ref 0–2)
BILIRUB SERPL-MCNC: 0.6 MG/DL (ref 0.2–1)
BUN SERPL-MCNC: 33 MG/DL (ref 7–18)
BUN SERPL-MCNC: 34 MG/DL (ref 7–18)
BUN/CREAT SERPL: 25 (ref 12–20)
BUN/CREAT SERPL: 25 (ref 12–20)
CALCIUM SERPL-MCNC: 8.5 MG/DL (ref 8.5–10.1)
CALCIUM SERPL-MCNC: 8.8 MG/DL (ref 8.5–10.1)
CHLORIDE SERPL-SCNC: 106 MMOL/L (ref 100–108)
CHLORIDE SERPL-SCNC: 106 MMOL/L (ref 100–108)
CO2 SERPL-SCNC: 24 MMOL/L (ref 21–32)
CO2 SERPL-SCNC: 25 MMOL/L (ref 21–32)
CREAT SERPL-MCNC: 1.32 MG/DL (ref 0.6–1.3)
CREAT SERPL-MCNC: 1.36 MG/DL (ref 0.6–1.3)
DIFFERENTIAL METHOD BLD: ABNORMAL
EOSINOPHIL # BLD: 0.6 K/UL (ref 0–0.4)
EOSINOPHIL NFR BLD: 10 % (ref 0–5)
ERYTHROCYTE [DISTWIDTH] IN BLOOD BY AUTOMATED COUNT: 18.5 % (ref 11.6–14.5)
GLOBULIN SER CALC-MCNC: 6.3 G/DL (ref 2–4)
GLUCOSE SERPL-MCNC: 73 MG/DL (ref 74–99)
GLUCOSE SERPL-MCNC: 98 MG/DL (ref 74–99)
HCT VFR BLD AUTO: 34.4 % (ref 35–45)
HGB BLD-MCNC: 11.1 G/DL (ref 12–16)
LACTATE BLD-SCNC: 1.82 MMOL/L (ref 0.4–2)
LYMPHOCYTES # BLD: 0.5 K/UL (ref 0.9–3.6)
LYMPHOCYTES NFR BLD: 9 % (ref 21–52)
MCH RBC QN AUTO: 31.5 PG (ref 24–34)
MCHC RBC AUTO-ENTMCNC: 32.3 G/DL (ref 31–37)
MCV RBC AUTO: 97.7 FL (ref 74–97)
MONOCYTES # BLD: 0.7 K/UL (ref 0.05–1.2)
MONOCYTES NFR BLD: 12 % (ref 3–10)
NEUTS SEG # BLD: 4 K/UL (ref 1.8–8)
NEUTS SEG NFR BLD: 69 % (ref 40–73)
PLATELET # BLD AUTO: 175 K/UL (ref 135–420)
PMV BLD AUTO: 12.2 FL (ref 9.2–11.8)
POTASSIUM SERPL-SCNC: 4.5 MMOL/L (ref 3.5–5.5)
POTASSIUM SERPL-SCNC: 4.6 MMOL/L (ref 3.5–5.5)
PROT SERPL-MCNC: 8.8 G/DL (ref 6.4–8.2)
RBC # BLD AUTO: 3.52 M/UL (ref 4.2–5.3)
SODIUM SERPL-SCNC: 138 MMOL/L (ref 136–145)
SODIUM SERPL-SCNC: 139 MMOL/L (ref 136–145)
WBC # BLD AUTO: 5.8 K/UL (ref 4.6–13.2)

## 2019-07-03 PROCEDURE — 74011250636 HC RX REV CODE- 250/636: Performed by: EMERGENCY MEDICINE

## 2019-07-03 PROCEDURE — 85025 COMPLETE CBC W/AUTO DIFF WBC: CPT

## 2019-07-03 PROCEDURE — 74176 CT ABD & PELVIS W/O CONTRAST: CPT

## 2019-07-03 PROCEDURE — 81001 URINALYSIS AUTO W/SCOPE: CPT

## 2019-07-03 PROCEDURE — 83605 ASSAY OF LACTIC ACID: CPT

## 2019-07-03 PROCEDURE — 96361 HYDRATE IV INFUSION ADD-ON: CPT

## 2019-07-03 PROCEDURE — 36415 COLL VENOUS BLD VENIPUNCTURE: CPT

## 2019-07-03 PROCEDURE — 99285 EMERGENCY DEPT VISIT HI MDM: CPT

## 2019-07-03 PROCEDURE — 80053 COMPREHEN METABOLIC PANEL: CPT

## 2019-07-03 RX ORDER — SODIUM CHLORIDE 9 MG/ML
150 INJECTION, SOLUTION INTRAVENOUS ONCE
Status: COMPLETED | OUTPATIENT
Start: 2019-07-03 | End: 2019-07-03

## 2019-07-03 RX ADMIN — SODIUM CHLORIDE 150 ML/HR: 900 INJECTION, SOLUTION INTRAVENOUS at 23:45

## 2019-07-04 ENCOUNTER — APPOINTMENT (OUTPATIENT)
Dept: GENERAL RADIOLOGY | Age: 84
DRG: 291 | End: 2019-07-04
Attending: EMERGENCY MEDICINE
Payer: MEDICARE

## 2019-07-04 PROBLEM — Z66 DNR (DO NOT RESUSCITATE): Status: ACTIVE | Noted: 2019-07-04

## 2019-07-04 PROBLEM — J90 PLEURAL EFFUSION: Status: ACTIVE | Noted: 2019-07-04

## 2019-07-04 PROBLEM — R18.8 ASCITES: Status: ACTIVE | Noted: 2019-07-04

## 2019-07-04 LAB
APPEARANCE UR: ABNORMAL
BACTERIA URNS QL MICRO: ABNORMAL /HPF
BILIRUB UR QL: NEGATIVE
CK MB CFR SERPL CALC: 6.5 % (ref 0–4)
CK MB CFR SERPL CALC: 6.7 % (ref 0–4)
CK MB CFR SERPL CALC: NORMAL % (ref 0–4)
CK MB SERPL-MCNC: 1.3 NG/ML (ref 5–25)
CK MB SERPL-MCNC: 1.4 NG/ML (ref 5–25)
CK MB SERPL-MCNC: <1 NG/ML (ref 5–25)
CK SERPL-CCNC: 20 U/L (ref 26–192)
CK SERPL-CCNC: 21 U/L (ref 26–192)
CK SERPL-CCNC: 31 U/L (ref 26–192)
COLOR UR: YELLOW
DIGOXIN SERPL-MCNC: 3.2 NG/ML (ref 0.9–2)
EPITH CASTS URNS QL MICRO: NEGATIVE /LPF (ref 0–5)
ERYTHROCYTE [SEDIMENTATION RATE] IN BLOOD: 55 MM/HR (ref 0–30)
GLUCOSE UR STRIP.AUTO-MCNC: NEGATIVE MG/DL
HAV IGM SER QL: NEGATIVE
HBV CORE IGM SER QL: NEGATIVE
HBV SURFACE AG SER QL: <0.1 INDEX
HBV SURFACE AG SER QL: NEGATIVE
HCV AB SER IA-ACNC: 0.17 INDEX
HCV AB SERPL QL IA: NEGATIVE
HCV COMMENT,HCGAC: NORMAL
HGB UR QL STRIP: NEGATIVE
KETONES UR QL STRIP.AUTO: ABNORMAL MG/DL
LEUKOCYTE ESTERASE UR QL STRIP.AUTO: ABNORMAL
NITRITE UR QL STRIP.AUTO: NEGATIVE
PH UR STRIP: 7 [PH] (ref 5–8)
PROT UR STRIP-MCNC: 100 MG/DL
RBC #/AREA URNS HPF: NEGATIVE /HPF (ref 0–5)
SP GR UR REFRACTOMETRY: 1.02 (ref 1–1.03)
SP1: NORMAL
SP2: NORMAL
SP3: NORMAL
TROPONIN I SERPL-MCNC: 0.02 NG/ML (ref 0–0.04)
TSH SERPL DL<=0.05 MIU/L-ACNC: 7.13 UIU/ML (ref 0.36–3.74)
UROBILINOGEN UR QL STRIP.AUTO: 1 EU/DL (ref 0.2–1)
WBC URNS QL MICRO: ABNORMAL /HPF (ref 0–4)

## 2019-07-04 PROCEDURE — 96374 THER/PROPH/DIAG INJ IV PUSH: CPT

## 2019-07-04 PROCEDURE — P9047 ALBUMIN (HUMAN), 25%, 50ML: HCPCS | Performed by: INTERNAL MEDICINE

## 2019-07-04 PROCEDURE — 71045 X-RAY EXAM CHEST 1 VIEW: CPT

## 2019-07-04 PROCEDURE — 74011250636 HC RX REV CODE- 250/636

## 2019-07-04 PROCEDURE — 87086 URINE CULTURE/COLONY COUNT: CPT

## 2019-07-04 PROCEDURE — 84436 ASSAY OF TOTAL THYROXINE: CPT

## 2019-07-04 PROCEDURE — 85652 RBC SED RATE AUTOMATED: CPT

## 2019-07-04 PROCEDURE — 77030038269 HC DRN EXT URIN PURWCK BARD -A

## 2019-07-04 PROCEDURE — 65270000029 HC RM PRIVATE

## 2019-07-04 PROCEDURE — 87077 CULTURE AEROBIC IDENTIFY: CPT

## 2019-07-04 PROCEDURE — 74011250637 HC RX REV CODE- 250/637: Performed by: INTERNAL MEDICINE

## 2019-07-04 PROCEDURE — 82105 ALPHA-FETOPROTEIN SERUM: CPT

## 2019-07-04 PROCEDURE — 74011250636 HC RX REV CODE- 250/636: Performed by: INTERNAL MEDICINE

## 2019-07-04 PROCEDURE — 94640 AIRWAY INHALATION TREATMENT: CPT

## 2019-07-04 PROCEDURE — 82550 ASSAY OF CK (CPK): CPT

## 2019-07-04 PROCEDURE — 80074 ACUTE HEPATITIS PANEL: CPT

## 2019-07-04 PROCEDURE — 74011250636 HC RX REV CODE- 250/636: Performed by: EMERGENCY MEDICINE

## 2019-07-04 PROCEDURE — 86038 ANTINUCLEAR ANTIBODIES: CPT

## 2019-07-04 PROCEDURE — 36415 COLL VENOUS BLD VENIPUNCTURE: CPT

## 2019-07-04 PROCEDURE — 84443 ASSAY THYROID STIM HORMONE: CPT

## 2019-07-04 PROCEDURE — 80162 ASSAY OF DIGOXIN TOTAL: CPT

## 2019-07-04 RX ORDER — CYCLOSPORINE 0.5 MG/ML
1 EMULSION OPHTHALMIC 2 TIMES DAILY
Status: DISCONTINUED | OUTPATIENT
Start: 2019-07-04 | End: 2019-07-08 | Stop reason: HOSPADM

## 2019-07-04 RX ORDER — LORAZEPAM 0.5 MG/1
0.5 TABLET ORAL
Status: DISCONTINUED | OUTPATIENT
Start: 2019-07-04 | End: 2019-07-08 | Stop reason: HOSPADM

## 2019-07-04 RX ORDER — HYDRALAZINE HYDROCHLORIDE 25 MG/1
25 TABLET, FILM COATED ORAL 3 TIMES DAILY
Status: DISCONTINUED | OUTPATIENT
Start: 2019-07-04 | End: 2019-07-08 | Stop reason: HOSPADM

## 2019-07-04 RX ORDER — LOPERAMIDE HYDROCHLORIDE 2 MG/1
2 CAPSULE ORAL
Status: DISCONTINUED | OUTPATIENT
Start: 2019-07-04 | End: 2019-07-08 | Stop reason: HOSPADM

## 2019-07-04 RX ORDER — ALBUMIN HUMAN 250 G/1000ML
12.5 SOLUTION INTRAVENOUS EVERY 6 HOURS
Status: DISCONTINUED | OUTPATIENT
Start: 2019-07-04 | End: 2019-07-07

## 2019-07-04 RX ORDER — MELATONIN
1000 DAILY
Status: DISCONTINUED | OUTPATIENT
Start: 2019-07-04 | End: 2019-07-08 | Stop reason: HOSPADM

## 2019-07-04 RX ORDER — PANTOPRAZOLE SODIUM 40 MG/1
40 TABLET, DELAYED RELEASE ORAL DAILY
Status: DISCONTINUED | OUTPATIENT
Start: 2019-07-04 | End: 2019-07-08 | Stop reason: HOSPADM

## 2019-07-04 RX ORDER — ERYTHROMYCIN 5 MG/G
1 OINTMENT OPHTHALMIC
Status: DISCONTINUED | OUTPATIENT
Start: 2019-07-04 | End: 2019-07-08 | Stop reason: HOSPADM

## 2019-07-04 RX ORDER — DIGOXIN 125 MCG
0.12 TABLET ORAL EVERY OTHER DAY
Status: DISCONTINUED | OUTPATIENT
Start: 2019-07-04 | End: 2019-07-04

## 2019-07-04 RX ORDER — CARBOXYMETHYLCELLULOSE SODIUM 10 MG/ML
1 GEL OPHTHALMIC 3 TIMES DAILY
Status: DISCONTINUED | OUTPATIENT
Start: 2019-07-04 | End: 2019-07-08 | Stop reason: HOSPADM

## 2019-07-04 RX ORDER — CALCIUM CARBONATE 600 MG
600 TABLET ORAL DAILY
Status: DISCONTINUED | OUTPATIENT
Start: 2019-07-04 | End: 2019-07-08 | Stop reason: HOSPADM

## 2019-07-04 RX ORDER — LORATADINE 10 MG/1
10 TABLET ORAL EVERY OTHER DAY
Status: DISCONTINUED | OUTPATIENT
Start: 2019-07-06 | End: 2019-07-08 | Stop reason: HOSPADM

## 2019-07-04 RX ORDER — COLCHICINE 0.6 MG/1
0.6 CAPSULE ORAL DAILY
Status: DISCONTINUED | OUTPATIENT
Start: 2019-07-04 | End: 2019-07-06

## 2019-07-04 RX ORDER — LANOLIN ALCOHOL/MO/W.PET/CERES
3 CREAM (GRAM) TOPICAL
Status: DISCONTINUED | OUTPATIENT
Start: 2019-07-04 | End: 2019-07-08 | Stop reason: HOSPADM

## 2019-07-04 RX ORDER — LEVOTHYROXINE SODIUM 100 UG/1
100 TABLET ORAL
Status: DISCONTINUED | OUTPATIENT
Start: 2019-07-04 | End: 2019-07-08 | Stop reason: HOSPADM

## 2019-07-04 RX ORDER — LORATADINE 10 MG/1
10 TABLET ORAL DAILY
Status: DISCONTINUED | OUTPATIENT
Start: 2019-07-04 | End: 2019-07-04

## 2019-07-04 RX ORDER — BENZONATATE 100 MG/1
200 CAPSULE ORAL
Status: DISCONTINUED | OUTPATIENT
Start: 2019-07-04 | End: 2019-07-08 | Stop reason: HOSPADM

## 2019-07-04 RX ORDER — IPRATROPIUM BROMIDE AND ALBUTEROL SULFATE 2.5; .5 MG/3ML; MG/3ML
3 SOLUTION RESPIRATORY (INHALATION)
Status: DISCONTINUED | OUTPATIENT
Start: 2019-07-04 | End: 2019-07-08 | Stop reason: HOSPADM

## 2019-07-04 RX ORDER — NIFEDIPINE 30 MG/1
30 TABLET, EXTENDED RELEASE ORAL DAILY
Status: DISCONTINUED | OUTPATIENT
Start: 2019-07-04 | End: 2019-07-08 | Stop reason: HOSPADM

## 2019-07-04 RX ORDER — LANOLIN ALCOHOL/MO/W.PET/CERES
325 CREAM (GRAM) TOPICAL
Status: DISCONTINUED | OUTPATIENT
Start: 2019-07-04 | End: 2019-07-08 | Stop reason: HOSPADM

## 2019-07-04 RX ORDER — QUETIAPINE FUMARATE 25 MG/1
25 TABLET, FILM COATED ORAL
Status: DISCONTINUED | OUTPATIENT
Start: 2019-07-04 | End: 2019-07-08 | Stop reason: HOSPADM

## 2019-07-04 RX ORDER — ATORVASTATIN CALCIUM 10 MG/1
10 TABLET, FILM COATED ORAL DAILY
Status: DISCONTINUED | OUTPATIENT
Start: 2019-07-04 | End: 2019-07-08 | Stop reason: HOSPADM

## 2019-07-04 RX ORDER — HYDROCORTISONE 1 %
5 CREAM (GRAM) TOPICAL 2 TIMES DAILY
Status: DISCONTINUED | OUTPATIENT
Start: 2019-07-04 | End: 2019-07-08 | Stop reason: HOSPADM

## 2019-07-04 RX ORDER — MORPHINE SULFATE 2 MG/ML
2 INJECTION, SOLUTION INTRAMUSCULAR; INTRAVENOUS
Status: COMPLETED | OUTPATIENT
Start: 2019-07-04 | End: 2019-07-04

## 2019-07-04 RX ORDER — MORPHINE SULFATE 2 MG/ML
INJECTION, SOLUTION INTRAMUSCULAR; INTRAVENOUS
Status: DISPENSED
Start: 2019-07-04 | End: 2019-07-04

## 2019-07-04 RX ORDER — BISACODYL 5 MG
5 TABLET, DELAYED RELEASE (ENTERIC COATED) ORAL
Status: DISCONTINUED | OUTPATIENT
Start: 2019-07-04 | End: 2019-07-08 | Stop reason: HOSPADM

## 2019-07-04 RX ORDER — METOPROLOL TARTRATE 25 MG/1
25 TABLET, FILM COATED ORAL 2 TIMES DAILY
Status: DISCONTINUED | OUTPATIENT
Start: 2019-07-04 | End: 2019-07-08 | Stop reason: HOSPADM

## 2019-07-04 RX ORDER — VANCOMYCIN/0.9 % SOD CHLORIDE 1 G/100 ML
1000 PLASTIC BAG, INJECTION (ML) INTRAVENOUS
Status: COMPLETED | OUTPATIENT
Start: 2019-07-04 | End: 2019-07-04

## 2019-07-04 RX ORDER — TRAMADOL HYDROCHLORIDE 50 MG/1
25 TABLET ORAL DAILY
Status: DISCONTINUED | OUTPATIENT
Start: 2019-07-04 | End: 2019-07-08 | Stop reason: HOSPADM

## 2019-07-04 RX ORDER — ALBUTEROL SULFATE 90 UG/1
2 AEROSOL, METERED RESPIRATORY (INHALATION)
Status: DISCONTINUED | OUTPATIENT
Start: 2019-07-04 | End: 2019-07-04 | Stop reason: SDUPTHER

## 2019-07-04 RX ORDER — POTASSIUM CHLORIDE 20 MEQ/1
20 TABLET, EXTENDED RELEASE ORAL DAILY
Status: DISCONTINUED | OUTPATIENT
Start: 2019-07-04 | End: 2019-07-08 | Stop reason: HOSPADM

## 2019-07-04 RX ORDER — FUROSEMIDE 10 MG/ML
20 INJECTION INTRAMUSCULAR; INTRAVENOUS EVERY 12 HOURS
Status: DISCONTINUED | OUTPATIENT
Start: 2019-07-04 | End: 2019-07-07

## 2019-07-04 RX ADMIN — ATORVASTATIN CALCIUM 10 MG: 10 TABLET, FILM COATED ORAL at 08:52

## 2019-07-04 RX ADMIN — COLCHICINE 0.6 MG: 0.6 CAPSULE ORAL at 08:53

## 2019-07-04 RX ADMIN — MELATONIN TAB 3 MG 3 MG: 3 TAB at 23:29

## 2019-07-04 RX ADMIN — LEVOTHYROXINE SODIUM 100 MCG: 100 TABLET ORAL at 08:54

## 2019-07-04 RX ADMIN — CARBOXYMETHYLCELLULOSE SODIUM 1 DROP: 10 GEL OPHTHALMIC at 09:09

## 2019-07-04 RX ADMIN — FUROSEMIDE 20 MG: 10 INJECTION, SOLUTION INTRAMUSCULAR; INTRAVENOUS at 23:29

## 2019-07-04 RX ADMIN — VITAMIN D 1000 UNITS: 25 TAB ORAL at 08:52

## 2019-07-04 RX ADMIN — FERROUS SULFATE TAB 325 MG (65 MG ELEMENTAL FE) 325 MG: 325 (65 FE) TAB at 08:54

## 2019-07-04 RX ADMIN — HYDRALAZINE HYDROCHLORIDE 25 MG: 25 TABLET, FILM COATED ORAL at 23:29

## 2019-07-04 RX ADMIN — CYCLOSPORINE 1 DROP: 0.5 EMULSION OPHTHALMIC at 21:00

## 2019-07-04 RX ADMIN — ALBUMIN (HUMAN) 12.5 G: 0.25 INJECTION, SOLUTION INTRAVENOUS at 17:29

## 2019-07-04 RX ADMIN — Medication 600 MG: at 08:53

## 2019-07-04 RX ADMIN — HYDRALAZINE HYDROCHLORIDE 25 MG: 25 TABLET, FILM COATED ORAL at 08:53

## 2019-07-04 RX ADMIN — METOPROLOL TARTRATE 25 MG: 25 TABLET ORAL at 08:52

## 2019-07-04 RX ADMIN — NIFEDIPINE 30 MG: 30 TABLET, FILM COATED, EXTENDED RELEASE ORAL at 08:53

## 2019-07-04 RX ADMIN — MORPHINE SULFATE 2 MG: 2 INJECTION, SOLUTION INTRAMUSCULAR; INTRAVENOUS at 01:51

## 2019-07-04 RX ADMIN — HYDRALAZINE HYDROCHLORIDE 25 MG: 25 TABLET, FILM COATED ORAL at 17:18

## 2019-07-04 RX ADMIN — ALBUMIN (HUMAN) 12.5 G: 0.25 INJECTION, SOLUTION INTRAVENOUS at 23:29

## 2019-07-04 RX ADMIN — QUETIAPINE FUMARATE 25 MG: 25 TABLET ORAL at 23:29

## 2019-07-04 RX ADMIN — TRAMADOL HYDROCHLORIDE 25 MG: 50 TABLET, FILM COATED ORAL at 08:53

## 2019-07-04 RX ADMIN — CYCLOSPORINE 1 DROP: 0.5 EMULSION OPHTHALMIC at 11:03

## 2019-07-04 RX ADMIN — DIGOXIN 0.12 MG: 125 TABLET ORAL at 08:54

## 2019-07-04 RX ADMIN — CARBOXYMETHYLCELLULOSE SODIUM 1 DROP: 10 GEL OPHTHALMIC at 17:18

## 2019-07-04 RX ADMIN — FLUTICASONE FUROATE 1 PUFF: 100 POWDER RESPIRATORY (INHALATION) at 07:42

## 2019-07-04 RX ADMIN — ALBUMIN (HUMAN) 12.5 G: 0.25 INJECTION, SOLUTION INTRAVENOUS at 08:55

## 2019-07-04 RX ADMIN — CARBOXYMETHYLCELLULOSE SODIUM 1 DROP: 10 GEL OPHTHALMIC at 22:00

## 2019-07-04 RX ADMIN — POTASSIUM CHLORIDE 20 MEQ: 20 TABLET, EXTENDED RELEASE ORAL at 08:54

## 2019-07-04 RX ADMIN — METOPROLOL TARTRATE 25 MG: 25 TABLET ORAL at 17:21

## 2019-07-04 RX ADMIN — HYDROCORTISONE 5 G: 10 CREAM TOPICAL at 09:24

## 2019-07-04 RX ADMIN — VANCOMYCIN HYDROCHLORIDE 1000 MG: 10 INJECTION, POWDER, LYOPHILIZED, FOR SOLUTION INTRAVENOUS at 02:35

## 2019-07-04 RX ADMIN — FUROSEMIDE 20 MG: 10 INJECTION, SOLUTION INTRAMUSCULAR; INTRAVENOUS at 09:24

## 2019-07-04 RX ADMIN — HYDROCORTISONE 5 G: 10 CREAM TOPICAL at 17:19

## 2019-07-04 RX ADMIN — PANTOPRAZOLE SODIUM 40 MG: 40 TABLET, DELAYED RELEASE ORAL at 08:53

## 2019-07-04 RX ADMIN — Medication 1 CAPSULE: at 08:53

## 2019-07-04 RX ADMIN — ERYTHROMYCIN 1 G: 5 OINTMENT OPHTHALMIC at 23:31

## 2019-07-04 RX ADMIN — LORATADINE 10 MG: 10 TABLET ORAL at 08:53

## 2019-07-04 NOTE — CONSULTS
[facility]    [email or web address]      Impression:   1.new onset ascites  AMS      Plan:   Diagnostic paracentesis , chemistry, C&S, cytology  Check ammonia , T4 TSH  Echo, R/O constrictive pericarditis  Will also obtain Doppler of HV/PV   1. Chief Complaint: \"bloating\"      HPI:  Stephon Franco is a 80 y.o. female who is being seen on consult for the above. Pt is confused and is not felt to be an accurate historian. Apparently she has developed recent ascites; She also c/o \"constipation \" w/o bleeding. CT shows moderate ascites and gallstones. No Hx of ALONSO or ETOH abuse or any  Chronic liver disease. LFTs significant for low alb and elevated alk phos  PMH:   Past Medical History:   Diagnosis Date    Anemia NEC     Arthritis     Atrial fibrillation (HCC)     Bursitis of left shoulder     Cardiac cath 01/27/2009    RCA mild. LM patent. pLAD 20%. Very dLAD w/significant tapering. CX mild.  Cardiac stress echo, abnormal 01/05/2009    Positive maximal stress echo by echo & EKG criteria w/o chest pain.  EF >65%. Sm, discrete apical, apical septal hypk.     Cough     Endocrine disease     Hyperlipidemia     Hypertension     Hypothyroidism     Osteopenia     Pulmonary hemorrhage     Pulmonary hypertension (HCC)     Rash and nonspecific skin eruption 9/2012    maculopapular on torso, arms, and upper legs    Reflux     Right hip pain     Right shoulder pain     Stenosing tenosynovitis of finger 04/2015    Bilateral index fingers    Trigger ring finger of right hand     Wears glasses        PSH:   Past Surgical History:   Procedure Laterality Date    ABDOMEN SURGERY PROC UNLISTED      HX APPENDECTOMY      HX COLECTOMY      partial    HX HYSTERECTOMY      HX MOHS PROCEDURES Right 12/2010    Dr. Mo Reyes    HX OTHER SURGICAL      Two ribs removed    HX TONSILLECTOMY         Social HX:   Social History Socioeconomic History    Marital status:      Spouse name: Not on file    Number of children: Not on file    Years of education: Not on file    Highest education level: Not on file   Occupational History    Not on file   Social Needs    Financial resource strain: Not on file    Food insecurity:     Worry: Not on file     Inability: Not on file    Transportation needs:     Medical: Not on file     Non-medical: Not on file   Tobacco Use    Smoking status: Never Smoker    Smokeless tobacco: Never Used   Substance and Sexual Activity    Alcohol use: No    Drug use: No    Sexual activity: Not on file   Lifestyle    Physical activity:     Days per week: Not on file     Minutes per session: Not on file    Stress: Not on file   Relationships    Social connections:     Talks on phone: Not on file     Gets together: Not on file     Attends Hinduism service: Not on file     Active member of club or organization: Not on file     Attends meetings of clubs or organizations: Not on file     Relationship status: Not on file    Intimate partner violence:     Fear of current or ex partner: Not on file     Emotionally abused: Not on file     Physically abused: Not on file     Forced sexual activity: Not on file   Other Topics Concern    Not on file   Social History Narrative    Not on file       FHX:   Family History   Problem Relation Age of Onset    Hypertension Mother     Heart Attack Mother     Stroke Father     Heart Disease Sister     Heart Surgery Sister         CABG    Stroke Brother     Heart Attack Brother        Allergy:   Allergies   Allergen Reactions    Hydrochlorothiazide Hives    Penicillins Other (comments)     Mouth sore, upset stomach.     Sulfa (Sulfonamide Antibiotics) Unknown (comments)    Prednisone Other (comments)     Body sweats, hot and cold, up all night       Home Medications:     Medications Prior to Admission   Medication Sig    hydrocortisone (CORTAID) 1 % topical cream Apply 5 g to affected area two (2) times a day. Apply to back, buttocks, stomach for itching due to rash.  NIFEdipine ER (ADALAT CC) 30 mg ER tablet Take 30 mg by mouth daily.  amLODIPine (NORVASC) 2.5 mg tablet Take  by mouth daily.  mirtazapine (REMERON) 15 mg tablet Take 15 mg by mouth nightly. To help with appetite    albuterol (PROVENTIL HFA) 90 mcg/actuation inhaler Take 2 Puffs by inhalation every six (6) hours as needed for Wheezing (PRN:  Wheezing). PRN:  Wheezing    miconazole (MICOTIN) 2 % topical cream Apply 1 g to affected area as needed for Itching (PRN Itching). Daily PRN Itching    albuterol-ipratropium (DUO-NEB) 2.5 mg-0.5 mg/3 ml nebu 3 mL by Nebulization route every six (6) hours as needed for Other (PRN:  Wheezing). PRN:  Wheezing    beclomethasone dipropionate (QVAR REDIHALER) 40 mcg/actuation HFAb inhaler Take 2 Puffs by inhalation two (2) times a day.  loperamide (ANTI-DIARRHEA) 2 mg tablet Take 2 mg by mouth three (3) times daily as needed for Diarrhea. PRN: Diarrhea    aspirin delayed-release 81 mg tablet Take 81 mg by mouth daily.  benzonatate (TESSALON) 100 mg capsule Take 200 mg by mouth three (3) times daily as needed for Cough. PRN: Cough    multivit,tx with iron,minerals (THERAPEUTIC M PO) Take 1 Tab by mouth daily.  bisacodyl (LAXATIVE, BISACODYL,) 5 mg tab Take 1 Tab by mouth daily as needed for Other (Constipation). PRN:  Constipation    cyclosporine (RESTASIS MULTIDOSE OP) Administer 1 Drop to both eyes two (2) times a day.  furosemide (LASIX) 20 mg tablet Take 20 mg by mouth daily as needed (Swelling. ).  lactose-reduced food (ENSURE CLEAR PO) Take 1 Can by mouth three (3) times daily.  ferrous sulfate (IRON) 325 mg (65 mg iron) tablet Take 325 mg by mouth Daily (before breakfast).  QUEtiapine (SEROQUEL) 25 mg tablet Take 25 mg by mouth nightly.     metoprolol tartrate (LOPRESSOR) 50 mg tablet Take 25 mg by mouth two (2) times a day.    Cetirizine 10 mg cap Take 1 Tab by mouth daily.  LORazepam (ATIVAN) 0.5 mg tablet Take 0.5 mg by mouth every eight (8) hours as needed for Anxiety (PRN:  Agitation, anxiety). PRN:  Agitation, anxiety    polyvinyl alcohol-povidon,PF, (REFRESH CLASSIC) 1.4-0.6 % ophthalmic solution Administer 1-2 Drops to both eyes as needed.  atorvastatin (LIPITOR) 10 mg tablet Take 10 mg by mouth daily.  colchicine 0.6 mg tablet Take 0.6 mg by mouth daily.  digoxin (LANOXIN) 0.125 mg tablet Take 0.125 mg by mouth every other day.  erythromycin (ILOTYCIN) ophthalmic ointment Administer 1 g to both eyes nightly. 1 strip    potassium chloride (K-DUR, KLOR-CON) 20 mEq tablet Take 20 mEq by mouth daily.  hydrALAZINE (APRESOLINE) 25 mg tablet Take 1 Tab by mouth three (3) times daily.  traMADol (ULTRAM) 50 mg tablet Take 0.5 Tabs by mouth daily. Max Daily Amount: 25 mg. Indications: Pain    carboxymethylcellulose sodium 1 % dlgl Apply 1 Drop to eye three (3) times daily. To both eyes    melatonin 3 mg tablet Take 1 Tab by mouth nightly.  levothyroxine (SYNTHROID) 100 mcg tablet Take 100 mcg by mouth Daily (before breakfast).  pantoprazole (PROTONIX) 40 mg tablet Take 40 mg by mouth daily.  CALCIUM CARBONATE (CALCIUM 600 PO) Take 1 Tab by mouth daily.  cholecalciferol (VITAMIN D3) 1,000 unit cap Take 1,000 Units by mouth daily.  B.infantis-B.ani-B.long-B.bifi (PROBIOTIC 4X) 10-15 mg TbEC Take 1 Tab by mouth daily.  montelukast (SINGULAIR) 10 mg tablet Take 10 mg by mouth daily.  loratadine (CLARITIN) 10 mg tablet Take 10 mg by mouth daily as needed for Allergies or Rhinitis. PRN:  Allergic Rhinitis       Review of Systems:     Constitutional: No fevers, chills, weight loss, fatigue. Skin: No rashes, pruritis, jaundice, ulcerations, erythema. HENT: No headaches, nosebleeds, sinus pressure, rhinorrhea, sore throat.    Eyes: No visual changes, blurred vision, eye pain, photophobia, jaundice. Cardiovascular: No chest pain, heart palpitations. Respiratory: No cough, SOB, wheezing, chest discomfort, orthopnea. Gastrointestinal: \"swelling and constipation\"   Genitourinary: No dysuria, bleeding, discharge, pyuria. Musculoskeletal: No weakness, arthralgias, wasting. Endo: No sweats. Heme: No bruising, easy bleeding. Allergies: As noted. Neurological: Cranial nerves intact. Alert and oriented. Gait not assessed. Psychiatric:  No anxiety, depression, hallucinations. Visit Vitals  /84 (BP 1 Location: Left arm, BP Patient Position: At rest)   Pulse 92   Temp 96.7 °F (35.9 °C)   Resp 18   Ht 4' 8\" (1.422 m)   Wt 42.6 kg (94 lb)   SpO2 95%   Breastfeeding? No   BMI 21.07 kg/m²       Physical Assessment:     constitutional: appearance:  Elderly , chronically ill  skin: inspection: no rashes, ulcers, icterus or other lesions; no clubbing or telangiectasias. palpation: no induration or subcutaneos nodules. eyes: inspection: normal conjunctivae and lids; no jaundice pupils: symmetrical, normoreactive to light, normal accommodation and size. ENMT: mouth: normal oral mucosa,lips and gums; good dentition. oropharynx: normal tongue, hard and soft palate; posterior pharynx without erithema, exudate or lesions. neck: thyroid: normal size, consistency and position; no masses or tenderness. respiratory: effort:  Crackles in both bases  cardiovascular: abdominal aorta: normal size and position; no bruits. palpation: PMI of normal size and position; normal rhythm; no thrill or murmurs. abdominal: abdomen: no hepatomegaly, moderately tense ascites  rectal: hemoccult/guaiac: not performed. musculoskeletal: digits and nails: no clubbing, cyanosis, petechiae or other inflammatory conditions. gait: normal gait and station head and neck: normal range of motion; no pain, crepitation or contracture.  spine/ribs/pelvis: normal range of motion; no pain, deformity or contracture. lymphatic: axilae: not palpable. groin: not palpable. neck: within normal limits. other: not palpable. neurologic: cranial nerves: II-XII normal.   psychiatric: judgement/insight:  Disoriented X2     Basic Metabolic Profile   Recent Labs     07/03/19  2315      K 4.6      CO2 25   BUN 33*   GLU 98   CA 8.5         CBC w/Diff    Recent Labs     07/03/19  2315   WBC 5.8   RBC 3.52*   HGB 11.1*   HCT 34.4*   MCV 97.7*   MCH 31.5   MCHC 32.3   RDW 18.5*       Recent Labs     07/03/19  2315   GRANS 69   LYMPH 9*   EOS 10*        Hepatic Function   Recent Labs     07/03/19  2315   ALB 2.5*   TP 8.8*   TBILI 0.6   SGOT 49*   *          Ed Rodriguez MD, M.D. [facility], [address] [place] [address]  [email or web address]

## 2019-07-04 NOTE — ED PROVIDER NOTES
HPI patient is a 80-year-old female who brought to the ER by ambulance because of having a distended abdomen and abdominal pain for 2 days. Patient states she did not have vomiting . She states   she feels constipated. No nausea vomiting shortness of breath. Last BM 2 days ago. Past Medical History:   Diagnosis Date    Anemia NEC     Arthritis     Atrial fibrillation (HCC)     Bursitis of left shoulder     Cardiac cath 01/27/2009    RCA mild. LM patent. pLAD 20%. Very dLAD w/significant tapering. CX mild.  Cardiac stress echo, abnormal 01/05/2009    Positive maximal stress echo by echo & EKG criteria w/o chest pain.  EF >65%. Sm, discrete apical, apical septal hypk.     Cough     Endocrine disease     Hyperlipidemia     Hypertension     Hypothyroidism     Osteopenia     Pulmonary hemorrhage     Pulmonary hypertension (HCC)     Rash and nonspecific skin eruption 9/2012    maculopapular on torso, arms, and upper legs    Reflux     Right hip pain     Right shoulder pain     Stenosing tenosynovitis of finger 04/2015    Bilateral index fingers    Trigger ring finger of right hand     Wears glasses        Past Surgical History:   Procedure Laterality Date    ABDOMEN SURGERY PROC UNLISTED      HX APPENDECTOMY      HX COLECTOMY      partial    HX HYSTERECTOMY      HX MOHS PROCEDURES Right 12/2010    Dr. Suzan Melendez    HX OTHER SURGICAL      Two ribs removed    HX TONSILLECTOMY           Family History:   Problem Relation Age of Onset    Hypertension Mother     Heart Attack Mother     Stroke Father     Heart Disease Sister     Heart Surgery Sister         CABG    Stroke Brother     Heart Attack Brother        Social History     Socioeconomic History    Marital status:      Spouse name: Not on file    Number of children: Not on file    Years of education: Not on file    Highest education level: Not on file   Occupational History    Not on file   Social Needs    Financial resource strain: Not on file    Food insecurity:     Worry: Not on file     Inability: Not on file    Transportation needs:     Medical: Not on file     Non-medical: Not on file   Tobacco Use    Smoking status: Never Smoker    Smokeless tobacco: Never Used   Substance and Sexual Activity    Alcohol use: No    Drug use: No    Sexual activity: Not on file   Lifestyle    Physical activity:     Days per week: Not on file     Minutes per session: Not on file    Stress: Not on file   Relationships    Social connections:     Talks on phone: Not on file     Gets together: Not on file     Attends Sabianist service: Not on file     Active member of club or organization: Not on file     Attends meetings of clubs or organizations: Not on file     Relationship status: Not on file    Intimate partner violence:     Fear of current or ex partner: Not on file     Emotionally abused: Not on file     Physically abused: Not on file     Forced sexual activity: Not on file   Other Topics Concern    Not on file   Social History Narrative    Not on file         ALLERGIES: Hydrochlorothiazide; Penicillins; Sulfa (sulfonamide antibiotics); and Prednisone    Review of Systems   Constitutional: Negative. HENT: Negative. Eyes: Negative. Respiratory: Negative. Cardiovascular: Negative. Gastrointestinal: Positive for abdominal distention, abdominal pain, constipation and nausea. Negative for vomiting. Endocrine: Negative. Genitourinary: Negative. Musculoskeletal: Negative. Skin: Negative. Allergic/Immunologic: Negative. Neurological: Negative. Hematological: Negative. Psychiatric/Behavioral: Negative. All other systems reviewed and are negative. There were no vitals filed for this visit. Physical Exam   Constitutional: She is oriented to person, place, and time. She appears well-developed and well-nourished. No distress. HENT:   Head: Normocephalic.    Right Ear: External ear normal.   Left Ear: External ear normal.   Mouth/Throat: No oropharyngeal exudate. Eyes: Pupils are equal, round, and reactive to light. Conjunctivae and EOM are normal. Right eye exhibits no discharge. Left eye exhibits no discharge. No scleral icterus. Neck: Normal range of motion. Neck supple. No JVD present. No tracheal deviation present. No thyromegaly present. Cardiovascular: Normal rate, regular rhythm, normal heart sounds and intact distal pulses. Exam reveals no gallop and no friction rub. No murmur heard. Pulmonary/Chest: Effort normal and breath sounds normal. No stridor. No respiratory distress. She has no wheezes. She has no rales. She exhibits no tenderness. Abdominal: Soft. She exhibits distension. She exhibits no mass. Bowel sounds are increased. There is generalized tenderness. There is no rebound and no guarding. Musculoskeletal: Normal range of motion. She exhibits no edema or tenderness. Lymphadenopathy:     She has no cervical adenopathy. Neurological: She is alert and oriented to person, place, and time. She displays normal reflexes. No cranial nerve deficit. She exhibits normal muscle tone. Coordination normal.   Skin: Skin is warm and dry. No rash noted. She is not diaphoretic. No erythema. No pallor. Nursing note and vitals reviewed. MDM: Differential diagnosis: Bowel obstruction, ascites, colitis, constipation, volvulus, intussusception, and etc.     Patient was found to have severe ascites of unknown etiology. Patient will be admitted for further evaluation. Case discussed with Dr. Anni Panchal (hospitalist). She is septic patient for admission.     Diagnosis: Ascites, bilateral pleural effusion    Disposition: Admit

## 2019-07-04 NOTE — ROUTINE PROCESS
TRANSFER - OUT REPORT: 
 
Verbal report given to Miseal Engel RN(name) on Roe Peterson  [facility] routine progression of care Report consisted of patients Situation, Background, Assessment and  
Recommendations(SBAR). Information from the following report(s) SBAR, ED Summary, OR Summary, Procedure Summary, Intake/Output, MAR, Accordion, Recent Results, Med Rec Status and Cardiac Rhythm AFIBB-SR with PVC's was reviewed with the receiving nurse. Lines:  
Peripheral IV 07/03/19 Left Wrist (Active) Site Assessment Clean, dry, & intact 7/3/2019 11:45 PM  
Phlebitis Assessment 0 7/3/2019 11:45 PM  
Infiltration Assessment 0 7/3/2019 11:45 PM  
Dressing Status Clean, dry, & intact 7/3/2019 11:45 PM  
Dressing Type Transparent 7/3/2019 11:45 PM  
Hub Color/Line Status Flushed;Patent 7/3/2019 11:45 PM  
Action Taken Blood drawn 7/3/2019 11:45 PM  
  
 
Opportunity for questions and clarification was provided. Patient transported with: 
 Monitor O2 @ 2 liters

## 2019-07-04 NOTE — PROGRESS NOTES
Problem: Falls - Risk of  Goal: *Absence of Falls  Description  Document Ronni Todd Fall Risk and appropriate interventions in the flowsheet. Outcome: Progressing Towards Goal     Problem: Patient Education: Go to Patient Education Activity  Goal: Patient/Family Education  Outcome: Progressing Towards Goal     Problem: Risk for Spread of Infection  Goal: Prevent transmission of infectious organism to others  Description  Prevent the transmission of infectious organisms to other patients, staff members, and visitors.   Outcome: Progressing Towards Goal     Problem: Patient Education:  Go to Education Activity  Goal: Patient/Family Education  Outcome: Progressing Towards Goal

## 2019-07-04 NOTE — ED NOTES
Pt. Informed of all adverse complications up and leading to death and informed by Dr. Ulises Barragan about her diagnosis in the presence of the CN Myah Guerrero and Primary Nurse Jan Prescott. Pt is A/Ox4 at this time and able to answer all questions appropriately with the 3 staff members listed above present. Pt stated that she \"was fine with going to [facility] for further treatment of her current diagnosis\" MD, CN, and Primary Nurse present and aware.

## 2019-07-04 NOTE — ROUTINE PROCESS
Bedside and Verbal shift change report given to Zee Youssef RN (oncoming nurse) by Gerardo Mckeon RN (offgoing nurse). Report included the following information SBAR, Kardex and MAR.

## 2019-07-04 NOTE — ED NOTES
Pt. Verbalized at this time that she is comfortable with admission to [facility], Pt is A/Ox4 at this time and is aware of the reasoning behind the admission to [facility]. Pt has no objections at this time. MD notified and will continue to monitor.

## 2019-07-04 NOTE — H&P
History & Physical    Patient: Amirah Corbett MRN: [id number]  CSN: [id number]    YOB: 1935  Age: 80 y.o. Sex: female      DOA: 7/3/2019    Chief Complaint:   Chief Complaint   Patient presents with    Abdominal Pain    Constipation          HPI:     Amirah Corbett is a 80 y.o.  female with hx of pulmonary HTN, CHF, Afib,  Presents to [facility] with complaints of constipation and abdominal bloating no worsenig dyspnea or chest pain  CT showed bilateral pleural effusion worse n left and moderate ascites with no obstruction  Patient has lost about 20 pounds last 3 months  Has had recent adjustment in meds  Taking po lasix 2-3 a week   in ER GI consulted? For new onset moderate ascites with mild increse in alk phos    Past Medical History:   Diagnosis Date    Anemia NEC     Arthritis     Atrial fibrillation (HCC)     Bursitis of left shoulder     Cardiac cath 01/27/2009    RCA mild. LM patent. pLAD 20%. Very dLAD w/significant tapering. CX mild.  Cardiac stress echo, abnormal 01/05/2009    Positive maximal stress echo by echo & EKG criteria w/o chest pain.  EF >65%. Sm, discrete apical, apical septal hypk.     Cough     Endocrine disease     Hyperlipidemia     Hypertension     Hypothyroidism     Osteopenia     Pulmonary hemorrhage     Pulmonary hypertension (HCC)     Rash and nonspecific skin eruption 9/2012    maculopapular on torso, arms, and upper legs    Reflux     Right hip pain     Right shoulder pain     Stenosing tenosynovitis of finger 04/2015    Bilateral index fingers    Trigger ring finger of right hand     Wears glasses        Past Surgical History:   Procedure Laterality Date    ABDOMEN SURGERY PROC UNLISTED      HX APPENDECTOMY      HX COLECTOMY      partial    HX HYSTERECTOMY      HX MOHS PROCEDURES Right 12/2010    Dr. Graylon Callander    HX OTHER SURGICAL      Two ribs removed    HX TONSILLECTOMY         Family History   Problem Relation Age of Onset    Hypertension Mother     Heart Attack Mother     Stroke Father     Heart Disease Sister     Heart Surgery Sister         CABG    Stroke Brother     Heart Attack Brother        Social History     Socioeconomic History    Marital status:      Spouse name: Not on file    Number of children: Not on file    Years of education: Not on file    Highest education level: Not on file   Tobacco Use    Smoking status: Never Smoker    Smokeless tobacco: Never Used   Substance and Sexual Activity    Alcohol use: No    Drug use: No       Prior to Admission medications    Medication Sig Start Date End Date Taking? Authorizing Provider   hydrocortisone (CORTAID) 1 % topical cream Apply 5 g to affected area two (2) times a day. Apply to back, buttocks, stomach for itching due to rash. Yes Provider, Historical   NIFEdipine ER (ADALAT CC) 30 mg ER tablet Take 30 mg by mouth daily. Yes Provider, Historical   amLODIPine (NORVASC) 2.5 mg tablet Take  by mouth daily. Yes Provider, Historical   mirtazapine (REMERON) 15 mg tablet Take 15 mg by mouth nightly. To help with appetite   Yes Provider, Historical   albuterol (PROVENTIL HFA) 90 mcg/actuation inhaler Take 2 Puffs by inhalation every six (6) hours as needed for Wheezing (PRN:  Wheezing). PRN:  Wheezing   Yes Provider, Historical   miconazole (MICOTIN) 2 % topical cream Apply 1 g to affected area as needed for Itching (PRN Itching). Daily PRN Itching   Yes Provider, Historical   albuterol-ipratropium (DUO-NEB) 2.5 mg-0.5 mg/3 ml nebu 3 mL by Nebulization route every six (6) hours as needed for Other (PRN:  Wheezing). PRN:  Wheezing   Yes Provider, Historical   beclomethasone dipropionate (QVAR REDIHALER) 40 mcg/actuation HFAb inhaler Take 2 Puffs by inhalation two (2) times a day. Yes Provider, Historical   loperamide (ANTI-DIARRHEA) 2 mg tablet Take 2 mg by mouth three (3) times daily as needed for Diarrhea.  PRN: Diarrhea   Yes Provider, Historical   aspirin delayed-release 81 mg tablet Take 81 mg by mouth daily. Yes Provider, Historical   benzonatate (TESSALON) 100 mg capsule Take 200 mg by mouth three (3) times daily as needed for Cough. PRN: Cough   Yes Provider, Historical   multivit,tx with iron,minerals (THERAPEUTIC M PO) Take 1 Tab by mouth daily. Yes Provider, Historical   bisacodyl (LAXATIVE, BISACODYL,) 5 mg tab Take 1 Tab by mouth daily as needed for Other (Constipation). PRN:  Constipation   Yes Provider, Historical   cyclosporine (RESTASIS MULTIDOSE OP) Administer 1 Drop to both eyes two (2) times a day. Yes Provider, Historical   furosemide (LASIX) 20 mg tablet Take 20 mg by mouth daily as needed (Swelling. ). Yes Provider, Historical   lactose-reduced food (ENSURE CLEAR PO) Take 1 Can by mouth three (3) times daily. Yes Provider, Historical   ferrous sulfate (IRON) 325 mg (65 mg iron) tablet Take 325 mg by mouth Daily (before breakfast). Yes Provider, Historical   QUEtiapine (SEROQUEL) 25 mg tablet Take 25 mg by mouth nightly. Yes Provider, Historical   metoprolol tartrate (LOPRESSOR) 50 mg tablet Take 25 mg by mouth two (2) times a day. Yes Provider, Historical   Cetirizine 10 mg cap Take 1 Tab by mouth daily. Yes Other, MD Lizbeth   LORazepam (ATIVAN) 0.5 mg tablet Take 0.5 mg by mouth every eight (8) hours as needed for Anxiety (PRN:  Agitation, anxiety). PRN:  Agitation, anxiety   Yes Other, MD Lizbeth   polyvinyl alcohol-povidon,PF, (REFRESH CLASSIC) 1.4-0.6 % ophthalmic solution Administer 1-2 Drops to both eyes as needed. Yes Liya, MD Lizbeth   atorvastatin (LIPITOR) 10 mg tablet Take 10 mg by mouth daily. Yes Liya, MD Lizbeth   colchicine 0.6 mg tablet Take 0.6 mg by mouth daily. Yes Lizbeth Nickerson MD   digoxin (LANOXIN) 0.125 mg tablet Take 0.125 mg by mouth every other day. Yes Liya, MD Lizbeth   erythromycin (ILOTYCIN) ophthalmic ointment Administer 1 g to both eyes nightly.  1 strip   Yes Liya, MD Lizbeth   potassium chloride (K-DUR, KLOR-CON) 20 mEq tablet Take 20 mEq by mouth daily. Yes Other, MD Lizbeth   hydrALAZINE (APRESOLINE) 25 mg tablet Take 1 Tab by mouth three (3) times daily. 6/15/18  Yes Fiona Yanez NP   traMADol (ULTRAM) 50 mg tablet Take 0.5 Tabs by mouth daily. Max Daily Amount: 25 mg. Indications: Pain 6/16/18  Yes Massiel Kim MD   carboxymethylcellulose sodium 1 % dlgl Apply 1 Drop to eye three (3) times daily. To both eyes   Yes Provider, Historical   melatonin 3 mg tablet Take 1 Tab by mouth nightly. 2/15/18  Yes Lalita Baumann NP   levothyroxine (SYNTHROID) 100 mcg tablet Take 100 mcg by mouth Daily (before breakfast). 5/16/16  Yes Provider, Historical   pantoprazole (PROTONIX) 40 mg tablet Take 40 mg by mouth daily. 3/9/16  Yes Provider, Historical   CALCIUM CARBONATE (CALCIUM 600 PO) Take 1 Tab by mouth daily. Yes Provider, Historical   cholecalciferol (VITAMIN D3) 1,000 unit cap Take 1,000 Units by mouth daily. Yes Provider, Historical   B.infantis-B.ani-B.long-B.bifi (PROBIOTIC 4X) 10-15 mg TbEC Take 1 Tab by mouth daily. Yes Provider, Historical   montelukast (SINGULAIR) 10 mg tablet Take 10 mg by mouth daily. Yes Provider, Historical   loratadine (CLARITIN) 10 mg tablet Take 10 mg by mouth daily as needed for Allergies or Rhinitis. PRN:  Allergic Rhinitis   Yes Provider, Historical       Allergies   Allergen Reactions    Hydrochlorothiazide Hives    Penicillins Other (comments)     Mouth sore, upset stomach.  Sulfa (Sulfonamide Antibiotics) Unknown (comments)    Prednisone Other (comments)     Body sweats, hot and cold, up all night         Review of Systems  GENERAL: Patient alert, awake and oriented times 2 able to communicatebrief l sentences and not in distress. Moaning HEENT: No change in vision, no earache, tinnitus, sore throat or sinus congestion. NECK: No pain or stiffness. PULMONARY:+shortness of breath, cough or wheeze. Cardiovascular: no pnd or orthopnea, no CP  GASTROINTESTINAL+ abdominal pain,denies  nausea, vomiting or diarrhea, melena or bright red blood per rectum. GENITOURINARY: No urinary frequency, urgency, hesitancy or dysuria. Hx of UTI vre was 2018  MUSCULOSKELETAL+ joint or muscle pain, no back pain, no recent trauma. DERMATOLOGIC+ rash, no itching, no lesions. ENDOCRINE: No polyuria, polydipsia, no heat or cold intolerance. + recent change in weight. HEMATOLOGICAL+ anemia or easy bruising or bleeding. NEUROLOGIC: No headache, seizures, numbness, tingling+weakness. Physical Exam:     Physical Exam:  Visit Vitals  /86   Pulse 77   Temp 96 °F (35.6 °C)   Resp 18   Ht 4' 8\" (1.422 m)   Wt 42.6 kg (94 lb)   SpO2 96%   BMI 21.07 kg/m²      O2 Device: Room air    Temp (24hrs), Av.3 °F (36.3 °C), Min:96 °F (35.6 °C), Max:98.2 °F (36.8 °C)    No intake/output data recorded. No intake/output data recorded. General:  Alert, cooperative, no distress, appears stated age. Head: Normocephalic, without obvious abnormality, atraumatic. Eyes:  Conjunctivae/corneas clear. PERRL, EOMs intact. Nose: Nares normal. No drainage or sinus tenderness. Neck: Supple, symmetrical, trachea midline, no adenopathy, thyroid: no enlargement, no carotid bruit and no JVD. Lungs:   Clear to auscultation bilaterally. Heart:  Irregularly irregular rate and rhythm, S1, S2 normal.soft murmer      Abdomen: Hard and tender. Bowel sounds do,oosjed tense abdomen noted with ascites dull to percussion    Extremities: Extremities normal, atraumatic, no cyanosis+1edema. Pulses: 2+ and symmetric all extremities. Skin:  No rashes but diffuse follicular lesions through out    Neurologic: AAOxperson /and place not month/year, No focal motor or sensory deficit. Labs Reviewed: All lab results for the last 24 hours reviewed.   Recent Results (from the past 24 hour(s))   POC LACTIC ACID    Collection Time: 07/03/19 11:13 PM   Result Value Ref Range    Lactic Acid (POC) 1.82 0.40 - 2.00 mmol/L   CBC WITH AUTOMATED DIFF    Collection Time: 07/03/19 11:15 PM   Result Value Ref Range    WBC 5.8 4.6 - 13.2 K/uL    RBC 3.52 (L) 4.20 - 5.30 M/uL    HGB 11.1 (L) 12.0 - 16.0 g/dL    HCT 34.4 (L) 35.0 - 45.0 %    MCV 97.7 (H) 74.0 - 97.0 FL    MCH 31.5 24.0 - 34.0 PG    MCHC 32.3 31.0 - 37.0 g/dL    RDW 18.5 (H) 11.6 - 14.5 %    PLATELET 467 714 - 976 K/uL    MPV 12.2 (H) 9.2 - 11.8 FL    NEUTROPHILS 69 40 - 73 %    LYMPHOCYTES 9 (L) 21 - 52 %    MONOCYTES 12 (H) 3 - 10 %    EOSINOPHILS 10 (H) 0 - 5 %    BASOPHILS 0 0 - 2 %    ABS. NEUTROPHILS 4.0 1.8 - 8.0 K/UL    ABS. LYMPHOCYTES 0.5 (L) 0.9 - 3.6 K/UL    ABS. MONOCYTES 0.7 0.05 - 1.2 K/UL    ABS. EOSINOPHILS 0.6 (H) 0.0 - 0.4 K/UL    ABS. BASOPHILS 0.0 0.0 - 0.1 K/UL    DF AUTOMATED     METABOLIC PANEL, COMPREHENSIVE    Collection Time: 07/03/19 11:15 PM   Result Value Ref Range    Sodium 138 136 - 145 mmol/L    Potassium 4.6 3.5 - 5.5 mmol/L    Chloride 106 100 - 108 mmol/L    CO2 25 21 - 32 mmol/L    Anion gap 7 3.0 - 18 mmol/L    Glucose 98 74 - 99 mg/dL    BUN 33 (H) 7.0 - 18 MG/DL    Creatinine 1.32 (H) 0.6 - 1.3 MG/DL    BUN/Creatinine ratio 25 (H) 12 - 20      GFR est AA 46 (L) >60 ml/min/1.73m2    GFR est non-AA 38 (L) >60 ml/min/1.73m2    Calcium 8.5 8.5 - 10.1 MG/DL    Bilirubin, total 0.6 0.2 - 1.0 MG/DL    ALT (SGPT) 27 13 - 56 U/L    AST (SGOT) 49 (H) 15 - 37 U/L    Alk.  phosphatase 241 (H) 45 - 117 U/L    Protein, total 8.8 (H) 6.4 - 8.2 g/dL    Albumin 2.5 (L) 3.4 - 5.0 g/dL    Globulin 6.3 (H) 2.0 - 4.0 g/dL    A-G Ratio 0.4 (L) 0.8 - 1.7      and EKG    Procedures/imaging: see electronic medical records for all procedures/Xrays and details which were not copied into this note but were reviewed prior to creation of Plan      Assessment/Plan     Active Problems:    Pulmonary hypertension (HCC) ()  Severe likely cause of fluid accumulation  Repeat echo  Check cardiac enzymes       Pleural effusion (7/4/2019)  Starting IV lasix  Oxygen prn   May need thoracentesis as well did not order      Ascites (7/4/2019)  Set up for IR radiology  Ultrasound guided therapeutic and diagnostic   Paracentesis order placed   GI consult  Hepatitis panel CARMEL ordered  Likely from 3 rd spacing     Weight loss   IV albumin for 3 doses      Dementia  Cont with Seroquel qhs       Afib   Cont metoprolol and digoxin for rate  Check digoxin level     Hypothyroidism   Resume levothyroxine check TSH     DVT/GI Prophylaxis: SCD's    Discussed with patient at bedside about hospital admission and my plan care, who understood and agree with my plan care.     Reviewed [facility] records  Patient has a POST not scanned in our system  Consider palliative/consult   Sourav Ren MD  7/4/2019 6:03 AM

## 2019-07-04 NOTE — ROUTINE PROCESS
Bedside and Verbal shift change report given to [facility] RN (oncoming nurse) by Louann Kahn RN (offgoing nurse). Report included the following information SBAR, Kardex and MAR.

## 2019-07-04 NOTE — PROGRESS NOTES
Received awake and alert from [facility]. Placed in 467. In no acute distress. Vital signs stable. B/p slightly elevated. Denies discomfort.  Tele#18= a-fib with a controlled v-response

## 2019-07-05 ENCOUNTER — APPOINTMENT (OUTPATIENT)
Dept: VASCULAR SURGERY | Age: 84
DRG: 291 | End: 2019-07-05
Attending: INTERNAL MEDICINE
Payer: MEDICARE

## 2019-07-05 ENCOUNTER — APPOINTMENT (OUTPATIENT)
Dept: NON INVASIVE DIAGNOSTICS | Age: 84
DRG: 291 | End: 2019-07-05
Attending: INTERNAL MEDICINE
Payer: MEDICARE

## 2019-07-05 ENCOUNTER — APPOINTMENT (OUTPATIENT)
Dept: ULTRASOUND IMAGING | Age: 84
DRG: 291 | End: 2019-07-05
Attending: INTERNAL MEDICINE
Payer: MEDICARE

## 2019-07-05 LAB
ALBUMIN FLD-MCNC: 1.6 G/DL
APPEARANCE FLD: CLEAR
BACTERIA SPEC CULT: ABNORMAL
BODY FLD TYPE: NORMAL
COLOR FLD: YELLOW
ECHO AO ROOT DIAM: 2.51 CM
ECHO LA AREA 4C: 19.1 CM2
ECHO LA VOL 2C: 50.65 ML (ref 22–52)
ECHO LA VOL 4C: 53.26 ML (ref 22–52)
ECHO LA VOL BP: 60.01 ML (ref 22–52)
ECHO LA VOL/BSA BIPLANE: 46.59 ML/M2 (ref 16–28)
ECHO LA VOLUME INDEX A2C: 39.32 ML/M2 (ref 16–28)
ECHO LA VOLUME INDEX A4C: 41.35 ML/M2 (ref 16–28)
ECHO LV INTERNAL DIMENSION DIASTOLIC: 3.81 CM (ref 3.9–5.3)
ECHO LV INTERNAL DIMENSION SYSTOLIC: 2.9 CM
ECHO LV IVSD: 0.98 CM (ref 0.6–0.9)
ECHO LV MASS 2D: 93.5 G (ref 67–162)
ECHO LV MASS INDEX 2D: 72.6 G/M2 (ref 43–95)
ECHO LV POSTERIOR WALL DIASTOLIC: 0.62 CM (ref 0.6–0.9)
ECHO LVOT DIAM: 1.85 CM
ECHO LVOT PEAK GRADIENT: 1.8 MMHG
ECHO LVOT PEAK VELOCITY: 66.36 CM/S
ECHO LVOT VTI: 12.32 CM
ECHO MV A VELOCITY: 30.65 CM/S
ECHO MV E DECELERATION TIME (DT): 167.6 MS
ECHO MV E VELOCITY: 93.68 CM/S
ECHO MV E/A RATIO: 3.06
ECHO TV REGURGITANT MAX VELOCITY: 364.43 CM/S
ECHO TV REGURGITANT PEAK GRADIENT: 53.1 MMHG
EOSINOPHIL NFR FLD MANUAL: 0 %
ERYTHROCYTE [SEDIMENTATION RATE] IN BLOOD: 31 MM/HR (ref 0–30)
GLUCOSE FLD-MCNC: 86 MG/DL
INR PPP: 1.3 (ref 0.8–1.2)
LDH FLD L TO P-CCNC: 106 U/L
LYMPHOCYTES NFR FLD: 17 %
MACROPHAGES NFR FLD: 60 %
MONOCYTES NFR FLD: 0 %
NEUTROPHILS NFR FLD: 23 %
NEUTS BAND # FLD: 0 %
NUC CELL # FLD: 244 /CU MM
PH FLD: 7.6 [PH]
PROT FLD-MCNC: 4.8 G/DL
PROTHROMBIN TIME: 15.7 SEC (ref 11.5–15.2)
RBC # FLD: 603 /CU MM
SERVICE CMNT-IMP: ABNORMAL
SPECIMEN SOURCE FLD: ABNORMAL
SPECIMEN SOURCE FLD: NORMAL
T4 SERPL-MCNC: 10.5 UG/DL (ref 4.8–13.9)
TSH SERPL DL<=0.05 MIU/L-ACNC: 4.85 UIU/ML (ref 0.36–3.74)
WBC MORPH BLD: ABNORMAL

## 2019-07-05 PROCEDURE — 36415 COLL VENOUS BLD VENIPUNCTURE: CPT

## 2019-07-05 PROCEDURE — 89051 BODY FLUID CELL COUNT: CPT

## 2019-07-05 PROCEDURE — 85652 RBC SED RATE AUTOMATED: CPT

## 2019-07-05 PROCEDURE — 74011250637 HC RX REV CODE- 250/637: Performed by: INTERNAL MEDICINE

## 2019-07-05 PROCEDURE — 93306 TTE W/DOPPLER COMPLETE: CPT

## 2019-07-05 PROCEDURE — 93975 VASCULAR STUDY: CPT

## 2019-07-05 PROCEDURE — 85610 PROTHROMBIN TIME: CPT

## 2019-07-05 PROCEDURE — 82042 OTHER SOURCE ALBUMIN QUAN EA: CPT

## 2019-07-05 PROCEDURE — 77030031604 US GUIDE PARACENTESIS

## 2019-07-05 PROCEDURE — 83986 ASSAY PH BODY FLUID NOS: CPT

## 2019-07-05 PROCEDURE — 83615 LACTATE (LD) (LDH) ENZYME: CPT

## 2019-07-05 PROCEDURE — 82945 GLUCOSE OTHER FLUID: CPT

## 2019-07-05 PROCEDURE — 84443 ASSAY THYROID STIM HORMONE: CPT

## 2019-07-05 PROCEDURE — 87070 CULTURE OTHR SPECIMN AEROBIC: CPT

## 2019-07-05 PROCEDURE — 94640 AIRWAY INHALATION TREATMENT: CPT

## 2019-07-05 PROCEDURE — P9047 ALBUMIN (HUMAN), 25%, 50ML: HCPCS | Performed by: INTERNAL MEDICINE

## 2019-07-05 PROCEDURE — 74011000250 HC RX REV CODE- 250: Performed by: INTERNAL MEDICINE

## 2019-07-05 PROCEDURE — 88305 TISSUE EXAM BY PATHOLOGIST: CPT

## 2019-07-05 PROCEDURE — 88112 CYTOPATH CELL ENHANCE TECH: CPT

## 2019-07-05 PROCEDURE — 74011250636 HC RX REV CODE- 250/636: Performed by: INTERNAL MEDICINE

## 2019-07-05 PROCEDURE — 84157 ASSAY OF PROTEIN OTHER: CPT

## 2019-07-05 PROCEDURE — 65270000029 HC RM PRIVATE

## 2019-07-05 PROCEDURE — 0W9G3ZX DRAINAGE OF PERITONEAL CAVITY, PERCUTANEOUS APPROACH, DIAGNOSTIC: ICD-10-PCS | Performed by: PHYSICIAN ASSISTANT

## 2019-07-05 RX ADMIN — CARBOXYMETHYLCELLULOSE SODIUM 1 DROP: 10 GEL OPHTHALMIC at 22:11

## 2019-07-05 RX ADMIN — CARBOXYMETHYLCELLULOSE SODIUM 1 DROP: 10 GEL OPHTHALMIC at 18:46

## 2019-07-05 RX ADMIN — ALBUMIN (HUMAN) 12.5 G: 0.25 INJECTION, SOLUTION INTRAVENOUS at 14:49

## 2019-07-05 RX ADMIN — ERYTHROMYCIN 1 G: 5 OINTMENT OPHTHALMIC at 21:07

## 2019-07-05 RX ADMIN — QUETIAPINE FUMARATE 25 MG: 25 TABLET ORAL at 21:06

## 2019-07-05 RX ADMIN — METOPROLOL TARTRATE 25 MG: 25 TABLET ORAL at 08:27

## 2019-07-05 RX ADMIN — ALBUMIN (HUMAN) 12.5 G: 0.25 INJECTION, SOLUTION INTRAVENOUS at 19:32

## 2019-07-05 RX ADMIN — NIFEDIPINE 30 MG: 30 TABLET, FILM COATED, EXTENDED RELEASE ORAL at 08:26

## 2019-07-05 RX ADMIN — Medication 600 MG: at 08:25

## 2019-07-05 RX ADMIN — MELATONIN TAB 3 MG 3 MG: 3 TAB at 21:06

## 2019-07-05 RX ADMIN — FERROUS SULFATE TAB 325 MG (65 MG ELEMENTAL FE) 325 MG: 325 (65 FE) TAB at 08:25

## 2019-07-05 RX ADMIN — ATORVASTATIN CALCIUM 10 MG: 10 TABLET, FILM COATED ORAL at 08:26

## 2019-07-05 RX ADMIN — PANTOPRAZOLE SODIUM 40 MG: 40 TABLET, DELAYED RELEASE ORAL at 08:25

## 2019-07-05 RX ADMIN — POTASSIUM CHLORIDE 20 MEQ: 20 TABLET, EXTENDED RELEASE ORAL at 08:26

## 2019-07-05 RX ADMIN — CYCLOSPORINE 1 DROP: 0.5 EMULSION OPHTHALMIC at 08:30

## 2019-07-05 RX ADMIN — CYCLOSPORINE 1 DROP: 0.5 EMULSION OPHTHALMIC at 21:08

## 2019-07-05 RX ADMIN — ALBUMIN (HUMAN) 12.5 G: 0.25 INJECTION, SOLUTION INTRAVENOUS at 06:07

## 2019-07-05 RX ADMIN — METOPROLOL TARTRATE 25 MG: 25 TABLET ORAL at 18:45

## 2019-07-05 RX ADMIN — HYDROCORTISONE 5 G: 10 CREAM TOPICAL at 08:27

## 2019-07-05 RX ADMIN — HYDRALAZINE HYDROCHLORIDE 25 MG: 25 TABLET, FILM COATED ORAL at 21:06

## 2019-07-05 RX ADMIN — VITAMIN D 1000 UNITS: 25 TAB ORAL at 08:26

## 2019-07-05 RX ADMIN — IPRATROPIUM BROMIDE AND ALBUTEROL SULFATE 3 ML: .5; 3 SOLUTION RESPIRATORY (INHALATION) at 08:44

## 2019-07-05 RX ADMIN — Medication 1 CAPSULE: at 08:25

## 2019-07-05 RX ADMIN — LEVOTHYROXINE SODIUM 100 MCG: 100 TABLET ORAL at 06:07

## 2019-07-05 RX ADMIN — HYDRALAZINE HYDROCHLORIDE 25 MG: 25 TABLET, FILM COATED ORAL at 08:26

## 2019-07-05 RX ADMIN — FUROSEMIDE 20 MG: 10 INJECTION, SOLUTION INTRAMUSCULAR; INTRAVENOUS at 08:26

## 2019-07-05 RX ADMIN — TRAMADOL HYDROCHLORIDE 25 MG: 50 TABLET, FILM COATED ORAL at 08:26

## 2019-07-05 RX ADMIN — COLCHICINE 0.6 MG: 0.6 CAPSULE ORAL at 08:25

## 2019-07-05 RX ADMIN — FLUTICASONE FUROATE 1 PUFF: 100 POWDER RESPIRATORY (INHALATION) at 08:44

## 2019-07-05 RX ADMIN — HYDROCORTISONE 5 G: 10 CREAM TOPICAL at 18:45

## 2019-07-05 RX ADMIN — CARBOXYMETHYLCELLULOSE SODIUM 1 DROP: 10 GEL OPHTHALMIC at 08:29

## 2019-07-05 RX ADMIN — FUROSEMIDE 20 MG: 10 INJECTION, SOLUTION INTRAMUSCULAR; INTRAVENOUS at 21:06

## 2019-07-05 NOTE — PROCEDURES
INTERVENTIONAL RADIOLOGY POST PROCEDURE NOTE              Paracentesis procedure note    July 5, 2019       1:10 PM     Preoperative Diagnosis:   Ascites. Postoperative Diagnosis:  Same. Attending Physician: Dr Sylvia Sherwood    : Lisette Marshall PA-C    Assistant:  None. Type of Anesthesia:  1% Lidocaine, local    Procedure:  Paracentesis    Description:   Using ultrasound guidance the largest pocket of peritoneal fluid was localized and marked at the Right lower quadrant. The patient was prepped and draped in the usual fashion. 1% Lidocaine was infiltrated locally. A 5 Kyrgyz over the needle catheter was advanced into the peritoneal cavity and clear colored fluid was aspirated. Once fluid was easily aspirated the needle was removed leaving the catheter in place. The catheter was connected to vacuum containers and 4 liters of ascitic fluid was removed. Findings:   4  Liters of ascites removed. Estimated blood Loss:  Minimal     Transfusions: None. Implants: None. Specimen Removed:   Yes     Drains: None.     Complications: None    Condition: Stable    Disposition: Return to nursing unit     Hernandez Glass PA-C

## 2019-07-05 NOTE — ROUTINE PROCESS
Bedside and Verbal shift change report given to [address] [facility] [address] (oncoming nurse) by Kacey Alvarado (offgoing nurse). Report included the following information SBAR, Kardex, Intake/Output, MAR, Recent Results and Cardiac Rhythm SR/SB. 0800 Per ultrasound pt is scheduled for procedure sometime after lunch so hold lunch tray. 0820 Pt resting in bed. Complaints of pain have been addressed. Pt's son is at bedside. Will continue to monitor. 0930 Pt transported  With 2L O2. for procedures and testing. 1210 Pt returned to floor. 1620 Pt resting in bed with no complaints of pain and no change to condition. Will continue to monitor. Bedside and Verbal shift change report given to Brayden Herrera (oncoming nurse) by [facility] (offgoing nurse).  Report included the following information SBAR, Kardex, Intake/Output, MAR, Recent Results and Cardiac Rhythm SR.

## 2019-07-05 NOTE — PROGRESS NOTES
Problem: Falls - Risk of  Goal: *Absence of Falls  Description  Document Fani Girt Fall Risk and appropriate interventions in the flowsheet.   Outcome: Progressing Towards Goal     Problem: Patient Education: Go to Patient Education Activity  Goal: Patient/Family Education  Outcome: Progressing Towards Goal     Problem: Patient Education: Go to Patient Education Activity  Goal: Patient/Family Education  Outcome: Progressing Towards Goal     Problem: Nutrition Deficit  Goal: *Optimize nutritional status  Outcome: Progressing Towards Goal     Problem: Pain  Goal: *Control of Pain  Outcome: Progressing Towards Goal

## 2019-07-05 NOTE — PROGRESS NOTES
Bedside and verbal report given to Wade Abrams RN, with use of kardex. Rounded on pt Q 1 hour throughout shift, no s/s distress nor discomfort noted, call bell in reach.

## 2019-07-05 NOTE — CONSULTS
Interventional Radiology      Consulted for paracentesis. Paracentesis performed 4l removed specimens sent to lab.       Ari Garcia PA-C

## 2019-07-05 NOTE — PROGRESS NOTES
Echocardiogram completed. Patient returned to room with armband in place. Report to follow.     [address] [facility]

## 2019-07-05 NOTE — PROGRESS NOTES
NUTRITION    Nutrition Screen      RECOMMENDATIONS / PLAN:     - Add supplement: Ensure Enlive TID  - Monitor meal and nutrition supplement intake and diet tolerance  - Continue RD inpatient monitoring and evaluation. NUTRITION INTERVENTIONS & DIAGNOSIS:     [x] Meals/snacks: modified composition  [x] Medical food supplement therapy: initiate     Nutrition Diagnosis:   unintended weight loss related to predicted prolonged inadequate oral intake as evidenced by wt loss of 15 lb x 3.5 month PTA    ASSESSMENT:     Per report, patient's family asking about nutrition supplement for pt. Pt with dementia, no family present at time of visit. Has poor meal intake per chart.      Average po intake adequate to meet patients estimated nutritional needs:   [] Yes     [x] No   [] Unable to determine at this time    Diet: DIET CARDIAC Regular      Food Allergies:  None known   Current Appetite:   [] Good     [] Fair     [] Poor     [x] Other: unknown   Appetite/meal intake prior to admission:   [] Good     [] Fair     [] Poor     [x] Other: unknown   Feeding Limitations:  [] Swallowing difficulty    [] Chewing difficulty    [] Other:  Current Meal Intake:   Patient Vitals for the past 100 hrs:   % Diet Eaten   07/05/19 1312 25 %   07/04/19 1749 25 %   07/04/19 1339 20 %   07/04/19 0855 0 %       BM: 7/4  Skin Integrity:  No pressure injury or wound noted  Edema:   [] No     [x] Yes, abdominal ascites   Pertinent Medications: Reviewed: bowel regimen, vitamin D3, ferrous sulfate, citricare HN, lasix, bio K plus, imodium, protonix, KCl    Recent Labs     07/03/19  2315 07/03/19  0545    139   K 4.6 4.5    106   CO2 25 24   GLU 98 73*   BUN 33* 34*   CREA 1.32* 1.36*   CA 8.5 8.8   ALB 2.5*  --    SGOT 49*  --    ALT 27  --        Intake/Output Summary (Last 24 hours) at 7/5/2019 1528  Last data filed at 7/5/2019 1449  Gross per 24 hour   Intake 1290 ml   Output 775 ml   Net 515 ml       Anthropometrics:  Ht Readings from Last 1 Encounters:   07/05/19 4' 8\" (1.422 m)     Last 3 Recorded Weights in this Encounter    07/03/19 2252 07/05/19 0990   Weight: 42.6 kg (94 lb) 42.6 kg (94 lb)     Body mass index is 21.07 kg/m². Weight History:  -15 lb (13.8%) x 3.5 month PTA per chart hx    Weight Metrics 7/5/2019 7/3/2019 6/20/2019 3/20/2019 3/15/2019 10/8/2018 9/16/2018   Weight 94 lb - 91 lb 9.6 oz 109 lb 120 lb 6.4 oz 96 lb 95 lb   BMI - 21.07 kg/m2 20.54 kg/m2 24.44 kg/m2 26.99 kg/m2 21.52 kg/m2 21.3 kg/m2        Admitting Diagnosis: Pleural effusion [J90]  Ascites [R18.8]  Pertinent PMHx:  Endocrine disease, HLD, HTN, hypothyroidism, reflux    Education Needs:        [x] None identified  [] Identified - Not appropriate at this time  []  Identified and addressed - refer to education log  Learning Limitations:   [] None identified  [x] Identified: dementia    Cultural, Orthodoxy & ethnic food preferences:  [x] None identified    [] Identified and addressed     ESTIMATED NUTRITION NEEDS:     Calories: 7090-8749 kcal (25-30 kcal/kg) based on  [x] Actual BW: 43 kg      [] IBW   Protein: 34-43 gm (0.8-1 gm/kg) based on  [x] Actual BW      [] IBW   Fluid: 1 mL/kcal     MONITORING & EVALUATION:     Nutrition Goal(s):   1. Po intake of meals will meet >75% of patient estimated nutritional needs within the next 7 days.   Outcome:  [] Met/Ongoing    [] Progressing towards goal    [] Not progressing towards goal    [x] New/Initial goal     Monitoring:   [x] Food and beverage intake   [x] Diet order   [x] Nutrition-focused physical findings   [x] Treatment/therapy   [] Weight   [] Enteral nutrition intake        Previous Recommendations (for follow-up assessments only):     []   Implemented       []   Not Implemented (RD to address)      [] No Longer Appropriate     [] No Recommendation Made     Discharge Planning:   Cardiac diet  [x] Participated in care planning, discharge planning, & interdisciplinary rounds as appropriate      Ela [address] Fermin Haque, [address] [place] [address]   Pager: [phone]

## 2019-07-05 NOTE — ROUTINE PROCESS
Received report from [address]. Pt AAOx2-self and place, NAD, breathing non labored, on room air, HOB up. IV site clean, dry and intact. Bed at the lowest level on lock position, call bell w/i reach. Bedside and Verbal shift change report given to [facility] [place] (oncoming nurse) by me (offgoing nurse).  Report included the following information SBAR, Kardex, Intake/Output, MAR, Recent Results and Cardiac Rhythm SR.

## 2019-07-05 NOTE — PROGRESS NOTES
[facility] Hospitalist Group  Progress Note    Patient: Samra Mckeon Age: 80 y.o. : 1935 MR#: [id number] SSN: [id number]  Date: 2019    Subjective/24-hour events:     No new issues overnight. Assessment:   Ascites s/p diagnostic paracentesis  Elevated LFTs  Unintentional weight loss  hypothryoidism  Atrial fibrillation  Dementia   Advanced age    Plan:  Paracentesis today - await fluid study results   Hep panel negative. Further recs pending above. Supportive care in interim. Case discussed with:  [x]Patient  []Family  []Nursing  []Case Management  DVT Prophylaxis:  []Lovenox  []Hep SQ  [x]SCDs  []Coumadin   []On Heparin gtt    Objective:   VS:   Visit Vitals  /71   Pulse 60   Temp 97 °F (36.1 °C)   Resp 18   Ht 4' 8\" (1.422 m)   Wt 42.6 kg (94 lb)   SpO2 94%   Breastfeeding? No   BMI 21.07 kg/m²      Tmax/24hrs: Temp (24hrs), Av.9 °F (36.1 °C), Min:96 °F (35.6 °C), Max:97.9 °F (36.6 °C)      Intake/Output Summary (Last 24 hours) at 2019 1055  Last data filed at 2019 0709  Gross per 24 hour   Intake 1200 ml   Output 650 ml   Net 550 ml       General:    Cardiovascular:    Pulmonary:    GI:    Extremities:     Additional:      Labs:    Recent Results (from the past 24 hour(s))   CARDIAC PANEL,(CK, CKMB & TROPONIN)    Collection Time: 19  1:00 PM   Result Value Ref Range    CK 31 26 - 192 U/L    CK - MB <1.0 <3.6 ng/ml    CK-MB Index  0.0 - 4.0 %     CALCULATION NOT PERFORMED WHEN RESULT IS BELOW LINEAR LIMIT    Troponin-I, QT 0.02 0.0 - 0.045 NG/ML   CARDIAC PANEL,(CK, CKMB & TROPONIN)    Collection Time: 19  6:00 PM   Result Value Ref Range    CK 21 (L) 26 - 192 U/L    CK - MB 1.4 <3.6 ng/ml    CK-MB Index 6.7 (H) 0.0 - 4.0 %    Troponin-I, QT 0.02 0.0 - 0.045 NG/ML   TSH 3RD GENERATION    Collection Time: 19  4:41 AM   Result Value Ref Range    TSH 4.85 (H) 0.36 - 3.74 uIU/mL   SED RATE (ESR)    Collection Time: 19  4:41 AM Result Value Ref Range    Sed rate, automated 31 (H) 0 - 30 mm/hr   PROTHROMBIN TIME + INR    Collection Time: 07/05/19  9:00 AM   Result Value Ref Range    Prothrombin time 15.7 (H) 11.5 - 15.2 sec    INR 1.3 (H) 0.8 - 1.2     ECHO ADULT COMPLETE    Collection Time: 07/05/19 10:24 AM   Result Value Ref Range    LA Volume 60.01 22 - 52 mL    Ao Root D 2.51 cm    LVIDd 3.81 (A) 3.9 - 5.3 cm    LVPWd 0.62 0.6 - 0.9 cm    LVIDs 2.90 cm    IVSd 0.98 (A) 0.6 - 0.9 cm    LVOT d 1.85 cm    LVOT Peak Velocity 66.36 cm/s    LVOT Peak Gradient 1.8 mmHg    LVOT VTI 12.32 cm    MV A Jude 30.65 cm/s    MV E Jude 93.68 cm/s    MV E/A 3.10     LA Vol 4C 53.26 (A) 22 - 52 mL    LA Vol 2C 50.65 22 - 52 mL    LA Area 4C 19.1 cm2    LV Mass AL 93.5 67 - 162 g    LV Mass AL Index 66.5 43 - 95 g/m2    Mitral Valve E Wave Deceleration Time 167.6 ms    Triscuspid Valve Regurgitation Peak Gradient 53.1 mmHg    TR Max Velocity 364.43 cm/s    LA Vol Index 46.59 16 - 28 ml/m2    LA Vol Index 39.32 16 - 28 ml/m2    LA Vol Index 41.35 16 - 28 ml/m2       Signed By: Melony Garza MD     July 5, 2019

## 2019-07-05 NOTE — PROGRESS NOTES
[email or web address]  [phone]    Gastroenterology follow up-Progress note    Impression:  1. New onset ascites  - no prior history of liver disease  - Viral hepatitis panel negative  - CARMEL, AFP Pending  2. Elevated LFTs - ALT 27, AST 49, Alk phos 241, Bili 0.6   3. Bilateral pleural effusion  4. Weight loss  5. A-fib  6. Dementia    Plan:  1. Await results of paracentesis, fluid studies, cytology, Echo, doppler      Chief Complaint: New Ascites      Subjective:  Complains of needing to use the bathroom. Some abdominal tenderness/pressure. Seen w/ son at bedside    ROS: Denies any fevers, chills, rash. General: Elderly, no acute distress  Eyes: conjunctiva normal, EOM normal  Cardiovascular: heart normal, intact distal pulses, normal rate and regular rhythm  Pulmonary: breath sounds normal and effort normal  Abdominal: mildly distended, rigid, mild diffuse tenderness, +BS      Patient Active Problem List   Diagnosis Code    Atrial fibrillation (HCC) I48.91    Essential hypertension, benign I10    Dyslipidemia E78.5    Chronic anemia D64.9    Hypothyroidism E03.9    Rash and nonspecific skin eruption R21    Pulmonary hypertension (HCC) I27.20    Malaise and fatigue R53.81, R53.83    Essential hypertension I10    Chronic kidney disease, stage III (moderate) (HCC) N18.3    Cough R05    Pneumonia J18.9    Weakness R53.1    Injury to ligament of cervical spine S13. 4XXA    Hyponatremia E87.1    Fall W19. XXXA    Afib ([address] I48.91    Pleural effusion J90    Ascites R18.8    DNR (do not resuscitate) Z66         Visit Vitals  /71   Pulse 60   Temp 97 °F (36.1 °C)   Resp 18   Ht 4' 8\" (1.422 m)   Wt 42.6 kg (94 lb)   SpO2 94%   Breastfeeding?  No   BMI 21.07 kg/m²           Intake/Output Summary (Last 24 hours) at 7/5/2019 1117  Last data filed at 7/5/2019 0709  Gross per 24 hour   Intake 1200 ml   Output 650 ml   Net 550 ml       CBC w/Diff    Lab Results   Component Value Date/Time    WBC 5.8 07/03/2019 11:15 PM    RBC 3.52 (L) 07/03/2019 11:15 PM    HGB 11.1 (L) 07/03/2019 11:15 PM    HCT 34.4 (L) 07/03/2019 11:15 PM    MCV 97.7 (H) 07/03/2019 11:15 PM    MCH 31.5 07/03/2019 11:15 PM    MCHC 32.3 07/03/2019 11:15 PM    RDW 18.5 (H) 07/03/2019 11:15 PM     07/03/2019 11:15 PM    Lab Results   Component Value Date/Time    GRANS 69 07/03/2019 11:15 PM    LYMPH 9 (L) 07/03/2019 11:15 PM    EOS 10 (H) 07/03/2019 11:15 PM    BANDS 3 06/13/2018 01:19 AM    BASOS 0 07/03/2019 11:15 PM    METAS 2 (H) 06/13/2018 01:19 AM      Basic Metabolic Profile   Recent Labs     07/03/19  2315      K 4.6      CO2 25   BUN 33*   CA 8.5        Hepatic Function    Lab Results   Component Value Date/Time    ALB 2.5 (L) 07/03/2019 11:15 PM    TP 8.8 (H) 07/03/2019 11:15 PM     (H) 07/03/2019 11:15 PM    Lab Results   Component Value Date/Time    SGOT 49 (H) 07/03/2019 11:15 PM          Coags   Recent Labs     07/05/19  0900   PTP 15.7*   INR 1.3*               YULI Vicente  07/05/19, 11:47 AM   [facility]. [email or web address]  Phone: [phone]  Pager: [phone]

## 2019-07-06 LAB — AFP-TM SERPL-MCNC: 1.1 NG/ML (ref 0–8.3)

## 2019-07-06 PROCEDURE — 74011250636 HC RX REV CODE- 250/636: Performed by: INTERNAL MEDICINE

## 2019-07-06 PROCEDURE — 74011250637 HC RX REV CODE- 250/637: Performed by: FAMILY MEDICINE

## 2019-07-06 PROCEDURE — 74011250637 HC RX REV CODE- 250/637: Performed by: INTERNAL MEDICINE

## 2019-07-06 PROCEDURE — P9047 ALBUMIN (HUMAN), 25%, 50ML: HCPCS | Performed by: INTERNAL MEDICINE

## 2019-07-06 PROCEDURE — 65270000029 HC RM PRIVATE

## 2019-07-06 RX ADMIN — METOPROLOL TARTRATE 25 MG: 25 TABLET ORAL at 11:01

## 2019-07-06 RX ADMIN — CARBOXYMETHYLCELLULOSE SODIUM 1 DROP: 10 GEL OPHTHALMIC at 16:00

## 2019-07-06 RX ADMIN — VITAMIN D 1000 UNITS: 25 TAB ORAL at 11:01

## 2019-07-06 RX ADMIN — LORATADINE 10 MG: 10 TABLET ORAL at 11:01

## 2019-07-06 RX ADMIN — PANTOPRAZOLE SODIUM 40 MG: 40 TABLET, DELAYED RELEASE ORAL at 11:01

## 2019-07-06 RX ADMIN — TRAMADOL HYDROCHLORIDE 25 MG: 50 TABLET, FILM COATED ORAL at 11:01

## 2019-07-06 RX ADMIN — HYDRALAZINE HYDROCHLORIDE 25 MG: 25 TABLET, FILM COATED ORAL at 11:01

## 2019-07-06 RX ADMIN — HYDRALAZINE HYDROCHLORIDE 25 MG: 25 TABLET, FILM COATED ORAL at 16:00

## 2019-07-06 RX ADMIN — METOPROLOL TARTRATE 25 MG: 25 TABLET ORAL at 18:00

## 2019-07-06 RX ADMIN — FERROUS SULFATE TAB 325 MG (65 MG ELEMENTAL FE) 325 MG: 325 (65 FE) TAB at 11:01

## 2019-07-06 RX ADMIN — Medication 600 MG: at 11:01

## 2019-07-06 RX ADMIN — ALBUMIN (HUMAN) 12.5 G: 0.25 INJECTION, SOLUTION INTRAVENOUS at 07:42

## 2019-07-06 RX ADMIN — CYCLOSPORINE 1 DROP: 0.5 EMULSION OPHTHALMIC at 09:00

## 2019-07-06 RX ADMIN — FUROSEMIDE 20 MG: 10 INJECTION, SOLUTION INTRAMUSCULAR; INTRAVENOUS at 21:47

## 2019-07-06 RX ADMIN — Medication 237 ML: at 12:00

## 2019-07-06 RX ADMIN — LORAZEPAM 0.5 MG: 0.5 TABLET ORAL at 01:23

## 2019-07-06 RX ADMIN — FUROSEMIDE 20 MG: 10 INJECTION, SOLUTION INTRAMUSCULAR; INTRAVENOUS at 11:02

## 2019-07-06 RX ADMIN — POTASSIUM CHLORIDE 20 MEQ: 20 TABLET, EXTENDED RELEASE ORAL at 11:01

## 2019-07-06 RX ADMIN — CARBOXYMETHYLCELLULOSE SODIUM 1 DROP: 10 GEL OPHTHALMIC at 09:00

## 2019-07-06 RX ADMIN — ALBUMIN (HUMAN) 12.5 G: 0.25 INJECTION, SOLUTION INTRAVENOUS at 21:54

## 2019-07-06 RX ADMIN — ATORVASTATIN CALCIUM 10 MG: 10 TABLET, FILM COATED ORAL at 11:01

## 2019-07-06 RX ADMIN — Medication 1 CAPSULE: at 11:01

## 2019-07-06 RX ADMIN — NIFEDIPINE 30 MG: 30 TABLET, FILM COATED, EXTENDED RELEASE ORAL at 11:01

## 2019-07-06 NOTE — PROGRESS NOTES
[place]  [facility], [address]     [facility], [address] [place] [address]  [email or web address]         Impression/Plan:  1. New onset ascites- suspect due to R heart failure. Neg Doppler HV, PV. Cytology pending ,  Initial studies suggest transudative ascites   AMS-suspect chronic dementia  Elevated AP, c/w \"cardiac cirrhosis\" and R heart failure   Plan:   D/W cardiology   No further GI w/u planned      Chief Complaint:     Lethargic , non verbal  Subjective:      No N/V , eating poorly    Eyes: conjunctiva normal, EOM normal   Neck: ROM normal, supple and trachea normal   Cardiovascular: heart normal, intact distal pulses, normal rate and regular rhythm   Pulmonary/Chest Wall: breath sounds normal and effort normal   Abdominal: appearance normal, bowel sounds normal and soft, non-acute, non-tender                Visit Vitals  /88 (BP 1 Location: Right leg)   Pulse 66   Temp 97.7 °F (36.5 °C)   Resp 16   Ht 4' 8\" (1.422 m)   Wt 42.6 kg (94 lb)   SpO2 96%   Breastfeeding?  No   BMI 21.07 kg/m²           Intake/Output Summary (Last 24 hours) at 7/6/2019 1102  Last data filed at 7/6/2019 0659  Gross per 24 hour   Intake 980 ml   Output 325 ml   Net 655 ml       CBC w/Diff    Lab Results   Component Value Date/Time    WBC 5.8 07/03/2019 11:15 PM    RBC 3.52 (L) 07/03/2019 11:15 PM    HGB 11.1 (L) 07/03/2019 11:15 PM    HCT 34.4 (L) 07/03/2019 11:15 PM    MCV 97.7 (H) 07/03/2019 11:15 PM    MCH 31.5 07/03/2019 11:15 PM    MCHC 32.3 07/03/2019 11:15 PM    RDW 18.5 (H) 07/03/2019 11:15 PM     07/03/2019 11:15 PM    Lab Results   Component Value Date/Time    GRANS 69 07/03/2019 11:15 PM    LYMPH 9 (L) 07/03/2019 11:15 PM    EOS 10 (H) 07/03/2019 11:15 PM    BANDS 3 06/13/2018 01:19 AM    BASOS 0 07/03/2019 11:15 PM    METAS 2 (H) 06/13/2018 01:19 AM      Basic Metabolic Profile   Recent Labs     07/03/19  2315    K 4.6      CO2 25   BUN 33*   CA 8.5        Hepatic Function    Lab Results   Component Value Date/Time    ALB 2.5 (L) 07/03/2019 11:15 PM    TP 8.8 (H) 07/03/2019 11:15 PM     (H) 07/03/2019 11:15 PM    Lab Results   Component Value Date/Time    SGOT 49 (H) 07/03/2019 11:15 PM                    Lor Beasley MD, MD  [facility], [address] [place] [address]  [email or web address]

## 2019-07-06 NOTE — PROGRESS NOTES
Discharge Planning: This writer spoke with this pt's son whose is requesting hospice for this pt, for when she is ready for hospice. Son requested [facility] and informed this writer that he called [facility] and plans to have a meeting with them at this pt's bedside on 7/7/19 in preparation for discharge. Pt's  Son states that the plan is for this pt to return [facility] on Monday as soon as DME is delivered to the facility.     Uploaded referral to [facility] per pt's son verbal request.    [facility]

## 2019-07-06 NOTE — PROGRESS NOTES
[facility] Hospitalist Group  Progress Note    Patient: Meg Calhoun Age: 80 y.o. : 1935 MR#: [id number] SSN: [id number]  Date: 2019    Subjective/24-hour events:     Resting comfortably currently. No chest pain or SOB reported. Assessment:   Acute R heart failure  Ascites s/p diagnostic paracentesis, suspect secondary to CHF  Elevated LFTs  Unintentional weight loss  hypothryoidism  Atrial fibrillation  Dementia   Advanced age    Plan:  Long discussion held with patient's son/DIETER Agustina Manisha) at bedside. Updated on current condition and questions answered. He has expressed interest in consideration of comfort care as he reports noticing continued gradual decline in his mother's overall condition over the course of the past several weeks. She recently had a course of physical and occupational therapy that failed to provide any significant benefit. Patient has also had recent adjustments in cardiac therapy that have also not seemed to offer any significant symptomatic relief. Paracentesis fluid studies discussed and son states that he would not wish to continue to repeatedly subject patient to this procedure should fluid recur. Given patient's age, new decompensation in cardiac status with hepatic involvement and poor overall functional status, she is now likely a candidate for hospice care. Son is agreeable to hospice evaluation and I have placed the order for this to be completed and notified [facility]. Will await eval and final recommendations. Ideal situation, per son, would be for patient to be able to return to [facility] in hospice care.     Case discussed with:  [x]Patient  []Family  []Nursing  [x]Case Management  DVT Prophylaxis:  []Lovenox  []Hep SQ  [x]SCDs  []Coumadin   []On Heparin gtt    Objective:   VS:   Visit Vitals  /88 (BP 1 Location: Right leg)   Pulse 66   Temp 97.7 °F (36.5 °C)   Resp 16   Ht 4' 8\" (1.422 m)   Wt 42.6 kg (94 lb)   SpO2 96% Breastfeeding? No   BMI 21.07 kg/m²      Tmax/24hrs: Temp (24hrs), Av.3 °F (36.8 °C), Min:96.9 °F (36.1 °C), Max:99 °F (37.2 °C)      Intake/Output Summary (Last 24 hours) at 2019 1132  Last data filed at 2019 0659  Gross per 24 hour   Intake 980 ml   Output 325 ml   Net 655 ml       General:    Cardiovascular:    Pulmonary:    GI:    Extremities: Additional:      Labs:    Recent Results (from the past 24 hour(s))   CELL COUNT AND DIFF, BODY FLUID    Collection Time: 19 12:00 PM   Result Value Ref Range    BODY FLUID TYPE ABDOMINAL FLUID      FLUID COLOR YELLOW      FLUID APPEARANCE CLEAR      FLUID RBC CT. 603 (A) NRRE /cu mm    FLUID NUCLEATED CELLS 244 (A) NRRE /cu mm    FLD NEUTROPHILS 23 %    FLD BANDS 0 %    FLD LYMPHS 17 %    FLD MONOCYTES 0 %    FLD EOSINS 0 %    MACROPHAGE 60 %    WBC COMMENTS MESOTHELIAL CELLS     LDH, BODY FLUID    Collection Time: 19 12:01 PM   Result Value Ref Range    Fluid Type: ABDOMINAL FLUID      LD, body fld. 106 U/L   ALBUMIN, FLUID    Collection Time: 19 12:01 PM   Result Value Ref Range    Fluid Type: ABDOMINAL FLUID      Albumin, body fld. 1.6 g/dL   GLUCOSE, FLUID    Collection Time: 19 12:01 PM   Result Value Ref Range    Fluid Type: ABDOMINAL FLUID      Glucose, body fld. 86 MG/DL   PH, FLUID    Collection Time: 19 12:01 PM   Result Value Ref Range    FLUID TYPE(15) ABDOMINAL FLUID      FLUID PH 7.6     PROTEIN TOTAL, FLUID    Collection Time: 19 12:01 PM   Result Value Ref Range    Fluid Type: ABDOMINAL FLUID      Protein total, body fld.  4.8 g/dL       Signed By: Gail Menon MD     2019

## 2019-07-06 NOTE — PROGRESS NOTES
Patient is not available to be assessed at this time.  had a very brief visit only.       [address] [facility].   [phone]

## 2019-07-07 PROCEDURE — 65270000029 HC RM PRIVATE

## 2019-07-07 PROCEDURE — 74011250637 HC RX REV CODE- 250/637: Performed by: FAMILY MEDICINE

## 2019-07-07 PROCEDURE — 77010033678 HC OXYGEN DAILY

## 2019-07-07 PROCEDURE — 74011250636 HC RX REV CODE- 250/636: Performed by: INTERNAL MEDICINE

## 2019-07-07 PROCEDURE — 74011250637 HC RX REV CODE- 250/637: Performed by: INTERNAL MEDICINE

## 2019-07-07 PROCEDURE — P9047 ALBUMIN (HUMAN), 25%, 50ML: HCPCS | Performed by: INTERNAL MEDICINE

## 2019-07-07 RX ORDER — FUROSEMIDE 20 MG/1
20 TABLET ORAL 2 TIMES DAILY
Status: DISCONTINUED | OUTPATIENT
Start: 2019-07-07 | End: 2019-07-08 | Stop reason: HOSPADM

## 2019-07-07 RX ADMIN — METOPROLOL TARTRATE 25 MG: 25 TABLET ORAL at 19:09

## 2019-07-07 RX ADMIN — QUETIAPINE FUMARATE 25 MG: 25 TABLET ORAL at 21:27

## 2019-07-07 RX ADMIN — PANTOPRAZOLE SODIUM 40 MG: 40 TABLET, DELAYED RELEASE ORAL at 14:59

## 2019-07-07 RX ADMIN — FUROSEMIDE 20 MG: 10 INJECTION, SOLUTION INTRAMUSCULAR; INTRAVENOUS at 15:01

## 2019-07-07 RX ADMIN — ALBUMIN (HUMAN) 12.5 G: 0.25 INJECTION, SOLUTION INTRAVENOUS at 07:24

## 2019-07-07 RX ADMIN — CARBOXYMETHYLCELLULOSE SODIUM 1 DROP: 10 GEL OPHTHALMIC at 21:30

## 2019-07-07 RX ADMIN — POTASSIUM CHLORIDE 20 MEQ: 20 TABLET, EXTENDED RELEASE ORAL at 15:00

## 2019-07-07 RX ADMIN — Medication 600 MG: at 15:00

## 2019-07-07 RX ADMIN — CYCLOSPORINE 1 DROP: 0.5 EMULSION OPHTHALMIC at 21:27

## 2019-07-07 RX ADMIN — HYDRALAZINE HYDROCHLORIDE 25 MG: 25 TABLET, FILM COATED ORAL at 14:59

## 2019-07-07 RX ADMIN — METOPROLOL TARTRATE 25 MG: 25 TABLET ORAL at 15:00

## 2019-07-07 RX ADMIN — NIFEDIPINE 30 MG: 30 TABLET, FILM COATED, EXTENDED RELEASE ORAL at 14:59

## 2019-07-07 RX ADMIN — HYDRALAZINE HYDROCHLORIDE 25 MG: 25 TABLET, FILM COATED ORAL at 21:27

## 2019-07-07 RX ADMIN — LEVOTHYROXINE SODIUM 100 MCG: 100 TABLET ORAL at 05:23

## 2019-07-07 RX ADMIN — VITAMIN D 1000 UNITS: 25 TAB ORAL at 15:00

## 2019-07-07 RX ADMIN — CARBOXYMETHYLCELLULOSE SODIUM 1 DROP: 10 GEL OPHTHALMIC at 15:02

## 2019-07-07 RX ADMIN — ALBUMIN (HUMAN) 12.5 G: 0.25 INJECTION, SOLUTION INTRAVENOUS at 02:37

## 2019-07-07 RX ADMIN — ERYTHROMYCIN 1 G: 5 OINTMENT OPHTHALMIC at 21:31

## 2019-07-07 RX ADMIN — MELATONIN TAB 3 MG 3 MG: 3 TAB at 21:27

## 2019-07-07 RX ADMIN — TRAMADOL HYDROCHLORIDE 25 MG: 50 TABLET, FILM COATED ORAL at 15:00

## 2019-07-07 RX ADMIN — ATORVASTATIN CALCIUM 10 MG: 10 TABLET, FILM COATED ORAL at 15:01

## 2019-07-07 RX ADMIN — Medication 1 CAPSULE: at 15:00

## 2019-07-07 RX ADMIN — CYCLOSPORINE 1 DROP: 0.5 EMULSION OPHTHALMIC at 15:01

## 2019-07-07 RX ADMIN — FUROSEMIDE 20 MG: 20 TABLET ORAL at 21:27

## 2019-07-07 RX ADMIN — HYDRALAZINE HYDROCHLORIDE 25 MG: 25 TABLET, FILM COATED ORAL at 19:09

## 2019-07-07 RX ADMIN — FERROUS SULFATE TAB 325 MG (65 MG ELEMENTAL FE) 325 MG: 325 (65 FE) TAB at 15:00

## 2019-07-07 NOTE — ROUTINE PROCESS
Received report from [facility] LYNET[facility]. Pt's asleep, easily awakened, oriented to self only, NAD, breathing non labored, on room air, HOB up. IV site clean, dry and intact. Family member at the bedside. Bed at the lowest level on lock position, call bell w/i reach. Bedside and Verbal shift change report given to [facility] LY[facility] (oncoming nurse) by me (offgoing nurse).  Report included the following information SBAR, Kardex, Intake/Output, MAR, Recent Results and Cardiac Rhythm SR.

## 2019-07-07 NOTE — PROGRESS NOTES
Pt has no PIV-had pulled it out, questioned Dr. Yeison Garcia about leaving PIV out and what to do about the albumin and lasix, orders recvd.

## 2019-07-07 NOTE — ROUTINE PROCESS
Received report from [facility]. Pt AAOx2-self and place , NAD, breathing non labored, on room air, HOB up. Bed at the lowest level on lock position, call bell w/i reach. Bed alarm on. Bedside and Verbal shift change report given to GUSTAVO Kunz (oncoming nurse) by me (offgoing nurse). Report included the following information SBAR, Kardex, Intake/Output, MAR, Recent Results and Cardiac Rhythm SB,SR.

## 2019-07-07 NOTE — PROGRESS NOTES
[facility] Hospitalist Group  Progress Note    Patient: Jeana Cruz Age: 80 y.o. : 1935 MR#: [id number] SSN: [id number]  Date: 2019    Subjective/24-hour events:     No new issues overnight. Assessment:   Acute R heart failure  Ascites s/p diagnostic paracentesis, suspect secondary to CHF  Elevated LFTs  Unintentional weight loss  hypothryoidism  Atrial fibrillation  Dementia   Advanced age    Plan:  Hospice evaluation pending. Plan to transition to hospice care tomorrow if arrangements in place. Case discussed with:  []Patient  []Family  []Nursing  [x]Case Management  DVT Prophylaxis:  []Lovenox  []Hep SQ  [x]SCDs  []Coumadin   []On Heparin gtt    Objective:   VS:   Visit Vitals  /63 (BP 1 Location: Left arm, BP Patient Position: At rest)   Pulse 81   Temp 97.3 °F (36.3 °C)   Resp 16   Ht 4' 8\" (1.422 m)   Wt 39.8 kg (87 lb 11.2 oz)   SpO2 90%   Breastfeeding? No   BMI 19.66 kg/m²      Tmax/24hrs: Temp (24hrs), Av.4 °F (36.3 °C), Min:97 °F (36.1 °C), Max:97.7 °F (36.5 °C)      Intake/Output Summary (Last 24 hours) at 2019 1039  Last data filed at 2019 0659  Gross per 24 hour   Intake 150 ml   Output    Net 150 ml       General:  In NAD. Cardiovascular: RRR. Pulmonary:  Effort nonlabored. GI:  Abdomen soft, NTTP. Extremities:  Warm, no ischemia or edema. Labs:    No results found for this or any previous visit (from the past 24 hour(s)).     Signed By: Ta Weinstein MD     2019

## 2019-07-08 ENCOUNTER — HOME CARE VISIT (OUTPATIENT)
Dept: HOSPICE | Facility: HOSPICE | Age: 84
End: 2019-07-08

## 2019-07-08 ENCOUNTER — HOSPICE ADMISSION (OUTPATIENT)
Dept: HOSPICE | Facility: HOSPICE | Age: 84
End: 2019-07-08

## 2019-07-08 VITALS
TEMPERATURE: 98.2 F | SYSTOLIC BLOOD PRESSURE: 105 MMHG | WEIGHT: 83 LBS | HEART RATE: 60 BPM | HEIGHT: 56 IN | RESPIRATION RATE: 18 BRPM | BODY MASS INDEX: 18.67 KG/M2 | OXYGEN SATURATION: 91 % | DIASTOLIC BLOOD PRESSURE: 58 MMHG

## 2019-07-08 PROCEDURE — 94640 AIRWAY INHALATION TREATMENT: CPT

## 2019-07-08 PROCEDURE — 74011250637 HC RX REV CODE- 250/637: Performed by: FAMILY MEDICINE

## 2019-07-08 PROCEDURE — 74011250637 HC RX REV CODE- 250/637: Performed by: INTERNAL MEDICINE

## 2019-07-08 RX ORDER — SCOLOPAMINE TRANSDERMAL SYSTEM 1 MG/1
1 PATCH, EXTENDED RELEASE TRANSDERMAL
Qty: 10 PATCH | Refills: 0 | Status: SHIPPED | OUTPATIENT
Start: 2019-07-08

## 2019-07-08 RX ORDER — LORAZEPAM 2 MG/ML
1 CONCENTRATE ORAL
Qty: 30 ML | Refills: 0 | Status: SHIPPED | OUTPATIENT
Start: 2019-07-08

## 2019-07-08 RX ORDER — MORPHINE SULFATE 20 MG/ML
10 SOLUTION ORAL
Qty: 30 ML | Refills: 0 | Status: SHIPPED | OUTPATIENT
Start: 2019-07-08 | End: 2019-08-07

## 2019-07-08 RX ORDER — ONDANSETRON HYDROCHLORIDE 8 MG/1
8 TABLET, FILM COATED ORAL
Qty: 15 TAB | Refills: 0 | Status: SHIPPED | OUTPATIENT
Start: 2019-07-08

## 2019-07-08 RX ORDER — FACIAL-BODY WIPES
10 EACH TOPICAL DAILY
Qty: 15 SUPPOSITORY | Refills: 0 | Status: SHIPPED | OUTPATIENT
Start: 2019-07-08

## 2019-07-08 RX ADMIN — HYDROCORTISONE 5 G: 10 CREAM TOPICAL at 10:27

## 2019-07-08 RX ADMIN — CARBOXYMETHYLCELLULOSE SODIUM 1 DROP: 10 GEL OPHTHALMIC at 18:09

## 2019-07-08 RX ADMIN — VITAMIN D 1000 UNITS: 25 TAB ORAL at 10:25

## 2019-07-08 RX ADMIN — LORATADINE 10 MG: 10 TABLET ORAL at 10:25

## 2019-07-08 RX ADMIN — ATORVASTATIN CALCIUM 10 MG: 10 TABLET, FILM COATED ORAL at 10:25

## 2019-07-08 RX ADMIN — HYDRALAZINE HYDROCHLORIDE 25 MG: 25 TABLET, FILM COATED ORAL at 18:09

## 2019-07-08 RX ADMIN — Medication 1 CAPSULE: at 10:25

## 2019-07-08 RX ADMIN — POTASSIUM CHLORIDE 20 MEQ: 20 TABLET, EXTENDED RELEASE ORAL at 10:25

## 2019-07-08 RX ADMIN — LEVOTHYROXINE SODIUM 100 MCG: 100 TABLET ORAL at 05:12

## 2019-07-08 RX ADMIN — HYDRALAZINE HYDROCHLORIDE 25 MG: 25 TABLET, FILM COATED ORAL at 10:26

## 2019-07-08 RX ADMIN — METOPROLOL TARTRATE 25 MG: 25 TABLET ORAL at 18:09

## 2019-07-08 RX ADMIN — Medication 600 MG: at 10:25

## 2019-07-08 RX ADMIN — FUROSEMIDE 20 MG: 20 TABLET ORAL at 18:09

## 2019-07-08 RX ADMIN — FLUTICASONE FUROATE 1 PUFF: 100 POWDER RESPIRATORY (INHALATION) at 08:20

## 2019-07-08 RX ADMIN — TRAMADOL HYDROCHLORIDE 25 MG: 50 TABLET, FILM COATED ORAL at 10:25

## 2019-07-08 RX ADMIN — FUROSEMIDE 20 MG: 20 TABLET ORAL at 10:25

## 2019-07-08 RX ADMIN — NIFEDIPINE 30 MG: 30 TABLET, FILM COATED, EXTENDED RELEASE ORAL at 10:25

## 2019-07-08 RX ADMIN — CARBOXYMETHYLCELLULOSE SODIUM 1 DROP: 10 GEL OPHTHALMIC at 10:26

## 2019-07-08 RX ADMIN — METOPROLOL TARTRATE 25 MG: 25 TABLET ORAL at 10:25

## 2019-07-08 RX ADMIN — CYCLOSPORINE 1 DROP: 0.5 EMULSION OPHTHALMIC at 10:26

## 2019-07-08 RX ADMIN — PANTOPRAZOLE SODIUM 40 MG: 40 TABLET, DELAYED RELEASE ORAL at 10:25

## 2019-07-08 RX ADMIN — FERROUS SULFATE TAB 325 MG (65 MG ELEMENTAL FE) 325 MG: 325 (65 FE) TAB at 10:25

## 2019-07-08 NOTE — ROUTINE PROCESS
Bedside shift change report given to Brayden Herrera (oncoming nurse) by Kendra Cook (offgoing nurse). Report included the following information SBAR, Kardex, Intake/Output, MAR and Recent Results.

## 2019-07-08 NOTE — PROGRESS NOTES
Assuming care  Resting quietly in bed  RR even and unlabored  Pnt stable @ this time  Will continue to monitor  NADN

## 2019-07-08 NOTE — PROGRESS NOTES
MEWS score of 3 d/t HR<60, low BP. Pt's asymptomatic, sleeping, was given Melatonin. . Pt's a DNR status w/ hospice consult. Will continue to monitor.      07/08/19 0028   Vital Signs   Temp 97 °F (36.1 °C)   Pulse (Heart Rate) (!) 58   Resp Rate 18   O2 Sat (%) 94 %   Level of Consciousness Alert   BP (!) 80/40   MAP (Calculated) (!) 53   BP 1 Method Manual   MEWS Score 3

## 2019-07-08 NOTE — DISCHARGE SUMMARY
Discharge Summary    Patient: Stephon Franco MRN: [id number]  CSN: [id number]    YOB: 1935  Age: 80 y.o. Sex: female    DOA: 7/3/2019 LOS:  LOS: 4 days   Discharge Date: 7/8/2019     Admission Diagnoses:   Increased abdominal girth    Discharge Diagnoses:    Severe diastolic heart failure/acute R heart failure with cor pulmonale  Moderate to severe pulmonary HTN  Small, bilateral pleural effusions  Ascites s/p diagnostic paracentesis, secondary to CHF  Elevated LFTs secondary to passive hepatic congestion in setting of CHF  Asymptomatic cholelithiasis  Severe protein calorie malnutrition with unintentional weight loss  Hypothryoidism  Atrial fibrillation  Dementia   Advanced age      Discharge Condition: Stable    PHYSICAL EXAM  Visit Vitals  /58 (BP 1 Location: Right arm, BP Patient Position: At rest)   Pulse 60   Temp 98.2 °F (36.8 °C)   Resp 18   Ht 4' 8\" (1.422 m)   Wt 37.6 kg (83 lb)   SpO2 91%   Breastfeeding? No   BMI 18.61 kg/m²       General: In NAD. HEENT: NCAT. Sclerae anicteric. EOMI. Lungs: No wheezes. Effort nonlabored. Heart:  S1, S2. Rate WNL. Abdomen: Protuberant but soft, NTTP. Extremities: Warm,   Psych:   Calm, no agitation. Neurologic:  Awake and alert, moves extremities spontaneously. Chronically demented. Hospital Course:   See admission H&P for full details of HPI. Patient admitted to a telemetry bed for evaluation of new-onset ascites. GI evaluation was obtained and paracentesis was done for diagnostic purposes. Findings were consistent with CHF as etiology of ascites and it is suspected that elevated LFTs are due to passive hepatic congestion. Given patient's age and new symptomatology with related to CHF, goals of care was held with patient's son/POA, Francine Ambriz.   He expressed that patient has been in slow decline over the course of the past several weeks and that she failed to improve significantly following a recent stint with physical and occupational therapy. He also stated that he would not want repeated paracentesis procedures to remove fluid that is likely to reaccumulate given worsening cardiac status. Decision was made to have patient evaluated for hospice care and she has been accepted. Patient is stable to be discharged back to her assisted living facility today with hospice services in place as arranged. Consults:   Gastroenterology    Significant Diagnostic Studies:   2D echo: Interpretation Summary     · Left Ventricle: Normal cavity size, wall thickness and systolic function (ejection fraction normal). Estimated left ventricular ejection fraction is 56 - 60%. Visually measured ejection fraction. Abnormal left ventricular septal motion. Severe (grade 3) left ventricular diastolic dysfunction. · Right Ventricle: Dilated right ventricle. Reduced systolic function. · Tricuspid Valve: Non-specific thickening of the tricuspid valve. Severe tricuspid valve regurgitation is present. · Pulmonary Artery: Moderate to severe pulmonary hypertension. Pulmonary arterial systolic pressure is 68 mmHg. · Pericardium: There is a large left pleural effusion. · IVC/Hepatic Veins: Severely elevated central venous pressure (15+ mmHg); IVC diameter is larger than 21 mm and collapses less than 50% with respiration. · Left Atrium: Moderately dilated left atrium. · Overall unchanged compared to prior, but echo consistent with right heart failure/ volume overload. Comparison Study Information     Prior Study     There is a prior study available for comparison that was performed on 6/12/2018. As compared to the previous study, there are no significant changes. CT abdomen/pelvis  IMPRESSION:   1. Moderate ascites. This could potentially be related to liver disease. However, no discrete liver lesion or evidence of cirrhosis appreciated.  Consider  correlation with laboratory values.     2.  Bilateral pleural effusions left greater than right.     Cholelithiasis without evidence of cholecystitis. CXR:  IMPRESSION: Left greater than right small pleural effusion with superimposed  left-sided atelectasis or infiltrate. Discharge Medications:     Current Discharge Medication List      START taking these medications    Details   morphine (ROXANOL) 100 mg/5 mL (20 mg/mL) concentrated solution Take 0.5 mL by mouth every two (2) hours as needed for Pain (air hunger) for up to 30 days. Max Daily Amount: 120 mg.  Qty: 30 mL, Refills: 0    Associated Diagnoses: Hospice care patient      scopolamine (TRANSDERM-SCOP) 1 mg over 3 days pt3d 1 Patch by TransDERmal route every seventy-two (72) hours. Qty: 10 Patch, Refills: 0      LORazepam (INTENSOL) 2 mg/mL concentrated solution Take 0.5 mL by mouth every eight (8) hours as needed for Agitation or Anxiety. Max Daily Amount: 3 mg. Qty: 30 mL, Refills: 0    Associated Diagnoses: Hospice care patient      ondansetron hcl (ZOFRAN) 8 mg tablet Take 1 Tab by mouth every eight (8) hours as needed for Nausea. Qty: 15 Tab, Refills: 0      bisacodyl (DULCOLAX, BISACODYL,) 10 mg suppository Insert 10 mg into rectum daily. Qty: 15 Suppository, Refills: 0         CONTINUE these medications which have NOT CHANGED    Details   hydrocortisone (CORTAID) 1 % topical cream Apply 5 g to affected area two (2) times a day. Apply to back, buttocks, stomach for itching due to rash. polyvinyl alcohol-povidon,PF, (REFRESH CLASSIC) 1.4-0.6 % ophthalmic solution Administer 1-2 Drops to both eyes as needed.          STOP taking these medications       NIFEdipine ER (ADALAT CC) 30 mg ER tablet Comments:   Reason for Stopping:         amLODIPine (NORVASC) 2.5 mg tablet Comments:   Reason for Stopping:         mirtazapine (REMERON) 15 mg tablet Comments:   Reason for Stopping:         albuterol (PROVENTIL HFA) 90 mcg/actuation inhaler Comments:   Reason for Stopping:         miconazole (MICOTIN) 2 % topical cream Comments:   Reason for Stopping:         albuterol-ipratropium (DUO-NEB) 2.5 mg-0.5 mg/3 ml nebu Comments:   Reason for Stopping:         beclomethasone dipropionate (QVAR REDIHALER) 40 mcg/actuation HFAb inhaler Comments:   Reason for Stopping:         loperamide (ANTI-DIARRHEA) 2 mg tablet Comments:   Reason for Stopping:         aspirin delayed-release 81 mg tablet Comments:   Reason for Stopping:         benzonatate (TESSALON) 100 mg capsule Comments:   Reason for Stopping:         multivit,tx with iron,minerals (THERAPEUTIC M PO) Comments:   Reason for Stopping:         bisacodyl (LAXATIVE, BISACODYL,) 5 mg tab Comments:   Reason for Stopping:         cyclosporine (RESTASIS MULTIDOSE OP) Comments:   Reason for Stopping:         furosemide (LASIX) 20 mg tablet Comments:   Reason for Stopping:         lactose-reduced food (ENSURE CLEAR PO) Comments:   Reason for Stopping:         ferrous sulfate (IRON) 325 mg (65 mg iron) tablet Comments:   Reason for Stopping:         QUEtiapine (SEROQUEL) 25 mg tablet Comments:   Reason for Stopping:         metoprolol tartrate (LOPRESSOR) 50 mg tablet Comments:   Reason for Stopping:         Cetirizine 10 mg cap Comments:   Reason for Stopping:         LORazepam (ATIVAN) 0.5 mg tablet Comments:   Reason for Stopping:         atorvastatin (LIPITOR) 10 mg tablet Comments:   Reason for Stopping:         colchicine 0.6 mg tablet Comments:   Reason for Stopping:         digoxin (LANOXIN) 0.125 mg tablet Comments:   Reason for Stopping:         erythromycin (ILOTYCIN) ophthalmic ointment Comments:   Reason for Stopping:         potassium chloride (K-DUR, KLOR-CON) 20 mEq tablet Comments:   Reason for Stopping:         hydrALAZINE (APRESOLINE) 25 mg tablet Comments:   Reason for Stopping:         traMADol (ULTRAM) 50 mg tablet Comments:   Reason for Stopping:         carboxymethylcellulose sodium 1 % dlgl Comments:   Reason for Stopping:         melatonin 3 mg tablet Comments: Reason for Stopping:         levothyroxine (SYNTHROID) 100 mcg tablet Comments:   Reason for Stopping:         pantoprazole (PROTONIX) 40 mg tablet Comments:   Reason for Stopping:         CALCIUM CARBONATE (CALCIUM 600 PO) Comments:   Reason for Stopping:         cholecalciferol (VITAMIN D3) 1,000 unit cap Comments:   Reason for Stopping:         B.infantis-B.ani-B.long-B.bifi (PROBIOTIC 4X) 10-15 mg TbEC Comments:   Reason for Stopping:         montelukast (SINGULAIR) 10 mg tablet Comments:   Reason for Stopping:         loratadine (CLARITIN) 10 mg tablet Comments:   Reason for Stopping:               Activity: As tolerated    Diet: Comfort feeding    Disposition: Home hospice    Minutes spent on discharge: >30 minutes spent coordinating this discharge       Seferino Chino 1038.  Holly Baptiste MD

## 2019-07-08 NOTE — PROGRESS NOTES
Discharge Planning  The plan for this pt is to return to [facility] with Hospice Services with [facility]. [facility] has met with this pt's POA(son) who has signed the necessary paperwork and setting up this pt for transfer back to [facility] for [employer]. Pt's son explained to pt the plan for today and the goal of being discharged back to the facility today. [facility] has been called an a message left regarding this pt's readiness, this writer left a message for return call from [address], [facility], for a return call    Transportation set up for this pt with [facility]. 4:30 p.m. Pt's family will be notified. Spoke with [facility] who states they will be ready at 4:30 p.m to receive this pt. Received call from [facility] that furniture had not been delivered and requested that transport time be pushed back to 5:30 p.m. [facility] called and time changed to  5:30 p.m. Pt placed on \"Will Call\" with [facility] this writer has asked Liaison courtney Francis to call unit when DME is delivered. 5:00 p.m. DME delivered and transport set up for 6:30 p.m.     [facility]

## 2019-07-08 NOTE — PROGRESS NOTES
NUTRITION    Nutrition Screen      RECOMMENDATIONS / PLAN:     - Continue current nutrition intervention  - Provide nutrition interventions consistent with plan of care goals  - Continue RD inpatient monitoring and evaluation. NUTRITION INTERVENTIONS & DIAGNOSIS:     [x] Meals/snacks: modified composition  [x] Medical food supplement therapy: continue     Nutrition Diagnosis:   unintended weight loss related to predicted prolonged inadequate oral intake as evidenced by wt loss of 26 lb, 23.9% x 3.5 month PTA    Patient meets criteria for Severe Protein Calorie Malnutrition as evidenced by:   ASPEN Malnutrition Criteria  Acute Illness, Chronic Illness, or Social/Enviornmental: Chronic illness  Weight Loss: Greater than 10% x 6 mos  Muscle Mass: Severe(temples, calves)  ASPEN Malnutrition Score - Chronic Illness: 12  Chronic Illness - Malnutrition Diagnosis: Severe malnutrition. ASSESSMENT:     7/8: Pt asleep at time of visit. Continues to have poor/fair meal intake per chart. Fair po intake of nutrition supplement per chart. Per MD note on 7/6, patient's son is considering transitioning pt to comfort measures and asking about hospice. Pt discussed with MD; plan for discharge home on hospice. 7/5: Per report, patient's family asking about nutrition supplement for pt. Pt with dementia, no family present at time of visit. Has poor meal intake per chart.      Average po intake adequate to meet patients estimated nutritional needs:   [] Yes     [x] No   [] Unable to determine at this time    Diet: DIET CARDIAC Regular  DIET NUTRITIONAL SUPPLEMENTS Breakfast, Lunch, Dinner; ENSURE ENLIVE      Food Allergies:  None known   Current Appetite:   [] Good     [] Fair     [] Poor     [x] Other: unknown   Appetite/meal intake prior to admission:   [] Good     [] Fair     [] Poor     [x] Other: unknown   Feeding Limitations:  [] Swallowing difficulty    [] Chewing difficulty    [] Other:  Current Meal Intake:   Patient Vitals for the past 100 hrs:   % Diet Eaten   07/08/19 0914 120 %   07/07/19 1256 0 %   07/07/19 0907 0 %   07/05/19 1740 40 %   07/05/19 1312 25 %   07/04/19 1749 25 %   07/04/19 1339 20 %   07/04/19 0855 0 %       BM: 7/6  Skin Integrity:  No pressure injury or wound noted  Edema:   [] No     [x] Yes, abdominal ascites   Pertinent Medications: Reviewed: bowel regimen, vitamin D3, ferrous sulfate, citricare HN, lasix, bio K plus, imodium, protonix, KCl    No results for input(s): NA, K, CL, CO2, GLU, BUN, CREA, CA, MG, PHOS, ALB, PREALB, TBIL, SGOT, ALT in the last 72 hours. Intake/Output Summary (Last 24 hours) at 7/8/2019 1144  Last data filed at 7/8/2019 0914  Gross per 24 hour   Intake 200 ml   Output 400 ml   Net -200 ml       Anthropometrics:  Ht Readings from Last 1 Encounters:   07/05/19 4' 8\" (1.422 m)     Last 3 Recorded Weights in this Encounter    07/05/19 0955 07/07/19 0450 07/08/19 0510   Weight: 42.6 kg (94 lb) 39.8 kg (87 lb 11.2 oz) 37.6 kg (83 lb)     Body mass index is 18.61 kg/m².     Weight History:  -26 lb (23.9%) x 3.5 month PTA per chart hx    Weight Metrics 7/8/2019 7/3/2019 6/20/2019 3/20/2019 3/15/2019 10/8/2018 9/16/2018   Weight 83 lb - 91 lb 9.6 oz 109 lb 120 lb 6.4 oz 96 lb 95 lb   BMI - 18.61 kg/m2 20.54 kg/m2 24.44 kg/m2 26.99 kg/m2 21.52 kg/m2 21.3 kg/m2        Admitting Diagnosis: Pleural effusion [J90]  Ascites [R18.8]  Pertinent PMHx:  Endocrine disease, HLD, HTN, hypothyroidism, reflux    Education Needs:        [x] None identified  [] Identified - Not appropriate at this time  []  Identified and addressed - refer to education log  Learning Limitations:   [] None identified  [x] Identified: dementia    Cultural, Samaritan & ethnic food preferences:  [x] None identified    [] Identified and addressed     ESTIMATED NUTRITION NEEDS:     Calories: 3950-1000 kcal (25-30 kcal/kg) based on  [x] Actual BW: 43 kg      [] IBW   Protein: 34-43 gm (0.8-1 gm/kg) based on  [x] Actual BW [] IBW   Fluid: 1 mL/kcal     MONITORING & EVALUATION:     Nutrition Goal(s):   1. Po intake of meals will meet >75% of patient estimated nutritional needs within the next 7 days.   Outcome:  [] Met/Ongoing    [x] Progressing towards goal    [] Not progressing towards goal    [] New/Initial goal     Monitoring:   [x] Food and beverage intake   [x] Diet order   [x] Nutrition-focused physical findings   [x] Treatment/therapy   [] Weight   [] Enteral nutrition intake        Previous Recommendations (for follow-up assessments only):     [x]   Implemented       []   Not Implemented (RD to address)      [] No Longer Appropriate     [] No Recommendation Made     Discharge Planning:   Cardiac diet  [x] Participated in care planning, discharge planning, & interdisciplinary rounds as appropriate      Teddy Ramirez, [address] [place] [address]   Pager: [phone]

## 2019-07-08 NOTE — PROGRESS NOTES
TRANSFER - OUT REPORT:    Verbal report given to Tigist Campbell on [facility]  being transferred to [facility] for routine progression of care       Report consisted of patients Situation, Background, Assessment and   Recommendations(SBAR). Information from the following report(s) SBAR was reviewed with the receiving nurse. Lines:       Opportunity for questions and clarification was provided. Patient transported with:   Patient's belongings    **Glasses are in case in her blue bag inside her sneaker. **

## 2019-07-09 LAB
ANA TITR SER IF: NEGATIVE {TITER}
PLEASE NOTE, 734348: NORMAL

## 2019-07-10 LAB
BACTERIA SPEC CULT: NORMAL
GRAM STN SPEC: NORMAL
GRAM STN SPEC: NORMAL
SERVICE CMNT-IMP: NORMAL

## 2019-09-02 ENCOUNTER — APPOINTMENT (OUTPATIENT)
Dept: GENERAL RADIOLOGY | Age: 84
End: 2019-09-02
Attending: EMERGENCY MEDICINE
Payer: MEDICARE

## 2019-09-02 ENCOUNTER — HOSPITAL ENCOUNTER (EMERGENCY)
Age: 84
Discharge: HOME | End: 2019-09-02
Attending: EMERGENCY MEDICINE
Payer: MEDICARE

## 2019-09-02 VITALS
DIASTOLIC BLOOD PRESSURE: 69 MMHG | RESPIRATION RATE: 13 BRPM | OXYGEN SATURATION: 98 % | SYSTOLIC BLOOD PRESSURE: 151 MMHG | HEART RATE: 92 BPM | BODY MASS INDEX: 18.83 KG/M2 | TEMPERATURE: 97.5 F | WEIGHT: 84 LBS

## 2019-09-02 DIAGNOSIS — S50.02XA CONTUSION OF LEFT ELBOW, INITIAL ENCOUNTER: Primary | ICD-10-CM

## 2019-09-02 PROCEDURE — 73080 X-RAY EXAM OF ELBOW: CPT

## 2019-09-02 PROCEDURE — 73030 X-RAY EXAM OF SHOULDER: CPT

## 2019-09-02 PROCEDURE — 99284 EMERGENCY DEPT VISIT MOD MDM: CPT

## 2019-09-02 NOTE — ED NOTES
Patient POA does not want patient admitted unless she has a broken bone. She is currently on Hospice. Please call [facility] to give report prior to her leaving the ED.

## 2019-09-02 NOTE — ED TRIAGE NOTES
Per EMS, Patient from nursing home, [address] patient fell injuring her left shoulder and elbow. Patient is alert and oriented to baseline which is mildly confused. Current events she is aware of, past events she is confused with.

## 2019-09-03 NOTE — ED NOTES
Discharge instructions reviewed with Miss Ivon Grimes at [facility]. Report included vitals,  Diagnosis.   Follow up with

## 2019-09-03 NOTE — ED NOTES
Nursing home called for an update on patient's condition they have been updated and will call the patient's son and hospice and let them know that she will be discharged shortly as per Dr Marcelino Martinez.

## 2019-09-03 NOTE — ED PROVIDER NOTES
EMERGENCY DEPARTMENT HISTORY AND PHYSICAL EXAM    5:55 PM  Date: 9/2/2019  Patient Name: Lizzie Adamson    History of Presenting Illness     Chief Complaint   Patient presents with   Randolph Fall        History Provided By: Patient    HPI: Lizzie Adamson is a 80 y.o. female with History of anemia, A. fib, hypertension, diastolic heart failure, pulmonary hypertension, dementia, under hospice care, here for fall. History obtained from nursing staffpatient is currently at her baseline mental baseline/dementia, but she did have a ground-level fall and now has left ankle and left shoulder pain. They deny any head injury, patient denies any headache, she does not have to be on any blood thinners. Patient denies any other pain elsewhere. HPI is limited due to active dementia. PCP: Yonny Najera MD    Past History     Past Medical History:  Past Medical History:   Diagnosis Date    Anemia NEC     Arthritis     Atrial fibrillation ([address]     Bursitis of left shoulder     Cardiac cath 01/27/2009    RCA mild. LM patent. pLAD 20%. Very dLAD w/significant tapering. CX mild.  Cardiac stress echo, abnormal 01/05/2009    Positive maximal stress echo by echo & EKG criteria w/o chest pain.  EF >65%. Sm, discrete apical, apical septal hypk.     Cough     Endocrine disease     Hyperlipidemia     Hypertension     Hypothyroidism     Osteopenia     Pulmonary hemorrhage     Pulmonary hypertension (HCC)     Rash and nonspecific skin eruption 9/2012    maculopapular on torso, arms, and upper legs    Reflux     Right hip pain     Right shoulder pain     Stenosing tenosynovitis of finger 04/2015    Bilateral index fingers    Trigger ring finger of right hand     Wears glasses        Past Surgical History:  Past Surgical History:   Procedure Laterality Date    ABDOMEN SURGERY PROC UNLISTED      HX APPENDECTOMY      HX COLECTOMY      partial    HX HYSTERECTOMY      HX MOHS PROCEDURES Right 12/2010    Dr. Nacho Mohr      Two ribs removed    HX TONSILLECTOMY         Family History:  Family History   Problem Relation Age of Onset    Hypertension Mother     Heart Attack Mother     Stroke Father     Heart Disease Sister     Heart Surgery Sister         CABG    Stroke Brother     Heart Attack Brother        Social History:  Social History     Tobacco Use    Smoking status: Never Smoker    Smokeless tobacco: Never Used   Substance Use Topics    Alcohol use: No    Drug use: No       Allergies: Allergies   Allergen Reactions    Xarelto [Rivaroxaban] Other (comments)     Son stated no anticoagulants for patient took Xarelto and had bleeding in lungs.  Hydrochlorothiazide Hives    Penicillins Other (comments)     Mouth sore, upset stomach.  Sulfa (Sulfonamide Antibiotics) Unknown (comments)    Prednisone Other (comments)     Body sweats, hot and cold, up all night       Review of Systems     ROS unable to be completed due to dementia    Physical Exam     Patient Vitals for the past 12 hrs:   Temp Pulse Resp BP SpO2   09/02/19 2030  92 13 151/69    09/02/19 2027 97.5 °F (36.4 °C) 97 18 (!) 153/97 98 %   09/02/19 1748 98.1 °F (36.7 °C) 97 15 (!) 131/98 98 %       Physical Exam   Constitutional: Appears well-developed. Is not diaphoretic. Eyes: Conjunctiva clear, EOMI  Head: Normocephalic and atraumatic. Neck: Normal range of motion. No stridor or tracheal deviation. Cardiovascular: Intact distal pulses. Pulmonary/Chest: Effort normal and no respiratory distress. Abdominal: Non distended. Musculoskeletal: Normal range of motion. Exhibits no tenderness. Skin tears at the left elbow. Neurological: Conversant, alert. Only oriented to self and place. Skin: Skin is warm and dry. Psychiatric: Has a normal mood and affect. Behavior is normal.   Nursing note and vitals reviewed.     Diagnostic Study Results     Labs -  No results found for this or any previous visit (from the past 12 hour(s)). Radiologic Studies -   No results found. Medical Decision Making     ED Course: Progress Notes, Reevaluation, and Consults:    5:55 PM Initial assessment performed. The patients presenting problems have been discussed, and they/their family are in agreement with the care plan formulated and outlined with them. I have encouraged them to ask questions as they arise throughout their visit. 9:48 PM on reassessment patient has no additional symptoms, and her symptoms are currently controlled. She denies any other concerns or questions currently. She is agreeable with the plan for close primary doctor follow-up. Provider Notes (Medical Decision Making): Patient presents after mechanical fall with pain at her left shoulder and left elbow. There does not appear to be any acute fractures on x-rays. Patient on exam has skin tears that were dressed here. Plan will be for her to be discharged back to her this facility for close primary doctor follow-up. Nursing staff have followed-up with the POA of the patient and they agreed the plan. Vital Signs-Reviewed the patient's vital signs. Reviewed pt's pulse ox reading. Records Reviewed: Nursing Notes and Old Medical Records (Time of Review: 9:44 PM)  -I am the first provider for this patient.  -I reviewed the vital signs, available nursing notes, past medical history, past surgical history, family history and social history. Current Outpatient Medications   Medication Sig Dispense Refill    scopolamine (TRANSDERM-SCOP) 1 mg over 3 days pt3d 1 Patch by TransDERmal route every seventy-two (72) hours. 10 Patch 0    LORazepam (INTENSOL) 2 mg/mL concentrated solution Take 0.5 mL by mouth every eight (8) hours as needed for Agitation or Anxiety. Max Daily Amount: 3 mg. 30 mL 0    ondansetron hcl (ZOFRAN) 8 mg tablet Take 1 Tab by mouth every eight (8) hours as needed for Nausea.  15 Tab 0    bisacodyl (DULCOLAX, BISACODYL,) 10 mg suppository Insert 10 mg into rectum daily. 15 Suppository 0    hydrocortisone (CORTAID) 1 % topical cream Apply 5 g to affected area two (2) times a day. Apply to back, buttocks, stomach for itching due to rash.  polyvinyl alcohol-povidon,PF, (REFRESH CLASSIC) 1.4-0.6 % ophthalmic solution Administer 1-2 Drops to both eyes as needed. Clinical Impression     Clinical Impression:   1. Contusion of left elbow, initial encounter        Disposition: [facility] home        This note was dictated utilizing voice recognition software which may lead to typographical errors. I apologize in advance if the situation occurs. If questions arise please do not hesitate to contact me or call our department.

## 2019-09-03 NOTE — DISCHARGE INSTRUCTIONS
Your evaluation today showed no major fractures in your left elbow and shoulder after a fall. This still may be a small fracture so you may be called in the next several days if there is any additional findings upon further review. Please follow up with a doctor as discussed. The evaluation and treatment done today requires that you follow up with a physician for re-evaluation. Not following up could result in chronic pain/disability/injury. Medical problems can change over time and symptoms can get worse or new symptoms can develop over time, therefore, it is important that you follow up as we discussed or return immedediately to the ER. Diseases don't read textbooks and there are limitations to our evaluation and testing, so please immediately return to the ER if you have any concerns. Call the ER if you have any questions about what we discussed. Please visit [employer] for discounts on any prescriptions you may have. Please log in to your account to review your lab work and radiology results with your primary care doctor on follow up. [facility] we are genuinely concerned about your health and comfort. You may be selected to participate in a patient satisfaction survey mailed to your home. We are excited about the opportunity to learn from your experience so we may continue to improve. In striving for the very best we believe good is not good enough, but if you rate us as EXCELLENT in all boxes, we have succeeded. We strive to provide EXCELLENT care to you and your family. We appreciate the opportunity to take care of you, and hope you do well.

## 2019-09-17 ENCOUNTER — HOSPITAL ENCOUNTER (OUTPATIENT)
Dept: LAB | Age: 84
Discharge: HOME | End: 2019-09-17

## 2019-09-17 LAB
HIV1 P24 AG SERPL QL IA: NONREACTIVE
HIV1+2 AB SERPL QL IA: NONREACTIVE

## 2019-09-17 PROCEDURE — 87389 HIV-1 AG W/HIV-1&-2 AB AG IA: CPT

## 2019-09-17 PROCEDURE — 36415 COLL VENOUS BLD VENIPUNCTURE: CPT

## 2019-09-17 PROCEDURE — 86803 HEPATITIS C AB TEST: CPT

## 2019-09-17 PROCEDURE — 87340 HEPATITIS B SURFACE AG IA: CPT

## 2019-09-18 LAB
HBV SURFACE AG SER QL: <0.1 INDEX
HBV SURFACE AG SER QL: NEGATIVE
HCV AB SER IA-ACNC: 0.1 INDEX
HCV AB SERPL QL IA: NEGATIVE
HCV COMMENT,HCGAC: NORMAL
HIV 1+2 AB+HIV1 P24 AG SERPL QL IA: NONREACTIVE
HIV12 RESULT COMMENT, HHIVC: NORMAL